# Patient Record
Sex: FEMALE | Race: WHITE | NOT HISPANIC OR LATINO | Employment: OTHER | URBAN - METROPOLITAN AREA
[De-identification: names, ages, dates, MRNs, and addresses within clinical notes are randomized per-mention and may not be internally consistent; named-entity substitution may affect disease eponyms.]

---

## 2017-02-14 ENCOUNTER — HOSPITAL ENCOUNTER (INPATIENT)
Facility: HOSPITAL | Age: 72
LOS: 13 days | Discharge: HOME | DRG: 956 | End: 2017-02-27
Attending: SURGERY | Admitting: INTERNAL MEDICINE
Payer: MEDICARE

## 2017-02-14 DIAGNOSIS — S72.002A CLOSED LEFT HIP FRACTURE, INITIAL ENCOUNTER: ICD-10-CM

## 2017-02-14 DIAGNOSIS — J98.9 RESPIRATORY DISORDER: ICD-10-CM

## 2017-02-14 DIAGNOSIS — S72.002A CLOSED FRACTURE OF LEFT HIP: ICD-10-CM

## 2017-02-14 DIAGNOSIS — S62.102A LEFT WRIST FRACTURE, CLOSED, INITIAL ENCOUNTER: Primary | ICD-10-CM

## 2017-02-14 DIAGNOSIS — M25.532 LEFT WRIST PAIN: ICD-10-CM

## 2017-02-14 LAB
ALBUMIN SERPL BCP-MCNC: 4.1 G/DL
ALP SERPL-CCNC: 57 U/L
ALT SERPL W/O P-5'-P-CCNC: 28 U/L
ANION GAP SERPL CALC-SCNC: 11 MMOL/L
AST SERPL-CCNC: 29 U/L
BASOPHILS # BLD AUTO: 0.01 K/UL
BASOPHILS NFR BLD: 0.1 %
BILIRUB SERPL-MCNC: 0.5 MG/DL
BILIRUB UR QL STRIP: NEGATIVE
BUN SERPL-MCNC: 18 MG/DL
CALCIUM SERPL-MCNC: 9.3 MG/DL
CHLORIDE SERPL-SCNC: 106 MMOL/L
CLARITY UR: CLEAR
CO2 SERPL-SCNC: 24 MMOL/L
COLOR UR: YELLOW
CREAT SERPL-MCNC: 0.7 MG/DL
DIFFERENTIAL METHOD: ABNORMAL
EOSINOPHIL # BLD AUTO: 0.1 K/UL
EOSINOPHIL NFR BLD: 0.5 %
ERYTHROCYTE [DISTWIDTH] IN BLOOD BY AUTOMATED COUNT: 12.7 %
EST. GFR  (AFRICAN AMERICAN): >60 ML/MIN/1.73 M^2
EST. GFR  (NON AFRICAN AMERICAN): >60 ML/MIN/1.73 M^2
GLUCOSE SERPL-MCNC: 144 MG/DL
GLUCOSE UR QL STRIP: NEGATIVE
HCT VFR BLD AUTO: 39.5 %
HGB BLD-MCNC: 13.7 G/DL
HGB UR QL STRIP: ABNORMAL
KETONES UR QL STRIP: ABNORMAL
LEUKOCYTE ESTERASE UR QL STRIP: NEGATIVE
LYMPHOCYTES # BLD AUTO: 0.8 K/UL
LYMPHOCYTES NFR BLD: 7.7 %
MCH RBC QN AUTO: 31.9 PG
MCHC RBC AUTO-ENTMCNC: 34.7 %
MCV RBC AUTO: 92 FL
MONOCYTES # BLD AUTO: 0.7 K/UL
MONOCYTES NFR BLD: 6.4 %
NEUTROPHILS # BLD AUTO: 9.2 K/UL
NEUTROPHILS NFR BLD: 85.3 %
NITRITE UR QL STRIP: NEGATIVE
PH UR STRIP: >8 [PH] (ref 5–8)
PLATELET # BLD AUTO: 328 K/UL
PMV BLD AUTO: 8.7 FL
POTASSIUM SERPL-SCNC: 3.3 MMOL/L
PROT SERPL-MCNC: 6.7 G/DL
PROT UR QL STRIP: NEGATIVE
RBC # BLD AUTO: 4.3 M/UL
SODIUM SERPL-SCNC: 141 MMOL/L
SP GR UR STRIP: 1.01 (ref 1–1.03)
URN SPEC COLLECT METH UR: ABNORMAL
UROBILINOGEN UR STRIP-ACNC: NEGATIVE EU/DL
WBC # BLD AUTO: 10.79 K/UL

## 2017-02-14 PROCEDURE — 80053 COMPREHEN METABOLIC PANEL: CPT

## 2017-02-14 PROCEDURE — 96365 THER/PROPH/DIAG IV INF INIT: CPT

## 2017-02-14 PROCEDURE — 99285 EMERGENCY DEPT VISIT HI MDM: CPT | Mod: 25

## 2017-02-14 PROCEDURE — 93005 ELECTROCARDIOGRAM TRACING: CPT

## 2017-02-14 PROCEDURE — 63600175 PHARM REV CODE 636 W HCPCS: Performed by: SURGERY

## 2017-02-14 PROCEDURE — 85025 COMPLETE CBC W/AUTO DIFF WBC: CPT

## 2017-02-14 PROCEDURE — 94761 N-INVAS EAR/PLS OXIMETRY MLT: CPT

## 2017-02-14 PROCEDURE — 94760 N-INVAS EAR/PLS OXIMETRY 1: CPT

## 2017-02-14 PROCEDURE — 36415 COLL VENOUS BLD VENIPUNCTURE: CPT

## 2017-02-14 PROCEDURE — 25000003 PHARM REV CODE 250: Performed by: SURGERY

## 2017-02-14 PROCEDURE — 11000001 HC ACUTE MED/SURG PRIVATE ROOM

## 2017-02-14 PROCEDURE — 27000339 *HC DAILY SUPPLY KIT

## 2017-02-14 PROCEDURE — 96375 TX/PRO/DX INJ NEW DRUG ADDON: CPT

## 2017-02-14 PROCEDURE — 81003 URINALYSIS AUTO W/O SCOPE: CPT

## 2017-02-14 PROCEDURE — 63600175 PHARM REV CODE 636 W HCPCS: Performed by: ORTHOPAEDIC SURGERY

## 2017-02-14 PROCEDURE — 51702 INSERT TEMP BLADDER CATH: CPT

## 2017-02-14 RX ORDER — NALOXONE HCL 0.4 MG/ML
1 VIAL (ML) INJECTION ONCE
Status: DISCONTINUED | OUTPATIENT
Start: 2017-02-14 | End: 2017-02-15

## 2017-02-14 RX ORDER — IBUPROFEN 600 MG/1
600 TABLET ORAL EVERY 6 HOURS PRN
Status: DISCONTINUED | OUTPATIENT
Start: 2017-02-14 | End: 2017-02-27 | Stop reason: HOSPADM

## 2017-02-14 RX ORDER — PANTOPRAZOLE SODIUM 40 MG/1
40 TABLET, DELAYED RELEASE ORAL DAILY
Status: DISCONTINUED | OUTPATIENT
Start: 2017-02-15 | End: 2017-02-27 | Stop reason: HOSPADM

## 2017-02-14 RX ORDER — ONDANSETRON 2 MG/ML
4 INJECTION INTRAMUSCULAR; INTRAVENOUS EVERY 12 HOURS PRN
Status: DISCONTINUED | OUTPATIENT
Start: 2017-02-14 | End: 2017-02-14 | Stop reason: SDUPTHER

## 2017-02-14 RX ORDER — ONDANSETRON 2 MG/ML
4 INJECTION INTRAMUSCULAR; INTRAVENOUS EVERY 8 HOURS PRN
Status: DISCONTINUED | OUTPATIENT
Start: 2017-02-14 | End: 2017-02-27 | Stop reason: HOSPADM

## 2017-02-14 RX ORDER — HYDROMORPHONE HCL IN 0.9% NACL 6 MG/30 ML
PATIENT CONTROLLED ANALGESIA SYRINGE INTRAVENOUS CONTINUOUS
Status: DISCONTINUED | OUTPATIENT
Start: 2017-02-14 | End: 2017-02-15

## 2017-02-14 RX ORDER — HYDROMORPHONE HYDROCHLORIDE 1 MG/ML
1 INJECTION, SOLUTION INTRAMUSCULAR; INTRAVENOUS; SUBCUTANEOUS EVERY 4 HOURS PRN
Status: DISCONTINUED | OUTPATIENT
Start: 2017-02-14 | End: 2017-02-14

## 2017-02-14 RX ORDER — SODIUM CHLORIDE 9 MG/ML
INJECTION, SOLUTION INTRAVENOUS CONTINUOUS
Status: DISCONTINUED | OUTPATIENT
Start: 2017-02-14 | End: 2017-02-16

## 2017-02-14 RX ORDER — HYDROMORPHONE HYDROCHLORIDE 1 MG/ML
1 INJECTION, SOLUTION INTRAMUSCULAR; INTRAVENOUS; SUBCUTANEOUS
Status: COMPLETED | OUTPATIENT
Start: 2017-02-14 | End: 2017-02-14

## 2017-02-14 RX ORDER — VENLAFAXINE HYDROCHLORIDE 75 MG/1
75 CAPSULE, EXTENDED RELEASE ORAL DAILY
Status: DISCONTINUED | OUTPATIENT
Start: 2017-02-15 | End: 2017-02-27 | Stop reason: HOSPADM

## 2017-02-14 RX ORDER — HYDROMORPHONE HYDROCHLORIDE 1 MG/ML
1 INJECTION, SOLUTION INTRAMUSCULAR; INTRAVENOUS; SUBCUTANEOUS EVERY 4 HOURS PRN
Status: DISCONTINUED | OUTPATIENT
Start: 2017-02-14 | End: 2017-02-15

## 2017-02-14 RX ORDER — ONDANSETRON 4 MG/1
4 TABLET, ORALLY DISINTEGRATING ORAL
Status: COMPLETED | OUTPATIENT
Start: 2017-02-14 | End: 2017-02-14

## 2017-02-14 RX ADMIN — ONDANSETRON 4 MG: 4 TABLET, ORALLY DISINTEGRATING ORAL at 05:02

## 2017-02-14 RX ADMIN — PROMETHAZINE HYDROCHLORIDE 12.5 MG: 25 INJECTION INTRAMUSCULAR; INTRAVENOUS at 10:02

## 2017-02-14 RX ADMIN — HYDROMORPHONE HYDROCHLORIDE 1 MG: 1 INJECTION, SOLUTION INTRAMUSCULAR; INTRAVENOUS; SUBCUTANEOUS at 04:02

## 2017-02-14 RX ADMIN — PROMETHAZINE HYDROCHLORIDE 12.5 MG: 25 INJECTION INTRAMUSCULAR; INTRAVENOUS at 04:02

## 2017-02-14 RX ADMIN — Medication: at 06:02

## 2017-02-14 NOTE — ED NOTES
DR REEDER ON WAY TO HOSPITAL   WILL GIVE PAIN MED AFTER EVALUATION   PATIENT UNDERSTANDS   AT SIDE

## 2017-02-14 NOTE — ED PROVIDER NOTES
[facility]                                        February 14, 2017                   Chief Complaint  71 y.o. female with Hip Pain (left hip pain and wrist s/p trauma  fall off exercise equipment)     History of Present Illness  Marycarmen Louis presents to the emergency room with left hip and wrist pain today  Patient was on exercise equipment with a slip and fall on her left side prior to arrival  Patient on arrival has an obvious left wrist and hip deformity consistent with fractures  Patient has good distal pulses and capillary refill, neurovascular intact on exam now  Patient denies any head trauma or loss of consciousness with the slip and fall today  Patient on x-ray has a femoral neck fracture and a nondisplaced wrist fracture today    The history is provided by the patient    Past Medical History   -- Anxiety    -- Arthritis    -- Cancer    -- COPD (chronic obstructive pulmonary disease)    -- Degenerative disc disease    -- Depression    -- Emphysema of lung    -- Macrocytosis    -- Meibomianitis    -- Memory loss    -- Osteoporosis    -- Platelet disorder      Past surgical history: Appendectomy and salivary gland surgery  ALLERGIES: Codeine, fentanyl, phenytoin, Vicodin    Review of Systems and Physical Exam     Review of Systems  -- Constitution - no fever, denies fatigue, no weakness, no chills  -- Eyes - no tearing or redness, no visual disturbance  -- Ear, Nose - no tinnitus or earache, no nasal congestion or discharge  -- Mouth,Throat - no sore throat, no toothache, normal voice, normal swallowing  -- Respiratory - denies cough and congestion, no shortness of breath, no SHAIKH  -- Cardiovascular - denies chest pain, no palpitations, denies claudication  -- Gastrointestinal - denies abdominal pain, nausea, vomiting, or diarrhea  -- Musculoskeletal - left hip and wrist pain today  -- Neurological - no headache, denies weakness or seizure; no LOC  -- Skin - denies pallor, rash, or  changes in skin. no hives or welts noted    Vital Signs  -- Her blood pressure is 136/69 and her pulse is 89.   -- Her respiration is 18 and oxygen saturation is 100%.      Physical Exam  -- Nursing note and vitals reviewed  -- Constitutional: Appears well-developed and well-nourished  -- Head: Atraumatic. Normocephalic. No obvious abnormality  -- Eyes: Pupils are equal and reactive to light. Normal conjunctiva and lids  -- Cardiac: Normal rate, regular rhythm and normal heart sounds  -- Pulmonary: Normal respiratory effort, breath sounds clear to auscultation  -- Abdominal: Soft, no tenderness. Normal bowel sounds. Normal liver edge  -- Musculoskeletal: Left wrist pain on exam, left hip deformity and leg shortening  -- Neurological: No focal deficits. Showed good interaction with staff  -- Vascular: Posterior tibial, dorsalis pedis and radial pulses 2+ bilaterally      Emergency Room Course     Treatment and Evaluation  -- Chest x-ray showed no infiltrate and showed no acute pathology   -- Left hip x-ray shows a femoral neck fracture  -- Left wrist x-ray show radius and ulnar fracture  -- The electrolytes drawn in the ER today were within normal limits   -- The CBC drawn in the ER today was within normal limits   -- The EKG findings today were without concerning findings from baseline   -- Velco wrist splint applied by the physician     Diagnosis  -- Left wrist fracture, closed  -- Left wrist pain and Closed left hip fracture, initial encounter     Disposition and Plan  -- Disposition: observation  -- Condition: stable  -- Telemetry monitoring  -- Morning labs  -- SCD hoses  -- Home medications  -- Nausea medication when necessary  -- Pain medication when necessary  -- Hep-Lock IV  -- Routine monitoring  -- Bed rest until otherwise stated  -- Low salt diet  -- Protonix for GERD prophylaxis  -- EKG in the morning  -- Aspirin daily  -- Cardiology consult  -- Paul catheter  -- Surgery in the morning     This note is  dictated on Dragon Natural Speaking word recognition program.  There are word recognition mistakes that are occasionally missed on review.             Santos Chapman MD  02/14/17 0085

## 2017-02-14 NOTE — IP AVS SNAPSHOT
[address] [address]  Phone: [phone]           Patient Discharge Instructions     Our goal is to set you up for success. This packet includes information on your condition, medications, and your home care. It will help you to care for yourself so you don't get sicker and need to go back to the hospital.     Please ask your nurse if you have any questions.        There are many details to remember when preparing to leave the hospital. Here is what you will need to do:    1. Take your medicine. If you are prescribed medications, review your Medication List in the following pages. You may have new medications to  at the pharmacy and others that you'll need to stop taking. Review the instructions for how and when to take your medications. Talk with your doctor or nurses if you are unsure of what to do.     2. Go to your follow-up appointments. Specific follow-up information is listed in the following pages. Your may be contacted by a transition nurse or clinical provider about future appointments. Be sure we have all of the phone numbers to reach you, if needed. Please contact your provider's office if you are unable to make an appointment.     3. Watch for warning signs. Your doctor or nurse will give you detailed warning signs to watch for and when to call for assistance. These instructions may also include educational information about your condition. If you experience any of warning signs to your health, call your doctor.               ** Verify the list of medication(s) below is accurate and up to date. Carry this with you in case of emergency. If your medications have changed, please notify your healthcare provider.             Medication List      START taking these medications        Additional Info                      ibuprofen 600 MG tablet   Commonly known as:  ADVIL,MOTRIN   Quantity:  60 tablet   Refills:  0   Dose:  600 mg    Last time this was  given:  600 mg on 2/26/2017  4:25 PM   Instructions:  Take 1 tablet (600 mg total) by mouth every 6 (six) hours as needed.     Begin Date    AM    Noon    PM    Bedtime       lorazepam 0.5 MG tablet   Commonly known as:  ATIVAN   Quantity:  30 tablet   Refills:  0   Dose:  0.5 mg    Instructions:  Take 1 tablet (0.5 mg total) by mouth every 6 (six) hours as needed for Anxiety.     Begin Date    AM    Noon    PM    Bedtime       rivaroxaban 10 mg Tab   Commonly known as:  XARELTO   Quantity:  13 tablet   Refills:  0   Dose:  10 mg    Last time this was given:  10 mg on 2/17/2017  5:01 PM   Instructions:  Take 1 tablet (10 mg total) by mouth daily with dinner or evening meal.     Begin Date    AM    Noon    PM    Bedtime       teriparatide 20 mcg/dose - 600 mcg/2.4 mL Pnij   Commonly known as:  FORTEO   Quantity:  2.4 mL   Refills:  11   Dose:  20 mcg   Comments:  Failed biphosphenate; now with hip and wrist fracture    Instructions:  Inject 0.08 mLs (20 mcg total) into the skin once daily.     Begin Date    AM    Noon    PM    Bedtime       tramadol 50 mg tablet   Commonly known as:  ULTRAM   Quantity:  60 tablet   Refills:  0   Dose:  50 mg    Last time this was given:  50 mg on 2/27/2017  9:02 AM   Instructions:  Take 1 tablet (50 mg total) by mouth every 6 (six) hours as needed.     Begin Date    AM    Noon    PM    Bedtime         CONTINUE taking these medications        Additional Info                      b complex vitamins capsule   Refills:  0   Dose:  1 capsule    Instructions:  Take 1 capsule by mouth once daily.     Begin Date    AM    Noon    PM    Bedtime       calcium-vitamin D3 500 mg(1,250mg) -200 unit per tablet   Refills:  0   Dose:  1 tablet    Instructions:  Take 1 tablet by mouth 2 (two) times daily with meals.     Begin Date    AM    Noon    PM    Bedtime       glucosamine-chondroitin 500-400 mg tablet   Refills:  0   Dose:  1 tablet    Instructions:  Take 1 tablet by mouth 3 (three) times  daily.     Begin Date    AM    Noon    PM    Bedtime       meclizine 25 mg tablet   Commonly known as:  ANTIVERT   Quantity:  30 tablet   Refills:  1   Dose:  25 mg    Instructions:  Take 1 tablet (25 mg total) by mouth 3 (three) times daily as needed for Dizziness.     Begin Date    AM    Noon    PM    Bedtime       melatonin 5 mg Tab   Refills:  0   Dose:  5 mg    Instructions:  Take 5 mg by mouth 2 (two) times daily.     Begin Date    AM    Noon    PM    Bedtime       multivitamin capsule   Refills:  0   Dose:  1 capsule    Instructions:  Take 1 capsule by mouth once daily.     Begin Date    AM    Noon    PM    Bedtime       ondansetron 8 MG Tbdl   Commonly known as:  ZOFRAN-ODT   Quantity:  6 tablet   Refills:  1    Last time this was given:  4 mg on 2/14/2017  5:06 PM   Instructions:  1 sublingual Q 8 hours, prn     Begin Date    AM    Noon    PM    Bedtime       venlafaxine 75 MG 24 hr capsule   Commonly known as:  EFFEXOR-XR   Quantity:  30 capsule   Refills:  12   Dose:  75 mg    Last time this was given:  75 mg on 2/27/2017  9:02 AM   Instructions:  Take 1 capsule (75 mg total) by mouth once daily.     Begin Date    AM    Noon    PM    Bedtime       SHEREEN-C ORAL   Refills:  0    Instructions:  Take by mouth.     Begin Date    AM    Noon    PM    Bedtime       VITAMIN B-1 100 MG tablet   Refills:  0   Dose:  100 mg   Generic drug:  thiamine    Instructions:  Take 100 mg by mouth once daily.     Begin Date    AM    Noon    PM    Bedtime       VITAMIN B-12 ORAL   Refills:  0    Instructions:  Take by mouth.     Begin Date    AM    Noon    PM    Bedtime         STOP taking these medications     alendronate 70 MG tablet   Commonly known as:  FOSAMAX            Where to Get Your Medications      These medications were sent to [facility], [address] [address]     Phone:  [phone]     ibuprofen 600 MG tablet    rivaroxaban 10 mg Tab     teriparatide 20 mcg/dose - 600 mcg/2.4 mL Pnij         You can get these medications from any pharmacy     Bring a paper prescription for each of these medications     lorazepam 0.5 MG tablet    tramadol 50 mg tablet                  Please bring to all follow up appointments:    1. A copy of your discharge instructions.  2. All medicines you are currently taking in their original bottles.  3. Identification and insurance card.    Please arrive 15 minutes ahead of scheduled appointment time.    Please call 24 hours in advance if you must reschedule your appointment and/or time.        Follow-up Information     Follow up with Shaun Sanchez MD.    Specialty:  Orthopedic Surgery    Why:  Outpatient Services    Contact information:    [address] [place] [address]  [phone]          Follow up with Kurtis Cesar MD.    Specialty:  Cardiology    Why:  please follow-up with Dr. Cesar's clinic on 3/17/17 at 2:10pm. The appointment will be with KM Thomas.    Contact information:    [address] [place] [address]  [phone]          Follow up with Dileep Moulton MD.    Specialty:  Family Medicine    Why:  either 1 week or s/p release from rehab    Contact information:    [address] [place] [address]  [phone]        Referrals     Future Orders    Referral to Home health         Discharge Instructions     Future Orders    Activity as tolerated     Diet general     Questions:    Total calories:      Fat restriction, if any:      Protein restriction, if any:      Na restriction, if any:      Fluid restriction:      Additional restrictions:          Discharge Instructions         Discharge Instructions for Hip Fracture Surgery  You had surgery to repair a hip fracture. The type of surgery you received depends on the location and severity of the fracture. You may have pins, screws, or rods (internal fixation devices) holding the fractured bone in place. Or, some or all of your hip may  have been replaced. You must take care of your new hip as you recover at home or in a rehabilitation facility. This means moving and sitting the way you were taught in the hospital. You must also see your doctor for follow-up visits as you slowly return to activity.  Hip repair for fracture or hip replacement is major surgery. So dont be surprised if it takes a few months before you can move comfortably. Plan to have your family and friends help when you return home.  Home care  · Take your pain medicine exactly as directed.  · Dont drive until your doctor says its OK. And never drive if you are taking opioid pain medicine.  · Wear the support stockings you were given in the hospital. Wear them 24 hours a day for 3 to 4 week(s).  · Make arrangements to have your staples removed 2 weeks after surgery. The staples were used to close the skin incision.  · Get up and carefully move around to relieve pain.  · If you received an artificial hip joint, tell all your healthcare providers--including your dentist--about the joint before any procedure. You may need to take antibiotics before dental work and other medical procedures to reduce the risk of infection.  Incision care  · Avoid infection by washing your hands often. If an infection occurs, it will need to be treated with antibiotics immediately. So call your doctor right away if you think you may have an infection. Symptoms of infection include a fever or leakage of white, greenish, or yellowish-colored fluid from the incision.  · Check your incision daily for redness, tenderness, or drainage.  · Avoid soaking your wound in water (no hot tubs, bathtubs, swimming pools) until your doctor says its OK.  · Wait 7 day(s) after your surgery to begin showering. Then shower as needed. Carefully wash your incision with soap and water. Gently pat it dry. Dont rub the incision, or apply creams or lotions. And sit on a shower stool when you shower to keep from  falling.  Sitting and sleeping  · Dont sit for more than 30 to 45 minutes at a time.  · Use chairs with arms, and sit with your knees slightly lower than your hips. Dont sit on low or sagging chairs or couches.  · Dont lean forward while sitting.  · Dont cross your legs.  · Keep your feet flat on the floor. Dont turn your foot or leg inward. This stresses your hip joint.  · Use an elevated toilet seat for 6 week(s) after surgery.  · Use pillows between your legs when sleeping on your back or on your healthy side.  · Sit on a firm cushion when you ride in a car and avoid sitting too low. Try not to bend your hip too much when getting in and out of the car.  Moving safely  · Dont bend at the hip when you bend over. Dont bend at the waist to put on socks and shoes. And avoid picking up items from the floor.  · Use a cane, crutches, a walker, or handrails until your balance, flexibility, and strength improve. And remember to ask for help from others when you need it.  · Free up your hands so that you can use them to keep balance. Use a sanjuanita pack, apron, or pockets to carry things.  · Follow your doctors orders regarding how much weight to place on the affected leg.  · Do all exercises as instructed.  · Arrange your household to keep the items you need within reach.  · Remove electrical cords, throw rugs, and anything else that may cause you to fall.  · Use nonslip bath mats, grab bars, an elevated toilet seat, and a shower chair in your bathroom.  Follow-up  Make a follow-up appointment as directed by your doctor.     When to seek medical attention  Call 911 right away if you have any of the following:  · Chest pain  · Shortness of breath  Otherwise, call your doctor right away if you have any of the following:  · Increased hip pain  · Pain or swelling of your calf or leg  · Fever above 100.4°F  (38.0°C) or shaking chills  · Excessive swelling, increased redness, or any drainage from the incision  · Swelling,  tenderness, or cramps in your leg   Date Last Reviewed: 11/15/2015  © 4580-5690 [employer]. [address] [place] [address], [place] [address]. All rights reserved. This information is not intended as a substitute for professional medical care. Always follow your healthcare professional's instructions.      The Xarelto needs to be taken for only 28 days (including the days you took it in the hospital). Your first dose was given in the hospital on 2/16/17. This will help prevent blood clots. Please take the medication with a meal. For any questions about this medication please contact Dr. Cesar's office.     Discharge References/Attachments     HIP PRECAUTIONS: NO ADDUCTION (ENGLISH)    HIP PRECAUTIONS: NO INTERNAL ROTATION (ENGLISH)    HIP PRECAUTIONS: LIMIT HIP FLEXION (ENGLISH)    MOBILITY: USING WALKER (ENGLISH)        Primary Diagnosis     Your primary diagnosis was:  Closed Fracture Of Hip      Admission Information     Date & Time Provider Department CSN    2/14/2017  3:25 PM Jose Jaeger MD [facility] [id number]      Care Providers     Provider Role Specialty Primary office phone    Jose Jaeger MD Attending Provider Family Medicine [phone]    Shaun Sanchez MD Consulting Physician  Orthopedic Surgery [phone]    Kurtis Cesar MD Consulting Physician  Cardiology [phone]    Shaun Sanchez MD Surgeon  Orthopedic Surgery [phone]    Reji Melendez MD Consulting Physician  Pulmonary Disease [phone]      Important Medicare Message          Most Recent Value    Important Message from Medicare Regarding Discharge Appeal Rights  Given to patient/caregiver, Explained to patient/caregiver, Signed/date by patient/caregiver yes 02/15/2017 1011      Your Vitals Were     BP                   93/61 (BP Location: Right arm, Patient Position: Lying, BP Method: Automatic)           Recent Lab Values     No lab values to display.      Pending Labs     Order  Current Status    Specimen to Pathology - Surgery Collected (02/15/17 1512)    Specimen to Pathology - Surgery Collected (02/15/17 1513)    Specimen to Pathology - Surgery In process    Prepare RBC 1 Unit Preliminary result      Allergies as of 2/27/2017        Reactions    Codeine     Other reaction(s): Vomiting  Other reaction(s): Headache    Fentanyl     Other reaction(s): Vomiting  Other reaction(s): Nausea    Phenytoin Sodium Extended     Other reaction(s): Vomiting  Other reaction(s): Nausea    Vicodin  [Hydrocodone-acetaminophen]     Other reaction(s): Vomiting      [facility]     [facility] Nurse Care Line - 24/7 Assistance  Unless otherwise directed by your provider, please contact [facility], our nurse care line that is available for 24/7 assistance.     Registered nurses in the [facility] provide clinical advisement, health education, appointment booking, and other advisory services.  Call for this free service at [phone].        Advance Directives     An advance directive is a document which, in the event you are no longer able to make decisions for yourself, tells your healthcare team what kind of treatment you do or do not want to receive, or who you would like to make those decisions for you.  If you do not currently have an advance directive, [facility] encourages you to create one.  For more information call:  [phone]), [phone] ([phone]),  or log on to [email or web address].        Smoking Cessation     If you would like to quit smoking:   You may be eligible for free services if you are a [place] resident and started smoking cigarettes before September 1, 1988.  Call the Smoking Cessation Trust (SCT) toll free at (219) 371-1826 or (092) 575-7643.   Call 5-457-QUIT-NOW if you do not meet the above criteria.            Language Assistance Services     ATTENTION: Language assistance services are available, free of charge. Please call  [phone].      ATENCIÓN: Si habla gina, tiene a weiss disposición servicios gratuitos de asistencia lingüística. Joselo al [phone].     [facility] Ý: N?u b?n nói Ti?ng Vi?t, có các d?ch v? h? tr? ngôn ng? mi?n phí dành cho b?n. G?i s? [phone].        Blood Transfusion Reaction Signs and Symptoms     The blood you have received has been matched for you as carefully as possible. Most patients who receive a blood transfusion do not experience problems. However, there can be a delayed reaction that happens a few weeks after your blood transfusion. Contact your physician immediately if you experience any NEW SYMPTOMS listed below:     Fever greater than 100.4 degrees    Chills   Yellow color to your skin or eyes(Jaundice)   Back pain, chest pain, or pain at the infusion site   Weakness (more than usual)   Discomfort or uneasiness more than usual (Malaise)   Nausea or vomiting   Shortness of breath, wheezing, or coughing   Higher or lower blood pressure than normal   Skin rash, itching, skin redness, or localized swelling (example: hands or feet)   Urinating less than normal   Urine appears reddish or orange and is darker than normal      Remember that some these signs may already exist for you--such as having chronic back pain or high blood pressure. You only need to look for and report to your doctor any new occurrences since your blood transfusion that are of concern.        [employer] Information            [facility] complies with applicable Federal civil rights laws and does not discriminate on the basis of race, color, national origin, age, disability, or sex.

## 2017-02-15 ENCOUNTER — ANESTHESIA EVENT (OUTPATIENT)
Dept: SURGERY | Facility: HOSPITAL | Age: 72
DRG: 956 | End: 2017-02-15
Payer: MEDICARE

## 2017-02-15 ENCOUNTER — ANESTHESIA (OUTPATIENT)
Dept: SURGERY | Facility: HOSPITAL | Age: 72
DRG: 956 | End: 2017-02-15
Payer: MEDICARE

## 2017-02-15 ENCOUNTER — SURGERY (OUTPATIENT)
Age: 72
End: 2017-02-15

## 2017-02-15 PROBLEM — R50.9 FEVER: Status: ACTIVE | Noted: 2017-02-15

## 2017-02-15 PROBLEM — M81.0 OSTEOPOROSIS: Status: ACTIVE | Noted: 2017-02-15

## 2017-02-15 PROBLEM — S72.002A CLOSED FRACTURE OF LEFT HIP: Status: ACTIVE | Noted: 2017-02-15

## 2017-02-15 LAB
ABO + RH BLD: NORMAL
ALBUMIN SERPL BCP-MCNC: 3.6 G/DL
ALP SERPL-CCNC: 45 U/L
ALT SERPL W/O P-5'-P-CCNC: 23 U/L
ANION GAP SERPL CALC-SCNC: 7 MMOL/L
AST SERPL-CCNC: 28 U/L
BASOPHILS # BLD AUTO: 0.01 K/UL
BASOPHILS NFR BLD: 0.1 %
BILIRUB SERPL-MCNC: 1 MG/DL
BLD GP AB SCN CELLS X3 SERPL QL: NORMAL
BUN SERPL-MCNC: 13 MG/DL
CALCIUM SERPL-MCNC: 8.1 MG/DL
CHLORIDE SERPL-SCNC: 102 MMOL/L
CO2 SERPL-SCNC: 27 MMOL/L
CREAT SERPL-MCNC: 0.7 MG/DL
DIFFERENTIAL METHOD: ABNORMAL
EOSINOPHIL # BLD AUTO: 0 K/UL
EOSINOPHIL NFR BLD: 0.3 %
ERYTHROCYTE [DISTWIDTH] IN BLOOD BY AUTOMATED COUNT: 12.8 %
EST. GFR  (AFRICAN AMERICAN): >60 ML/MIN/1.73 M^2
EST. GFR  (NON AFRICAN AMERICAN): >60 ML/MIN/1.73 M^2
GLUCOSE SERPL-MCNC: 120 MG/DL
HCT VFR BLD AUTO: 36 %
HGB BLD-MCNC: 12.1 G/DL
LYMPHOCYTES # BLD AUTO: 0.7 K/UL
LYMPHOCYTES NFR BLD: 6.5 %
MCH RBC QN AUTO: 31.4 PG
MCHC RBC AUTO-ENTMCNC: 33.6 %
MCV RBC AUTO: 94 FL
MONOCYTES # BLD AUTO: 0.9 K/UL
MONOCYTES NFR BLD: 8.4 %
NEUTROPHILS # BLD AUTO: 8.6 K/UL
NEUTROPHILS NFR BLD: 84.7 %
PLATELET # BLD AUTO: 287 K/UL
PMV BLD AUTO: 8.9 FL
POTASSIUM SERPL-SCNC: 3.6 MMOL/L
PROT SERPL-MCNC: 5.9 G/DL
RBC # BLD AUTO: 3.85 M/UL
SODIUM SERPL-SCNC: 136 MMOL/L
WBC # BLD AUTO: 10.2 K/UL

## 2017-02-15 PROCEDURE — 63600175 PHARM REV CODE 636 W HCPCS: Performed by: SURGERY

## 2017-02-15 PROCEDURE — 94761 N-INVAS EAR/PLS OXIMETRY MLT: CPT

## 2017-02-15 PROCEDURE — 0SRS0JZ REPLACEMENT OF LEFT HIP JOINT, FEMORAL SURFACE WITH SYNTHETIC SUBSTITUTE, OPEN APPROACH: ICD-10-PCS | Performed by: ORTHOPAEDIC SURGERY

## 2017-02-15 PROCEDURE — 86920 COMPATIBILITY TEST SPIN: CPT

## 2017-02-15 PROCEDURE — 36000711: Performed by: ORTHOPAEDIC SURGERY

## 2017-02-15 PROCEDURE — 27000494 *HC OXYGEN SET UP (STAH ONLY)

## 2017-02-15 PROCEDURE — 99223 1ST HOSP IP/OBS HIGH 75: CPT | Mod: ,,, | Performed by: FAMILY MEDICINE

## 2017-02-15 PROCEDURE — 01210 ANES OPEN PX HIP JOINT NOS: CPT | Mod: QZ,,P3 | Performed by: NURSE ANESTHETIST, CERTIFIED REGISTERED

## 2017-02-15 PROCEDURE — 25000003 PHARM REV CODE 250: Performed by: NURSE ANESTHETIST, CERTIFIED REGISTERED

## 2017-02-15 PROCEDURE — 94640 AIRWAY INHALATION TREATMENT: CPT

## 2017-02-15 PROCEDURE — 80053 COMPREHEN METABOLIC PANEL: CPT

## 2017-02-15 PROCEDURE — 63600175 PHARM REV CODE 636 W HCPCS: Performed by: FAMILY MEDICINE

## 2017-02-15 PROCEDURE — 71000033 HC RECOVERY, INTIAL HOUR: Performed by: ORTHOPAEDIC SURGERY

## 2017-02-15 PROCEDURE — 37000009 HC ANESTHESIA EA ADD 15 MINS: Performed by: ORTHOPAEDIC SURGERY

## 2017-02-15 PROCEDURE — C1776 JOINT DEVICE (IMPLANTABLE): HCPCS | Performed by: ORTHOPAEDIC SURGERY

## 2017-02-15 PROCEDURE — 36000710: Performed by: ORTHOPAEDIC SURGERY

## 2017-02-15 PROCEDURE — 63600175 PHARM REV CODE 636 W HCPCS: Performed by: ORTHOPAEDIC SURGERY

## 2017-02-15 PROCEDURE — 25000003 PHARM REV CODE 250: Performed by: ORTHOPAEDIC SURGERY

## 2017-02-15 PROCEDURE — C1769 GUIDE WIRE: HCPCS | Performed by: ORTHOPAEDIC SURGERY

## 2017-02-15 PROCEDURE — 85025 COMPLETE CBC W/AUTO DIFF WBC: CPT

## 2017-02-15 PROCEDURE — 11000001 HC ACUTE MED/SURG PRIVATE ROOM

## 2017-02-15 PROCEDURE — 71000039 HC RECOVERY, EACH ADD'L HOUR: Performed by: ORTHOPAEDIC SURGERY

## 2017-02-15 PROCEDURE — 25000003 PHARM REV CODE 250

## 2017-02-15 PROCEDURE — 27000221 HC OXYGEN, UP TO 24 HOURS

## 2017-02-15 PROCEDURE — 86900 BLOOD TYPING SEROLOGIC ABO: CPT

## 2017-02-15 PROCEDURE — 37000008 HC ANESTHESIA 1ST 15 MINUTES: Performed by: ORTHOPAEDIC SURGERY

## 2017-02-15 PROCEDURE — 94760 N-INVAS EAR/PLS OXIMETRY 1: CPT

## 2017-02-15 PROCEDURE — 86901 BLOOD TYPING SEROLOGIC RH(D): CPT

## 2017-02-15 PROCEDURE — 27000496 *HC LARYNGEAL BLADE (STAH ONLY)

## 2017-02-15 PROCEDURE — 0PSJ34Z REPOSITION LEFT RADIUS WITH INTERNAL FIXATION DEVICE, PERCUTANEOUS APPROACH: ICD-10-PCS | Performed by: ORTHOPAEDIC SURGERY

## 2017-02-15 PROCEDURE — 63600175 PHARM REV CODE 636 W HCPCS

## 2017-02-15 PROCEDURE — 88305 TISSUE EXAM BY PATHOLOGIST: CPT | Performed by: PATHOLOGY

## 2017-02-15 PROCEDURE — 27000495 *HC ANESTHESIA START UP SUPPLY KIT (STAH ONLY)

## 2017-02-15 PROCEDURE — 27000339 *HC DAILY SUPPLY KIT

## 2017-02-15 PROCEDURE — 27200120 HC KIT IV START (RUSH ONLY)

## 2017-02-15 DEVICE — IMPLANTABLE DEVICE: Type: IMPLANTABLE DEVICE | Site: HIP | Status: FUNCTIONAL

## 2017-02-15 DEVICE — HEAD FEMORAL 28MM: Type: IMPLANTABLE DEVICE | Site: HIP | Status: FUNCTIONAL

## 2017-02-15 DEVICE — HIP STEM SUMMITT: Type: IMPLANTABLE DEVICE | Site: HIP | Status: FUNCTIONAL

## 2017-02-15 DEVICE — IMPLANTABLE DEVICE: Type: IMPLANTABLE DEVICE | Site: RADIUS | Status: FUNCTIONAL

## 2017-02-15 RX ORDER — PROPOFOL 10 MG/ML
VIAL (ML) INTRAVENOUS
Status: DISCONTINUED | OUTPATIENT
Start: 2017-02-15 | End: 2017-02-15

## 2017-02-15 RX ORDER — SCOLOPAMINE TRANSDERMAL SYSTEM 1 MG/1
PATCH, EXTENDED RELEASE TRANSDERMAL
Status: DISCONTINUED | OUTPATIENT
Start: 2017-02-15 | End: 2017-02-15

## 2017-02-15 RX ORDER — ACETAMINOPHEN 500 MG
500 TABLET ORAL EVERY 6 HOURS PRN
Status: DISCONTINUED | OUTPATIENT
Start: 2017-02-15 | End: 2017-02-27 | Stop reason: HOSPADM

## 2017-02-15 RX ORDER — HYDROMORPHONE HYDROCHLORIDE 1 MG/ML
1 INJECTION, SOLUTION INTRAMUSCULAR; INTRAVENOUS; SUBCUTANEOUS EVERY 4 HOURS PRN
Status: DISCONTINUED | OUTPATIENT
Start: 2017-02-15 | End: 2017-02-20

## 2017-02-15 RX ORDER — MIDAZOLAM HYDROCHLORIDE 1 MG/ML
INJECTION INTRAMUSCULAR; INTRAVENOUS
Status: DISCONTINUED | OUTPATIENT
Start: 2017-02-15 | End: 2017-02-15

## 2017-02-15 RX ORDER — FLUMAZENIL 0.1 MG/ML
INJECTION INTRAVENOUS
Status: DISCONTINUED | OUTPATIENT
Start: 2017-02-15 | End: 2017-02-15

## 2017-02-15 RX ORDER — LIDOCAINE HYDROCHLORIDE 20 MG/ML
INJECTION, SOLUTION EPIDURAL; INFILTRATION; INTRACAUDAL; PERINEURAL
Status: DISCONTINUED | OUTPATIENT
Start: 2017-02-15 | End: 2017-02-15

## 2017-02-15 RX ORDER — KETOROLAC TROMETHAMINE 30 MG/ML
INJECTION, SOLUTION INTRAMUSCULAR; INTRAVENOUS
Status: DISCONTINUED | OUTPATIENT
Start: 2017-02-15 | End: 2017-02-15

## 2017-02-15 RX ORDER — TRAMADOL HYDROCHLORIDE 50 MG/1
50 TABLET ORAL EVERY 6 HOURS PRN
Status: DISCONTINUED | OUTPATIENT
Start: 2017-02-15 | End: 2017-02-27 | Stop reason: HOSPADM

## 2017-02-15 RX ORDER — GLYCOPYRROLATE 0.2 MG/ML
INJECTION INTRAMUSCULAR; INTRAVENOUS
Status: DISCONTINUED | OUTPATIENT
Start: 2017-02-15 | End: 2017-02-15

## 2017-02-15 RX ORDER — ROCURONIUM BROMIDE 10 MG/ML
INJECTION, SOLUTION INTRAVENOUS
Status: DISCONTINUED | OUTPATIENT
Start: 2017-02-15 | End: 2017-02-15

## 2017-02-15 RX ORDER — KETOROLAC TROMETHAMINE 15 MG/ML
15 INJECTION, SOLUTION INTRAMUSCULAR; INTRAVENOUS EVERY 8 HOURS PRN
Status: DISPENSED | OUTPATIENT
Start: 2017-02-15 | End: 2017-02-18

## 2017-02-15 RX ORDER — HYDROMORPHONE HYDROCHLORIDE 2 MG/ML
INJECTION, SOLUTION INTRAMUSCULAR; INTRAVENOUS; SUBCUTANEOUS
Status: DISCONTINUED | OUTPATIENT
Start: 2017-02-15 | End: 2017-02-15

## 2017-02-15 RX ORDER — FUROSEMIDE 10 MG/ML
10 INJECTION INTRAMUSCULAR; INTRAVENOUS ONCE
Status: COMPLETED | OUTPATIENT
Start: 2017-02-15 | End: 2017-02-15

## 2017-02-15 RX ORDER — ACETAMINOPHEN 10 MG/ML
INJECTION, SOLUTION INTRAVENOUS
Status: DISCONTINUED | OUTPATIENT
Start: 2017-02-15 | End: 2017-02-15

## 2017-02-15 RX ORDER — LEVALBUTEROL INHALATION SOLUTION 0.63 MG/3ML
0.63 SOLUTION RESPIRATORY (INHALATION) ONCE
Status: COMPLETED | OUTPATIENT
Start: 2017-02-15 | End: 2017-02-15

## 2017-02-15 RX ORDER — ONDANSETRON 2 MG/ML
4 INJECTION INTRAMUSCULAR; INTRAVENOUS EVERY 12 HOURS PRN
Status: DISCONTINUED | OUTPATIENT
Start: 2017-02-15 | End: 2017-02-15

## 2017-02-15 RX ORDER — FUROSEMIDE 10 MG/ML
30 INJECTION INTRAMUSCULAR; INTRAVENOUS ONCE
Status: COMPLETED | OUTPATIENT
Start: 2017-02-15 | End: 2017-02-15

## 2017-02-15 RX ORDER — SODIUM CHLORIDE 0.9 % (FLUSH) 0.9 %
3 SYRINGE (ML) INJECTION EVERY 8 HOURS
Status: DISCONTINUED | OUTPATIENT
Start: 2017-02-15 | End: 2017-02-27 | Stop reason: HOSPADM

## 2017-02-15 RX ORDER — AMOXICILLIN 250 MG
1 CAPSULE ORAL 2 TIMES DAILY
Status: DISCONTINUED | OUTPATIENT
Start: 2017-02-15 | End: 2017-02-27 | Stop reason: HOSPADM

## 2017-02-15 RX ORDER — PROPOFOL 10 MG/ML
VIAL (ML) INTRAVENOUS CONTINUOUS PRN
Status: DISCONTINUED | OUTPATIENT
Start: 2017-02-15 | End: 2017-02-15

## 2017-02-15 RX ORDER — FENTANYL CITRATE 50 UG/ML
INJECTION, SOLUTION INTRAMUSCULAR; INTRAVENOUS
Status: DISCONTINUED | OUTPATIENT
Start: 2017-02-15 | End: 2017-02-15

## 2017-02-15 RX ORDER — SODIUM CHLORIDE, SODIUM LACTATE, POTASSIUM CHLORIDE, CALCIUM CHLORIDE 600; 310; 30; 20 MG/100ML; MG/100ML; MG/100ML; MG/100ML
INJECTION, SOLUTION INTRAVENOUS CONTINUOUS PRN
Status: DISCONTINUED | OUTPATIENT
Start: 2017-02-15 | End: 2017-02-15

## 2017-02-15 RX ORDER — PHENYLEPHRINE HYDROCHLORIDE 10 MG/ML
INJECTION INTRAVENOUS
Status: DISCONTINUED | OUTPATIENT
Start: 2017-02-15 | End: 2017-02-15

## 2017-02-15 RX ORDER — FAMOTIDINE 20 MG/1
20 TABLET, FILM COATED ORAL 2 TIMES DAILY
Status: DISCONTINUED | OUTPATIENT
Start: 2017-02-15 | End: 2017-02-15

## 2017-02-15 RX ORDER — HYDROCODONE BITARTRATE AND ACETAMINOPHEN 7.5; 325 MG/1; MG/1
1 TABLET ORAL EVERY 6 HOURS PRN
Status: DISCONTINUED | OUTPATIENT
Start: 2017-02-15 | End: 2017-02-15

## 2017-02-15 RX ORDER — NEOSTIGMINE METHYLSULFATE 1 MG/ML
INJECTION, SOLUTION INTRAVENOUS
Status: DISCONTINUED | OUTPATIENT
Start: 2017-02-15 | End: 2017-02-15

## 2017-02-15 RX ORDER — DEXAMETHASONE SODIUM PHOSPHATE 4 MG/ML
INJECTION, SOLUTION INTRA-ARTICULAR; INTRALESIONAL; INTRAMUSCULAR; INTRAVENOUS; SOFT TISSUE
Status: DISCONTINUED | OUTPATIENT
Start: 2017-02-15 | End: 2017-02-15

## 2017-02-15 RX ADMIN — SCOPOLAMINE 1 PATCH: 1 PATCH, EXTENDED RELEASE TRANSDERMAL at 08:02

## 2017-02-15 RX ADMIN — PHENYLEPHRINE HYDROCHLORIDE 100 MCG: 10 INJECTION INTRAVENOUS at 12:02

## 2017-02-15 RX ADMIN — ONDANSETRON 4 MG: 2 INJECTION INTRAMUSCULAR; INTRAVENOUS at 04:02

## 2017-02-15 RX ADMIN — ACETAMINOPHEN 1000 MG: 10 INJECTION, SOLUTION INTRAVENOUS at 01:02

## 2017-02-15 RX ADMIN — HYDROMORPHONE HYDROCHLORIDE 0.2 MG: 2 INJECTION, SOLUTION INTRAMUSCULAR; INTRAVENOUS; SUBCUTANEOUS at 03:02

## 2017-02-15 RX ADMIN — FUROSEMIDE 30 MG: 10 INJECTION, SOLUTION INTRAMUSCULAR; INTRAVENOUS at 03:02

## 2017-02-15 RX ADMIN — MIDAZOLAM HYDROCHLORIDE 2 MG: 1 INJECTION, SOLUTION INTRAMUSCULAR; INTRAVENOUS at 12:02

## 2017-02-15 RX ADMIN — ROCURONIUM BROMIDE 10 MG: 10 INJECTION, SOLUTION INTRAVENOUS at 01:02

## 2017-02-15 RX ADMIN — ROCURONIUM BROMIDE 20 MG: 10 INJECTION, SOLUTION INTRAVENOUS at 12:02

## 2017-02-15 RX ADMIN — SODIUM CHLORIDE, SODIUM LACTATE, POTASSIUM CHLORIDE, AND CALCIUM CHLORIDE: .6; .31; .03; .02 INJECTION, SOLUTION INTRAVENOUS at 02:02

## 2017-02-15 RX ADMIN — DOCUSATE SODIUM AND SENNOSIDES 1 TABLET: 8.6; 5 TABLET, FILM COATED ORAL at 09:02

## 2017-02-15 RX ADMIN — SODIUM CHLORIDE, SODIUM LACTATE, POTASSIUM CHLORIDE, AND CALCIUM CHLORIDE: .6; .31; .03; .02 INJECTION, SOLUTION INTRAVENOUS at 01:02

## 2017-02-15 RX ADMIN — FLUMAZENIL 0.3 MG: 0.1 INJECTION, SOLUTION INTRAVENOUS at 04:02

## 2017-02-15 RX ADMIN — PROPOFOL 150 MG: 10 INJECTION, EMULSION INTRAVENOUS at 12:02

## 2017-02-15 RX ADMIN — SODIUM CHLORIDE, SODIUM LACTATE, POTASSIUM CHLORIDE, AND CALCIUM CHLORIDE: .6; .31; .03; .02 INJECTION, SOLUTION INTRAVENOUS at 12:02

## 2017-02-15 RX ADMIN — NEOSTIGMINE METHYLSULFATE 3 MG: 1 INJECTION INTRAVENOUS at 02:02

## 2017-02-15 RX ADMIN — ROCURONIUM BROMIDE 30 MG: 10 INJECTION, SOLUTION INTRAVENOUS at 12:02

## 2017-02-15 RX ADMIN — FUROSEMIDE 10 MG: 10 INJECTION, SOLUTION INTRAMUSCULAR; INTRAVENOUS at 03:02

## 2017-02-15 RX ADMIN — HYDROMORPHONE HYDROCHLORIDE 1 MG: 1 INJECTION, SOLUTION INTRAMUSCULAR; INTRAVENOUS; SUBCUTANEOUS at 08:02

## 2017-02-15 RX ADMIN — FENTANYL CITRATE 100 MCG: 50 INJECTION, SOLUTION INTRAMUSCULAR; INTRAVENOUS at 12:02

## 2017-02-15 RX ADMIN — PROPOFOL 250 MCG/KG/MIN: 10 INJECTION, EMULSION INTRAVENOUS at 12:02

## 2017-02-15 RX ADMIN — ROCURONIUM BROMIDE 10 MG: 10 INJECTION, SOLUTION INTRAVENOUS at 12:02

## 2017-02-15 RX ADMIN — DEXAMETHASONE SODIUM PHOSPHATE 8 MG: 4 INJECTION, SOLUTION INTRAMUSCULAR; INTRAVENOUS at 12:02

## 2017-02-15 RX ADMIN — LEVALBUTEROL HYDROCHLORIDE 0.63 MG: 0.63 SOLUTION RESPIRATORY (INHALATION) at 04:02

## 2017-02-15 RX ADMIN — LIDOCAINE HYDROCHLORIDE 100 MG: 20 INJECTION, SOLUTION EPIDURAL; INFILTRATION; INTRACAUDAL; PERINEURAL at 12:02

## 2017-02-15 RX ADMIN — GLYCOPYRROLATE 0.4 MG: 0.2 INJECTION INTRAMUSCULAR; INTRAVENOUS at 02:02

## 2017-02-15 RX ADMIN — ONDANSETRON 4 MG: 2 INJECTION INTRAMUSCULAR; INTRAVENOUS at 12:02

## 2017-02-15 RX ADMIN — PROPOFOL 50 MG: 10 INJECTION, EMULSION INTRAVENOUS at 12:02

## 2017-02-15 RX ADMIN — SODIUM CHLORIDE: 0.9 INJECTION, SOLUTION INTRAVENOUS at 05:02

## 2017-02-15 RX ADMIN — KETOROLAC TROMETHAMINE 30 MG: 30 INJECTION, SOLUTION INTRAMUSCULAR; INTRAVENOUS at 03:02

## 2017-02-15 RX ADMIN — FENTANYL CITRATE 50 MCG: 50 INJECTION, SOLUTION INTRAMUSCULAR; INTRAVENOUS at 02:02

## 2017-02-15 NOTE — H&P
[facility] Medicine  Progress Note    Patient Name: Marycarmen Louis  MRN: [id number]  Patient Class: IP- Inpatient   Admission Date: 2/14/2017  Length of Stay: 1 days  Attending Physician: Paul Higgins MD  Primary Care Provider: Willie Santos MD (Inactive)        Subjective:     Principal Problem:Closed fracture of left hip    HPI:  70 y/o female reports she fell off of gym apparatus. Reports falling after missing step. ER w/up shows left wrist fracture and left hip fracture. Reports feeling fine prior to fall yesterday. No fever prior to this am.    Hospital Course:  Currently resting comfortably in bed. Pain controlled with IV dilaudid  Fever overnight       Past Medical History   Diagnosis Date    Anxiety     Arthritis     Cancer     COPD (chronic obstructive pulmonary disease)     Degenerative disc disease     Depression     Emphysema of lung     Macrocytosis     Meibomianitis 11/3/10    Memory loss     Osteoporosis     Platelet disorder        Past Surgical History   Procedure Laterality Date    Appendectomy      Salviary gland removed      Joint replacement  2014     left knee  tka       Review of patient's allergies indicates:   Allergen Reactions    Codeine      Other reaction(s): Vomiting  Other reaction(s): Headache    Fentanyl      Other reaction(s): Vomiting  Other reaction(s): Nausea    Phenytoin sodium extended      Other reaction(s): Vomiting  Other reaction(s): Nausea    Vicodin  [hydrocodone-acetaminophen]      Other reaction(s): Vomiting       No current facility-administered medications on file prior to encounter.      Current Outpatient Prescriptions on File Prior to Encounter   Medication Sig    b complex vitamins capsule Take 1 capsule by mouth once daily.    calcium-vitamin D3 500 mg(1,250mg) -200 unit per tablet Take 1 tablet by mouth 2 (two) times daily with meals.    CYANOCOBALAMIN, VITAMIN B-12, (VITAMIN B-12 ORAL) Take by mouth.  "   melatonin 5 mg Tab Take 5 mg by mouth 2 (two) times daily.    multivitamin capsule Take 1 capsule by mouth once daily.    venlafaxine (EFFEXOR-XR) 75 MG 24 hr capsule Take 1 capsule (75 mg total) by mouth once daily.    alendronate (FOSAMAX) 70 MG tablet Take 1 tablet (70 mg total) by mouth every 7 days.    ASCORBIC ACID (SHEREEN-C ORAL) Take by mouth.    glucosamine-chondroitin 500-400 mg tablet Take 1 tablet by mouth 3 (three) times daily.    meclizine (ANTIVERT) 25 mg tablet Take 1 tablet (25 mg total) by mouth 3 (three) times daily as needed for Dizziness.    ondansetron (ZOFRAN-ODT) 8 MG TbDL 1 sublingual Q 8 hours, prn    thiamine (VITAMIN B-1) 100 MG tablet Take 100 mg by mouth once daily.     Family History     Problem Relation (Age of Onset)    Cancer Father    Heart disease Mother    No Known Problems Sister, Brother, Maternal Aunt, Maternal Uncle, Paternal Aunt, Paternal Uncle, Maternal Grandmother, Maternal Grandfather, Paternal Grandmother, Paternal Grandfather        Social History Main Topics    Smoking status: Former Smoker    Smokeless tobacco: Not on file    Alcohol use Yes      Comment: occasionally    Drug use: Not on file    Sexual activity: Not on file     Review of Systems   Constitutional: Negative for chills, diaphoresis, fatigue and fever.        "i'm burning up"   HENT: Negative for congestion, nosebleeds, postnasal drip, rhinorrhea, sinus pressure, sneezing and sore throat.    Eyes: Negative for visual disturbance.   Respiratory: Negative for cough, shortness of breath and wheezing.         History of smoking 20 years ago; reports chronic cough "most of the time"   Cardiovascular: Negative for chest pain.   Gastrointestinal: Positive for nausea and vomiting. Negative for abdominal distention, abdominal pain, blood in stool, constipation and diarrhea.        Only secondary to pain meds in ER; reports she cannot "take all that"   Genitourinary: Negative for difficulty " urinating, dyspareunia, flank pain, frequency and hematuria.   Musculoskeletal: Negative for arthralgias, back pain and myalgias.   Neurological: Positive for headaches (this am).   Psychiatric/Behavioral: Negative for dysphoric mood (takes antidepressant) and sleep disturbance.     Objective:     Vital Signs (Most Recent):  Temp: 99.2 °F (37.3 °C) (02/15/17 0749)  Pulse: 99 (02/15/17 0749)  Resp: 16 (02/15/17 0749)  BP: 133/63 (02/15/17 0749)  SpO2: 95 % (02/15/17 0749) Vital Signs (24h Range):  Temp:  [96.4 °F (35.8 °C)-100.9 °F (38.3 °C)] 99.2 °F (37.3 °C)  Pulse:  [] 99  Resp:  [16-20] 16  SpO2:  [70 %-100 %] 95 %  BP: (123-150)/(63-78) 133/63     Weight: 59 kg (130 lb)  Body mass index is 22.31 kg/(m^2).    Physical Exam   Constitutional: She is oriented to person, place, and time. She appears well-developed and well-nourished.   HENT:   Head: Normocephalic and atraumatic.   Right Ear: External ear normal.   Left Ear: External ear normal.   Nose: Nose normal.   Mouth/Throat: Oropharynx is clear and moist.   TMs clear  OP clear    Eyes: EOM are normal. Pupils are equal, round, and reactive to light.   Neck: Normal range of motion. Neck supple. No JVD present. No tracheal deviation present. No thyromegaly present.   Cardiovascular: Normal rate, normal heart sounds and intact distal pulses.    No murmur heard.  Pulmonary/Chest: Effort normal and breath sounds normal. No respiratory distress. She has no wheezes. She has no rales. She exhibits no tenderness.   Abdominal: Soft. Bowel sounds are normal. She exhibits no distension and no mass. There is no tenderness. There is no rebound and no guarding.   Musculoskeletal: Normal range of motion. She exhibits no edema or tenderness.   Left wrist and left LE immobilzed   Lymphadenopathy:     She has no cervical adenopathy.   Neurological: She is alert and oriented to person, place, and time. She has normal reflexes. She displays normal reflexes. No cranial nerve  deficit. She exhibits normal muscle tone. Coordination normal.   Skin: Skin is warm and dry. No rash noted. No erythema. No pallor.   Psychiatric: She has a normal mood and affect. Her behavior is normal. Judgment and thought content normal.        Significant Labs:   CBC:   Recent Labs  Lab 02/14/17  1635 02/15/17  0514   WBC 10.79 10.20   HGB 13.7 12.1   HCT 39.5 36.0*    287     CMP:   Recent Labs  Lab 02/14/17  1635 02/15/17  0514    136   K 3.3* 3.6    102   CO2 24 27   * 120*   BUN 18 13   CREATININE 0.7 0.7   CALCIUM 9.3 8.1*   PROT 6.7 5.9*   ALBUMIN 4.1 3.6   BILITOT 0.5 1.0   ALKPHOS 57 45*   AST 29 28   ALT 28 23   ANIONGAP 11 7*   EGFRNONAA >60 >60     Urine Studies:   Recent Labs  Lab 02/14/17  1720   COLORU Yellow   APPEARANCEUA Clear   PHUR >8.0   SPECGRAV 1.015   PROTEINUA Negative   GLUCUA Negative   KETONESU 1+*   BILIRUBINUA Negative   OCCULTUA Trace*   NITRITE Negative   UROBILINOGEN Negative   LEUKOCYTESUR Negative       Significant Imaging: CXR: I have reviewed all pertinent results/findings within the past 24 hours and my personal findings are:  reviewed.     Assessment/Plan:      * Closed fracture of left hip  Ortho following  Surgery today  She is medically cleared      Coronary artery calcification seen on CAT scan  [facility] has cleared her for surgery      Left wrist fracture  Ortho follwing      Osteoporosis  Failed fosamax   Will need forteo x 2 years at discharge       Fever  Normal exam, CXR, U/A. ROS negative.  This fever is presumed to be from her acute fractures      VTE Risk Mitigation         Ordered     Medium Risk of VTE  Once      02/14/17 1744          Dileep Moulton MD  Department of Hospital Medicine   [facility]

## 2017-02-15 NOTE — ANESTHESIA PREPROCEDURE EVALUATION
02/15/2017  Marycarmen Louis is a 71 y.o., female.    [facility] Anesthesia Evaluation    I have reviewed the Patient Summary Reports.    I have reviewed the Nursing Notes.   I have reviewed the Medications.     Review of Systems  Social:  Former Smoker, Social Alcohol Use    Hematology/Oncology:  Hematology Normal   Oncology Normal     EENT/Dental:EENT/Dental Normal   Cardiovascular:   Exercise tolerance: good CAD asymptomatic     Pulmonary:   COPD, mild    Renal/:  Renal/ Normal     Musculoskeletal:   Arthritis     Neurological:  Neurology Normal    Endocrine:  Endocrine Normal    Psych:   Psychiatric History          Physical Exam  General:  Well nourished    Airway/Jaw/Neck:  Airway Findings: Mouth Opening: Normal Tongue: Normal  General Airway Assessment: Adult  Mallampati: II  TM Distance: Normal, at least 6 cm  Jaw/Neck Findings:  Neck ROM: Normal ROM      Dental:  Dental Findings: In tact        Mental Status:  Mental Status Findings:  Cooperative         Anesthesia Plan  Type of Anesthesia, risks & benefits discussed:  Anesthesia Type:  general  Patient's Preference:   Intra-op Monitoring Plan:   Intra-op Monitoring Plan Comments:   Post Op Pain Control Plan:   Post Op Pain Control Plan Comments:   Induction:   IV  Beta Blocker:  Patient is not currently on a Beta-Blocker (No further documentation required).       Informed Consent: Patient understands risks and agrees with Anesthesia plan.  Questions answered.   ASA Score: 3     Day of Surgery Review of History & Physical: I have interviewed and examined the patient. I have reviewed the patient's H&P dated: 2/15/17. There are no significant changes.  H&P update referred to the surgeon.         Ready For Surgery From Anesthesia Perspective.

## 2017-02-15 NOTE — NURSING
Rounding on patient noted to be difficult to arouse. Patient 's PCA continues and patient had received Phenergan . Pulse Ox obtained and noted to be in 70's with ECo2 44. O2 applied at 3l with increase in sats initially to 80's and eventually to 92. Kept patient stimulated and encouraged deep breathing.Dozes back to sleep. PCA pump stopped. B/P stable.

## 2017-02-15 NOTE — PLAN OF CARE
Problem: Patient Care Overview  Goal: Plan of Care Review  Outcome: Ongoing (interventions implemented as appropriate)  The patient did become sedate with her PCA and a dose of Phenergan. Since adjustments made she has been arousable and is still comfortable. She has required repeat nausea meds with relief  Fall risk assessment 13 and precautions are in place . She has been NPO and anticipates surgery later today. Palan of care reviewed with patient and she verbalizes understanding Weld traction maintained as well as Left wrist splint

## 2017-02-15 NOTE — NURSING
Report received. Patient admitted to floor. Hip pain when moving. Oriented to room/ Plan of care reviewed. Family at bedside. Call bell within reach. Patient instructed to call with needs.

## 2017-02-15 NOTE — CONSULTS
This is a 71-year-old white female brought into the emergency room after she sustained a fall while on some exercise equipment injuring her left hip and left wrist. Emergency room evaluation and x-rays show a displaced fracture of the left femoral neck, and a comminuted fracture of the left distal radius including the ulna will styloid. She has a normal neurovascular examination to both left upper and lower extremities. Two years ago she had a left total knee arthroplasty. The left lower extremity is shortened consistent with the injury. Present in the emergency room with her is her . I explained to both of them the necessities of fixing the left hip. They normally live in [place], but have received all of their medical care at an [facility] facility. Informed consent has been obtained. She also has a past history of COPD. She is being admitted to the hospital by the medical service and will be cleared for anticipated bipolar endoprosthetic hemiarthroplasty and close reduction percutaneous distraction pinning of the left distal radius tomorrow.

## 2017-02-15 NOTE — CONSULTS
[facility]  Cardiology  Consult Note    Patient Name: Marycarmen Louis  MRN: [id number]  Admission Date: 2/14/2017  Hospital Length of Stay: 1 days  Code Status: Full Code   Attending Provider: Paul Higgins MD   Consulting Provider: JOSEF Guillen  Primary Care Physician: Willie Santos MD (Inactive)  Principal Problem:<principal problem not specified>    Patient information was obtained from patient and ER records.     Inpatient consult to [facility]  Consult performed by: BIANKA EVANS  Consult ordered by: BARB ROGEL        Subjective:     Chief Complaint:  Lt hip and Lt wrist fracture/surgical risk stratification      HPI: 71 year old w/f with Hx of COPD, CA, osteoporosis, and no significant cardiac history presented to the ED after falling while exercising at the gym. She sustained Lt hip and wrist fracture. She denies any recent chest pain, palpitations, SOB, dizziness or weakness.     Past Medical History   Diagnosis Date    Anxiety     Arthritis     Cancer     COPD (chronic obstructive pulmonary disease)     Degenerative disc disease     Depression     Emphysema of lung     Macrocytosis     Meibomianitis 11/3/10    Memory loss     Osteoporosis     Platelet disorder        Past Surgical History   Procedure Laterality Date    Appendectomy      Salviary gland removed      Joint replacement  2014     left knee  tka       Review of patient's allergies indicates:   Allergen Reactions    Codeine      Other reaction(s): Vomiting  Other reaction(s): Headache    Fentanyl      Other reaction(s): Vomiting  Other reaction(s): Nausea    Phenytoin sodium extended      Other reaction(s): Vomiting  Other reaction(s): Nausea    Vicodin  [hydrocodone-acetaminophen]      Other reaction(s): Vomiting       No current facility-administered medications on file prior to encounter.      Current Outpatient Prescriptions on File Prior to Encounter   Medication Sig    b  complex vitamins capsule Take 1 capsule by mouth once daily.    calcium-vitamin D3 500 mg(1,250mg) -200 unit per tablet Take 1 tablet by mouth 2 (two) times daily with meals.    CYANOCOBALAMIN, VITAMIN B-12, (VITAMIN B-12 ORAL) Take by mouth.    melatonin 5 mg Tab Take 5 mg by mouth 2 (two) times daily.    multivitamin capsule Take 1 capsule by mouth once daily.    venlafaxine (EFFEXOR-XR) 75 MG 24 hr capsule Take 1 capsule (75 mg total) by mouth once daily.    alendronate (FOSAMAX) 70 MG tablet Take 1 tablet (70 mg total) by mouth every 7 days.    ASCORBIC ACID (SHEREEN-C ORAL) Take by mouth.    glucosamine-chondroitin 500-400 mg tablet Take 1 tablet by mouth 3 (three) times daily.    meclizine (ANTIVERT) 25 mg tablet Take 1 tablet (25 mg total) by mouth 3 (three) times daily as needed for Dizziness.    ondansetron (ZOFRAN-ODT) 8 MG TbDL 1 sublingual Q 8 hours, prn    thiamine (VITAMIN B-1) 100 MG tablet Take 100 mg by mouth once daily.     Family History     Problem Relation (Age of Onset)    Cancer Father    Heart disease Mother    No Known Problems Sister, Brother, Maternal Aunt, Maternal Uncle, Paternal Aunt, Paternal Uncle, Maternal Grandmother, Maternal Grandfather, Paternal Grandmother, Paternal Grandfather        Social History Main Topics    Smoking status: Former Smoker    Smokeless tobacco: Not on file    Alcohol use Yes      Comment: occasionally    Drug use: Not on file    Sexual activity: Not on file     ROS   Constitutional: Negative   Eyes: Negative    Respiratory: Negative    Cardiovascular: Negative   Gastrointestinal: Negative   Genitourinary: Negative   Musculoskeletal: Negative   Skin: Negative .   Neurological: Negative   Objective:     Vital Signs (Most Recent):  Temp: 99.2 °F (37.3 °C) (02/15/17 0749)  Pulse: 99 (02/15/17 0749)  Resp: 16 (02/15/17 0749)  BP: 133/63 (02/15/17 0749)  SpO2: 95 % (02/15/17 0749) Vital Signs (24h Range):  Temp:  [96.4 °F (35.8 °C)-100.9 °F (38.3 °C)]  99.2 °F (37.3 °C)  Pulse:  [] 99  Resp:  [16-20] 16  SpO2:  [70 %-100 %] 95 %  BP: (123-150)/(63-78) 133/63     Weight: 59 kg (130 lb)  Body mass index is 22.31 kg/(m^2).    SpO2: 95 %  O2 Device (Oxygen Therapy): nasal cannula      Intake/Output Summary (Last 24 hours) at 02/15/17 0909  Last data filed at 02/15/17 0400   Gross per 24 hour   Intake                0 ml   Output              700 ml   Net             -700 ml       Lines/Drains/Airways     Drain                 Urethral Catheter 02/14/17 1721 Latex less than 1 day          Peripheral Intravenous Line                 Peripheral IV - Single Lumen 02/14/17 1535 Right Hand less than 1 day                Physical Exam   General appearance: alert, appears stated age and cooperative  Head: Normocephalic, without obvious abnormality, atraumatic  Eyes: conjunctivae/corneas clear. PERRL  Neck: no carotid bruit, no JVD and supple, symmetrical, trachea midline  Lungs: clear to auscultation bilaterally, normal respiratory effort  Chest Wall: no tenderness  Heart: regular rate and rhythm, S1, S2 normal, no murmur, click, rub or gallop  Abdomen: soft, non-tender; bowel sounds normal; no masses,  no organomegaly  Extremities: shortening of the LLE, no cyanosis, clubbing, or edema  Pulses: Dorsalis Pedis R: 2+ (normal)/L: 2+ (normal)  Skin: Skin color, texture, turgor normal. No rashes or lesions  Neurologic: Normal mood and affect  Alert and oriented X 3    Significant Labs:   ABG: No results for input(s): PH, PCO2, HCO3, POCSATURATED, BE in the last 48 hours., Blood Culture: No results for input(s): LABBLOO in the last 48 hours., BMP:   Recent Labs  Lab 02/14/17  1635 02/15/17  0514   * 120*    136   K 3.3* 3.6    102   CO2 24 27   BUN 18 13   CREATININE 0.7 0.7   CALCIUM 9.3 8.1*   , CMP   Recent Labs  Lab 02/14/17  1635 02/15/17  0514    136   K 3.3* 3.6    102   CO2 24 27   * 120*   BUN 18 13   CREATININE 0.7 0.7    CALCIUM 9.3 8.1*   PROT 6.7 5.9*   ALBUMIN 4.1 3.6   BILITOT 0.5 1.0   ALKPHOS 57 45*   AST 29 28   ALT 28 23   ANIONGAP 11 7*   ESTGFRAFRICA >60 >60   EGFRNONAA >60 >60   , CBC   Recent Labs  Lab 02/14/17  1635 02/15/17  0514   WBC 10.79 10.20   HGB 13.7 12.1   HCT 39.5 36.0*    287   , INR No results for input(s): INR, PROTIME in the last 48 hours., Lipid Panel No results for input(s): CHOL, HDL, LDLCALC, TRIG, CHOLHDL in the last 48 hours.,   Pathology Results  (Last 10 years)               02/15/17 0514 (A) CBC auto differential (Abnormal) Final result    02/14/17 1635 (A) CBC auto differential (Abnormal) Final result    11/16/16 1112 (A) CBC auto differential (Abnormal) Final result    Narrative:  [facility] Bulk Order       10/21/14 0947 (A) CBC auto differential (Abnormal) Final result    11/29/13 1242 (A) CBC auto differential (Abnormal) Final result    06/01/12 1257 (A) CBC auto differential (Abnormal) Final result    04/07/11 1449 (A) CBC auto differential (Abnormal) Final result    03/04/10 0400 (A) CBC auto differential (Abnormal) Final result    03/03/10 1910 (A) CBC auto differential (Abnormal) Final result       and Troponin No results for input(s): TROPONINI in the last 48 hours.    Significant Imaging: CT scan: CT ABDOMEN PELVIS WITH CONTRAST: No results found for this visit on 02/14/17. and CT ABDOMEN PELVIS WITHOUT CONTRAST: No results found for this visit on 02/14/17., Echocardiogram: 2D echo with color flow doppler: No results found for this or any previous visit., EKG: , Stress Test:  and X-Ray: CXR: X-Ray Chest 1 View (CXR):   Results for orders placed or performed during the hospital encounter of 02/14/17   X-Ray Chest 1 View    Narrative    Chest, 1 view: The lungs are hyperexpanded and demonstrate mild chronic lung changes.  No consolidation or pleural effusions are seen.  The heart is normal in size.  The skeletal structures are osteopenic and show age-appropriate degenerative  change.    Impression     As above.      Electronically signed by: Adriane Urena MD  Date:     02/14/17  Time:    16:24      Assessment and Plan:     Lt hip and Lt wrist fracture due to fall while at fitness center  COPD/remote smoker  Osteoporosis   Note slight tachycardia due to discomfort  Also note past h/o vomiting with Narcotics    PLAN:  Patient is low risk for surgery from a cardiovascular standpoint based on revised Ramirez criteria  Proceed with usual precautions including aggressive DVT prophylaxis    Active Diagnoses:    Diagnosis Date Noted POA    Left wrist fracture [S62.102A] 02/14/2017 Yes      Problems Resolved During this Admission:    Diagnosis Date Noted Date Resolved POA       VTE Risk Mitigation         Ordered     Medium Risk of VTE  Once      02/14/17 1744            JOSEF Guillen  Cardiology   [facility]    I attest that I have personally seen and examined this patient. I have reviewed and discussed the management in detail as outlined above.

## 2017-02-15 NOTE — SUBJECTIVE & OBJECTIVE
Past Medical History   Diagnosis Date    Anxiety     Arthritis     Cancer     COPD (chronic obstructive pulmonary disease)     Degenerative disc disease     Depression     Emphysema of lung     Macrocytosis     Meibomianitis 11/3/10    Memory loss     Osteoporosis     Platelet disorder        Past Surgical History   Procedure Laterality Date    Appendectomy      Salviary gland removed      Joint replacement  2014     left knee  tka       Review of patient's allergies indicates:   Allergen Reactions    Codeine      Other reaction(s): Vomiting  Other reaction(s): Headache    Fentanyl      Other reaction(s): Vomiting  Other reaction(s): Nausea    Phenytoin sodium extended      Other reaction(s): Vomiting  Other reaction(s): Nausea    Vicodin  [hydrocodone-acetaminophen]      Other reaction(s): Vomiting       No current facility-administered medications on file prior to encounter.      Current Outpatient Prescriptions on File Prior to Encounter   Medication Sig    b complex vitamins capsule Take 1 capsule by mouth once daily.    calcium-vitamin D3 500 mg(1,250mg) -200 unit per tablet Take 1 tablet by mouth 2 (two) times daily with meals.    CYANOCOBALAMIN, VITAMIN B-12, (VITAMIN B-12 ORAL) Take by mouth.    melatonin 5 mg Tab Take 5 mg by mouth 2 (two) times daily.    multivitamin capsule Take 1 capsule by mouth once daily.    venlafaxine (EFFEXOR-XR) 75 MG 24 hr capsule Take 1 capsule (75 mg total) by mouth once daily.    alendronate (FOSAMAX) 70 MG tablet Take 1 tablet (70 mg total) by mouth every 7 days.    ASCORBIC ACID (SHEREEN-C ORAL) Take by mouth.    glucosamine-chondroitin 500-400 mg tablet Take 1 tablet by mouth 3 (three) times daily.    meclizine (ANTIVERT) 25 mg tablet Take 1 tablet (25 mg total) by mouth 3 (three) times daily as needed for Dizziness.    ondansetron (ZOFRAN-ODT) 8 MG TbDL 1 sublingual Q 8 hours, prn    thiamine (VITAMIN B-1) 100 MG tablet Take 100 mg by mouth once  "daily.     Family History     Problem Relation (Age of Onset)    Cancer Father    Heart disease Mother    No Known Problems Sister, Brother, Maternal Aunt, Maternal Uncle, Paternal Aunt, Paternal Uncle, Maternal Grandmother, Maternal Grandfather, Paternal Grandmother, Paternal Grandfather        Social History Main Topics    Smoking status: Former Smoker    Smokeless tobacco: Not on file    Alcohol use Yes      Comment: occasionally    Drug use: Not on file    Sexual activity: Not on file     Review of Systems   Constitutional: Negative for chills, diaphoresis, fatigue and fever.        "i'm burning up"   HENT: Negative for congestion, nosebleeds, postnasal drip, rhinorrhea, sinus pressure, sneezing and sore throat.    Eyes: Negative for visual disturbance.   Respiratory: Negative for cough, shortness of breath and wheezing.         History of smoking 20 years ago; reports chronic cough "most of the time"   Cardiovascular: Negative for chest pain.   Gastrointestinal: Positive for nausea and vomiting. Negative for abdominal distention, abdominal pain, blood in stool, constipation and diarrhea.        Only secondary to pain meds in ER; reports she cannot "take all that"   Genitourinary: Negative for difficulty urinating, dyspareunia, flank pain, frequency and hematuria.   Musculoskeletal: Negative for arthralgias, back pain and myalgias.   Neurological: Positive for headaches (this am).   Psychiatric/Behavioral: Negative for dysphoric mood (takes antidepressant) and sleep disturbance.     Objective:     Vital Signs (Most Recent):  Temp: 99.2 °F (37.3 °C) (02/15/17 0749)  Pulse: 99 (02/15/17 0749)  Resp: 16 (02/15/17 0749)  BP: 133/63 (02/15/17 0749)  SpO2: 95 % (02/15/17 0749) Vital Signs (24h Range):  Temp:  [96.4 °F (35.8 °C)-100.9 °F (38.3 °C)] 99.2 °F (37.3 °C)  Pulse:  [] 99  Resp:  [16-20] 16  SpO2:  [70 %-100 %] 95 %  BP: (123-150)/(63-78) 133/63     Weight: 59 kg (130 lb)  Body mass index is 22.31 " kg/(m^2).    Physical Exam   Constitutional: She is oriented to person, place, and time. She appears well-developed and well-nourished.   HENT:   Head: Normocephalic and atraumatic.   Right Ear: External ear normal.   Left Ear: External ear normal.   Nose: Nose normal.   Mouth/Throat: Oropharynx is clear and moist.   TMs clear  OP clear    Eyes: EOM are normal. Pupils are equal, round, and reactive to light.   Neck: Normal range of motion. Neck supple. No JVD present. No tracheal deviation present. No thyromegaly present.   Cardiovascular: Normal rate, normal heart sounds and intact distal pulses.    No murmur heard.  Pulmonary/Chest: Effort normal and breath sounds normal. No respiratory distress. She has no wheezes. She has no rales. She exhibits no tenderness.   Abdominal: Soft. Bowel sounds are normal. She exhibits no distension and no mass. There is no tenderness. There is no rebound and no guarding.   Musculoskeletal: Normal range of motion. She exhibits no edema or tenderness.   Left wrist and left LE immobilzed   Lymphadenopathy:     She has no cervical adenopathy.   Neurological: She is alert and oriented to person, place, and time. She has normal reflexes. She displays normal reflexes. No cranial nerve deficit. She exhibits normal muscle tone. Coordination normal.   Skin: Skin is warm and dry. No rash noted. No erythema. No pallor.   Psychiatric: She has a normal mood and affect. Her behavior is normal. Judgment and thought content normal.        Significant Labs:   CBC:   Recent Labs  Lab 02/14/17  1635 02/15/17  0514   WBC 10.79 10.20   HGB 13.7 12.1   HCT 39.5 36.0*    287     CMP:   Recent Labs  Lab 02/14/17  1635 02/15/17  0514    136   K 3.3* 3.6    102   CO2 24 27   * 120*   BUN 18 13   CREATININE 0.7 0.7   CALCIUM 9.3 8.1*   PROT 6.7 5.9*   ALBUMIN 4.1 3.6   BILITOT 0.5 1.0   ALKPHOS 57 45*   AST 29 28   ALT 28 23   ANIONGAP 11 7*   EGFRNONAA >60 >60     Urine Studies:    Recent Labs  Lab 02/14/17  1720   COLORU Yellow   APPEARANCEUA Clear   PHUR >8.0   SPECGRAV 1.015   PROTEINUA Negative   GLUCUA Negative   KETONESU 1+*   BILIRUBINUA Negative   OCCULTUA Trace*   NITRITE Negative   UROBILINOGEN Negative   LEUKOCYTESUR Negative       Significant Imaging: CXR: I have reviewed all pertinent results/findings within the past 24 hours and my personal findings are:  reviewed.

## 2017-02-15 NOTE — OP NOTE
DATE OF PROCEDURE:  02/15/2017.    PREOPERATIVE DIAGNOSES:  Displaced fracture of the left femoral neck and   comminuted fracture of the left distal radius and ulnar styloid.    POSTOPERATIVE DIAGNOSES:  Displaced fracture of the left femoral neck and   comminuted fracture of the left distal radius and ulnar styloid.    PROCEDURES:  Left bipolar endoprosthetic hemiarthroplasty and closed reduction   and percutaneous distraction pinning of the left distal radius.    PROCEDURE IN DETAIL:  After adequate preoperative medication, the patient was   brought to the Operating Room, placed in a supine position, and was administered   a general anesthetic with endotracheal intubation.  She was initially   transferred on to the operating room table and placed in the right lateral   decubitus position.  The left hip region was then prepped and draped in the   usual sterile manner.  A posterior incision was created, allowing exposure of   the greater trochanter and distally down the femoral shaft, approximately 3   inches, and then towards the posterior iliac spine for about 3 inches.    Dissection was carried down through skin and subcutaneous tissues.  Hemostasis   was constantly monitored with the use of electrocautery.  An interval was then   created whereby the tensor fascia lee was divided along its fibers and then   extended up across the gluteus musculature.  This exposed the posterior rotators   of the hip.  They were isolated and cut at their insertion.  This allowed   exposure of the posterior capsule.  A T incision was created.  The fracture   hematoma was evacuated.  The femoral head was extracted and measured at size 46.    At this point, the joint was inspected.  The femoral neck was freshened up   with a rongeur followed by reaming, finding the shaft, then followed by the   lateralizing reamer, followed by reaming up to size 6.  The broaches were then   utilized up to size 5.  The size 5 was eventually chosen as  the appropriate   size.  The calcar reamer was utilized.  A trial fit showed the standard neck and   head to be appropriate.  All the instrumentation was removed.  The wound,   femoral shaft, and acetabulum were aggressively irrigated with normal saline   using the Pulsavac and then the permanent 5 tapered stem impacted into position.    A second trial fit was accomplished which showed satisfactory stabilization,   rotation, and fit.  The trial head was removed.  The permanent double head   system was then put together and impacted on the permanent stem.  The hip was   reduced and placed through a normal range of motion and noted to be extremely   stable.  One last irrigation with Pulsavac was accomplished, followed by closure   using a #1 Vicryl suture to reapproximate the posterior capsule, followed by   reattachment of the external rotators.  The tensor fascia lee and gluteus   musculature was reapproximated with a #1 Vicryl suture.  The subcutaneous   tissues were closed with a 0 Vicryl suture.  The skin was then closed with a   skin stapler.  The wound was dressed with sterile Adaptic, sterile 4 x 4, and   ABD and the dressings taped into position.  She was then placed in an abduction   pillow.  She was then repositioned into a supine position and the left upper   extremity placed in finger trap traction with 15 pounds of weight.  The C-arm   was brought into position and an AP and lateral projection obtained of that   distal radius.  After approximately 5 minutes of traction, a closed reduction   was performed.  A satisfactory reduction was noted.  The upper extremity was   then prepped and draped in the usual sterile manner.  Two 3.2 smooth Steinmann   pins were then utilized; the first placed at the base of the index metacarpal   through three cortices and the second proximal to the fracture, but still in the   distal portion of the radius and through two cortices.  Positions of pins were   checked with a  C-arm.  A short-arm fiberglass cast was then applied,   incorporating both pins.  Once the cast material was allowed to harden, the pins   were bent, cut, and incorporated in the remainder of the cast, basically hiding   and protecting the two pins.  The patient tolerated the procedure quite well.    She left the operating room in satisfactory condition.  Estimated blood loss was   400 mL.  Please refer to form 1092.      ESTHER/VENKATA  dd: 02/15/2017 15:40:52 (CST)  td: 02/15/2017 16:23:12 (CST)  Doc ID   #[id number]  Job ID #[id number]    CC:

## 2017-02-15 NOTE — TRANSFER OF CARE
"Anesthesia Transfer of Care Note    Patient: Marycarmen Louis    Procedure(s) Performed: Procedure(s) (LRB):  BIPOLAR ENDOPROSTHETIC HEMIARTHROPLASTY (Left)  CLOSED REDUCTION PERCUTANEOUS DISTRACTION PINNING DISTAL RADIUS (Left)    Patient location: PACU    Anesthesia Type: general    Transport from OR: Transported from OR on room air with adequate spontaneous ventilation    Post pain: adequate analgesia    Post assessment: no apparent anesthetic complications and tolerated procedure well    Post vital signs: stable    Level of consciousness: awake, alert and oriented    Nausea/Vomiting: no nausea/vomiting    Complications: none          Last vitals:   Visit Vitals    /63 (BP Location: Right arm, Patient Position: Lying, BP Method: Automatic)    Pulse 96    Temp 37.3 °C (99.2 °F) (Oral)    Resp 16    Ht 5' 4" (1.626 m)    Wt 59 kg (130 lb)    SpO2 (!) 92%    Breastfeeding No    BMI 22.31 kg/m2     "

## 2017-02-15 NOTE — NURSING
Report received. Patient resting in bed quietly; stable, but sleepy. Neurovascular checks WDL. IV fluids infusing. Call bell in reach. Patient instructed to call with needs.

## 2017-02-16 PROBLEM — J81.1 PULMONARY EDEMA: Status: ACTIVE | Noted: 2017-02-16

## 2017-02-16 LAB
ANION GAP SERPL CALC-SCNC: 5 MMOL/L
BASOPHILS # BLD AUTO: 0.02 K/UL
BASOPHILS NFR BLD: 0.2 %
BUN SERPL-MCNC: 9 MG/DL
CALCIUM SERPL-MCNC: 7.9 MG/DL
CHLORIDE SERPL-SCNC: 104 MMOL/L
CO2 SERPL-SCNC: 30 MMOL/L
CREAT SERPL-MCNC: 0.6 MG/DL
DIFFERENTIAL METHOD: ABNORMAL
EOSINOPHIL # BLD AUTO: 0.3 K/UL
EOSINOPHIL NFR BLD: 2.7 %
ERYTHROCYTE [DISTWIDTH] IN BLOOD BY AUTOMATED COUNT: 13.1 %
EST. GFR  (AFRICAN AMERICAN): >60 ML/MIN/1.73 M^2
EST. GFR  (NON AFRICAN AMERICAN): >60 ML/MIN/1.73 M^2
GLUCOSE SERPL-MCNC: 114 MG/DL
HCT VFR BLD AUTO: 31.2 %
HCT VFR BLD AUTO: 31.7 %
HGB BLD-MCNC: 10.3 G/DL
HGB BLD-MCNC: 10.6 G/DL
LYMPHOCYTES # BLD AUTO: 0.9 K/UL
LYMPHOCYTES NFR BLD: 8.1 %
MCH RBC QN AUTO: 31.6 PG
MCHC RBC AUTO-ENTMCNC: 33 %
MCV RBC AUTO: 96 FL
MONOCYTES # BLD AUTO: 1.1 K/UL
MONOCYTES NFR BLD: 9.6 %
NEUTROPHILS # BLD AUTO: 9.3 K/UL
NEUTROPHILS NFR BLD: 79.4 %
PLATELET # BLD AUTO: 234 K/UL
PMV BLD AUTO: 8.8 FL
POTASSIUM SERPL-SCNC: 3.6 MMOL/L
RBC # BLD AUTO: 3.26 M/UL
SODIUM SERPL-SCNC: 139 MMOL/L
WBC # BLD AUTO: 11.65 K/UL

## 2017-02-16 PROCEDURE — 94761 N-INVAS EAR/PLS OXIMETRY MLT: CPT

## 2017-02-16 PROCEDURE — 36415 COLL VENOUS BLD VENIPUNCTURE: CPT

## 2017-02-16 PROCEDURE — 63600175 PHARM REV CODE 636 W HCPCS: Performed by: NURSE PRACTITIONER

## 2017-02-16 PROCEDURE — 25000003 PHARM REV CODE 250: Performed by: SURGERY

## 2017-02-16 PROCEDURE — 27000339 *HC DAILY SUPPLY KIT

## 2017-02-16 PROCEDURE — 25000003 PHARM REV CODE 250: Performed by: ORTHOPAEDIC SURGERY

## 2017-02-16 PROCEDURE — 94799 UNLISTED PULMONARY SVC/PX: CPT

## 2017-02-16 PROCEDURE — 99232 SBSQ HOSP IP/OBS MODERATE 35: CPT | Mod: ,,, | Performed by: FAMILY MEDICINE

## 2017-02-16 PROCEDURE — 97530 THERAPEUTIC ACTIVITIES: CPT

## 2017-02-16 PROCEDURE — 97163 PT EVAL HIGH COMPLEX 45 MIN: CPT

## 2017-02-16 PROCEDURE — 85018 HEMOGLOBIN: CPT

## 2017-02-16 PROCEDURE — 85014 HEMATOCRIT: CPT

## 2017-02-16 PROCEDURE — 25000003 PHARM REV CODE 250: Performed by: NURSE PRACTITIONER

## 2017-02-16 PROCEDURE — 80048 BASIC METABOLIC PNL TOTAL CA: CPT

## 2017-02-16 PROCEDURE — 11000001 HC ACUTE MED/SURG PRIVATE ROOM

## 2017-02-16 PROCEDURE — 25000003 PHARM REV CODE 250: Performed by: FAMILY MEDICINE

## 2017-02-16 PROCEDURE — 85025 COMPLETE CBC W/AUTO DIFF WBC: CPT

## 2017-02-16 PROCEDURE — 63600175 PHARM REV CODE 636 W HCPCS: Performed by: FAMILY MEDICINE

## 2017-02-16 PROCEDURE — 27000221 HC OXYGEN, UP TO 24 HOURS

## 2017-02-16 RX ORDER — FUROSEMIDE 10 MG/ML
20 INJECTION INTRAMUSCULAR; INTRAVENOUS ONCE
Status: COMPLETED | OUTPATIENT
Start: 2017-02-16 | End: 2017-02-16

## 2017-02-16 RX ADMIN — PANTOPRAZOLE SODIUM 40 MG: 40 TABLET, DELAYED RELEASE ORAL at 08:02

## 2017-02-16 RX ADMIN — RIVAROXABAN 10 MG: 10 TABLET, FILM COATED ORAL at 04:02

## 2017-02-16 RX ADMIN — TRAMADOL HYDROCHLORIDE 50 MG: 50 TABLET, COATED ORAL at 08:02

## 2017-02-16 RX ADMIN — KETOROLAC TROMETHAMINE 15 MG: 15 INJECTION, SOLUTION INTRAMUSCULAR; INTRAVENOUS at 08:02

## 2017-02-16 RX ADMIN — DOCUSATE SODIUM AND SENNOSIDES 1 TABLET: 8.6; 5 TABLET, FILM COATED ORAL at 08:02

## 2017-02-16 RX ADMIN — ACETAMINOPHEN 500 MG: 500 TABLET ORAL at 03:02

## 2017-02-16 RX ADMIN — VENLAFAXINE HYDROCHLORIDE 75 MG: 75 CAPSULE, EXTENDED RELEASE ORAL at 08:02

## 2017-02-16 RX ADMIN — FUROSEMIDE 20 MG: 10 INJECTION, SOLUTION INTRAMUSCULAR; INTRAVENOUS at 10:02

## 2017-02-16 RX ADMIN — KETOROLAC TROMETHAMINE 15 MG: 15 INJECTION, SOLUTION INTRAMUSCULAR; INTRAVENOUS at 01:02

## 2017-02-16 RX ADMIN — TRAMADOL HYDROCHLORIDE 50 MG: 50 TABLET, COATED ORAL at 05:02

## 2017-02-16 RX ADMIN — ACETAMINOPHEN 500 MG: 500 TABLET ORAL at 08:02

## 2017-02-16 RX ADMIN — SODIUM CHLORIDE, PRESERVATIVE FREE 3 ML: 5 INJECTION INTRAVENOUS at 02:02

## 2017-02-16 NOTE — PROGRESS NOTES
Immediately post op PACU evaluation indicated some O-2 Sat decreases warranting short stay in ICE, which have since been reversed.  I saw & spoke with her in ICU about 5:30 PM and she was in good spirits, in no distress, and noticed her pre op pain level for both the hip and wrist were reduced. I spoke with her , and saw to it he was able to visit with her.  Ar this moment she has returned to her 3rd floor room. Hopefully she will start to progress with PT later today.

## 2017-02-16 NOTE — PROGRESS NOTES
[facility]  Cardiology  Progress Note    Patient Name: Marycarmen Louis  MRN: [id number]  Admission Date: 2/14/2017  Hospital Length of Stay: 2 days  Code Status: Full Code   Attending Physician: Paul Higgins MD   Primary Care Physician: Willie Santos MD (Inactive)  Expected Discharge Date:   Principal Problem:Closed fracture of left hip    Subjective:   Chief Complaint:  Lt hip and Lt wrist fracture/surgical risk stratification       HPI: 71 year old w/f with Hx of COPD, CA, osteoporosis, and no significant cardiac history presented to the ED after falling while exercising at the gym. She sustained Lt hip and wrist fracture. She denies any recent chest pain, palpitations, SOB, dizziness or weakness. She was risk stratified prior to surgery.  She has had surgical correction.      ROS   Constitutional: Negative   Eyes: Negative   Respiratory: Negative   Cardiovascular: Negative   Gastrointestinal: Negative   Genitourinary: Negative   Musculoskeletal: Negative   Skin: Negative .   Neurological: Negative   No complaints.  Objective:     Vital Signs (Most Recent):  Temp: (!) 100.4 °F (38 °C) (02/16/17 0726)  Pulse: 101 (02/16/17 0737)  Resp: 18 (02/16/17 0726)  BP: (!) 99/55 (02/16/17 0726)  SpO2: 96 % (02/16/17 0737) Vital Signs (24h Range):  Temp:  [96.8 °F (36 °C)-100.4 °F (38 °C)] 100.4 °F (38 °C)  Pulse:  [] 101  Resp:  [15-18] 18  SpO2:  [78 %-97 %] 96 %  BP: ()/(55-82) 99/55     Weight: 59 kg (130 lb)  Body mass index is 22.31 kg/(m^2).    SpO2: 96 %  O2 Device (Oxygen Therapy): nasal cannula      Intake/Output Summary (Last 24 hours) at 02/16/17 0929  Last data filed at 02/16/17 0600   Gross per 24 hour   Intake          2941.67 ml   Output             2920 ml   Net            21.67 ml       Lines/Drains/Airways     Drain                 Urethral Catheter 02/14/17 1721 Latex 1 day          Peripheral Intravenous Line                 Peripheral IV - Single Lumen 02/14/17  1535 Right Hand 1 day                Physical Exam  General appearance: alert, appears stated age and cooperative  Head: Normocephalic, without obvious abnormality, atraumatic  Eyes: conjunctivae/corneas clear. PERRL  Neck: no carotid bruit, no JVD and supple, symmetrical, trachea midline  Lungs: clear to auscultation bilaterally, normal respiratory effort  Chest Wall: no tenderness  Heart: regular rate and rhythm, S1, S2 normal, no murmur, click, rub or gallop  Abdomen: soft, non-tender; bowel sounds normal; no masses, no organomegaly  Extremities: shortening of the LLE, no cyanosis, clubbing, or edema  Pulses: Dorsalis Pedis R: 2+ (normal)/L: 2+ (normal)  Skin: Skin color, texture, turgor normal. No rashes or lesions  Neurologic: Normal mood and affect  Alert and oriented X 3     Significant Labs:   BMP:   Recent Labs  Lab 02/14/17  1635 02/15/17  0514   * 120*    136   K 3.3* 3.6    102   CO2 24 27   BUN 18 13   CREATININE 0.7 0.7   CALCIUM 9.3 8.1*    and CBC   Recent Labs  Lab 02/14/17  1635 02/15/17  0514 02/16/17  0552   WBC 10.79 10.20  --    HGB 13.7 12.1 10.6*   HCT 39.5 36.0* 31.7*    287  --        Significant Imaging: X-Ray: CXR: X-Ray Chest 1 View (CXR):   Results for orders placed or performed during the hospital encounter of 02/14/17   X-Ray Chest 1 View    Narrative    Chest, 1 view: The lungs are underexpanded with crowding of the pulmonary vascularity.  As compared to the previous study of 2/15/2017, there is improved aeration of the lungs with continued ill-defined opacity in the right perihilar location.  The heart is normal in size.  The skeletal structures are intact.    Impression     As above.      Electronically signed by: Adriane Urena MD  Date:     02/16/17  Time:    08:20      Assessment and Plan:   1 day postop doing well  Lt hip and Lt wrist fracture due to fall while at fitness center  COPD/remote smoker  Osteoporosis     Plan:    Consider DVT prophylaxis  with Xarelto 10 mgqd for 28 days if ok with orthopedics.  Low grade fever/assoicated mild tachycardia.  CV stable.  Will sign off.      Active Diagnoses:    Diagnosis Date Noted POA    PRINCIPAL PROBLEM:  Closed fracture of left hip [S72.002A] 02/15/2017 Yes    Osteoporosis [M81.0] 02/15/2017 Yes    Fever [R50.9] 02/15/2017 No    Left wrist fracture [S62.102A] 02/14/2017 Yes    Coronary artery calcification seen on CAT scan [I25.10] 06/29/2013 Yes      Problems Resolved During this Admission:    Diagnosis Date Noted Date Resolved POA       VTE Risk Mitigation         Ordered     Medium Risk of VTE  Once      02/15/17 1550          Martha Perez NP  Cardiology  [facility]    I attest that I have personally seen and examined this patient. I have reviewed and discussed the management in detail as outlined above.

## 2017-02-16 NOTE — PROGRESS NOTES
Seen on rounds, and continues to feel better than pre op.  Anticipating PT visit,.  Explained the use of the abduction pillow and to avoid crossing legs, flexion, internal rotation, and adduction. Pre op H&H 36.0/12.1 and this AM 31.7/10.6. Also explained her ulna styloid fracture may cause pain which I can eliminate, but would require a long arm cast which most patients fine the cure to be worse than the disease.

## 2017-02-16 NOTE — PT/OT/SLP EVAL
"Physical Therapy  Evaluation    Marycarmen Louis   MRN: [id number]   Admitting Diagnosis: Closed fracture of left hip    PT Received On: 17  PT Start Time: 1200     PT Stop Time: 1230    PT Total Time (min): 30 min       Billable Minutes:  Evaluation 15 minutes and Therapeutic Activity 15 minutes    Diagnosis: Closed fracture of left hip  Displaced fracture of the left femoral neck and   comminuted fracture of the left distal radius and ulnar styloid.    S/P Left bipolar endoprosthetic hemiarthroplasty and closed reduction   and percutaneous distraction pinning of the left distal radius.        Past Medical History   Diagnosis Date    Anxiety     Arthritis     Cancer     COPD (chronic obstructive pulmonary disease)     Degenerative disc disease     Depression     Emphysema of lung     Macrocytosis     Meibomianitis 11/3/10    Memory loss     Osteoporosis     Platelet disorder       Past Surgical History   Procedure Laterality Date    Appendectomy      Salviary gland removed      Joint replacement       left knee  tka       Referring physician: Dr. SAMMIE Sanchez M.D.  Date referred to PT: 2/15/2017    General Precautions: Standard, fall  Orthopedic Precautions: LLE weight bearing as tolerated   Braces:  (Left Wrist Hard Cast)       Do you have any cultural, spiritual, Confucianist conflicts, given your current situation?: none voiced    Patient History:  Living Arrangements: house  Home Layout: Able to live on 1st floor  Equipment Currently Used at Home: none  DME owned (not currently used): none    Previous Level of Function:  Ambulation Skills: independent  Transfer Skills: independent  ADL Skills: independent  Work/Leisure Activity: independent    Subjective:  Communicated with patient prior to session.  "I want to sit up."  Chief Complaint: Pt. Had c/o Left Hip Pain  Patient goals: Increase distance with ambulation and regain independence with ADL's.    Pain Ratin/10   Location - Side: " Left  Location - Orientation: generalized  Location: hip  Pain Addressed: Nurse notified, Reposition, Distraction, Pre-medicate for activity  Pain Rating Post-Intervention: 2/10    Objective:   Patient found with: peripheral IV, oxygen     Cognitive Exam:  Oriented to: Person, Place, Time and Situation    Follows Commands/attention: Follows multistep  commands  Communication: clear/fluent  Safety awareness/insight to disability: intact    Physical Exam:  Postural examination/scapula alignment: No postural abnormalities identified    Skin integrity: Visible skin intact and Left Wrist/Hand Hard Cast, Left Hip bandaged.  Edema: none noted    Sensation:   Intact    Upper Extremity Range of Motion:  Right Upper Extremity: WFL  Left Upper Extremity: WFL except Left Wrist N/A (sx.)    Upper Extremity Strength:  Right Upper Extremity: WFL  Left Upper Extremity: WFL except Left Elbow, Wrist N/A secondary to sx.    Lower Extremity Range of Motion:  Right Lower Extremity: WFL  Left Lower Extremity: Deficits: Left Hip, Knee N/A secondary to sx.  Left Ankle : 5 deg. DF, 30 deg. PF    Lower Extremity Strength:  Right Lower Extremity: WFL  Left Lower Extremity: Deficits: Deficits: Left Hip, Knee N/A secondary to sx.  Left Ankle : 4-/5 grossly     Fine motor coordination:  Impaired  Left hand thumb/finger opposition skills secondary to sx.    Gross motor coordination: WFL    Functional Mobility:  Bed Mobility:  Rolling/Turning to Left: Moderate assistance  Rolling/Turning Right: Moderate assistance  Scooting/Bridging: Moderate Assistance, With trapeze  Supine to Sit: Moderate Assistance, With trapeze  Sit to Supine: Moderate Assistance    Transfers:  Sit <> Stand Assistance: Moderate Assistance  Sit <> Stand Assistive Device: Platform, Rolling Walker    Gait:        Stairs:      Balance:   Static Sit: FAIR+: Able to take MINIMAL challenges from all directions  Dynamic Sit: FAIR+: Maintains balance through MINIMAL excursions of  active trunk motion  Static Stand: POOR+: Needs MINIMAL assist to maintain  Dynamic stand: POOR: N/A    Therapeutic Activities and Exercises:  Therapeutic Activity:  Bed mobility tasks and transfer training with assistance as noted.  Pt. Transferred sit <>stand with platform walker, mod. A. X 2 bouts.  Verbal cues and manual guidance given to pt. For sequencing.    AM-PAC 6 CLICK MOBILITY  How much help from another person does this patient currently need?   1 = Unable, Total/Dependent Assistance  2 = A lot, Maximum/Moderate Assistance  3 = A little, Minimum/Contact Guard/Supervision  4 = None, Modified [place]/Independent    Turning over in bed (including adjusting bedclothes, sheets and blankets)?: 2  Sitting down on and standing up from a chair with arms (e.g., wheelchair, bedside commode, etc.): 2  Moving from lying on back to sitting on the side of the bed?: 2  Moving to and from a bed to a chair (including a wheelchair)?: 2  Need to walk in hospital room?: 1  Climbing 3-5 steps with a railing?: 1  Total Score: 10     AM-PAC Raw Score CMS G-Code Modifier Level of Impairment Assistance   6 % Total / Unable   7 - 9 CM 80 - 100% Maximal Assist   10 - 14 CL 60 - 80% Moderate Assist   15 - 19 CK 40 - 60% Moderate Assist   20 - 22 CJ 20 - 40% Minimal Assist   23 CI 1-20% SBA / CGA   24 CH 0% Independent/ Mod I     Patient left Left UE elevated on pillow, Hip Abd. pillow positioned appropriately with all lines intact, call button in reach and RN notified.    Assessment:   Marycarmen Louis is a 71 y.o. female with a medical diagnosis of Closed fracture of left hip and presents with mobility deficits, decreased strength and endurance and c/o pain.  Pt. Would benefit from PT to achieve stated goals.      Factors that may affect patient's status:  [x] Patient's age [x] Time since onset of injury/illness/exacerbation  [x]Prior Level of function       [x] Current level of function [x] Status of current  condition []Patient's cognitive status and safety concerns      [x] Patient's level of motivation    [] Patient's home situation (environment and family support)  [x] Objective examination findings [] Goals and goal agreement with the patient        [] Rehab potential (prognosis) and probable outcome    [x] Expected progression of patient    Criteria:     History:    > 3  Personal Factors and/or co-morbidity - 97644 (see PLOFand Med Hx)  Examination of Body System:  addressing a total of 4 or more elements - 42402  (See Rehab Impairments/Problem List below)  Clinical Presentation:  Unstable - 92326  Clinical Decision Making (Complexity):  High - 56785      On examination of body system using standardized tests and measures patient presents with 4 or more elements from any of the following: body structures and functions, activity limitations, and/or participation restrictions.  Leading to a clinical presentation that is considered unstable with unpredictable characteristics      Rehab identified problem list/impairments: Rehab identified problem list/impairments: weakness, impaired endurance, impaired balance, gait instability, decreased lower extremity function, impaired functional mobilty, orthopedic precautions, pain    Rehab potential is fair.    Activity tolerance: Fair    Discharge recommendations: Discharge Facility/Level Of Care Needs: home health PT, home with home health     Barriers to discharge: Barriers to Discharge: None    Equipment recommendations: Equipment Needed After Discharge: walker, rolling, other (see comments) (platform)     GOALS:   Physical Therapy Goals        Problem: Physical Therapy Goal    Goal Priority Disciplines Outcome Goal Variances Interventions   Physical Therapy Goal     PT/OT, PT      Description:  Goals to be met by: 3/10/2017     Patient will increase functional independence with mobility by performin. Supine to sit with Stand-by Assistance  2. Sit to supine with  Stand-by Assistance  3. Sit to stand transfer with Stand-by Assistance  4. Bed to chair transfer with Stand-by Assistance using Platform walker  5. Gait  x 50 feet with Contact Guard Assistance using Platform walker.                 PLAN:    Patient to be seen  (1-2x/day(weekdays); PRN(Weekends)) to address the above listed problems via therapeutic activities, gait training, therapeutic exercises  Plan of Care expires:  (Upon D/C from facility)  Plan of Care reviewed with: patient          Guido Douglas, PT  02/16/2017

## 2017-02-16 NOTE — PROGRESS NOTES
"[facility] Hospital Medicine  Progress Note    Patient Name: Marycarmen Louis  MRN: [id number]  Patient Class: IP- Inpatient   Admission Date: 2/14/2017  Length of Stay: 2 days  Attending Physician: Paul Higgins MD  Primary Care Provider: Willie Santos MD (Inactive)        Subjective:     Principal Problem:Closed fracture of left hip    HPI:  70 y/o female reports she fell off of gym apparatus. Reports falling after missing step. ER w/up shows left wrist fracture and left hip fracture. Reports feeling fine prior to fall yesterday. No fever prior to this am.    Hospital Course:  She has surgery yesterday right lateral decubitus. Was given IVF. Has slight fluid overload caused resp distress. Hypoxic. CXR showed right lung infiltrate. She had no s/s of pneumonia and clear  X ray prior to surgery. Given dose of lasix and improved well. She was moved  From ICU to floor. Did well over night. PT to start this am. Still on O2 today 4L NC 96%    Interval History: doing well No SOB, low grade  Temp 100.4. No SOB. POX 96% on RA  Review of Systems   Constitutional: Negative for chills, diaphoresis, fatigue and fever.        "i'm burning up"   HENT: Negative for congestion, nosebleeds, postnasal drip, rhinorrhea, sinus pressure, sneezing and sore throat.    Eyes: Negative for visual disturbance.   Respiratory: Negative for cough, shortness of breath and wheezing.         History of smoking 20 years ago; reports chronic cough "most of the time"   Cardiovascular: Negative for chest pain.   Gastrointestinal: Positive for vomiting. Negative for abdominal distention, abdominal pain, blood in stool, constipation, diarrhea and nausea.        Only secondary to pain meds in ER; reports she cannot "take all that"   Genitourinary: Negative for difficulty urinating, dyspareunia, flank pain, frequency and hematuria.   Musculoskeletal: Positive for arthralgias. Negative for back pain and myalgias.   Neurological: " Negative for headaches (this am).   Psychiatric/Behavioral: Negative for dysphoric mood (takes antidepressant) and sleep disturbance.     Objective:     Vital Signs (Most Recent):  Temp: (!) 100.4 °F (38 °C) (02/16/17 0726)  Pulse: 101 (02/16/17 0737)  Resp: 18 (02/16/17 0726)  BP: (!) 99/55 (02/16/17 0726)  SpO2: 96 % (02/16/17 0737) Vital Signs (24h Range):  Temp:  [96.8 °F (36 °C)-100.4 °F (38 °C)] 100.4 °F (38 °C)  Pulse:  [] 101  Resp:  [15-18] 18  SpO2:  [78 %-97 %] 96 %  BP: ()/(55-82) 99/55     Weight: 59 kg (130 lb)  Body mass index is 22.31 kg/(m^2).    Physical Exam   Constitutional: She is oriented to person, place, and time. She appears well-developed and well-nourished. No distress.   HENT:   Head: Normocephalic and atraumatic.   Right Ear: External ear normal.   Left Ear: External ear normal.   Nose: Nose normal.   Mouth/Throat: Oropharynx is clear and moist.   TMs clear  OP clear    Eyes: Conjunctivae and EOM are normal. Pupils are equal, round, and reactive to light.   Neck: Normal range of motion. Neck supple. No JVD present. No tracheal deviation present. No thyromegaly present.   Cardiovascular: Normal rate, regular rhythm, normal heart sounds and intact distal pulses.    No murmur heard.  Pulmonary/Chest: Effort normal and breath sounds normal. No respiratory distress. She has no wheezes. She has no rales. She exhibits no tenderness.   No resp distress.    Lungs clear bilaterally   Abdominal: Soft. Bowel sounds are normal. She exhibits no distension and no mass. There is no tenderness. There is no rebound and no guarding.   Musculoskeletal: Normal range of motion. She exhibits no edema or tenderness.   Left wrist casted and left LE post surgical   Lymphadenopathy:     She has no cervical adenopathy.   Neurological: She is alert and oriented to person, place, and time. She has normal reflexes. She displays normal reflexes. No cranial nerve deficit. She exhibits normal muscle tone.  Coordination normal.   Skin: Skin is warm and dry. No rash noted. No erythema. No pallor.   Left hip with bandage, CDI   Psychiatric: She has a normal mood and affect. Her behavior is normal. Judgment and thought content normal.   Nursing note and vitals reviewed.       Significant Labs:   CBC:     Recent Labs  Lab 02/14/17  1635 02/15/17  0514 02/16/17  0552   WBC 10.79 10.20  --    HGB 13.7 12.1 10.6*   HCT 39.5 36.0* 31.7*    287  --      CMP:     Recent Labs  Lab 02/14/17  1635 02/15/17  0514    136   K 3.3* 3.6    102   CO2 24 27   * 120*   BUN 18 13   CREATININE 0.7 0.7   CALCIUM 9.3 8.1*   PROT 6.7 5.9*   ALBUMIN 4.1 3.6   BILITOT 0.5 1.0   ALKPHOS 57 45*   AST 29 28   ALT 28 23   ANIONGAP 11 7*   EGFRNONAA >60 >60     Urine Studies:     Recent Labs  Lab 02/14/17  1720   COLORU Yellow   APPEARANCEUA Clear   PHUR >8.0   SPECGRAV 1.015   PROTEINUA Negative   GLUCUA Negative   KETONESU 1+*   BILIRUBINUA Negative   OCCULTUA Trace*   NITRITE Negative   UROBILINOGEN Negative   LEUKOCYTESUR Negative       Significant Imaging:     CXR prior to surgery The lungs are hyperexpanded and demonstrate mild chronic lung changes.  No consolidation or pleural effusions are seen.  The heart is normal in size.  The skeletal structures are osteopenic and show age-appropriate degenerative change.    CXR after surgery Since the prior study, there has been interval development of a diffuse infiltrate involving the right lung.  The possibility of aspiration pneumonia should be considered.  The left lung is relatively clear.  Allowing for a very poor depth of inspiration, heart size and pulmonary vessels are within normal limits.  No pleural effusion or pneumothorax are identified.    CXR today The lungs are underexpanded with crowding of the pulmonary vascularity.  As compared to the previous study of 2/15/2017, there is improved aeration of the lungs with continued ill-defined opacity in the right perihilar  location.  The heart is normal in size.  The skeletal structures are intact.    EKG NSR    Assessment/Plan:      * Closed fracture of left hip  Ortho following  VS/p Left bipolar endoprosthetic hemiarthroplasty        Coronary artery calcification seen on CAT scan  CIS has cleared her for surgery      Left wrist fracture  Ortho follwing  S/p closed reduction   and percutaneous distraction pinning of the left distal radius.  Start PT today    Osteoporosis  Failed fosamax   Will need forteo x 2 years at discharge       Fever  Normal exam, CXR, U/A. ROS negative.  This fever is presumed to be from her acute fractures as clinically she had NO infectious symptoms. Check CBC to assess WBC this morning with 100.4 isolated temp. If fever persistent and continued O2 requirement will need to add in aspiration coverage  IS      Pulmonary edema  Given 40 mg IV lasix yesterday. Will repeat 20mg IV today      VTE Risk Mitigation         Ordered     Medium Risk of VTE  Once      02/15/17 1550          Tamera Bowen MD  Department of Hospital Medicine   [facility]

## 2017-02-16 NOTE — NURSING
Attempted to ween patient from 5L o2 to 4L o2, but sats dropped from 97 to 92. Kept patient on 5L.

## 2017-02-16 NOTE — ASSESSMENT & PLAN NOTE
Normal exam, CXR, U/A. ROS negative.  This fever is presumed to be from her acute fractures as clinically she had NO infectious symptoms. Check CBC to assess WBC this morning with 100.4 isolated temp. If fever persistent and continued O2 requirement will need to add in aspiration coverage  IS

## 2017-02-16 NOTE — SUBJECTIVE & OBJECTIVE
"Interval History: doing well No SOB, low grade  Temp 100.4. No SOB. POX 96% on RA  Review of Systems   Constitutional: Negative for chills, diaphoresis, fatigue and fever.        "i'm burning up"   HENT: Negative for congestion, nosebleeds, postnasal drip, rhinorrhea, sinus pressure, sneezing and sore throat.    Eyes: Negative for visual disturbance.   Respiratory: Negative for cough, shortness of breath and wheezing.         History of smoking 20 years ago; reports chronic cough "most of the time"   Cardiovascular: Negative for chest pain.   Gastrointestinal: Positive for vomiting. Negative for abdominal distention, abdominal pain, blood in stool, constipation, diarrhea and nausea.        Only secondary to pain meds in ER; reports she cannot "take all that"   Genitourinary: Negative for difficulty urinating, dyspareunia, flank pain, frequency and hematuria.   Musculoskeletal: Positive for arthralgias. Negative for back pain and myalgias.   Neurological: Negative for headaches (this am).   Psychiatric/Behavioral: Negative for dysphoric mood (takes antidepressant) and sleep disturbance.     Objective:     Vital Signs (Most Recent):  Temp: (!) 100.4 °F (38 °C) (02/16/17 0726)  Pulse: 101 (02/16/17 0737)  Resp: 18 (02/16/17 0726)  BP: (!) 99/55 (02/16/17 0726)  SpO2: 96 % (02/16/17 0737) Vital Signs (24h Range):  Temp:  [96.8 °F (36 °C)-100.4 °F (38 °C)] 100.4 °F (38 °C)  Pulse:  [] 101  Resp:  [15-18] 18  SpO2:  [78 %-97 %] 96 %  BP: ()/(55-82) 99/55     Weight: 59 kg (130 lb)  Body mass index is 22.31 kg/(m^2).    Physical Exam   Constitutional: She is oriented to person, place, and time. She appears well-developed and well-nourished. No distress.   HENT:   Head: Normocephalic and atraumatic.   Right Ear: External ear normal.   Left Ear: External ear normal.   Nose: Nose normal.   Mouth/Throat: Oropharynx is clear and moist.   TMs clear  OP clear    Eyes: Conjunctivae and EOM are normal. Pupils are equal, " round, and reactive to light.   Neck: Normal range of motion. Neck supple. No JVD present. No tracheal deviation present. No thyromegaly present.   Cardiovascular: Normal rate, regular rhythm, normal heart sounds and intact distal pulses.    No murmur heard.  Pulmonary/Chest: Effort normal and breath sounds normal. No respiratory distress. She has no wheezes. She has no rales. She exhibits no tenderness.   No resp distress.    Lungs clear bilaterally   Abdominal: Soft. Bowel sounds are normal. She exhibits no distension and no mass. There is no tenderness. There is no rebound and no guarding.   Musculoskeletal: Normal range of motion. She exhibits no edema or tenderness.   Left wrist casted and left LE post surgical   Lymphadenopathy:     She has no cervical adenopathy.   Neurological: She is alert and oriented to person, place, and time. She has normal reflexes. She displays normal reflexes. No cranial nerve deficit. She exhibits normal muscle tone. Coordination normal.   Skin: Skin is warm and dry. No rash noted. No erythema. No pallor.   Left hip with bandage, CDI   Psychiatric: She has a normal mood and affect. Her behavior is normal. Judgment and thought content normal.   Nursing note and vitals reviewed.       Significant Labs:   CBC:     Recent Labs  Lab 02/14/17  1635 02/15/17  0514 02/16/17  0552   WBC 10.79 10.20  --    HGB 13.7 12.1 10.6*   HCT 39.5 36.0* 31.7*    287  --      CMP:     Recent Labs  Lab 02/14/17  1635 02/15/17  0514    136   K 3.3* 3.6    102   CO2 24 27   * 120*   BUN 18 13   CREATININE 0.7 0.7   CALCIUM 9.3 8.1*   PROT 6.7 5.9*   ALBUMIN 4.1 3.6   BILITOT 0.5 1.0   ALKPHOS 57 45*   AST 29 28   ALT 28 23   ANIONGAP 11 7*   EGFRNONAA >60 >60     Urine Studies:     Recent Labs  Lab 02/14/17  1720   COLORU Yellow   APPEARANCEUA Clear   PHUR >8.0   SPECGRAV 1.015   PROTEINUA Negative   GLUCUA Negative   KETONESU 1+*   BILIRUBINUA Negative   OCCULTUA Trace*   NITRITE  Negative   UROBILINOGEN Negative   LEUKOCYTESUR Negative       Significant Imaging:     CXR prior to surgery The lungs are hyperexpanded and demonstrate mild chronic lung changes.  No consolidation or pleural effusions are seen.  The heart is normal in size.  The skeletal structures are osteopenic and show age-appropriate degenerative change.    CXR after surgery Since the prior study, there has been interval development of a diffuse infiltrate involving the right lung.  The possibility of aspiration pneumonia should be considered.  The left lung is relatively clear.  Allowing for a very poor depth of inspiration, heart size and pulmonary vessels are within normal limits.  No pleural effusion or pneumothorax are identified.    CXR today The lungs are underexpanded with crowding of the pulmonary vascularity.  As compared to the previous study of 2/15/2017, there is improved aeration of the lungs with continued ill-defined opacity in the right perihilar location.  The heart is normal in size.  The skeletal structures are intact.    EKG NSR

## 2017-02-16 NOTE — PLAN OF CARE
Problem: Fall Risk (Adult)  Goal: Absence of Falls  Patient will demonstrate the desired outcomes by discharge/transition of care.   Outcome: Ongoing (interventions implemented as appropriate)  Fall precautions maintained. No falls this shift.     Problem: Patient Care Overview  Goal: Plan of Care Review  Outcome: Ongoing (interventions implemented as appropriate)  Plan of care reviewed with patient and she agrees with the plan of care. Neuro vascular checks WNL. Dressing to left hip intact with serosanguinous drainage noted. Cast intact to left wrist.

## 2017-02-16 NOTE — PLAN OF CARE
Problem: Patient Care Overview  Goal: Plan of Care Review  Outcome: Ongoing (interventions implemented as appropriate)  Patient required ICU monitoring initial post op secondary to respiratory issues. More awake at present, breath sounds auscultated with few bibasilar crackles. O2 sat 96% on O2 per ventimask at 50%. Respirations even and unlabored, rate 18/min. Moist non-productive cough noted. Denies any pain. Will plan to downgrade to med-surg unit later this PM. Short arm cast intact to left arm. neurochecks WNL. Left hip dressing intact with moderate amount of sanguineous drainage, margin marked. Not through to outer dressing. Abduction pillow in use, good circulation and neurochecks to left foot. Vital signs stable. NSR noted on monitor. Will continue to monitor.

## 2017-02-16 NOTE — NURSING
Home instructions for xarelto reviewed with patient and spouse; all questions addressed. Patient instructed to call Dr. Cesar's office for nay questions after discharge.

## 2017-02-16 NOTE — NURSING
Report received. Patient resting in pain; stable. Call bell within reach. Patient instructed to call with needs.

## 2017-02-16 NOTE — PLAN OF CARE
Problem: Patient Care Overview  Goal: Plan of Care Review  Outcome: Ongoing (interventions implemented as appropriate)  Fall precautions maintained. Patient remains free from falls/injuries. Infection prevention methods in place; equipment surfaces wipes down; proper hand hygiene. Rest promoted. Trapeze bar over bed. Patient repositions independently. Fluids and proper nutrition promoted. Paul catheter draining clear, yellow urine. Patient denies pain at rest. Patient took tylenol. Only hurts when moving. Denies nausea. Weight bearing as tolerated.Cast to left extremity dry and intact. Worked with PT today; stood up with them with platform walker. Abductor pillow. Neurovascular checks WDL; patient instructed of warning signs to watch for. CIS on case; xarelto initiated. Plan of care reviewed with patient and patient agrees with plan of care.

## 2017-02-16 NOTE — PLAN OF CARE
02/16/17 1414   Discharge Assessment   Assessment Type Discharge Planning Assessment   Confirmed/corrected address and phone number on facesheet? Yes   Assessment information obtained from? Patient   Expected Length of Stay (days) 3   Type of Healthcare Directive Received (Declined)   Prior to hospitilization cognitive status: Alert/Oriented   Prior to hospitalization functional status: Independent   Current cognitive status: Alert/Oriented   Current Functional Status: Independent   Arrived From home or self-care   Lives With spouse   Able to Return to Prior Arrangements yes   Is patient able to care for self after discharge? Yes   How many people do you have in your home that can help with your care after discharge? 1   Who are your caregiver(s) and their phone number(s)? Spouse, Melvi Louis Cell [phone]   Patient's perception of discharge disposition home or selfcare   Readmission Within The Last 30 Days no previous admission in last 30 days   Patient currently being followed by outpatient case management? No   Patient currently receives home health services? No   Does the patient currently use HME? No   Patient currently receives private duty nursing? No   Patient currently receives any other outside agency services? No   Equipment Currently Used at Home none   Do you have any problems affording any of your prescribed medications? No   Is the patient taking medications as prescribed? yes   Do you have any financial concerns preventing you from receiving the healthcare you need? No   Does the patient have transportation to healthcare appointments? Yes   Transportation Available family or friend will provide   On Dialysis? No   Does the patient receive services at the [facility]? No   Are there any open cases? No   Discharge Plan A Rehab   Discharge Plan B Home with family;Home Health   Patient/Family In Agreement With Plan yes   Patient is a 71 year old female coming to the hospital from home where  she lives with her spouse.  Patient is alert and engages in conversation.  She states she is very active and her spouse will assist as needed.  She may need inpatient rehab or home with home health.  Will follow for needs.  Patient anticipates returning to her home or rehab.  She is OK with [facility] if needed.

## 2017-02-16 NOTE — ASSESSMENT & PLAN NOTE
Ortho follwing  S/p closed reduction   and percutaneous distraction pinning of the left distal radius.  Start PT today

## 2017-02-17 PROBLEM — J44.1 COPD EXACERBATION: Status: ACTIVE | Noted: 2017-02-17

## 2017-02-17 LAB
ANION GAP SERPL CALC-SCNC: 6 MMOL/L
BASOPHILS # BLD AUTO: 0.03 K/UL
BASOPHILS NFR BLD: 0.3 %
BNP SERPL-MCNC: 36 PG/ML
BUN SERPL-MCNC: 8 MG/DL
CALCIUM SERPL-MCNC: 7.6 MG/DL
CHLORIDE SERPL-SCNC: 101 MMOL/L
CO2 SERPL-SCNC: 29 MMOL/L
CREAT SERPL-MCNC: 0.6 MG/DL
DIFFERENTIAL METHOD: ABNORMAL
EOSINOPHIL # BLD AUTO: 0.5 K/UL
EOSINOPHIL NFR BLD: 4.9 %
ERYTHROCYTE [DISTWIDTH] IN BLOOD BY AUTOMATED COUNT: 13 %
EST. GFR  (AFRICAN AMERICAN): >60 ML/MIN/1.73 M^2
EST. GFR  (NON AFRICAN AMERICAN): >60 ML/MIN/1.73 M^2
GLUCOSE SERPL-MCNC: 109 MG/DL
HCT VFR BLD AUTO: 27.9 %
HCT VFR BLD AUTO: 27.9 %
HGB BLD-MCNC: 9.2 G/DL
HGB BLD-MCNC: 9.2 G/DL
LYMPHOCYTES # BLD AUTO: 0.6 K/UL
LYMPHOCYTES NFR BLD: 6.2 %
MCH RBC QN AUTO: 31 PG
MCHC RBC AUTO-ENTMCNC: 33 %
MCV RBC AUTO: 94 FL
MONOCYTES # BLD AUTO: 1 K/UL
MONOCYTES NFR BLD: 10.4 %
NEUTROPHILS # BLD AUTO: 7.4 K/UL
NEUTROPHILS NFR BLD: 78.2 %
PLATELET # BLD AUTO: 211 K/UL
PMV BLD AUTO: 8.6 FL
POTASSIUM SERPL-SCNC: 3.1 MMOL/L
RBC # BLD AUTO: 2.97 M/UL
SODIUM SERPL-SCNC: 136 MMOL/L
WBC # BLD AUTO: 9.4 K/UL

## 2017-02-17 PROCEDURE — 63600175 PHARM REV CODE 636 W HCPCS: Performed by: FAMILY MEDICINE

## 2017-02-17 PROCEDURE — 11000001 HC ACUTE MED/SURG PRIVATE ROOM

## 2017-02-17 PROCEDURE — 99233 SBSQ HOSP IP/OBS HIGH 50: CPT | Mod: ,,, | Performed by: FAMILY MEDICINE

## 2017-02-17 PROCEDURE — 25000003 PHARM REV CODE 250: Performed by: SURGERY

## 2017-02-17 PROCEDURE — 94640 AIRWAY INHALATION TREATMENT: CPT

## 2017-02-17 PROCEDURE — 27000221 HC OXYGEN, UP TO 24 HOURS

## 2017-02-17 PROCEDURE — 80048 BASIC METABOLIC PNL TOTAL CA: CPT

## 2017-02-17 PROCEDURE — 97116 GAIT TRAINING THERAPY: CPT

## 2017-02-17 PROCEDURE — 25000003 PHARM REV CODE 250: Performed by: FAMILY MEDICINE

## 2017-02-17 PROCEDURE — 36415 COLL VENOUS BLD VENIPUNCTURE: CPT

## 2017-02-17 PROCEDURE — 27000339 *HC DAILY SUPPLY KIT

## 2017-02-17 PROCEDURE — 25500020 PHARM REV CODE 255: Performed by: FAMILY MEDICINE

## 2017-02-17 PROCEDURE — 99900035 HC TECH TIME PER 15 MIN (STAT)

## 2017-02-17 PROCEDURE — 97530 THERAPEUTIC ACTIVITIES: CPT

## 2017-02-17 PROCEDURE — 25000242 PHARM REV CODE 250 ALT 637 W/ HCPCS: Performed by: FAMILY MEDICINE

## 2017-02-17 PROCEDURE — 85025 COMPLETE CBC W/AUTO DIFF WBC: CPT

## 2017-02-17 PROCEDURE — 25000003 PHARM REV CODE 250: Performed by: NURSE PRACTITIONER

## 2017-02-17 PROCEDURE — 94761 N-INVAS EAR/PLS OXIMETRY MLT: CPT

## 2017-02-17 PROCEDURE — 83880 ASSAY OF NATRIURETIC PEPTIDE: CPT

## 2017-02-17 PROCEDURE — 25000003 PHARM REV CODE 250: Performed by: ORTHOPAEDIC SURGERY

## 2017-02-17 RX ORDER — POTASSIUM CHLORIDE 20 MEQ/1
40 TABLET, EXTENDED RELEASE ORAL ONCE
Status: COMPLETED | OUTPATIENT
Start: 2017-02-17 | End: 2017-02-17

## 2017-02-17 RX ORDER — IPRATROPIUM BROMIDE AND ALBUTEROL SULFATE 2.5; .5 MG/3ML; MG/3ML
3 SOLUTION RESPIRATORY (INHALATION)
Status: DISCONTINUED | OUTPATIENT
Start: 2017-02-17 | End: 2017-02-27 | Stop reason: HOSPADM

## 2017-02-17 RX ORDER — FLUTICASONE FUROATE AND VILANTEROL 100; 25 UG/1; UG/1
1 POWDER RESPIRATORY (INHALATION) DAILY
Status: DISCONTINUED | OUTPATIENT
Start: 2017-02-18 | End: 2017-02-17

## 2017-02-17 RX ORDER — CEFTRIAXONE 1 G/1
1 INJECTION, POWDER, FOR SOLUTION INTRAMUSCULAR; INTRAVENOUS
Status: DISCONTINUED | OUTPATIENT
Start: 2017-02-17 | End: 2017-02-17

## 2017-02-17 RX ADMIN — SODIUM CHLORIDE, PRESERVATIVE FREE 3 ML: 5 INJECTION INTRAVENOUS at 05:02

## 2017-02-17 RX ADMIN — POTASSIUM CHLORIDE 40 MEQ: 1500 TABLET, EXTENDED RELEASE ORAL at 02:02

## 2017-02-17 RX ADMIN — SODIUM CHLORIDE, PRESERVATIVE FREE 3 ML: 5 INJECTION INTRAVENOUS at 01:02

## 2017-02-17 RX ADMIN — RIVAROXABAN 10 MG: 10 TABLET, FILM COATED ORAL at 05:02

## 2017-02-17 RX ADMIN — CEFTRIAXONE 1 G: 1 INJECTION, SOLUTION INTRAVENOUS at 07:02

## 2017-02-17 RX ADMIN — SODIUM CHLORIDE, PRESERVATIVE FREE 3 ML: 5 INJECTION INTRAVENOUS at 09:02

## 2017-02-17 RX ADMIN — VENLAFAXINE HYDROCHLORIDE 75 MG: 75 CAPSULE, EXTENDED RELEASE ORAL at 09:02

## 2017-02-17 RX ADMIN — TRAMADOL HYDROCHLORIDE 50 MG: 50 TABLET, COATED ORAL at 07:02

## 2017-02-17 RX ADMIN — IPRATROPIUM BROMIDE AND ALBUTEROL SULFATE 3 ML: .5; 3 SOLUTION RESPIRATORY (INHALATION) at 07:02

## 2017-02-17 RX ADMIN — DOCUSATE SODIUM AND SENNOSIDES 1 TABLET: 8.6; 5 TABLET, FILM COATED ORAL at 09:02

## 2017-02-17 RX ADMIN — KETOROLAC TROMETHAMINE 15 MG: 15 INJECTION, SOLUTION INTRAMUSCULAR; INTRAVENOUS at 02:02

## 2017-02-17 RX ADMIN — IOHEXOL 75 ML: 350 INJECTION, SOLUTION INTRAVENOUS at 01:02

## 2017-02-17 RX ADMIN — PANTOPRAZOLE SODIUM 40 MG: 40 TABLET, DELAYED RELEASE ORAL at 09:02

## 2017-02-17 NOTE — PROGRESS NOTES
Called to pt room bleeding noted to hip dressing. Accessed incision, no active bleeding but dressing was saturated. Dr. Sanchez notified. Wants to redress and if incision begins to bleed again he will come out. Small hematoma noted to bottom part of the incision. Will continue to monitor

## 2017-02-17 NOTE — PLAN OF CARE
Problem: Fall Risk (Adult)  Goal: Absence of Falls  Patient will demonstrate the desired outcomes by discharge/transition of care.   Outcome: Ongoing (interventions implemented as appropriate)  Fall precautions maintained. No falls this shift.     Problem: Patient Care Overview  Goal: Plan of Care Review  Outcome: Ongoing (interventions implemented as appropriate)  Plan of care reviewed with patient and she agrees with the plan of care. C/o pain to left hip and wrist x 1 this shift. Ketorolac and Ultram given and were effective for pain. Neurovascular checks WNL. Dressing intact to Left hip. Cast intact to left wrist.     Problem: Pressure Ulcer Risk (Harjinder Scale) (Adult,Obstetrics,Pediatric)  Goal: Skin Integrity  Patient will demonstrate the desired outcomes by discharge/transition of care.   Outcome: Ongoing (interventions implemented as appropriate)  Patient is able to reposition herself in bed using trapeze. Frequent weight shift encouraged.

## 2017-02-17 NOTE — PT/OT/SLP PROGRESS
Physical Therapy  Treatment    Marycarmen Louis   MRN: [id number]   Admitting Diagnosis: Closed fracture of left hip    PT Received On: 17  PT Start Time: 1355     PT Stop Time: 1410    PT Total Time (min): 15 min       Billable Minutes:  Therapeutic Activity 15 minutes    Treatment Type: Treatment  PT/PTA: PT             General Precautions: Standard, fall  Orthopedic Precautions: LUE non weight bearing, LLE weight bearing as tolerated (LUE NWB to wirst only)   Braces:           Subjective:  Communicated with patient prior to session.    Pain Ratin/10  Location - Side: Left  Location - Orientation: generalized  Location: hip  Pain Addressed: Reposition, Nurse notified  Pain Rating Post-Intervention: 9/10    Objective:   Patient found with: hip abduction pillow, dick catheter, oxygen, SCD    Functional Mobility:  Bed Mobility:   Rolling/Turning to Left: Moderate assistance  Rolling/Turning Right: Moderate assistance  Scooting/Bridging: Moderate Assistance, With trapeze  Supine to Sit: Moderate Assistance, With trapeze  Sit to Supine: With trapeze, Moderate Assistance    Transfers:  Sit <> Stand Assistance: Moderate Assistance  Sit <> Stand Assistive Device: Rolling Walker, Platform  Bed <> Chair Technique: Stand Pivot  Bed <> Chair Assistance: Moderate Assistance  Bed <> Chair Assistive Device: Rolling Walker, Platform    Balance:   Static Sit: GOOD-: Takes MODERATE challenges from all directions but inconsistently  Dynamic Sit: GOOD-: Maintains balance through MODERATE excursions of active trunk movement,     Static Stand: FAIR+: Takes MINIMAL challenges from all directions  Dynamic stand: POOR: N/A     AM-PAC 6 CLICK MOBILITY  How much help from another person does this patient currently need?   1 = Unable, Total/Dependent Assistance  2 = A lot, Maximum/Moderate Assistance  3 = A little, Minimum/Contact Guard/Supervision  4 = None, Modified [place]/Independent    Turning over in bed (including  adjusting bedclothes, sheets and blankets)?: 2  Sitting down on and standing up from a chair with arms (e.g., wheelchair, bedside commode, etc.): 2  Moving from lying on back to sitting on the side of the bed?: 2  Moving to and from a bed to a chair (including a wheelchair)?: 2  Need to walk in hospital room?: 1  Climbing 3-5 steps with a railing?: 1  Total Score: 10    AM-PAC Raw Score CMS G-Code Modifier Level of Impairment Assistance   6 % Total / Unable   7 - 9 CM 80 - 100% Maximal Assist   10 - 14 CL 60 - 80% Moderate Assist   15 - 19 CK 40 - 60% Moderate Assist   20 - 22 CJ 20 - 40% Minimal Assist   23 CI 1-20% SBA / CGA   24 CH 0% Independent/ Mod I     Patient left supine with all lines intact, call button in reach and NSG notified.    Assessment:  Marycarmen Louis is a 71 y.o. female with a medical diagnosis of Closed fracture of left hip and presents with increased fatigue this PM after just returning from x-ray. Pt c/o feeling exhausted and mild SOB. Pt unable to ambulate this PM.    Rehab identified problem list/impairments: Rehab identified problem list/impairments: weakness, impaired endurance, impaired self care skills, impaired functional mobilty, gait instability, impaired balance, decreased lower extremity function, decreased upper extremity function, decreased safety awareness, pain    Rehab potential is good.    Activity tolerance: Fair    Discharge recommendations: Discharge Facility/Level Of Care Needs: rehabilitation facility     Barriers to discharge: Barriers to Discharge: None    Equipment recommendations: Equipment Needed After Discharge: walker, rolling (LUE platform attachment)     GOALS:   Physical Therapy Goals        Problem: Physical Therapy Goal    Goal Priority Disciplines Outcome Goal Variances Interventions   Physical Therapy Goal     PT/OT, PT      Description:  Goals to be met by: 3/10/2017     Patient will increase functional independence with mobility by  performin. Supine to sit with Stand-by Assistance  2. Sit to supine with Stand-by Assistance  3. Sit to stand transfer with Stand-by Assistance  4. Bed to chair transfer with Stand-by Assistance using Platform walker  5. Gait  x 50 feet with Contact Guard Assistance using Platform walker.                 PLAN:    Patient to be seen  (1-2x/day Monday-Friday; PRN Weekends/Holidays)  to address the above listed problems via gait training, therapeutic activities, therapeutic exercises  Plan of Care expires:  (upon D/C from facility)  Plan of Care reviewed with: patient         Chanda Schmidt, PT  2017

## 2017-02-17 NOTE — PROGRESS NOTES
On rounds noticed pts NC was not on. Pt was lethargic and could not remember when she took it off. O2 sats were 52% NC reapplied and turned to 6L while getting facemask. Respiratory called. Once on facemask sats increased to 93%. Discussed with Dr. Garcia, will add orders

## 2017-02-17 NOTE — PROGRESS NOTES
Seen on rounds. H&H today 27.9/9.2. PT notes indicate she did not walk yesterday. Did stand with PT yesterday, but experienced slight dizziness, and did not walk.  Today's PT session should be a milestone. Soilage of dressing has not changed in last 24 hours.

## 2017-02-17 NOTE — NURSING
Attempted to take out dick catheter as ordered this morning; however, patient request dick to be left in until she can speak with the doctor.

## 2017-02-17 NOTE — PT/OT/SLP PROGRESS
Physical Therapy  Treatment    Marycarmen Louis   MRN: [id number]   Admitting Diagnosis: Closed fracture of left hip    PT Received On: 17  PT Start Time: 0945     PT Stop Time: 1010    PT Total Time (min): 25 min       Billable Minutes:  Gait [id number] minutes and Therapeutic Activity 10 minutes    Treatment Type: Treatment  PT/PTA: PT             General Precautions: Standard, fall  Orthopedic Precautions: LLE weight bearing as tolerated, LUE non weight bearing (LUE NWB to wrist only)   Braces:           Subjective:  Communicated with patient prior to session.    Pain Ratin/10  Location - Side: Left  Location - Orientation: generalized  Location: hip  Pain Addressed: Nurse notified  Pain Rating Post-Intervention: 5/10    Objective:   Patient found with: peripheral IV, SCD, hip abduction pillow, oxygen, dick catheter    Functional Mobility:  Bed Mobility:   Rolling/Turning to Left: Moderate assistance  Rolling/Turning Right: Moderate assistance  Scooting/Bridging: Moderate Assistance, With trapeze  Supine to Sit: Moderate Assistance, With trapeze  Sit to Supine: Moderate Assistance, With trapeze    Transfers:  Sit <> Stand Assistance: Moderate Assistance  Sit <> Stand Assistive Device: Rolling Walker  Bed <> Chair Technique: Stand Pivot  Bed <> Chair Assistance: Moderate Assistance  Bed <> Chair Assistive Device: Rolling Walker    Gait:   Gait Distance: 15 feet  Assistance 1: Moderate assistance  Gait Assistive Device: Rolling walker, Platform walker left  Gait Pattern: swing-to gait  Gait Deviation(s): decreased velocity of limb motion, decreased step length, decreased toe-to-floor clearance, decreased weight-shifting ability    Balance:   Static Sit: FAIR+: Able to take MINIMAL challenges from all directions  Dynamic Sit: FAIR+: Maintains balance through MINIMAL excursions of active trunk motion  Static Stand: POOR+: Needs MINIMAL assist to maintain  Dynamic stand: POOR: N/A     AM-PAC 6 CLICK  MOBILITY  How much help from another person does this patient currently need?   1 = Unable, Total/Dependent Assistance  2 = A lot, Maximum/Moderate Assistance  3 = A little, Minimum/Contact Guard/Supervision  4 = None, Modified [place]/Independent    Turning over in bed (including adjusting bedclothes, sheets and blankets)?: 2  Sitting down on and standing up from a chair with arms (e.g., wheelchair, bedside commode, etc.): 2  Moving from lying on back to sitting on the side of the bed?: 2  Moving to and from a bed to a chair (including a wheelchair)?: 2  Need to walk in hospital room?: 2  Climbing 3-5 steps with a railing?: 1  Total Score: 11    AM-PAC Raw Score CMS G-Code Modifier Level of Impairment Assistance   6 % Total / Unable   7 - 9 CM 80 - 100% Maximal Assist   10 - 14 CL 60 - 80% Moderate Assist   15 - 19 CK 40 - 60% Moderate Assist   20 - 22 CJ 20 - 40% Minimal Assist   23 CI 1-20% SBA / CGA   24 CH 0% Independent/ Mod I     Patient left up in chair with all lines intact, call button in reach and NSG notified.    Assessment:  Marycarmen Louis is a 71 y.o. female with a medical diagnosis of Closed fracture of left hip and presents with increased WB to LLE today. Pt with progression toward POC goals.    Rehab identified problem list/impairments: Rehab identified problem list/impairments: weakness, impaired endurance, impaired self care skills, impaired functional mobilty, gait instability, impaired balance, decreased lower extremity function, decreased upper extremity function, decreased safety awareness    Rehab potential is good.    Activity tolerance: Good    Discharge recommendations: Discharge Facility/Level Of Care Needs: rehabilitation facility     Barriers to discharge: Barriers to Discharge: None    Equipment recommendations: Equipment Needed After Discharge: walker, rolling (left UE platform attachment)     GOALS:   Physical Therapy Goals        Problem: Physical Therapy Goal    Goal  Priority Disciplines Outcome Goal Variances Interventions   Physical Therapy Goal     PT/OT, PT      Description:  Goals to be met by: 3/10/2017     Patient will increase functional independence with mobility by performin. Supine to sit with Stand-by Assistance  2. Sit to supine with Stand-by Assistance  3. Sit to stand transfer with Stand-by Assistance  4. Bed to chair transfer with Stand-by Assistance using Platform walker  5. Gait  x 50 feet with Contact Guard Assistance using Platform walker.                 PLAN:    Patient to be seen  (1-2x/day Monday-Friday; PRN Weekends/Holidays)  to address the above listed problems via gait training, therapeutic activities, therapeutic exercises  Plan of Care expires:  (upon D/C from facility)  Plan of Care reviewed with: patient         Chanda Roxana, PT  2017

## 2017-02-18 PROBLEM — R50.9 FEVER: Status: RESOLVED | Noted: 2017-02-15 | Resolved: 2017-02-18

## 2017-02-18 LAB
ANION GAP SERPL CALC-SCNC: 7 MMOL/L
BASOPHILS # BLD AUTO: 0.02 K/UL
BASOPHILS NFR BLD: 0.2 %
BUN SERPL-MCNC: 7 MG/DL
CALCIUM SERPL-MCNC: 7.8 MG/DL
CHLORIDE SERPL-SCNC: 99 MMOL/L
CO2 SERPL-SCNC: 28 MMOL/L
CREAT SERPL-MCNC: 0.5 MG/DL
DIFFERENTIAL METHOD: ABNORMAL
EOSINOPHIL # BLD AUTO: 0.3 K/UL
EOSINOPHIL NFR BLD: 3.6 %
ERYTHROCYTE [DISTWIDTH] IN BLOOD BY AUTOMATED COUNT: 12.7 %
EST. GFR  (AFRICAN AMERICAN): >60 ML/MIN/1.73 M^2
EST. GFR  (NON AFRICAN AMERICAN): >60 ML/MIN/1.73 M^2
GLUCOSE SERPL-MCNC: 108 MG/DL
HCT VFR BLD AUTO: 25.2 %
HCT VFR BLD AUTO: 25.2 %
HGB BLD-MCNC: 8.2 G/DL
HGB BLD-MCNC: 8.2 G/DL
LYMPHOCYTES # BLD AUTO: 0.7 K/UL
LYMPHOCYTES NFR BLD: 7.9 %
MCH RBC QN AUTO: 30.9 PG
MCHC RBC AUTO-ENTMCNC: 32.5 %
MCV RBC AUTO: 95 FL
MONOCYTES # BLD AUTO: 1.1 K/UL
MONOCYTES NFR BLD: 12.5 %
NEUTROPHILS # BLD AUTO: 6.4 K/UL
NEUTROPHILS NFR BLD: 75.8 %
PLATELET # BLD AUTO: 230 K/UL
PMV BLD AUTO: 8.9 FL
POTASSIUM SERPL-SCNC: 3.5 MMOL/L
RBC # BLD AUTO: 2.65 M/UL
SODIUM SERPL-SCNC: 134 MMOL/L
WBC # BLD AUTO: 8.43 K/UL

## 2017-02-18 PROCEDURE — 93005 ELECTROCARDIOGRAM TRACING: CPT

## 2017-02-18 PROCEDURE — 99232 SBSQ HOSP IP/OBS MODERATE 35: CPT | Mod: ,,, | Performed by: FAMILY MEDICINE

## 2017-02-18 PROCEDURE — 94640 AIRWAY INHALATION TREATMENT: CPT

## 2017-02-18 PROCEDURE — 99900035 HC TECH TIME PER 15 MIN (STAT)

## 2017-02-18 PROCEDURE — 93701 BIOIMPEDANCE CV ANALYSIS: CPT

## 2017-02-18 PROCEDURE — 94761 N-INVAS EAR/PLS OXIMETRY MLT: CPT

## 2017-02-18 PROCEDURE — 93010 ELECTROCARDIOGRAM REPORT: CPT | Mod: ,,, | Performed by: INTERNAL MEDICINE

## 2017-02-18 PROCEDURE — 27000221 HC OXYGEN, UP TO 24 HOURS

## 2017-02-18 PROCEDURE — 27100171 HC OXYGEN HIGH FLOW UP TO 24 HOURS

## 2017-02-18 PROCEDURE — 25000242 PHARM REV CODE 250 ALT 637 W/ HCPCS: Performed by: FAMILY MEDICINE

## 2017-02-18 PROCEDURE — 97530 THERAPEUTIC ACTIVITIES: CPT

## 2017-02-18 PROCEDURE — 25000003 PHARM REV CODE 250: Performed by: SURGERY

## 2017-02-18 PROCEDURE — 11000001 HC ACUTE MED/SURG PRIVATE ROOM

## 2017-02-18 PROCEDURE — 36415 COLL VENOUS BLD VENIPUNCTURE: CPT

## 2017-02-18 PROCEDURE — 85025 COMPLETE CBC W/AUTO DIFF WBC: CPT

## 2017-02-18 PROCEDURE — 25000003 PHARM REV CODE 250: Performed by: ORTHOPAEDIC SURGERY

## 2017-02-18 PROCEDURE — 25000003 PHARM REV CODE 250: Performed by: FAMILY MEDICINE

## 2017-02-18 PROCEDURE — 80048 BASIC METABOLIC PNL TOTAL CA: CPT

## 2017-02-18 PROCEDURE — 27000339 *HC DAILY SUPPLY KIT

## 2017-02-18 PROCEDURE — 97116 GAIT TRAINING THERAPY: CPT

## 2017-02-18 PROCEDURE — 63600175 PHARM REV CODE 636 W HCPCS: Performed by: SURGERY

## 2017-02-18 PROCEDURE — 63600175 PHARM REV CODE 636 W HCPCS: Performed by: FAMILY MEDICINE

## 2017-02-18 RX ORDER — HYDROCODONE BITARTRATE AND ACETAMINOPHEN 500; 5 MG/1; MG/1
TABLET ORAL
Status: DISCONTINUED | OUTPATIENT
Start: 2017-02-18 | End: 2017-02-18

## 2017-02-18 RX ADMIN — PANTOPRAZOLE SODIUM 40 MG: 40 TABLET, DELAYED RELEASE ORAL at 09:02

## 2017-02-18 RX ADMIN — CEFTRIAXONE 1 G: 1 INJECTION, SOLUTION INTRAVENOUS at 05:02

## 2017-02-18 RX ADMIN — IBUPROFEN 600 MG: 600 TABLET ORAL at 09:02

## 2017-02-18 RX ADMIN — SODIUM CHLORIDE, PRESERVATIVE FREE 3 ML: 5 INJECTION INTRAVENOUS at 09:02

## 2017-02-18 RX ADMIN — TRAMADOL HYDROCHLORIDE 50 MG: 50 TABLET, COATED ORAL at 05:02

## 2017-02-18 RX ADMIN — ONDANSETRON 4 MG: 2 INJECTION INTRAMUSCULAR; INTRAVENOUS at 05:02

## 2017-02-18 RX ADMIN — VENLAFAXINE HYDROCHLORIDE 75 MG: 75 CAPSULE, EXTENDED RELEASE ORAL at 09:02

## 2017-02-18 RX ADMIN — DOCUSATE SODIUM AND SENNOSIDES 1 TABLET: 8.6; 5 TABLET, FILM COATED ORAL at 09:02

## 2017-02-18 RX ADMIN — SODIUM CHLORIDE, PRESERVATIVE FREE 3 ML: 5 INJECTION INTRAVENOUS at 02:02

## 2017-02-18 RX ADMIN — ACETAMINOPHEN 500 MG: 500 TABLET ORAL at 01:02

## 2017-02-18 RX ADMIN — IPRATROPIUM BROMIDE AND ALBUTEROL SULFATE 3 ML: .5; 3 SOLUTION RESPIRATORY (INHALATION) at 07:02

## 2017-02-18 RX ADMIN — IPRATROPIUM BROMIDE AND ALBUTEROL SULFATE 3 ML: .5; 3 SOLUTION RESPIRATORY (INHALATION) at 01:02

## 2017-02-18 RX ADMIN — KETOROLAC TROMETHAMINE 15 MG: 15 INJECTION, SOLUTION INTRAMUSCULAR; INTRAVENOUS at 02:02

## 2017-02-18 RX ADMIN — SODIUM CHLORIDE, PRESERVATIVE FREE 3 ML: 5 INJECTION INTRAVENOUS at 06:02

## 2017-02-18 RX ADMIN — PROMETHAZINE HYDROCHLORIDE 12.5 MG: 25 INJECTION INTRAMUSCULAR; INTRAVENOUS at 09:02

## 2017-02-18 NOTE — PT/OT/SLP PROGRESS
"Physical Therapy  Treatment    Marycarmen Louis   MRN: [id number]   Admitting Diagnosis: Closed fracture of left hip    PT Received On: 17  PT Start Time: 925     PT Stop Time: 955    PT Total Time (min): 30 min       Billable Minutes:  Gait [id number] minutes and Therapeutic Activity 15 minutes    Treatment Type: Treatment  PT/PTA: PT             General Precautions: Standard, fall  Orthopedic Precautions: LUE non weight bearing, LLE weight bearing as tolerated (LUE NWB to wrist only)   Braces:  (Left Wrist Hard Cast)    Do you have any cultural, spiritual, Mormon conflicts, given your current situation?: none voiced    Subjective:  Communicated with patient prior to session.  "I just want to get better."    Pain Ratin/10  Location - Side: Left  Location - Orientation: medial  Location: hip  Pain Addressed: Nurse notified, Reposition  Pain Rating Post-Intervention: 2/10    Objective:   Patient found with: oxygen, SCD    Functional Mobility:  Bed Mobility:   Rolling/Turning to Left: Minimum assistance  Rolling/Turning Right: Minimum assistance  Scooting/Bridging: Minimum Assistance, With trapeze  Supine to Sit: Minimum Assistance, With trapeze  Sit to Supine: Minimum Assistance, With trapeze    Transfers:  Sit <> Stand Assistance: Minimum Assistance  Sit <> Stand Assistive Device: Rolling Walker, Platform  Bed <> Chair Technique: Stand Pivot  Bed <> Chair Assistance: Minimum Assistance  Bed <> Chair Assistive Device: Rolling Walker, Platform  Toilet Transfer Technique: Stand Pivot  Toilet Transfer Assistance: Moderate Assistance  Toilet Transfer Assistive Device: Rolling Walker, Other (see comments) (Platform Walker)    Gait:   Gait Distance: 15 feet  Assistance 1: Moderate assistance  Gait Assistive Device: Rolling walker, Platform walker left  Gait Pattern: swing-to gait  Gait Deviation(s): decreased velocity of limb motion, decreased step length, decreased toe-to-floor clearance, decreased " weight-shifting ability  Gait Training:  Pt. Amb. 15' with RW, platform attachment LUE, mod. A.  VC's given to pt. For step placement and A.D. Placement.    Balance:   Static Sit: GOOD-: Takes MODERATE challenges from all directions but inconsistently  Dynamic Sit: FAIR+: Maintains balance through MINIMAL excursions of active trunk motion  Static Stand: POOR+: Needs MINIMAL assist to maintain  Dynamic stand: POOR: N/A     Therapeutic Activities and Exercises:  There. Act.: Transfer Training with assistance as noted.   VC's and manual guidance given to pt. For sequencing.   Weight shifting and weight acceptance tasks performed in standing with min. A.  Trunk control tasks performed with pt. Seated EOB, with min. A.  Bed mobility tasks with assistance as noted, performed with pt.  VC's and manual guidance given to pt. For sequencing.     AM-PAC 6 CLICK MOBILITY  How much help from another person does this patient currently need?   1 = Unable, Total/Dependent Assistance  2 = A lot, Maximum/Moderate Assistance  3 = A little, Minimum/Contact Guard/Supervision  4 = None, Modified [place]/Independent    Turning over in bed (including adjusting bedclothes, sheets and blankets)?: 2  Sitting down on and standing up from a chair with arms (e.g., wheelchair, bedside commode, etc.): 2  Moving from lying on back to sitting on the side of the bed?: 2  Moving to and from a bed to a chair (including a wheelchair)?: 2  Need to walk in hospital room?: 2  Climbing 3-5 steps with a railing?: 1  Total Score: 11    AM-PAC Raw Score CMS G-Code Modifier Level of Impairment Assistance   6 % Total / Unable   7 - 9 CM 80 - 100% Maximal Assist   10 - 14 CL 60 - 80% Moderate Assist   15 - 19 CK 40 - 60% Moderate Assist   20 - 22 CJ 20 - 40% Minimal Assist   23 CI 1-20% SBA / CGA   24 CH 0% Independent/ Mod I     Patient left HOB elevated with all lines intact, call button in reach, RN notified, family present and LUE pillow  positioned appropriately.    Assessment:  Marycarmen Louis is a 71 y.o. female with a medical diagnosis of Closed fracture of left hip and presents with increased ease with mobility and decreased c/o pain.  Pt. Is progressing towards PT POC goals.    Rehab identified problem list/impairments: Rehab identified problem list/impairments: weakness, impaired balance, impaired endurance, gait instability, decreased lower extremity function, impaired functional mobilty, orthopedic precautions, pain    Rehab potential is fair.    Activity tolerance: Fair    Discharge recommendations: Discharge Facility/Level Of Care Needs: rehabilitation facility     Barriers to discharge:      Equipment recommendations: Equipment Needed After Discharge: walker, rolling (LUE platform attachment)     GOALS:   Physical Therapy Goals        Problem: Physical Therapy Goal    Goal Priority Disciplines Outcome Goal Variances Interventions   Physical Therapy Goal     PT/OT, PT      Description:  Goals to be met by: 3/10/2017     Patient will increase functional independence with mobility by performin. Supine to sit with Stand-by Assistance  2. Sit to supine with Stand-by Assistance  3. Sit to stand transfer with Stand-by Assistance  4. Bed to chair transfer with Stand-by Assistance using Platform walker  5. Gait  x 50 feet with Contact Guard Assistance using Platform walker.                 PLAN:    Patient to be seen  (1-2x/day(M-F); PRN(Sat.,Sun.))  to address the above listed problems via gait training, therapeutic activities, therapeutic exercises  Plan of Care expires:  (Upon D/C from facility)  Plan of Care reviewed with: patient, family         Rosaliakeagan Douglas, PT  2017

## 2017-02-18 NOTE — PROGRESS NOTES
"[facility] Medicine  Progress Note    Patient Name: Marycarmen Louis  MRN: [id number]  Patient Class: IP- Inpatient   Admission Date: 2/14/2017  Length of Stay: 3 days  Attending Physician: Jose Jaeger MD  Primary Care Provider: Willie Santos MD (Inactive)        Subjective:     Principal Problem:Closed fracture of left hip    HPI:  70 y/o female reports she fell off of gym apparatus. Reports falling after missing step. ER w/up shows left wrist fracture and left hip fracture. Reports feeling fine prior to fall yesterday. No fever prior to this am.    Hospital Course:  She has surgery yesterday right lateral decubitus. Was given IVF. Has slight fluid overload caused resp distress. Hypoxic. CXR showed right lung infiltrate. She had no s/s of pneumonia and clear  X ray prior to surgery. Given dose of lasix and improved well. She was moved  From ICU to floor. Did well over night. PT to start this am. Still on O2 today 4L NC 96%    2/17/17-patient still becomes hypoxic when oxygen is removed.  CTA of the chest and lungs were done today that were negative for pulmonary embolism.  She did have significant emphysematous changes and lower lobe infiltrates and atelectasis bilaterally.  She probably has an exacerbation of COPD probably from perioperative intubation.  DuoNeb treatments, Rocephin, azithromycin started.  She seems be doing better.      Interval History: doing well No SOB, low grade  Temp 100.4. No SOB. POX 96% on RA  Review of Systems   Constitutional: Negative for chills, diaphoresis, fatigue and fever.        "i'm burning up"   HENT: Negative for congestion, nosebleeds, postnasal drip, rhinorrhea, sinus pressure, sneezing and sore throat.    Eyes: Negative for visual disturbance.   Respiratory: Positive for cough and shortness of breath. Negative for wheezing.         History of smoking 20 years ago; reports chronic cough "most of the time"   Cardiovascular: Negative for " "chest pain.   Gastrointestinal: Positive for vomiting. Negative for abdominal distention, abdominal pain, blood in stool, constipation, diarrhea and nausea.        Only secondary to pain meds in ER; reports she cannot "take all that"   Genitourinary: Negative for difficulty urinating, dyspareunia, flank pain, frequency and hematuria.   Musculoskeletal: Positive for arthralgias. Negative for back pain and myalgias.   Neurological: Negative for headaches (this am).   Psychiatric/Behavioral: Negative for dysphoric mood (takes antidepressant) and sleep disturbance.     Objective:     Vital Signs (Most Recent):  Temp: 97.2 °F (36.2 °C) (02/17/17 1538)  Pulse: 95 (02/17/17 1937)  Resp: 18 (02/17/17 1937)  BP: (!) 102/54 (02/17/17 1538)  SpO2: (!) 92 % (02/17/17 1937) Vital Signs (24h Range):  Temp:  [97.2 °F (36.2 °C)-99.9 °F (37.7 °C)] 97.2 °F (36.2 °C)  Pulse:  [] 95  Resp:  [18-20] 18  SpO2:  [90 %-96 %] 92 %  BP: (102-113)/(50-59) 102/54     Weight: 59 kg (130 lb)  Body mass index is 22.31 kg/(m^2).    Physical Exam   Constitutional: She is oriented to person, place, and time. She appears well-developed and well-nourished. No distress.   HENT:   Head: Normocephalic and atraumatic.   TMs clear  OP clear    Eyes: EOM are normal. Pupils are equal, round, and reactive to light.   Neck: Normal range of motion. No JVD present.   Cardiovascular: Normal rate, regular rhythm, normal heart sounds and intact distal pulses.  Exam reveals no gallop and no friction rub.    No murmur heard.  Pulmonary/Chest: Effort normal. No respiratory distress. She has no wheezes. She has rales.   No resp distress.    Lungs clear bilaterally   Musculoskeletal: Normal range of motion. She exhibits tenderness. She exhibits no edema.   Left wrist casted and left LE post surgical   Neurological: She is alert and oriented to person, place, and time. No cranial nerve deficit.   Skin: Skin is warm and dry. No rash noted. No erythema. No pallor. "   Left hip with bandage, CDI   Psychiatric: She has a normal mood and affect. Her behavior is normal. Judgment and thought content normal.   Nursing note and vitals reviewed.       Significant Labs:   CBC:     Recent Labs  Lab 02/16/17  0552 02/16/17  1105 02/17/17  0504   WBC  --  11.65 9.40   HGB 10.6* 10.3* 9.2*  9.2*   HCT 31.7* 31.2* 27.9*  27.9*   PLT  --  234 211     CMP:     Recent Labs  Lab 02/16/17  1105 02/17/17  0504    136   K 3.6 3.1*    101   CO2 30* 29   * 109   BUN 9 8   CREATININE 0.6 0.6   CALCIUM 7.9* 7.6*   ANIONGAP 5* 6*   EGFRNONAA >60 >60     Urine Studies:   No results for input(s): COLORU, APPEARANCEUA, PHUR, SPECGRAV, PROTEINUA, GLUCUA, KETONESU, BILIRUBINUA, OCCULTUA, NITRITE, UROBILINOGEN, LEUKOCYTESUR, RBCUA, WBCUA, BACTERIA, SQUAMEPITHEL, HYALINECASTS in the last 48 hours.    Invalid input(s): WRIGHTSUR    Significant Imaging:     CXR prior to surgery The lungs are hyperexpanded and demonstrate mild chronic lung changes.  No consolidation or pleural effusions are seen.  The heart is normal in size.  The skeletal structures are osteopenic and show age-appropriate degenerative change.    CXR after surgery Since the prior study, there has been interval development of a diffuse infiltrate involving the right lung.  The possibility of aspiration pneumonia should be considered.  The left lung is relatively clear.  Allowing for a very poor depth of inspiration, heart size and pulmonary vessels are within normal limits.  No pleural effusion or pneumothorax are identified.    CXR today The lungs are underexpanded with crowding of the pulmonary vascularity.  As compared to the previous study of 2/15/2017, there is improved aeration of the lungs with continued ill-defined opacity in the right perihilar location.  The heart is normal in size.  The skeletal structures are intact.    EKG NSR    Assessment/Plan:      * Closed fracture of left hip  Ortho following  VS/p Left  bipolar endoprosthetic hemiarthroplasty        Coronary artery calcification seen on CAT scan  [facility] has cleared her for surgery      Left wrist fracture  Ortho follwing  S/p closed reduction   and percutaneous distraction pinning of the left distal radius.  Continue physical therapy    Osteoporosis  Failed fosamax   Will need forteo x 2 years at discharge       Fever  Monitor temperature      Pulmonary edema  Hold Lasix at this time      COPD exacerbation  Continue DuoNeb treatments 4 times daily  Add  Rocephin  Monitor respiratory status closely      VTE Risk Mitigation         Ordered     Medium Risk of VTE  Once      02/15/17 1550          Jose Jaeger MD  Department of Hospital Medicine   [facility]

## 2017-02-18 NOTE — PLAN OF CARE
Problem: Patient Care Overview  Goal: Plan of Care Review  Outcome: Ongoing (interventions implemented as appropriate)  Problem with O2 sats in the beginning of the shift, O2 sats now stable with 3L NC sating in low 90s. CTA done to rule out emboli. Hip incision bled this afternoon, new dressing applied and dry. Complained of pain that was decreased with pain meds. Up with PT today. Started abx for respiratory. IS done. Discussed plan of care with pt, stated understanding

## 2017-02-18 NOTE — PROGRESS NOTES
Report received. Pt stable sitting in bed. Drainage on left hip incision. Notified Dr. Sanchez, will access on morning rounds

## 2017-02-18 NOTE — PROGRESS NOTES
[facility] Medicine  Progress Note    Patient Name: Marycarmen Louis  MRN: [id number]  Patient Class: IP- Inpatient   Admission Date: 2/14/2017  Length of Stay: 4 days  Attending Physician: Jose Jaeger MD  Primary Care Provider: Willie Santos MD (Inactive)        Subjective:     Principal Problem:Closed fracture of left hip    HPI:  70 y/o female reports she fell off of gym apparatus. Reports falling after missing step. ER w/up shows left wrist fracture and left hip fracture. Reports feeling fine prior to fall yesterday. No fever prior to this am.    Hospital Course:  She has surgery yesterday right lateral decubitus. Was given IVF. Has slight fluid overload caused resp distress. Hypoxic. CXR showed right lung infiltrate. She had no s/s of pneumonia and clear  X ray prior to surgery. Given dose of lasix and improved well. She was moved  From ICU to floor. Did well over night. PT to start this am. Still on O2 today 4L NC 96%    2/17/17-patient still becomes hypoxic when oxygen is removed.  CTA of the chest and lungs were done today that were negative for pulmonary embolism.  She did have significant emphysematous changes and lower lobe infiltrates and atelectasis bilaterally.  She probably has an exacerbation of COPD probably from perioperative intubation.  DuoNeb treatments, Rocephin, azithromycin started.  She seems be doing better.    2/18.  Patient evaluated this morning.  She was working with physical therapy and able to ambulate across the room with assistance.  She does feel short of breath with exertion.  Her hemoglobin is now 8.2.  Dr. Melendez consulted.  Her sats still drop when she takes her oxygen off.  Patient had a large amount of bloody drainage.  I stopped her Xarelto today for now until her hemoglobin stabilizes.      Interval History: doing well No SOB, low grade  Temp 100.4. No SOB. POX 96% on RA  Review of Systems   Constitutional: Negative for chills,  "diaphoresis, fatigue and fever.        "i'm burning up"   HENT: Negative for congestion, nosebleeds, postnasal drip, rhinorrhea, sinus pressure, sneezing and sore throat.    Eyes: Negative for visual disturbance.   Respiratory: Positive for cough and shortness of breath. Negative for wheezing.         History of smoking 20 years ago; reports chronic cough "most of the time"   Cardiovascular: Negative for chest pain.   Gastrointestinal: Positive for vomiting. Negative for abdominal distention, abdominal pain, blood in stool, constipation, diarrhea and nausea.        Only secondary to pain meds in ER; reports she cannot "take all that"   Genitourinary: Negative for difficulty urinating, dyspareunia, flank pain, frequency and hematuria.   Musculoskeletal: Positive for arthralgias. Negative for back pain and myalgias.   Neurological: Negative for headaches (this am).   Psychiatric/Behavioral: Negative for dysphoric mood (takes antidepressant) and sleep disturbance.     Objective:     Vital Signs (Most Recent):  Temp: 98.8 °F (37.1 °C) (02/18/17 1140)  Pulse: 103 (02/18/17 1400)  Resp: 18 (02/18/17 1329)  BP: (!) 111/55 (02/18/17 1140)  SpO2: (!) 93 % (02/18/17 1329) Vital Signs (24h Range):  Temp:  [97.2 °F (36.2 °C)-100 °F (37.8 °C)] 98.8 °F (37.1 °C)  Pulse:  [] 103  Resp:  [18-20] 18  SpO2:  [85 %-95 %] 93 %  BP: (102-121)/(50-59) 111/55     Weight: 59 kg (130 lb)  Body mass index is 22.31 kg/(m^2).    Physical Exam   Constitutional: She is oriented to person, place, and time. She appears well-developed and well-nourished. No distress.   HENT:   Head: Normocephalic and atraumatic.   TMs clear  OP clear    Eyes: EOM are normal. Pupils are equal, round, and reactive to light.   Neck: Normal range of motion. No JVD present.   Cardiovascular: Normal rate, regular rhythm, normal heart sounds and intact distal pulses.  Exam reveals no gallop and no friction rub.    No murmur heard.  Pulmonary/Chest: Effort normal. No " respiratory distress. She has no wheezes. She has rales.   No resp distress.    Lungs clear bilaterally   Musculoskeletal: Normal range of motion. She exhibits tenderness. She exhibits no edema.   Left wrist casted and left LE post surgical.  Large amount of bloody drainage present.   Neurological: She is alert and oriented to person, place, and time. No cranial nerve deficit.   Skin: Skin is warm and dry. No rash noted. No erythema. No pallor.   Left hip with bandage, CDI   Psychiatric: She has a normal mood and affect. Her behavior is normal. Judgment and thought content normal.   Nursing note and vitals reviewed.       Significant Labs:   CBC:     Recent Labs  Lab 02/17/17  0504 02/18/17  0603   WBC 9.40 8.43   HGB 9.2*  9.2* 8.2*  8.2*   HCT 27.9*  27.9* 25.2*  25.2*    230     CMP:     Recent Labs  Lab 02/17/17  0504 02/18/17  0603    134*   K 3.1* 3.5    99   CO2 29 28    108   BUN 8 7*   CREATININE 0.6 0.5   CALCIUM 7.6* 7.8*   ANIONGAP 6* 7*   EGFRNONAA >60 >60     Urine Studies:   No results for input(s): COLORU, APPEARANCEUA, PHUR, SPECGRAV, PROTEINUA, GLUCUA, KETONESU, BILIRUBINUA, OCCULTUA, NITRITE, UROBILINOGEN, LEUKOCYTESUR, RBCUA, WBCUA, BACTERIA, SQUAMEPITHEL, HYALINECASTS in the last 48 hours.    Invalid input(s): WRIGHTSUR    Significant Imaging:     CXR prior to surgery The lungs are hyperexpanded and demonstrate mild chronic lung changes.  No consolidation or pleural effusions are seen.  The heart is normal in size.  The skeletal structures are osteopenic and show age-appropriate degenerative change.    CXR after surgery Since the prior study, there has been interval development of a diffuse infiltrate involving the right lung.  The possibility of aspiration pneumonia should be considered.  The left lung is relatively clear.  Allowing for a very poor depth of inspiration, heart size and pulmonary vessels are within normal limits.  No pleural effusion or pneumothorax  are identified.    CXR today The lungs are underexpanded with crowding of the pulmonary vascularity.  As compared to the previous study of 2/15/2017, there is improved aeration of the lungs with continued ill-defined opacity in the right perihilar location.  The heart is normal in size.  The skeletal structures are intact.    EKG NSR    Assessment/Plan:      * Closed fracture of left hip  Ortho following  VS/p Left bipolar endoprosthetic hemiarthroplasty        Coronary artery calcification seen on CAT scan  Cardiology consulted.  No changes at this time.      Left wrist fracture  Ortho follwing  S/p closed reduction   and percutaneous distraction pinning of the left distal radius.  Continue physical therapy    Osteoporosis  Failed fosamax   Will need forteo x 2 years at discharge       Pulmonary edema  Hold Lasix at this time.  Monitor chest x-rays.  Dr. camacho consulted.      COPD exacerbation  Continue DuoNeb treatments 4 times daily  Continue Rocephin  Monitor respiratory status closely      Fever, resolved as of 2/18/2017  Monitor temperature      VTE Risk Mitigation         Ordered     Medium Risk of VTE  Once      02/15/17 1550          Jose Jaeger MD  Department of Hospital Medicine   [facility]

## 2017-02-18 NOTE — NURSING
Report received and assessment completed. In stable condition. HOB elevated and O2 per N/C and tolerating well. Reinforced dressing to left hip dry and intact. Congested cough. Plan of care explained and verbalizes understanding. Call bell in reach and bed alarm in use. RODNEY Churchill RN

## 2017-02-18 NOTE — PLAN OF CARE
Problem: Patient Care Overview  Goal: Plan of Care Review  Outcome: Ongoing (interventions implemented as appropriate)  Vitals remained stable, afebrile. Complained of headache that was relieved with PO meds. Walked with PT today, did very well. Bleeding has decreased from incision now just small about.  Monitoring h/h if below 8 will discussed transfusion. O2 sats low 90s with 4L NC. abx given. Blood thinners held. Discussed plan of care, stated understanding

## 2017-02-18 NOTE — ASSESSMENT & PLAN NOTE
Ortho follwing  S/p closed reduction   and percutaneous distraction pinning of the left distal radius.  Continue physical therapy

## 2017-02-18 NOTE — CONSULTS
Marycarmen Louis is a 71 y.o. year old female that's presents with a chief complaint of SOB and Wheezing for 1 days.Pt had fall brokted hip and Left wrist + sob post surgery also pt with decreased Hb post surgery 8.2.No hilar mass on ct ++ effusion Right > left subpulmonic . Consulted to evaluate Respiratory status.    Past Medical History   Diagnosis Date    Anxiety     Arthritis     Cancer     COPD (chronic obstructive pulmonary disease)     Degenerative disc disease     Depression     Emphysema of lung     Macrocytosis     Meibomianitis 11/3/10    Memory loss     Osteoporosis     Platelet disorder         Past Surgical History   Procedure Laterality Date    Appendectomy      Salviary gland removed      Joint replacement  2014     left knee  tka       Prior to Admission medications    Medication Sig Start Date End Date Taking? Authorizing Provider   b complex vitamins capsule Take 1 capsule by mouth once daily.   Yes Historical Provider, MD   calcium-vitamin D3 500 mg(1,250mg) -200 unit per tablet Take 1 tablet by mouth 2 (two) times daily with meals.   Yes Historical Provider, MD   CYANOCOBALAMIN, VITAMIN B-12, (VITAMIN B-12 ORAL) Take by mouth.   Yes Historical Provider, MD   melatonin 5 mg Tab Take 5 mg by mouth 2 (two) times daily.   Yes Historical Provider, MD   multivitamin capsule Take 1 capsule by mouth once daily.   Yes Historical Provider, MD   venlafaxine (EFFEXOR-XR) 75 MG 24 hr capsule Take 1 capsule (75 mg total) by mouth once daily. 11/15/16  Yes Willie Santos MD   alendronate (FOSAMAX) 70 MG tablet Take 1 tablet (70 mg total) by mouth every 7 days. 12/8/16   Willie Santos MD   ASCORBIC ACID (SHEREEN-C ORAL) Take by mouth.    Historical Provider, MD   glucosamine-chondroitin 500-400 mg tablet Take 1 tablet by mouth 3 (three) times daily.    Historical Provider, MD   meclizine (ANTIVERT) 25 mg tablet Take 1 tablet (25 mg total) by mouth 3 (three) times daily as needed for  Dizziness. 12/2/14   Willie Santos MD   ondansetron (ZOFRAN-ODT) 8 MG TbDL 1 sublingual Q 8 hours, prn 12/2/14   Willie Santos MD   thiamine (VITAMIN B-1) 100 MG tablet Take 100 mg by mouth once daily.    Historical Provider, MD       Social History     Social History    Marital status:      Spouse name: N/A    Number of children: N/A    Years of education: N/A     Occupational History    Not on file.     Social History Main Topics    Smoking status: Former Smoker    Smokeless tobacco: Not on file    Alcohol use Yes      Comment: occasionally    Drug use: Not on file    Sexual activity: Not on file     Other Topics Concern    Not on file     Social History Narrative       Family History   Problem Relation Age of Onset    Heart disease Mother     Cancer Father      colon    No Known Problems Sister     No Known Problems Brother     No Known Problems Maternal Aunt     No Known Problems Maternal Uncle     No Known Problems Paternal Aunt     No Known Problems Paternal Uncle     No Known Problems Maternal Grandmother     No Known Problems Maternal Grandfather     No Known Problems Paternal Grandmother     No Known Problems Paternal Grandfather     Amblyopia Neg Hx     Blindness Neg Hx     Cataracts Neg Hx     Diabetes Neg Hx     Glaucoma Neg Hx     Hypertension Neg Hx     Macular degeneration Neg Hx     Retinal detachment Neg Hx     Strabismus Neg Hx     Stroke Neg Hx     Thyroid disease Neg Hx        Review of patient's allergies indicates:   Allergen Reactions    Codeine      Other reaction(s): Vomiting  Other reaction(s): Headache    Fentanyl      Other reaction(s): Vomiting  Other reaction(s): Nausea    Phenytoin sodium extended      Other reaction(s): Vomiting  Other reaction(s): Nausea    Vicodin  [hydrocodone-acetaminophen]      Other reaction(s): Vomiting    Allergies have been reviewed.     ROS:  negative N/V, negative Fever, negative Syncope, negative  Unilateral weakness, positive Chest pain, negative Rash, negative Sore throat, negative   Lower extremity swelling, negativeAbdominal pain, negativeDysuria, negativePolyuria, negativeEasy Bruising,  negativeWeight Loss, negativeNasal Drainage,otherwise ROS Negative    PE:   Vitals:    02/18/17 0800   BP:    Pulse: 104   Resp:    Temp:      Alert and orientated X 3   HEENT: Head: Normocephalic no trauma                Ears : Normal Pinna No Drainage no Battles sign                Eyes: Vision Unchanged, No conjunctivitis,No drainage                Neck: Supple, No JVD,No Abnormal Carotid Pulsations                Throat: No Erythema, No pus,No Swelling,Mallampati score=2    Chest: crackles bilateral basilar skodiatic ressonance on Right at the base  Cardiac: RRR S1+ S2 with a -S3: +M = 2/6, No R/H/G  Abdomen: Bowel Sounds are Normal.Soft Abdomen. No organomegaly of Liver,Spleen,or Kidneys   CNS: Non focal and intact. Cranial nerves 2, 346,8,9,10 and 12 are normal.Norrmal gait.Normal posture.  Extremities: No Clubbing,No Cyanosis with oxygen,Positive mild edema of lower extremities Bilateral  Skin: No Rash, No Ulcerative sores,and No cellulitis of the IV site.    Lab Results   Component Value Date    WBC 8.43 02/18/2017    HGB 8.2 (L) 02/18/2017    HGB 8.2 (L) 02/18/2017    HCT 25.2 (L) 02/18/2017    HCT 25.2 (L) 02/18/2017     02/18/2017    CHOL 183 11/16/2016    TRIG 48 11/16/2016    HDL 60 11/16/2016    ALT 23 02/15/2017    AST 28 02/15/2017     (L) 02/18/2017    K 3.5 02/18/2017    CL 99 02/18/2017    CREATININE 0.5 02/18/2017    BUN 7 (L) 02/18/2017    CO2 28 02/18/2017    TSH 1.383 11/29/2013    INR 0.9 11/29/2013       Assessment:  1. Acute Respiratory Distress  2. COPD exacerbation   3. Bilateral Infiltrate   4.Right  Hilar infiltrate(no Mass)  5.Left wrist and hip Fracture and repair  6.Right Subpulmonic Pleural Effusion     ICD-10-CM ICD-9-CM   1. Left wrist fracture, closed, initial encounter  S62.102A 814.00   2. Left wrist pain M25.532 719.43   3. Closed left hip fracture, initial encounter S72.002A 820.8   4. Closed fracture of left hip S72.002A 820.8         Plan:  1. CT scan  2. Follow cxr  3. BNP  4. Beta agonist  Hold off on transfusion unless pt more dyspneic or Hb drops below 8

## 2017-02-18 NOTE — SUBJECTIVE & OBJECTIVE
"Interval History: doing well No SOB, low grade  Temp 100.4. No SOB. POX 96% on RA  Review of Systems   Constitutional: Negative for chills, diaphoresis, fatigue and fever.        "i'm burning up"   HENT: Negative for congestion, nosebleeds, postnasal drip, rhinorrhea, sinus pressure, sneezing and sore throat.    Eyes: Negative for visual disturbance.   Respiratory: Positive for cough and shortness of breath. Negative for wheezing.         History of smoking 20 years ago; reports chronic cough "most of the time"   Cardiovascular: Negative for chest pain.   Gastrointestinal: Positive for vomiting. Negative for abdominal distention, abdominal pain, blood in stool, constipation, diarrhea and nausea.        Only secondary to pain meds in ER; reports she cannot "take all that"   Genitourinary: Negative for difficulty urinating, dyspareunia, flank pain, frequency and hematuria.   Musculoskeletal: Positive for arthralgias. Negative for back pain and myalgias.   Neurological: Negative for headaches (this am).   Psychiatric/Behavioral: Negative for dysphoric mood (takes antidepressant) and sleep disturbance.     Objective:     Vital Signs (Most Recent):  Temp: 97.2 °F (36.2 °C) (02/17/17 1538)  Pulse: 95 (02/17/17 1937)  Resp: 18 (02/17/17 1937)  BP: (!) 102/54 (02/17/17 1538)  SpO2: (!) 92 % (02/17/17 1937) Vital Signs (24h Range):  Temp:  [97.2 °F (36.2 °C)-99.9 °F (37.7 °C)] 97.2 °F (36.2 °C)  Pulse:  [] 95  Resp:  [18-20] 18  SpO2:  [90 %-96 %] 92 %  BP: (102-113)/(50-59) 102/54     Weight: 59 kg (130 lb)  Body mass index is 22.31 kg/(m^2).    Physical Exam   Constitutional: She is oriented to person, place, and time. She appears well-developed and well-nourished. No distress.   HENT:   Head: Normocephalic and atraumatic.   TMs clear  OP clear    Eyes: EOM are normal. Pupils are equal, round, and reactive to light.   Neck: Normal range of motion. No JVD present.   Cardiovascular: Normal rate, regular rhythm, normal " heart sounds and intact distal pulses.  Exam reveals no gallop and no friction rub.    No murmur heard.  Pulmonary/Chest: Effort normal. No respiratory distress. She has no wheezes. She has rales.   No resp distress.    Lungs clear bilaterally   Musculoskeletal: Normal range of motion. She exhibits tenderness. She exhibits no edema.   Left wrist casted and left LE post surgical   Neurological: She is alert and oriented to person, place, and time. No cranial nerve deficit.   Skin: Skin is warm and dry. No rash noted. No erythema. No pallor.   Left hip with bandage, CDI   Psychiatric: She has a normal mood and affect. Her behavior is normal. Judgment and thought content normal.   Nursing note and vitals reviewed.       Significant Labs:   CBC:     Recent Labs  Lab 02/16/17  0552 02/16/17  1105 02/17/17  0504   WBC  --  11.65 9.40   HGB 10.6* 10.3* 9.2*  9.2*   HCT 31.7* 31.2* 27.9*  27.9*   PLT  --  234 211     CMP:     Recent Labs  Lab 02/16/17  1105 02/17/17  0504    136   K 3.6 3.1*    101   CO2 30* 29   * 109   BUN 9 8   CREATININE 0.6 0.6   CALCIUM 7.9* 7.6*   ANIONGAP 5* 6*   EGFRNONAA >60 >60     Urine Studies:   No results for input(s): COLORU, APPEARANCEUA, PHUR, SPECGRAV, PROTEINUA, GLUCUA, KETONESU, BILIRUBINUA, OCCULTUA, NITRITE, UROBILINOGEN, LEUKOCYTESUR, RBCUA, WBCUA, BACTERIA, SQUAMEPITHEL, HYALINECASTS in the last 48 hours.    Invalid input(s): WRIGHTSUR    Significant Imaging:     CXR prior to surgery The lungs are hyperexpanded and demonstrate mild chronic lung changes.  No consolidation or pleural effusions are seen.  The heart is normal in size.  The skeletal structures are osteopenic and show age-appropriate degenerative change.    CXR after surgery Since the prior study, there has been interval development of a diffuse infiltrate involving the right lung.  The possibility of aspiration pneumonia should be considered.  The left lung is relatively clear.  Allowing for a very  poor depth of inspiration, heart size and pulmonary vessels are within normal limits.  No pleural effusion or pneumothorax are identified.    CXR today The lungs are underexpanded with crowding of the pulmonary vascularity.  As compared to the previous study of 2/15/2017, there is improved aeration of the lungs with continued ill-defined opacity in the right perihilar location.  The heart is normal in size.  The skeletal structures are intact.    EKG NSR

## 2017-02-18 NOTE — SIGNIFICANT EVENT
C/O midsternal pressure type chest pain. Denies radiation, SOB, shoulder or jaw pain. Awake, alert , skin warm and dry. Vital signs WNL. Increasing with coughing. Has congested, non productive cough. Dr. Jaeger notified and EKG order. VICKY Méndez

## 2017-02-18 NOTE — SUBJECTIVE & OBJECTIVE
"Interval History: doing well No SOB, low grade  Temp 100.4. No SOB. POX 96% on RA  Review of Systems   Constitutional: Negative for chills, diaphoresis, fatigue and fever.        "i'm burning up"   HENT: Negative for congestion, nosebleeds, postnasal drip, rhinorrhea, sinus pressure, sneezing and sore throat.    Eyes: Negative for visual disturbance.   Respiratory: Positive for cough and shortness of breath. Negative for wheezing.         History of smoking 20 years ago; reports chronic cough "most of the time"   Cardiovascular: Negative for chest pain.   Gastrointestinal: Positive for vomiting. Negative for abdominal distention, abdominal pain, blood in stool, constipation, diarrhea and nausea.        Only secondary to pain meds in ER; reports she cannot "take all that"   Genitourinary: Negative for difficulty urinating, dyspareunia, flank pain, frequency and hematuria.   Musculoskeletal: Positive for arthralgias. Negative for back pain and myalgias.   Neurological: Negative for headaches (this am).   Psychiatric/Behavioral: Negative for dysphoric mood (takes antidepressant) and sleep disturbance.     Objective:     Vital Signs (Most Recent):  Temp: 98.8 °F (37.1 °C) (02/18/17 1140)  Pulse: 103 (02/18/17 1400)  Resp: 18 (02/18/17 1329)  BP: (!) 111/55 (02/18/17 1140)  SpO2: (!) 93 % (02/18/17 1329) Vital Signs (24h Range):  Temp:  [97.2 °F (36.2 °C)-100 °F (37.8 °C)] 98.8 °F (37.1 °C)  Pulse:  [] 103  Resp:  [18-20] 18  SpO2:  [85 %-95 %] 93 %  BP: (102-121)/(50-59) 111/55     Weight: 59 kg (130 lb)  Body mass index is 22.31 kg/(m^2).    Physical Exam   Constitutional: She is oriented to person, place, and time. She appears well-developed and well-nourished. No distress.   HENT:   Head: Normocephalic and atraumatic.   TMs clear  OP clear    Eyes: EOM are normal. Pupils are equal, round, and reactive to light.   Neck: Normal range of motion. No JVD present.   Cardiovascular: Normal rate, regular rhythm, normal " heart sounds and intact distal pulses.  Exam reveals no gallop and no friction rub.    No murmur heard.  Pulmonary/Chest: Effort normal. No respiratory distress. She has no wheezes. She has rales.   No resp distress.    Lungs clear bilaterally   Musculoskeletal: Normal range of motion. She exhibits tenderness. She exhibits no edema.   Left wrist casted and left LE post surgical.  Large amount of bloody drainage present.   Neurological: She is alert and oriented to person, place, and time. No cranial nerve deficit.   Skin: Skin is warm and dry. No rash noted. No erythema. No pallor.   Left hip with bandage, CDI   Psychiatric: She has a normal mood and affect. Her behavior is normal. Judgment and thought content normal.   Nursing note and vitals reviewed.       Significant Labs:   CBC:     Recent Labs  Lab 02/17/17  0504 02/18/17  0603   WBC 9.40 8.43   HGB 9.2*  9.2* 8.2*  8.2*   HCT 27.9*  27.9* 25.2*  25.2*    230     CMP:     Recent Labs  Lab 02/17/17  0504 02/18/17  0603    134*   K 3.1* 3.5    99   CO2 29 28    108   BUN 8 7*   CREATININE 0.6 0.5   CALCIUM 7.6* 7.8*   ANIONGAP 6* 7*   EGFRNONAA >60 >60     Urine Studies:   No results for input(s): COLORU, APPEARANCEUA, PHUR, SPECGRAV, PROTEINUA, GLUCUA, KETONESU, BILIRUBINUA, OCCULTUA, NITRITE, UROBILINOGEN, LEUKOCYTESUR, RBCUA, WBCUA, BACTERIA, SQUAMEPITHEL, HYALINECASTS in the last 48 hours.    Invalid input(s): WRIGHTSUR    Significant Imaging:     CXR prior to surgery The lungs are hyperexpanded and demonstrate mild chronic lung changes.  No consolidation or pleural effusions are seen.  The heart is normal in size.  The skeletal structures are osteopenic and show age-appropriate degenerative change.    CXR after surgery Since the prior study, there has been interval development of a diffuse infiltrate involving the right lung.  The possibility of aspiration pneumonia should be considered.  The left lung is relatively clear.   Allowing for a very poor depth of inspiration, heart size and pulmonary vessels are within normal limits.  No pleural effusion or pneumothorax are identified.    CXR today The lungs are underexpanded with crowding of the pulmonary vascularity.  As compared to the previous study of 2/15/2017, there is improved aeration of the lungs with continued ill-defined opacity in the right perihilar location.  The heart is normal in size.  The skeletal structures are intact.    EKG NSR

## 2017-02-18 NOTE — PROGRESS NOTES
Seen on rounds.  Her dressing was changed yesterday 2° saturation.  I looked at today's dressing which was soiled with old clotted blood, probably representing the hematoma the nurse described yesterday.  Whole area a mass of bruising, but nothing unusual, and no active bleeding noted. H&H  25.2/8.2.  She did not get light headed yesterday during PT.  Lung CT a little worrisome and her SATs are down even on 4 L of O2, but she is not symptomatic, feeling slowly better, and eager to get up and get going. Abduction pillow removed, and she understands what to watch out for.

## 2017-02-18 NOTE — PLAN OF CARE
Problem: Patient Care Overview  Goal: Plan of Care Review  Outcome: Ongoing (interventions implemented as appropriate)  Chest pains during shift states it feels like bronchitis. Does have a congested cough. O2 sats dropped down to 85 % so O2 changed to Ventimask at 50 & and then titrated down per respiratory tech. EKG done as ordered per Dr. Jaeger. Toradol administered IV with great improvement of chest pain. Neurovascular checks to left fingers WNL with cast maintained and left extremity elevated on pillow. Reinforced dressing dry and intact. Plan of care discussed during care and verbalizes understanding. M> VICKY Churchill

## 2017-02-19 PROBLEM — D62 ACUTE BLOOD LOSS ANEMIA: Status: ACTIVE | Noted: 2017-02-19

## 2017-02-19 LAB
ABO + RH BLD: NORMAL
ALLENS TEST: ABNORMAL
ANION GAP SERPL CALC-SCNC: 7 MMOL/L
APTT BLDCRRT: 28.1 SEC
BASOPHILS # BLD AUTO: 0.03 K/UL
BASOPHILS NFR BLD: 0.4 %
BLD GP AB SCN CELLS X3 SERPL QL: NORMAL
BLD PROD TYP BPU: NORMAL
BLD PROD TYP BPU: NORMAL
BLOOD UNIT EXPIRATION DATE: NORMAL
BLOOD UNIT EXPIRATION DATE: NORMAL
BLOOD UNIT TYPE CODE: 6200
BLOOD UNIT TYPE CODE: 6200
BLOOD UNIT TYPE: NORMAL
BLOOD UNIT TYPE: NORMAL
BNP SERPL-MCNC: 28 PG/ML
BUN SERPL-MCNC: 7 MG/DL
CALCIUM SERPL-MCNC: 7.9 MG/DL
CHLORIDE SERPL-SCNC: 101 MMOL/L
CO2 SERPL-SCNC: 31 MMOL/L
CODING SYSTEM: NORMAL
CODING SYSTEM: NORMAL
CREAT SERPL-MCNC: 0.6 MG/DL
DELSYS: ABNORMAL
DIFFERENTIAL METHOD: ABNORMAL
DISPENSE STATUS: NORMAL
DISPENSE STATUS: NORMAL
EOSINOPHIL # BLD AUTO: 0.6 K/UL
EOSINOPHIL NFR BLD: 8.4 %
ERYTHROCYTE [DISTWIDTH] IN BLOOD BY AUTOMATED COUNT: 12.6 %
EST. GFR  (AFRICAN AMERICAN): >60 ML/MIN/1.73 M^2
EST. GFR  (NON AFRICAN AMERICAN): >60 ML/MIN/1.73 M^2
FIO2: 40 (ref 21–100)
GLUCOSE SERPL-MCNC: 99 MG/DL
HCO3 UR-SCNC: 34.3 MEQ/L (ref 22–26)
HCT VFR BLD AUTO: 23.2 %
HGB BLD-MCNC: 7.8 G/DL
INR PPP: 1
LYMPHOCYTES # BLD AUTO: 1 K/UL
LYMPHOCYTES NFR BLD: 13 %
MCH RBC QN AUTO: 31.8 PG
MCHC RBC AUTO-ENTMCNC: 33.6 %
MCV RBC AUTO: 95 FL
MODE: ABNORMAL
MONOCYTES # BLD AUTO: 1.1 K/UL
MONOCYTES NFR BLD: 14.7 %
NEUTROPHILS # BLD AUTO: 4.7 K/UL
NEUTROPHILS NFR BLD: 63.5 %
PCO2 BLDA: 43 MMHG (ref 35–45)
PH SMN: 7.51 [PH] (ref 7.35–7.45)
PLATELET # BLD AUTO: 253 K/UL
PMV BLD AUTO: 8.6 FL
PO2 BLDA: 83 MMHG (ref 80–100)
POC BE: 10.2 MEQ/L (ref -2–2)
POC SATURATED O2: 97 % (ref 90–100)
POTASSIUM SERPL-SCNC: 3.7 MMOL/L
PROTHROMBIN TIME: 10 SEC
RBC # BLD AUTO: 2.45 M/UL
SITE: ABNORMAL
SODIUM SERPL-SCNC: 139 MMOL/L
TRANS ERYTHROCYTES VOL PATIENT: NORMAL ML
TRANS ERYTHROCYTES VOL PATIENT: NORMAL ML
WBC # BLD AUTO: 7.37 K/UL

## 2017-02-19 PROCEDURE — 97110 THERAPEUTIC EXERCISES: CPT

## 2017-02-19 PROCEDURE — 27000339 *HC DAILY SUPPLY KIT

## 2017-02-19 PROCEDURE — 99900035 HC TECH TIME PER 15 MIN (STAT)

## 2017-02-19 PROCEDURE — 36430 TRANSFUSION BLD/BLD COMPNT: CPT

## 2017-02-19 PROCEDURE — P9021 RED BLOOD CELLS UNIT: HCPCS

## 2017-02-19 PROCEDURE — 86920 COMPATIBILITY TEST SPIN: CPT

## 2017-02-19 PROCEDURE — 80048 BASIC METABOLIC PNL TOTAL CA: CPT

## 2017-02-19 PROCEDURE — 63600175 PHARM REV CODE 636 W HCPCS: Performed by: FAMILY MEDICINE

## 2017-02-19 PROCEDURE — 94761 N-INVAS EAR/PLS OXIMETRY MLT: CPT

## 2017-02-19 PROCEDURE — 85730 THROMBOPLASTIN TIME PARTIAL: CPT

## 2017-02-19 PROCEDURE — 27000190 HC CPAP FULL FACE MASK W/VALVE

## 2017-02-19 PROCEDURE — 27000221 HC OXYGEN, UP TO 24 HOURS

## 2017-02-19 PROCEDURE — 85610 PROTHROMBIN TIME: CPT

## 2017-02-19 PROCEDURE — 86900 BLOOD TYPING SEROLOGIC ABO: CPT

## 2017-02-19 PROCEDURE — 93010 ELECTROCARDIOGRAM REPORT: CPT | Mod: ,,, | Performed by: INTERNAL MEDICINE

## 2017-02-19 PROCEDURE — 99233 SBSQ HOSP IP/OBS HIGH 50: CPT | Mod: ,,, | Performed by: FAMILY MEDICINE

## 2017-02-19 PROCEDURE — 63600175 PHARM REV CODE 636 W HCPCS: Performed by: ORTHOPAEDIC SURGERY

## 2017-02-19 PROCEDURE — 94640 AIRWAY INHALATION TREATMENT: CPT

## 2017-02-19 PROCEDURE — 25000003 PHARM REV CODE 250: Performed by: ORTHOPAEDIC SURGERY

## 2017-02-19 PROCEDURE — 85025 COMPLETE CBC W/AUTO DIFF WBC: CPT

## 2017-02-19 PROCEDURE — 86901 BLOOD TYPING SEROLOGIC RH(D): CPT

## 2017-02-19 PROCEDURE — 25000003 PHARM REV CODE 250: Performed by: FAMILY MEDICINE

## 2017-02-19 PROCEDURE — 94660 CPAP INITIATION&MGMT: CPT

## 2017-02-19 PROCEDURE — 97116 GAIT TRAINING THERAPY: CPT

## 2017-02-19 PROCEDURE — 25000003 PHARM REV CODE 250: Performed by: SURGERY

## 2017-02-19 PROCEDURE — 83880 ASSAY OF NATRIURETIC PEPTIDE: CPT

## 2017-02-19 PROCEDURE — 25000242 PHARM REV CODE 250 ALT 637 W/ HCPCS: Performed by: FAMILY MEDICINE

## 2017-02-19 PROCEDURE — 11000001 HC ACUTE MED/SURG PRIVATE ROOM

## 2017-02-19 PROCEDURE — 82803 BLOOD GASES ANY COMBINATION: CPT | Performed by: FAMILY MEDICINE

## 2017-02-19 PROCEDURE — 36415 COLL VENOUS BLD VENIPUNCTURE: CPT

## 2017-02-19 RX ORDER — FUROSEMIDE 10 MG/ML
40 INJECTION INTRAMUSCULAR; INTRAVENOUS ONCE
Status: COMPLETED | OUTPATIENT
Start: 2017-02-19 | End: 2017-02-19

## 2017-02-19 RX ORDER — HYDROCODONE BITARTRATE AND ACETAMINOPHEN 500; 5 MG/1; MG/1
TABLET ORAL
Status: DISCONTINUED | OUTPATIENT
Start: 2017-02-19 | End: 2017-02-27 | Stop reason: HOSPADM

## 2017-02-19 RX ORDER — LEVOFLOXACIN 5 MG/ML
500 INJECTION, SOLUTION INTRAVENOUS
Status: DISCONTINUED | OUTPATIENT
Start: 2017-02-19 | End: 2017-02-24

## 2017-02-19 RX ORDER — LORAZEPAM 2 MG/ML
0.5 INJECTION INTRAMUSCULAR EVERY 6 HOURS PRN
Status: DISCONTINUED | OUTPATIENT
Start: 2017-02-19 | End: 2017-02-27

## 2017-02-19 RX ADMIN — DOCUSATE SODIUM AND SENNOSIDES 1 TABLET: 8.6; 5 TABLET, FILM COATED ORAL at 08:02

## 2017-02-19 RX ADMIN — IPRATROPIUM BROMIDE AND ALBUTEROL SULFATE 3 ML: .5; 3 SOLUTION RESPIRATORY (INHALATION) at 07:02

## 2017-02-19 RX ADMIN — HYDROMORPHONE HYDROCHLORIDE 1 MG: 1 INJECTION, SOLUTION INTRAMUSCULAR; INTRAVENOUS; SUBCUTANEOUS at 11:02

## 2017-02-19 RX ADMIN — PANTOPRAZOLE SODIUM 40 MG: 40 TABLET, DELAYED RELEASE ORAL at 09:02

## 2017-02-19 RX ADMIN — ACETAMINOPHEN 500 MG: 500 TABLET ORAL at 08:02

## 2017-02-19 RX ADMIN — LEVOFLOXACIN 500 MG: 5 INJECTION INTRAVENOUS at 10:02

## 2017-02-19 RX ADMIN — CEFTRIAXONE 1 G: 1 INJECTION, SOLUTION INTRAVENOUS at 05:02

## 2017-02-19 RX ADMIN — SODIUM CHLORIDE, PRESERVATIVE FREE 3 ML: 5 INJECTION INTRAVENOUS at 06:02

## 2017-02-19 RX ADMIN — LORAZEPAM 0.5 MG: 2 INJECTION INTRAMUSCULAR; INTRAVENOUS at 09:02

## 2017-02-19 RX ADMIN — TRAMADOL HYDROCHLORIDE 50 MG: 50 TABLET, COATED ORAL at 08:02

## 2017-02-19 RX ADMIN — FUROSEMIDE 40 MG: 10 INJECTION, SOLUTION INTRAMUSCULAR; INTRAVENOUS at 02:02

## 2017-02-19 RX ADMIN — METHYLPREDNISOLONE SODIUM SUCCINATE 40 MG: 40 INJECTION, POWDER, FOR SOLUTION INTRAMUSCULAR; INTRAVENOUS at 10:02

## 2017-02-19 RX ADMIN — TRAMADOL HYDROCHLORIDE 50 MG: 50 TABLET, COATED ORAL at 12:02

## 2017-02-19 RX ADMIN — DOCUSATE SODIUM AND SENNOSIDES 1 TABLET: 8.6; 5 TABLET, FILM COATED ORAL at 09:02

## 2017-02-19 RX ADMIN — SODIUM CHLORIDE, PRESERVATIVE FREE 3 ML: 5 INJECTION INTRAVENOUS at 09:02

## 2017-02-19 RX ADMIN — IPRATROPIUM BROMIDE AND ALBUTEROL SULFATE 3 ML: .5; 3 SOLUTION RESPIRATORY (INHALATION) at 01:02

## 2017-02-19 RX ADMIN — VENLAFAXINE HYDROCHLORIDE 75 MG: 75 CAPSULE, EXTENDED RELEASE ORAL at 09:02

## 2017-02-19 NOTE — PROGRESS NOTES
Before giving meds noticed pt was dyspneic. Check sats, she was 85. Put on venti mask O2 sats up to 92. Will continue to monitor and Dr. Garcia notified

## 2017-02-19 NOTE — PROGRESS NOTES
"Marycarmen Louis is a 71 y.o. female patient.    Active Hospital Problems    Diagnosis  POA    *Closed fracture of left hip [S72.002A]  Yes    COPD exacerbation [J44.1]  No    Pulmonary edema [J81.1]  Yes    Osteoporosis [M81.0]  Yes    Left wrist fracture [S62.102A]  Yes    Coronary artery calcification seen on CAT scan [I25.10]  Yes      Resolved Hospital Problems    Diagnosis Date Resolved POA    Fever [R50.9] 02/18/2017 No     Temp: 98.3 °F (36.8 °C) (02/19/17 0315)  Pulse: 93 (02/19/17 0734)  Resp: 18 (02/19/17 0734)  BP: (!) 103/54 (02/19/17 0315)  SpO2: 97 % (02/19/17 0734)  Weight: 59 kg (130 lb) (02/14/17 1746)  Height: 5' 4" (162.6 cm) (02/14/17 1746)    Subjective:  Symptoms:  She reports shortness of breath, cough and anxiety.    Activity level: Returning to normal.    Pain:  She complains of pain that is moderate.  She reports pain is improving.  Pain is partially controlled.      Objective:  General Appearance:  Ill-appearing.    Vital signs: (most recent): Blood pressure (!) 103/54, pulse 93, temperature 98.3 °F (36.8 °C), temperature source Oral, resp. rate 18, height 5' 4" (1.626 m), weight 59 kg (130 lb), SpO2 97 %, not currently breastfeeding.  Vital signs are normal.    Output: Producing urine and producing stool.    Lungs:  Tachypnea.  Respiratory distress: mild to moderate.  No stridor.  There are wheezes, rhonchi and decreased breath sounds.    Heart: Tachycardia.  S1 normal and S2 normal.  Positive for murmur.  No gallop or friction rub.   Chest: No chest wall tenderness.  Symmetric chest wall expansion.   Extremities: Normal range of motion.  There is dependent edema.    Neurological: Patient is alert and oriented to person, place and time.  Normal strength.    Skin:  Warm.  No rash, ecchymosis or cyanosis.   Abdomen: Abdomen is soft.  There are no signs of ascites.  Bowel sounds are normal.     Pupils:  Pupils are equal, round, and reactive to light.    Pulses: Distal pulses are " intact.      Assessment:  (COPD Exacerbation   Volume OverLoad   Anemia below 8).     Plan:   Transfuse 1 unit  Lasix 20mg iv after.       Reji Melendez MD  2/19/2017

## 2017-02-19 NOTE — NURSING
Pt. Transferred to CCU-001 via bed with telemetry and 02 in use per staff. Placed on CM with ST-120's noted to monitor. Sp02 98% with O2 @ 50% Fi02 via Venti-mask. VSS. Orientation to room and staff completed.

## 2017-02-19 NOTE — PROGRESS NOTES
[facility] Hospital Medicine  Progress Note    Patient Name: Marycarmen Louis  MRN: [id number]  Patient Class: IP- Inpatient   Admission Date: 2/14/2017  Length of Stay: 5 days  Attending Physician: Jose Jaeger MD  Primary Care Provider: Willie Santos MD (Inactive)        Subjective:     Principal Problem:Closed fracture of left hip    HPI:  70 y/o female reports she fell off of gym apparatus. Reports falling after missing step. ER w/up shows left wrist fracture and left hip fracture. Reports feeling fine prior to fall yesterday. No fever prior to this am.    Hospital Course:  She has surgery yesterday right lateral decubitus. Was given IVF. Has slight fluid overload caused resp distress. Hypoxic. CXR showed right lung infiltrate. She had no s/s of pneumonia and clear  X ray prior to surgery. Given dose of lasix and improved well. She was moved  From ICU to floor. Did well over night. PT to start this am. Still on O2 today 4L NC 96%    2/17/17-patient still becomes hypoxic when oxygen is removed.  CTA of the chest and lungs were done today that were negative for pulmonary embolism.  She did have significant emphysematous changes and lower lobe infiltrates and atelectasis bilaterally.  She probably has an exacerbation of COPD probably from perioperative intubation.  DuoNeb treatments, Rocephin, azithromycin started.  She seems be doing better.    2/18.  Patient evaluated this morning.  She was working with physical therapy and able to ambulate across the room with assistance.  She does feel short of breath with exertion.  Her hemoglobin is now 8.2.  Dr. Melendez consulted.  Her sats still drop when she takes her oxygen off.  Patient had a large amount of bloody drainage.  I stopped her Xarelto today for now until her hemoglobin stabilizes.    2/18 patient never received blood.  Pulmonologist wanted to wait in x-ray today because he felt she may be fluid overloaded.  Chest x-ray is starting  "to look a little worse.  She has bilateral infiltrates consistent with bilateral perihilar and diffuse lung infiltrates bilaterally.  This may represent a atypical/viral pneumonia or ARDS.  Today her hemoglobin is 7.8 and everybody agrees that she could benefit from one unit of blood.  Her Xarelto has been discontinued.  Patient is ambulating with therapy.  She does have dyspnea with exertion.  She is on 4 L nasal cannula and her O2 sats are 92%.      Interval History: doing well No SOB, low grade  Temp 100.4. No SOB. POX 96% on RA  Review of Systems   Constitutional: Negative for chills, diaphoresis, fatigue and fever.   HENT: Negative for congestion, nosebleeds, postnasal drip, rhinorrhea, sinus pressure, sneezing and sore throat.    Eyes: Negative for visual disturbance.   Respiratory: Positive for cough and shortness of breath. Negative for wheezing.         History of smoking 20 years ago; reports chronic cough "most of the time"   Cardiovascular: Negative for chest pain.   Gastrointestinal: Negative for abdominal distention, abdominal pain, blood in stool, constipation, diarrhea, nausea and vomiting.        Only secondary to pain meds in ER; reports she cannot "take all that"   Genitourinary: Negative for difficulty urinating, dyspareunia, flank pain, frequency and hematuria.   Musculoskeletal: Positive for arthralgias. Negative for back pain and myalgias.   Neurological: Negative for headaches (this am).   Psychiatric/Behavioral: Negative for dysphoric mood (takes antidepressant) and sleep disturbance.     Objective:     Vital Signs (Most Recent):  Temp: 96.9 °F (36.1 °C) (02/19/17 0731)  Pulse: 96 (02/19/17 0800)  Resp: 18 (02/19/17 0734)  BP: (!) 110/56 (02/19/17 0731)  SpO2: 97 % (02/19/17 0734) Vital Signs (24h Range):  Temp:  [96.9 °F (36.1 °C)-100.6 °F (38.1 °C)] 96.9 °F (36.1 °C)  Pulse:  [] 96  Resp:  [18] 18  SpO2:  [91 %-97 %] 97 %  BP: (103-114)/(51-57) 110/56     Weight: 59 kg (130 lb)  Body " mass index is 22.31 kg/(m^2).    Physical Exam   Constitutional: She is oriented to person, place, and time. She appears well-developed and well-nourished. No distress.   HENT:   Head: Normocephalic and atraumatic.   TMs clear  OP clear    Eyes: EOM are normal. Pupils are equal, round, and reactive to light.   Neck: Normal range of motion. No JVD present.   Cardiovascular: Normal rate, regular rhythm, normal heart sounds and intact distal pulses.  Exam reveals no gallop and no friction rub.    No murmur heard.  Pulmonary/Chest: Effort normal. No respiratory distress. She has no wheezes. She has rales.   Crackles at the bases   Musculoskeletal: Normal range of motion. She exhibits tenderness. She exhibits no edema.   Left wrist casted and left LE post surgical.  Drainage has improved   Neurological: She is alert and oriented to person, place, and time. No cranial nerve deficit.   Skin: Skin is warm and dry. No rash noted. No erythema. No pallor.   Left hip with bandage, CDI   Psychiatric: She has a normal mood and affect. Her behavior is normal. Judgment and thought content normal.   Nursing note and vitals reviewed.       Significant Labs:   CBC:     Recent Labs  Lab 02/18/17  0603 02/19/17  0503   WBC 8.43 7.37   HGB 8.2*  8.2* 7.8*   HCT 25.2*  25.2* 23.2*    253     CMP:     Recent Labs  Lab 02/18/17  0603 02/19/17  0503   * 139   K 3.5 3.7   CL 99 101   CO2 28 31*    99   BUN 7* 7*   CREATININE 0.5 0.6   CALCIUM 7.8* 7.9*   ANIONGAP 7* 7*   EGFRNONAA >60 >60     Urine Studies:   No results for input(s): COLORU, APPEARANCEUA, PHUR, SPECGRAV, PROTEINUA, GLUCUA, KETONESU, BILIRUBINUA, OCCULTUA, NITRITE, UROBILINOGEN, LEUKOCYTESUR, RBCUA, WBCUA, BACTERIA, SQUAMEPITHEL, HYALINECASTS in the last 48 hours.    Invalid input(s): WRIGHTSUR    Significant Imaging:     CXR prior to surgery The lungs are hyperexpanded and demonstrate mild chronic lung changes.  No consolidation or pleural effusions are  seen.  The heart is normal in size.  The skeletal structures are osteopenic and show age-appropriate degenerative change.    CXR after surgery Since the prior study, there has been interval development of a diffuse infiltrate involving the right lung.  The possibility of aspiration pneumonia should be considered.  The left lung is relatively clear.  Allowing for a very poor depth of inspiration, heart size and pulmonary vessels are within normal limits.  No pleural effusion or pneumothorax are identified.    CXR today The lungs are underexpanded with crowding of the pulmonary vascularity.  As compared to the previous study of 2/15/2017, there is improved aeration of the lungs with continued ill-defined opacity in the right perihilar location.  The heart is normal in size.  The skeletal structures are intact.    EKG NSR  CXR 2/19-Increasing bilateral perihilar and diffuse lung infiltrates bilaterally.  This may represent a atypical/viral pneumonia or ARDS  Assessment/Plan:      * Closed fracture of left hip  Ortho following  VS/p Left bipolar endoprosthetic hemiarthroplasty  Continue physical therapy        Coronary artery calcification seen on CAT scan  Cardiology consulted.  No changes at this time.      Left wrist fracture  Ortho follwing  S/p closed reduction   and percutaneous distraction pinning of the left distal radius.  Continue physical therapy    Osteoporosis  Failed fosamax   Will need forteo x 2 years at discharge       Pulmonary edema  Hold Lasix at this time.  Monitor chest x-rays.  Dr. camacho consulted.  Chest x-ray-Increasing bilateral perihilar and diffuse lung infiltrates bilaterally.  This may represent a atypical/viral pneumonia or ARDS  After receiving 1 unit of blood, Lasix 40 mg IV push will be given.  Patient's BNP is normal  BioZ performed yesterday showed a TFC in the upper normal range  Continue oxygen per nasal cannula at 4 L  Continue Rocephin.  Add Levaquin      COPD  exacerbation  Continue DuoNeb treatments 4 times daily  Continue Rocephin.  Add Levaquin  Monitor respiratory status closely  Pulmonology consult      Acute blood loss anemia  Transfuse 1 unit of packed red blood cells.  Give 40 mg of Lasix afterwards.      VTE Risk Mitigation         Ordered     Medium Risk of VTE  Once      02/15/17 1550          Jose Jaeger MD  Department of Hospital Medicine   [facility]

## 2017-02-19 NOTE — NURSING
Report received and assessment completed. Dressing to left hip dry and intact. Stable condition.  HOB elevated and O 2 in progress per N/N at 4l/min. Congested non productive cough. Plan of care discussed and verbalizes understanding. Call bell in place and use. RODNEY Churchill RN

## 2017-02-19 NOTE — SUBJECTIVE & OBJECTIVE
"Interval History: doing well No SOB, low grade  Temp 100.4. No SOB. POX 96% on RA  Review of Systems   Constitutional: Negative for chills, diaphoresis, fatigue and fever.   HENT: Negative for congestion, nosebleeds, postnasal drip, rhinorrhea, sinus pressure, sneezing and sore throat.    Eyes: Negative for visual disturbance.   Respiratory: Positive for cough and shortness of breath. Negative for wheezing.         History of smoking 20 years ago; reports chronic cough "most of the time"   Cardiovascular: Negative for chest pain.   Gastrointestinal: Negative for abdominal distention, abdominal pain, blood in stool, constipation, diarrhea, nausea and vomiting.        Only secondary to pain meds in ER; reports she cannot "take all that"   Genitourinary: Negative for difficulty urinating, dyspareunia, flank pain, frequency and hematuria.   Musculoskeletal: Positive for arthralgias. Negative for back pain and myalgias.   Neurological: Negative for headaches (this am).   Psychiatric/Behavioral: Negative for dysphoric mood (takes antidepressant) and sleep disturbance.     Objective:     Vital Signs (Most Recent):  Temp: 96.9 °F (36.1 °C) (02/19/17 0731)  Pulse: 96 (02/19/17 0800)  Resp: 18 (02/19/17 0734)  BP: (!) 110/56 (02/19/17 0731)  SpO2: 97 % (02/19/17 0734) Vital Signs (24h Range):  Temp:  [96.9 °F (36.1 °C)-100.6 °F (38.1 °C)] 96.9 °F (36.1 °C)  Pulse:  [] 96  Resp:  [18] 18  SpO2:  [91 %-97 %] 97 %  BP: (103-114)/(51-57) 110/56     Weight: 59 kg (130 lb)  Body mass index is 22.31 kg/(m^2).    Physical Exam   Constitutional: She is oriented to person, place, and time. She appears well-developed and well-nourished. No distress.   HENT:   Head: Normocephalic and atraumatic.   TMs clear  OP clear    Eyes: EOM are normal. Pupils are equal, round, and reactive to light.   Neck: Normal range of motion. No JVD present.   Cardiovascular: Normal rate, regular rhythm, normal heart sounds and intact distal pulses.  " Exam reveals no gallop and no friction rub.    No murmur heard.  Pulmonary/Chest: Effort normal. No respiratory distress. She has no wheezes. She has rales.   Crackles at the bases   Musculoskeletal: Normal range of motion. She exhibits tenderness. She exhibits no edema.   Left wrist casted and left LE post surgical.  Drainage has improved   Neurological: She is alert and oriented to person, place, and time. No cranial nerve deficit.   Skin: Skin is warm and dry. No rash noted. No erythema. No pallor.   Left hip with bandage, CDI   Psychiatric: She has a normal mood and affect. Her behavior is normal. Judgment and thought content normal.   Nursing note and vitals reviewed.       Significant Labs:   CBC:     Recent Labs  Lab 02/18/17  0603 02/19/17  0503   WBC 8.43 7.37   HGB 8.2*  8.2* 7.8*   HCT 25.2*  25.2* 23.2*    253     CMP:     Recent Labs  Lab 02/18/17  0603 02/19/17  0503   * 139   K 3.5 3.7   CL 99 101   CO2 28 31*    99   BUN 7* 7*   CREATININE 0.5 0.6   CALCIUM 7.8* 7.9*   ANIONGAP 7* 7*   EGFRNONAA >60 >60     Urine Studies:   No results for input(s): COLORU, APPEARANCEUA, PHUR, SPECGRAV, PROTEINUA, GLUCUA, KETONESU, BILIRUBINUA, OCCULTUA, NITRITE, UROBILINOGEN, LEUKOCYTESUR, RBCUA, WBCUA, BACTERIA, SQUAMEPITHEL, HYALINECASTS in the last 48 hours.    Invalid input(s): WRIGHTSUR    Significant Imaging:     CXR prior to surgery The lungs are hyperexpanded and demonstrate mild chronic lung changes.  No consolidation or pleural effusions are seen.  The heart is normal in size.  The skeletal structures are osteopenic and show age-appropriate degenerative change.    CXR after surgery Since the prior study, there has been interval development of a diffuse infiltrate involving the right lung.  The possibility of aspiration pneumonia should be considered.  The left lung is relatively clear.  Allowing for a very poor depth of inspiration, heart size and pulmonary vessels are within normal  limits.  No pleural effusion or pneumothorax are identified.    CXR today The lungs are underexpanded with crowding of the pulmonary vascularity.  As compared to the previous study of 2/15/2017, there is improved aeration of the lungs with continued ill-defined opacity in the right perihilar location.  The heart is normal in size.  The skeletal structures are intact.    EKG NSR

## 2017-02-19 NOTE — EICU
Today, Mrs. Garcai was transferred to critical care from the floor following severe shortness of breath with increased work of breathing along with audible crackles that developed shortly after a transfusion of PRBC was started. The PRBC trasnfusion was stopped and a RXN workup started. She was also given lasix and diuresed quickly approx. 1.4L to which her cardiopulmonary status greatly improved. Her oxygen delivery was subequently reduced from 100%NRB to 6LNC. CXR showed bilateral infiltrates.  She now appears AA&OX3 W/O S/S of worsening cardiopulmonary instability @ this time. We will continue to monitor and trend data during her critical care stay.    Narrative      The cardiac size is within normal limits.  There is hypoventilation and severe central lung field and perihilar infiltrates, increased since this morning's study at 0801.  No pneumothorax or pleural effusion is noted.       Impression       Increasingly severe central and perihilar lung field infiltrates may represent ARDS or fulminant pneumonia.  Hypoventilation      Electronically signed by: Hari Olivas MD  Date: 02/19/17  Time: 16:44          Results for LISA GARCIA (MRN [id number]) as of 2/19/2017 17:39   Ref. Range 2/19/2017 16:52   POC PH Latest Ref Range: 7.35 - 7.45  7.51 (A)   POC PCO2 Latest Ref Range: 35 - 45 mmHg 43   POC PO2 Latest Ref Range: 80 - 100 mmHg 83   POC BE Latest Ref Range: -2 - 2 mEq/L 10.20 (A)   POC HCO3 Latest Ref Range: 22 - 26 mEq/L 34.30 (A)   POC SATURATED O2 Latest Ref Range: 90 - 100 % 97   FiO2 Latest Ref Range: 21 - 100  40   DelSys Unknown CPAP/BiPAP   Allens Test Unknown N/A   Site Unknown Right Brachial   Mode Unknown BiPAP     Results for LISA GARCIA (MRN [id number]) as of 2/19/2017 17:39   Ref. Range 2/19/2017 05:03 2/19/2017 16:28   WBC Latest Ref Range: 3.90 - 12.70 K/uL 7.37    RBC Latest Ref Range: 4.00 - 5.40 M/uL 2.45 (L)    Hemoglobin Latest Ref Range: 12.0 - 16.0 g/dL 7.8 (L)    Hematocrit  Latest Ref Range: 37.0 - 48.5 % 23.2 (L)    MCV Latest Ref Range: 82 - 98 fL 95    MCH Latest Ref Range: 27.0 - 31.0 pg 31.8 (H)    MCHC Latest Ref Range: 32.0 - 36.0 % 33.6    RDW Latest Ref Range: 11.5 - 14.5 % 12.6    Platelets Latest Ref Range: 150 - 350 K/uL 253    MPV Latest Ref Range: 9.2 - 12.9 fL 8.6 (L)    Gran% Latest Ref Range: 38.0 - 73.0 % 63.5    Gran # Latest Ref Range: 1.8 - 7.7 K/uL 4.7    Lymph% Latest Ref Range: 18.0 - 48.0 % 13.0 (L)    Lymph # Latest Ref Range: 1.0 - 4.8 K/uL 1.0    Mono% Latest Ref Range: 4.0 - 15.0 % 14.7    Mono # Latest Ref Range: 0.3 - 1.0 K/uL 1.1 (H)    Eosinophil% Latest Ref Range: 0.0 - 8.0 % 8.4 (H)    Eos # Latest Ref Range: 0.0 - 0.5 K/uL 0.6 (H)    Basophil% Latest Ref Range: 0.0 - 1.9 % 0.4    Baso # Latest Ref Range: 0.00 - 0.20 K/uL 0.03    Protime Latest Ref Range: 9.0 - 12.5 sec  10.0   Coumadin Monitoring INR Latest Ref Range: 0.8 - 1.2   1.0   aPTT Latest Ref Range: 21.0 - 32.0 sec  28.1     Results for LISA GARCIA (MRN [id number]) as of 2/19/2017 17:39   Ref. Range 2/19/2017 05:03   Sodium Latest Ref Range: 136 - 145 mmol/L 139   Potassium Latest Ref Range: 3.5 - 5.1 mmol/L 3.7   Chloride Latest Ref Range: 95 - 110 mmol/L 101   CO2 Latest Ref Range: 23 - 29 mmol/L 31 (H)   Anion Gap Latest Ref Range: 8 - 16 mmol/L 7 (L)   BUN, Bld Latest Ref Range: 8 - 23 mg/dL 7 (L)   Creatinine Latest Ref Range: 0.5 - 1.4 mg/dL 0.6   eGFR if non African American Latest Ref Range: >60 mL/min/1.73 m^2 >60   eGFR if  Latest Ref Range: >60 mL/min/1.73 m^2 >60   Glucose Latest Ref Range: 70 - 110 mg/dL 99   Calcium Latest Ref Range: 8.7 - 10.5 mg/dL 7.9 (L)

## 2017-02-19 NOTE — PLAN OF CARE
Problem: Patient Care Overview  Goal: Plan of Care Review  Outcome: Ongoing (interventions implemented as appropriate)  POC reviewed with patient and family with voiced understanding. Labs, X-ray, ABG and BiPap reviewed with patient. Informed pt. on administration of blood products with voiced understanding and questions answered.

## 2017-02-19 NOTE — PT/OT/SLP PROGRESS
"Physical Therapy  Treatment    Marycarmen Louis   MRN: [id number]   Admitting Diagnosis: Closed fracture of left hip    PT Received On: 17  PT Start Time: 852     PT Stop Time: 917    PT Total Time (min): 25 min       Billable Minutes:  Gait [id number] minutes and Therapeutic Exercise 10 minutes    Treatment Type: Treatment  PT/PTA: PT             General Precautions: Standard, fall  Orthopedic Precautions: LUE non weight bearing, LLE weight bearing as tolerated (LUE NWB to wrist only)   Braces:  (LUE hard cast)    Do you have any cultural, spiritual, Roman Catholic conflicts, given your current situation?: none voiced    Subjective:  Communicated with patient prior to session.  "I'm supposed to get blood today. I hope that helps me feel better."    Pain Ratin/10  Location - Side: Left  Location - Orientation: midline  Location: hip  Pain Addressed: Nurse notified, Reposition  Pain Rating Post-Intervention: 2/10    Objective:   Patient found with: oxygen    Functional Mobility:  Bed Mobility:   Rolling/Turning to Left: Minimum assistance  Rolling/Turning Right: Minimum assistance  Scooting/Bridging: Minimum Assistance, With trapeze  Supine to Sit: Minimum Assistance, With trapeze  Sit to Supine: Minimum Assistance, With trapeze    Transfers:  Sit <> Stand Assistance: Minimum Assistance  Sit <> Stand Assistive Device: Rolling Walker, Platform  Bed <> Chair Technique: Stand Pivot  Bed <> Chair Assistance: Minimum Assistance  Bed <> Chair Assistive Device: Rolling Walker, Platform    Gait:   Gait Distance: 20 feet  Assistance 1: Moderate assistance  Gait Assistive Device: Rolling walker, Platform walker left  Gait Pattern: swing-to gait  Gait Deviation(s): decreased velocity of limb motion, decreased step length, decreased toe-to-floor clearance, decreased weight-shifting ability  Gait Training:  Pt. Amb. 20' with platform RW, mod. A.  VC's given to pt. For step placement and A.D. Placement.        Balance:   Static " Sit: FAIR+: Able to take MINIMAL challenges from all directions  Dynamic Sit: FAIR+: Maintains balance through MINIMAL excursions of active trunk motion  Static Stand: POOR+: Needs MINIMAL assist to maintain  Dynamic stand: POOR: N/A     Therapeutic Activities and Exercises:  There. Ex.:  Pt. Performed gentle A/A exercises of LLE while supine in bed, for N.M. Tone and strength and fluid dynamics. 1-on-1 LLE HC stetches performed with pt.  LLE: A/A SLR(2x10), glut sets (2x10), quad sets (2x10), ankle pumps (2x10) performed with pt.     AM-PAC 6 CLICK MOBILITY  How much help from another person does this patient currently need?   1 = Unable, Total/Dependent Assistance  2 = A lot, Maximum/Moderate Assistance  3 = A little, Minimum/Contact Guard/Supervision  4 = None, Modified Garza/Independent    Turning over in bed (including adjusting bedclothes, sheets and blankets)?: 2  Sitting down on and standing up from a chair with arms (e.g., wheelchair, bedside commode, etc.): 2  Moving from lying on back to sitting on the side of the bed?: 2  Moving to and from a bed to a chair (including a wheelchair)?: 2  Need to walk in hospital room?: 2  Climbing 3-5 steps with a railing?: 1  Total Score: 11    AM-PAC Raw Score CMS G-Code Modifier Level of Impairment Assistance   6 % Total / Unable   7 - 9 CM 80 - 100% Maximal Assist   10 - 14 CL 60 - 80% Moderate Assist   15 - 19 CK 40 - 60% Moderate Assist   20 - 22 CJ 20 - 40% Minimal Assist   23 CI 1-20% SBA / CGA   24 CH 0% Independent/ Mod I     Patient left HOB elevated with all lines intact, call button in reach and RN and M.D. notified.    Assessment:  Marycarmen Louis is a 71 y.o. female with a medical diagnosis of Closed fracture of left hip and presents with increased distance with gait training.  Pt. Is progressing towards PT POC goals.    Rehab identified problem list/impairments: Rehab identified problem list/impairments: weakness, impaired balance, impaired  endurance, gait instability, decreased lower extremity function, impaired functional mobilty, orthopedic precautions    Rehab potential is fair.    Activity tolerance: Fair    Discharge recommendations: Discharge Facility/Level Of Care Needs: rehabilitation facility     Barriers to discharge: Barriers to Discharge: None    Equipment recommendations: Equipment Needed After Discharge: walker, rolling     GOALS:   Physical Therapy Goals        Problem: Physical Therapy Goal    Goal Priority Disciplines Outcome Goal Variances Interventions   Physical Therapy Goal     PT/OT, PT      Description:  Goals to be met by: 3/10/2017     Patient will increase functional independence with mobility by performin. Supine to sit with Stand-by Assistance  2. Sit to supine with Stand-by Assistance  3. Sit to stand transfer with Stand-by Assistance  4. Bed to chair transfer with Stand-by Assistance using Platform walker  5. Gait  x 50 feet with Contact Guard Assistance using Platform walker.                 PLAN:    Patient to be seen  (1-2x/day(M-F); PRN(Sat.,Sun.))  to address the above listed problems via gait training, therapeutic activities, therapeutic exercises  Plan of Care expires:  (Upon D/C from facility)  Plan of Care reviewed with: patient         Rosaliakeagan KatDouglas, PT  2017

## 2017-02-19 NOTE — PROGRESS NOTES
While patient started receiving blood she developed sudden respiratory distress.  Her O2 sats dropped into the 70's.  She had basilar crackles.  Blood transfusion was stopped and given Lasix 40 IVP.  Transferred to ICU.  Now on Bipap and doing better.        PE:  Vitals:    02/19/17 1541   BP:    Pulse: 109   Resp: (!) 26   Temp:    Gen:  A&O  Heart: RRR, no MRG  Lungs: crackles at the base  Ext:  No CCE.  Wound saturated with blood    O2 sat 96%.    CXR shows bilateral pulmonary infiltrates.    Bladder scan showed  cc urine  CTA - no PE, bilateral lower lobe infiltrate    ASS:  Causes could be pneumonia, ARDS, fat emboli for her hip fracture.  I suspect she has pulmonary fat embolism.   COPD  Blood loss anemia  Urinary retention  S/P ORIF hip and wrist  Possible transfusion reaction- I am doubtful     PLAN:  Currently she is stable on BiPap.    Paul placed  ECHO in am  Check Haptoglobin, LDH, Hgb, etc.  If these are normal then try to transfuse PRBC's again.  I would like to restart anticoagulation when hemodynamically stable  Continue Rocephin, Levaquin  Continue Duonebs  Case discussed with Dr. Melendez

## 2017-02-19 NOTE — ASSESSMENT & PLAN NOTE
Hold Lasix at this time.  Monitor chest x-rays.  Dr. camacho consulted.  Chest x-ray-Increasing bilateral perihilar and diffuse lung infiltrates bilaterally.  This may represent a atypical/viral pneumonia or ARDS  After receiving 1 unit of blood, Lasix 40 mg IV push will be given.  Patient's BNP is normal  BioZ performed yesterday showed a TFC in the upper normal range  Continue oxygen per nasal cannula at 4 L  Continue Rocephin.  Add Levaquin

## 2017-02-19 NOTE — PROGRESS NOTES
On rounds noticed pt was SOB, O2 sats were in low 80s to high 70s with venti mask at 12L. RR 24. Crackles. Tachycardic in 110s to 120s. Lasix 40 pushed. Dr. Mcdonnell, , and Daniel where called. Dr. Mcdonnell ordered transfer to CCU for closer monitoring and a EKG. Dr Melendez ordered Bipap. Pt notified of transfer to CCU. BP and temp were WNL. Report give to VICKY Mora

## 2017-02-19 NOTE — PLAN OF CARE
Problem: Infection, Risk/Actual (Adult)  Goal: Infection Prevention/Resolution  Patient will demonstrate the desired outcomes by discharge/transition of care.   Outcome: Ongoing (interventions implemented as appropriate)  Iv Antibiotics

## 2017-02-19 NOTE — ASSESSMENT & PLAN NOTE
Continue DuoNeb treatments 4 times daily  Continue Rocephin.  Add Levaquin  Monitor respiratory status closely  Pulmonology consult

## 2017-02-19 NOTE — PROGRESS NOTES
She did ok with PT this am.  She is scheduled to get 1 unit of PRBC.  She is breathing better and not complaining of too much pain.  She is anxious to get out of here, but realizes she is not ready today.  Hopefully there will be a significant noticeable improvement after the blood.

## 2017-02-19 NOTE — PROGRESS NOTES
Patient attempted to void on bedpan unable to. Says she feels the urge to void. Patient abd distended. Bladder scan shows greater then 999cc, Dr. Garcia notified. Paul placed in patient 1000cc drained clear yellow urine.

## 2017-02-19 NOTE — PROGRESS NOTES
Called to patient's room due to SaO2 dropping.  40% venti mask in use with SaO2 of 88-90% ; increased to 50% at this time with SaO2 increasing to 93%.  RN at bedside.

## 2017-02-20 PROBLEM — J96.01 ACUTE RESPIRATORY FAILURE WITH HYPOXIA: Status: ACTIVE | Noted: 2017-02-20

## 2017-02-20 LAB
ALLENS TEST: ABNORMAL
ANION GAP SERPL CALC-SCNC: 9 MMOL/L
BASOPHILS # BLD AUTO: 0.01 K/UL
BASOPHILS NFR BLD: 0.1 %
BNP SERPL-MCNC: 44 PG/ML
BUN SERPL-MCNC: 10 MG/DL
CALCIUM SERPL-MCNC: 8.4 MG/DL
CHLORIDE SERPL-SCNC: 96 MMOL/L
CO2 SERPL-SCNC: 30 MMOL/L
CREAT SERPL-MCNC: 0.6 MG/DL
DELSYS: ABNORMAL
DIASTOLIC DYSFUNCTION: YES
DIFFERENTIAL METHOD: ABNORMAL
EOSINOPHIL # BLD AUTO: 0 K/UL
EOSINOPHIL NFR BLD: 0.2 %
ERYTHROCYTE [DISTWIDTH] IN BLOOD BY AUTOMATED COUNT: 13.9 %
EST. GFR  (AFRICAN AMERICAN): >60 ML/MIN/1.73 M^2
EST. GFR  (NON AFRICAN AMERICAN): >60 ML/MIN/1.73 M^2
FIO2: 45 (ref 21–100)
GLUCOSE SERPL-MCNC: 143 MG/DL
HCO3 UR-SCNC: 35.1 MEQ/L (ref 22–26)
HCT VFR BLD AUTO: 28.4 %
HGB BLD-MCNC: 9.7 G/DL
LYMPHOCYTES # BLD AUTO: 0.3 K/UL
LYMPHOCYTES NFR BLD: 3.4 %
MCH RBC QN AUTO: 31.1 PG
MCHC RBC AUTO-ENTMCNC: 34.2 %
MCV RBC AUTO: 91 FL
MODE: ABNORMAL
MONOCYTES # BLD AUTO: 0.5 K/UL
MONOCYTES NFR BLD: 5.6 %
NEUTROPHILS # BLD AUTO: 8.6 K/UL
NEUTROPHILS NFR BLD: 90.7 %
PCO2 BLDA: 46 MMHG (ref 35–45)
PH SMN: 7.49 [PH] (ref 7.35–7.45)
PLATELET # BLD AUTO: 304 K/UL
PMV BLD AUTO: 8.7 FL
PO2 BLDA: 135 MMHG (ref 80–100)
POC BE: 10.4 MEQ/L (ref -2–2)
POC SATURATED O2: 99 % (ref 90–100)
POTASSIUM SERPL-SCNC: 4.1 MMOL/L
RBC # BLD AUTO: 3.12 M/UL
RETIRED EF AND QEF - SEE NOTES: 65 (ref 55–65)
SITE: ABNORMAL
SODIUM SERPL-SCNC: 135 MMOL/L
WBC # BLD AUTO: 9.46 K/UL

## 2017-02-20 PROCEDURE — 85025 COMPLETE CBC W/AUTO DIFF WBC: CPT

## 2017-02-20 PROCEDURE — 83880 ASSAY OF NATRIURETIC PEPTIDE: CPT

## 2017-02-20 PROCEDURE — 82803 BLOOD GASES ANY COMBINATION: CPT | Performed by: INTERNAL MEDICINE

## 2017-02-20 PROCEDURE — 27000339 *HC DAILY SUPPLY KIT

## 2017-02-20 PROCEDURE — 11000001 HC ACUTE MED/SURG PRIVATE ROOM

## 2017-02-20 PROCEDURE — 25000003 PHARM REV CODE 250: Performed by: SURGERY

## 2017-02-20 PROCEDURE — 93307 TTE W/O DOPPLER COMPLETE: CPT

## 2017-02-20 PROCEDURE — 27000221 HC OXYGEN, UP TO 24 HOURS

## 2017-02-20 PROCEDURE — 36415 COLL VENOUS BLD VENIPUNCTURE: CPT

## 2017-02-20 PROCEDURE — 94640 AIRWAY INHALATION TREATMENT: CPT

## 2017-02-20 PROCEDURE — 25000242 PHARM REV CODE 250 ALT 637 W/ HCPCS: Performed by: FAMILY MEDICINE

## 2017-02-20 PROCEDURE — 25000003 PHARM REV CODE 250: Performed by: ORTHOPAEDIC SURGERY

## 2017-02-20 PROCEDURE — 99900035 HC TECH TIME PER 15 MIN (STAT)

## 2017-02-20 PROCEDURE — 94660 CPAP INITIATION&MGMT: CPT

## 2017-02-20 PROCEDURE — 94761 N-INVAS EAR/PLS OXIMETRY MLT: CPT

## 2017-02-20 PROCEDURE — 36600 WITHDRAWAL OF ARTERIAL BLOOD: CPT

## 2017-02-20 PROCEDURE — 25000003 PHARM REV CODE 250: Performed by: FAMILY MEDICINE

## 2017-02-20 PROCEDURE — 63600175 PHARM REV CODE 636 W HCPCS: Performed by: INTERNAL MEDICINE

## 2017-02-20 PROCEDURE — 99233 SBSQ HOSP IP/OBS HIGH 50: CPT | Mod: ,,, | Performed by: INTERNAL MEDICINE

## 2017-02-20 PROCEDURE — 63600175 PHARM REV CODE 636 W HCPCS: Performed by: FAMILY MEDICINE

## 2017-02-20 PROCEDURE — 80048 BASIC METABOLIC PNL TOTAL CA: CPT

## 2017-02-20 RX ORDER — ENOXAPARIN SODIUM 100 MG/ML
40 INJECTION SUBCUTANEOUS EVERY 24 HOURS
Status: DISCONTINUED | OUTPATIENT
Start: 2017-02-20 | End: 2017-02-27

## 2017-02-20 RX ORDER — FUROSEMIDE 10 MG/ML
40 INJECTION INTRAMUSCULAR; INTRAVENOUS DAILY
Status: DISCONTINUED | OUTPATIENT
Start: 2017-02-20 | End: 2017-02-21

## 2017-02-20 RX ADMIN — SODIUM CHLORIDE, PRESERVATIVE FREE 3 ML: 5 INJECTION INTRAVENOUS at 10:02

## 2017-02-20 RX ADMIN — SODIUM CHLORIDE, PRESERVATIVE FREE 3 ML: 5 INJECTION INTRAVENOUS at 06:02

## 2017-02-20 RX ADMIN — SODIUM CHLORIDE, PRESERVATIVE FREE 3 ML: 5 INJECTION INTRAVENOUS at 01:02

## 2017-02-20 RX ADMIN — DOCUSATE SODIUM AND SENNOSIDES 1 TABLET: 8.6; 5 TABLET, FILM COATED ORAL at 09:02

## 2017-02-20 RX ADMIN — ENOXAPARIN SODIUM 40 MG: 40 INJECTION, SOLUTION INTRAVENOUS; SUBCUTANEOUS at 01:02

## 2017-02-20 RX ADMIN — DOCUSATE SODIUM AND SENNOSIDES 1 TABLET: 8.6; 5 TABLET, FILM COATED ORAL at 08:02

## 2017-02-20 RX ADMIN — IPRATROPIUM BROMIDE AND ALBUTEROL SULFATE 3 ML: .5; 3 SOLUTION RESPIRATORY (INHALATION) at 01:02

## 2017-02-20 RX ADMIN — TRAMADOL HYDROCHLORIDE 50 MG: 50 TABLET, COATED ORAL at 09:02

## 2017-02-20 RX ADMIN — CEFTRIAXONE 1 G: 1 INJECTION, SOLUTION INTRAVENOUS at 06:02

## 2017-02-20 RX ADMIN — IPRATROPIUM BROMIDE AND ALBUTEROL SULFATE 3 ML: .5; 3 SOLUTION RESPIRATORY (INHALATION) at 07:02

## 2017-02-20 RX ADMIN — METHYLPREDNISOLONE SODIUM SUCCINATE 40 MG: 40 INJECTION, POWDER, FOR SOLUTION INTRAMUSCULAR; INTRAVENOUS at 09:02

## 2017-02-20 RX ADMIN — METHYLPREDNISOLONE SODIUM SUCCINATE 40 MG: 40 INJECTION, POWDER, FOR SOLUTION INTRAMUSCULAR; INTRAVENOUS at 01:02

## 2017-02-20 RX ADMIN — FUROSEMIDE 40 MG: 10 INJECTION, SOLUTION INTRAMUSCULAR; INTRAVENOUS at 01:02

## 2017-02-20 RX ADMIN — VENLAFAXINE HYDROCHLORIDE 75 MG: 75 CAPSULE, EXTENDED RELEASE ORAL at 08:02

## 2017-02-20 RX ADMIN — PANTOPRAZOLE SODIUM 40 MG: 40 TABLET, DELAYED RELEASE ORAL at 08:02

## 2017-02-20 RX ADMIN — METHYLPREDNISOLONE SODIUM SUCCINATE 40 MG: 40 INJECTION, POWDER, FOR SOLUTION INTRAMUSCULAR; INTRAVENOUS at 06:02

## 2017-02-20 RX ADMIN — LORAZEPAM 0.5 MG: 2 INJECTION INTRAMUSCULAR; INTRAVENOUS at 04:02

## 2017-02-20 RX ADMIN — LORAZEPAM 0.5 MG: 2 INJECTION INTRAMUSCULAR; INTRAVENOUS at 09:02

## 2017-02-20 RX ADMIN — IPRATROPIUM BROMIDE AND ALBUTEROL SULFATE 3 ML: .5; 3 SOLUTION RESPIRATORY (INHALATION) at 08:02

## 2017-02-20 RX ADMIN — LEVOFLOXACIN 500 MG: 5 INJECTION INTRAVENOUS at 09:02

## 2017-02-20 NOTE — PROGRESS NOTES
[facility]  Cardiology  Progress Note    Patient Name: Marycarmen Louis  MRN: [id number]  Admission Date: 2/14/2017  Hospital Length of Stay: 6 days  Code Status: Full Code   Attending Physician: Jose Jaeger MD   Primary Care Physician: Willie Santos MD (Inactive)  Expected Discharge Date:   Principal Problem:Closed fracture of left hip    Subjective:     Hospital Course: admitted with hip fracture    Interval History: underwent ORIF last week, had post op SOB and desaturations, CT chest negative for PE.  Pulmonology following    Review of Systems   Constitution: Positive for weakness.   HENT: Negative.    Cardiovascular: Negative.    Respiratory: Positive for cough and shortness of breath.    Endocrine: Negative.    Skin: Negative.    Musculoskeletal: Positive for joint pain.   Gastrointestinal: Negative.    Genitourinary: Negative.    Psychiatric/Behavioral: Negative.    Allergic/Immunologic: Negative.      Objective:     Vital Signs (Most Recent):  Temp: 97 °F (36.1 °C) (02/20/17 0701)  Pulse: 108 (02/20/17 1000)  Resp: (!) 27 (02/20/17 1000)  BP: 118/64 (02/20/17 1000)  SpO2: 100 % (02/20/17 1000) Vital Signs (24h Range):  Temp:  [96.8 °F (36 °C)-100.8 °F (38.2 °C)] 97 °F (36.1 °C)  Pulse:  [] 108  Resp:  [16-30] 27  SpO2:  [89 %-100 %] 100 %  BP: (107-129)/(52-68) 118/64     Weight: 59 kg (130 lb)  Body mass index is 22.31 kg/(m^2).    SpO2: 100 %  O2 Device (Oxygen Therapy): BiPAP      Intake/Output Summary (Last 24 hours) at 02/20/17 1104  Last data filed at 02/20/17 1030   Gross per 24 hour   Intake           664.17 ml   Output             3825 ml   Net         -3160.83 ml       Lines/Drains/Airways     Drain                 Urethral Catheter 02/19/17 1656 Latex 16 Fr. less than 1 day          Peripheral Intravenous Line                 Peripheral IV - Single Lumen 02/20/17 1056 Right Wrist less than 1 day                Physical Exam   Constitutional: She is oriented to  person, place, and time. She appears well-developed and well-nourished.   HENT:   Head: Normocephalic.   Eyes: EOM are normal.   Neck: Normal range of motion. Neck supple.   Cardiovascular: Normal rate and regular rhythm.    Pulmonary/Chest: Effort normal.   Abdominal: Soft.   Neurological: She is alert and oriented to person, place, and time.   Skin: Skin is warm and dry.       Significant Labs: All pertinent lab results from the last 24 hours have been reviewed.    Significant Imaging: EKG: sinus tachy  Assessment and Plan:     Lt hip and Lt wrist fracture due to fall while at [facility] center  S/p ORIF 2/15/17  Hypoxia -- ARDS (+ COPD exacerbation?) but no clear sepsis  Postop anemia  COPD/remote smoker  Osteoporosis   Coronary artery calcification by CT 2013    Plan:  Await echo results today  Obtain venous u/s of BLE to R/O DVT  lovenox  Keep dry with lasix.    Active Diagnoses:    Diagnosis Date Noted POA    PRINCIPAL PROBLEM:  Closed fracture of left hip [S72.002A] 02/15/2017 Yes    Acute blood loss anemia [D62] 02/19/2017 No    COPD exacerbation [J44.1] 02/17/2017 No    Pulmonary edema [J81.1] 02/16/2017 Yes    Osteoporosis [M81.0] 02/15/2017 Yes    Left wrist fracture [S62.102A] 02/14/2017 Yes    Coronary artery calcification seen on CAT scan [I25.10] 06/29/2013 Yes      Problems Resolved During this Admission:    Diagnosis Date Noted Date Resolved POA    Fever [R50.9] 02/15/2017 02/18/2017 No       VTE Risk Mitigation         Ordered     Place sequential compression device  Until discontinued      02/19/17 1827     Medium Risk of VTE  Once      02/15/17 1550          Anticipated Disposition: Home or Self Care    Discharge Needs: outpatient evaluation    Cathy Mckeon NP  Cardiology  [facility]    I attest that I have personally seen and examined this patient. I have reviewed and discussed the management in detail as outlined above.

## 2017-02-20 NOTE — SUBJECTIVE & OBJECTIVE
Interval History: on biPaP, desats quickly to low 80s off BiPaP, does recover with 50% Venti Mask. CXR worsening    Review of Systems   Constitutional: Negative for activity change, fatigue, fever and unexpected weight change.   HENT: Negative for congestion, ear pain, hearing loss, rhinorrhea and sore throat.    Eyes: Negative for pain, redness and visual disturbance.   Respiratory: Negative for cough, shortness of breath and wheezing.    Cardiovascular: Negative for chest pain, palpitations and leg swelling.   Gastrointestinal: Negative for abdominal pain, constipation, diarrhea, nausea and vomiting.   Genitourinary: Negative for dysuria, frequency, pelvic pain and urgency.   Musculoskeletal: Negative for back pain, joint swelling and neck pain.   Skin: Negative for color change, rash and wound.   Neurological: Negative for dizziness, weakness, light-headedness and headaches.     Objective:     Vital Signs (Most Recent):  Temp: 97 °F (36.1 °C) (02/20/17 0701)  Pulse: 102 (02/20/17 1100)  Resp: (!) 23 (02/20/17 1100)  BP: 125/62 (02/20/17 1100)  SpO2: 99 % (02/20/17 1100) Vital Signs (24h Range):  Temp:  [96.8 °F (36 °C)-100.8 °F (38.2 °C)] 97 °F (36.1 °C)  Pulse:  [] 102  Resp:  [16-30] 23  SpO2:  [89 %-100 %] 99 %  BP: (107-129)/(52-68) 125/62     Weight: 59 kg (130 lb)  Body mass index is 22.31 kg/(m^2).    Intake/Output Summary (Last 24 hours) at 02/20/17 1138  Last data filed at 02/20/17 1030   Gross per 24 hour   Intake           314.17 ml   Output             3825 ml   Net         -3510.83 ml      Physical Exam   Constitutional: She is oriented to person, place, and time. She appears well-developed and well-nourished. No distress.   HENT:   Head: Normocephalic and atraumatic.   Right Ear: External ear normal.   Left Ear: External ear normal.   Eyes: Conjunctivae and EOM are normal. Pupils are equal, round, and reactive to light. Right eye exhibits no discharge. Left eye exhibits no discharge.   Neck:  "Neck supple. No tracheal deviation present.   Cardiovascular: Normal rate and regular rhythm.  Exam reveals no gallop and no friction rub.    No murmur heard.  Pulmonary/Chest: Effort normal. No respiratory distress. She has no wheezes. She has rales (soft crackles in bases).   Decreased breath sounds in bases b/l   Abdominal: Soft. Bowel sounds are normal. She exhibits no distension. There is no tenderness.   Musculoskeletal: She exhibits no edema.   Lymphadenopathy:     She has no cervical adenopathy.   Neurological: She is alert and oriented to person, place, and time. No cranial nerve deficit.   Skin: Skin is warm and dry.   Psychiatric: She has a normal mood and affect. Her behavior is normal.   Nursing note and vitals reviewed.      Significant Labs:   Blood Culture: No results for input(s): LABBLOO in the last 48 hours.  CBC:   Recent Labs  Lab 02/19/17  0503 02/20/17  0421   WBC 7.37 9.46   HGB 7.8* 9.7*   HCT 23.2* 28.4*    304     CMP:   Recent Labs  Lab 02/19/17  0503 02/20/17  0421    135*   K 3.7 4.1    96   CO2 31* 30*   GLU 99 143*   BUN 7* 10   CREATININE 0.6 0.6   CALCIUM 7.9* 8.4*   ANIONGAP 7* 9   EGFRNONAA >60 >60     Cardiac Markers:   Recent Labs  Lab 02/20/17  0421   BNP 44     All pertinent labs within the past 24 hours have been reviewed.    Significant Imaging: I have reviewed all pertinent imaging results/findings within the past 24 hours.     2/19: "Increasingly severe central and perihilar lung field infiltrates may represent ARDS or fulminant pneumonia. Hypoventilation"    "

## 2017-02-20 NOTE — PLAN OF CARE
02/20/17 1447   Discharge Reassessment   Assessment Type Discharge Planning Reassessment   Can the patient answer the patient profile reliably? Yes, cognitively intact   How does the patient rate their overall health at the present time? Fair   Describe the patient's ability to walk at the present time. Major restrictions/daily assistance from another person   How often would a person be available to care for the patient? Often   Number of comorbid conditions (as recorded on the chart) One   During the past month, has the patient often been bothered by feeling down, depressed or hopeless? No   During the past month, has the patient often been bothered by little interest or pleasure in doing things? No   Discharge plan remains the same: Yes   Provided patient/caregiver education on the expected discharge date and the discharge plan Yes   Discharge Plan A Home Health;Home with family   Discharge Plan B Home with family;Home Health   Change in patient condition or support system Yes   Patient choice form signed by patient/caregiver N/A   Explained to the the patient/caregiver why the discharge planned changed: Yes   Involved the patient/caregiver in establishing a new discharge plan: Yes

## 2017-02-20 NOTE — ASSESSMENT & PLAN NOTE
Continue DuoNeb treatments 4 times daily  Continue Rocephin (day 4).  Added Levaquin (day 2)  Pulmonology consult--added steroids.     I am not convinced this is COPD exacerbation. She has normal pCO2 (not retaining). No wheezing on exam. However, I do not have the benefit of seeing her prior to treatment over the last few days therefore will continue current threapy and see if she will improve    I feel she has developed ARDS. See treatment plan.

## 2017-02-20 NOTE — PT/OT/SLP DISCHARGE
Physical Therapy Discharge Summary    Marycarmen Louis  MRN: [id number]   Closed fracture of left hip   Patient Discharged from acute Physical Therapy on 2017.  Please refer to prior PT noted date on 2017 for functional status.     Assessment:   Patient was discharge unexpectedly.  Information required to complete and accurate discharge summary is unknown.  Refer to therapy initial evaluation and last progress note for initial and most recent functional status and goal achievement.  Recommendations made may be found in medical record. Patient appropriate for care in another setting.  GOALS:   Physical Therapy Goals        Problem: Physical Therapy Goal    Goal Priority Disciplines Outcome Goal Variances Interventions   Physical Therapy Goal     PT/OT, PT      Description:  Goals to be met by: 3/10/2017     Patient will increase functional independence with mobility by performin. Supine to sit with Stand-by Assistance  2. Sit to supine with Stand-by Assistance  3. Sit to stand transfer with Stand-by Assistance  4. Bed to chair transfer with Stand-by Assistance using Platform walker  5. Gait  x 50 feet with Contact Guard Assistance using Platform walker.               Reasons for Discontinuation of Therapy Services  Transfer to alternate level of care.      Plan:  Patient Discharged to: Transfer to ICU.

## 2017-02-20 NOTE — ASSESSMENT & PLAN NOTE
Ortho follwing  S/p closed reduction and percutaneous distraction pinning of the left distal radius.  Continue physical therapy  Pain well controlled

## 2017-02-20 NOTE — PLAN OF CARE
Problem: Patient Care Overview  Goal: Plan of Care Review  Outcome: Ongoing (interventions implemented as appropriate)  Pt now in CCU; on BiPAP 12/5/45% rr12. Pt pulling and moving mask constantly.  No distress noted; maintaining adequate O2 sats.

## 2017-02-20 NOTE — ASSESSMENT & PLAN NOTE
Transfuse 1 unit of packed red blood cells 2/20.  Developed acute decompensation of resp status and transferred to ICU  Hgb better today at 9.7  Follow CBC  Started prophylactic lovenox today

## 2017-02-20 NOTE — ASSESSMENT & PLAN NOTE
Worsening respiratory status over last 24 hours  Now requiring BiPap at 35% FiO2  Repeating ABG today  Off BiPaP, sats acutely drop to the low 80%; Maintains on 50% Venti as well with sats >95% this AM  We are treating with IV Lasix for possible pulm edema as had acute worsening after blood transfusion yesterday. TTE today. I am not yet convinced this is CHF related. BNP normal today. No other clinical signs of heart failure  She was already started on steroids and antibx for possible PNA. When I review her prior CXR it looks like she might have been developing a small infiltrate (right ivelisse-hilar). Will continue levaquin and rocephin which were already started as noted elsewhere and continue steroids for now and see if she improves  CTA was negative for PE--she is now on prophylactic lovenox (2/20), was off for a few days after H/H was dropping post-op    Would like to consult critical care specialist today and see if there is anything that may be contributing.

## 2017-02-20 NOTE — PLAN OF CARE
Problem: Patient Care Overview  Goal: Plan of Care Review  Outcome: Ongoing (interventions implemented as appropriate)  Patient to CCU 2/19 after resp distress on 3rd floor s/p blood transfusion. Patient currently on BiPAp 12/5 35% with O2 sats>95%; quickly desats to 80's with mask off for medications, etc. Dsg to L hip CDI w/o drainage; surgery following. Hard cast to LUE dry/intact with warm fingers noted. Pain controlled with Tramadol and Dilaudid prn at this time; tolerating well. Instructed on POC with verbalized understanding.

## 2017-02-20 NOTE — PT/OT/SLP PROGRESS
Physical Therapy      Marycarmen Louis  MRN: [id number]    Patient not seen today secondary to Other (Comment), MD hold (Comment), Patient ill (Comment) (Pt. transferred to ICU). Will follow-up as/if indicated.  Please see PT discharge note.    Rip Cole, PT

## 2017-02-20 NOTE — PROGRESS NOTES
"Marycarmen Louis is a 71 y.o. female patient.    Active Hospital Problems    Diagnosis  POA    *Closed fracture of left hip [S72.002A]  Yes    Acute blood loss anemia [D62]  No    COPD exacerbation [J44.1]  No    Pulmonary edema [J81.1]  Yes    Osteoporosis [M81.0]  Yes    Left wrist fracture [S62.102A]  Yes    Coronary artery calcification seen on CAT scan [I25.10]  Yes      Resolved Hospital Problems    Diagnosis Date Resolved POA    Fever [R50.9] 02/18/2017 No     Temp: 96.8 °F (36 °C) (02/20/17 0400)  Pulse: 96 (02/20/17 0716)  Resp: 20 (02/20/17 0716)  BP: 118/67 (02/20/17 0600)  SpO2: 97 % (02/20/17 0716)  Weight: 59 kg (130 lb) (02/14/17 1746)  Height: 5' 4" (162.6 cm) (02/19/17 1541)    Subjective:  Symptoms:  She reports shortness of breath, cough and anxiety.    Activity level: Returning to normal.    Pain:  She complains of pain that is moderate.  She reports pain is improving.  Pain is partially controlled.      Objective:  General Appearance:  Ill-appearing.    Vital signs: (most recent): Blood pressure 118/67, pulse 96, temperature 96.8 °F (36 °C), temperature source Axillary, resp. rate 20, height 5' 4" (1.626 m), weight 59 kg (130 lb), SpO2 97 %, not currently breastfeeding.  Vital signs are normal.    Output: Producing urine and producing stool.    Lungs:  Tachypnea.  Respiratory distress: mild to moderate.  No stridor.  There are wheezes, rhonchi and decreased breath sounds.    Heart: Tachycardia.  S1 normal and S2 normal.  Positive for murmur.  No gallop or friction rub.   Chest: No chest wall tenderness.  Symmetric chest wall expansion.   Extremities: Normal range of motion.  There is dependent edema.    Neurological: Patient is alert and oriented to person, place and time.  Normal strength.    Skin:  Warm.  No rash, ecchymosis or cyanosis.   Abdomen: Abdomen is soft.  There are no signs of ascites.  Bowel sounds are normal.     Pupils:  Pupils are equal, round, and reactive to light.  "   Pulses: Distal pulses are intact.      Assessment:  (ARDS  Fat Emboli  COPD Exacerbation   Volume OverLoad   Anemia S/p transfusion ).     Plan:   NIV  Beta agonist   Cxr shows ARDS need to follow   Got early Steroids and now  let shower of fatty acids resolve   Pt desats when take off NIV .       Reji Melendez MD  2/20/2017

## 2017-02-20 NOTE — ASSESSMENT & PLAN NOTE
Ortho following  S/p Left bipolar endoprosthetic hemiarthroplasty  Continue physical therapy  Pain well controlled

## 2017-02-20 NOTE — ASSESSMENT & PLAN NOTE
Cardiology consulted.  No changes at this time.  Can be followed further as outpatient  No chest pain, no signs of ACS this admission

## 2017-02-20 NOTE — ASSESSMENT & PLAN NOTE
Lasix was held earlier this admission.  Monitor chest x-rays.  Dr. camacho consulted.  Chest x-ray-Increasing bilateral perihilar and diffuse lung infiltrates bilaterally.  This may represent a atypical/viral pneumonia or ARDS  After receiving 1 unit of blood, Lasix 40 mg IV push was given.  Patient's BNP is normal (44)  BioZ performed yesterday showed a TFC in the upper normal range    TTE today to eval heart function  No other clinical evidence of CHF exacerbation and no known CHF history. I do not feel this is all pulmonary edema. I think she has developed ARDS. See ARDS plans

## 2017-02-20 NOTE — PLAN OF CARE
Patient currently on BiPAPwith O2 Sats > 95%, when removed from BiPAP desats quickly to upper 70's.  Dressing to L hip dry and intact, no redness noted.  Cast to LUE dry/intact with no damage fingers warm to touch with good cap refill. No complaints of pain at this time.  Instructed on POC, verbalized understanding.

## 2017-02-20 NOTE — PROGRESS NOTES
[facility] Hospital Medicine  Progress Note    Patient Name: Marycarmen Louis  MRN: [id number]  Patient Class: IP- Inpatient   Admission Date: 2/14/2017  Length of Stay: 6 days  Attending Physician: Jose Jaeger MD  Primary Care Provider: Willie Santos MD (Inactive)        Subjective:     Principal Problem:Closed fracture of left hip    HPI:  72 y/o female reports she fell off of gym apparatus. Reports falling after missing step. ER w/up shows left wrist fracture and left hip fracture. Reports feeling fine prior to fall yesterday. No fever prior to this am.    Hospital Course:  She has surgery yesterday right lateral decubitus. Was given IVF. Has slight fluid overload caused resp distress. Hypoxic. CXR showed right lung infiltrate. She had no s/s of pneumonia and clear  X ray prior to surgery. Given dose of lasix and improved well. She was moved  From ICU to floor. Did well over night. PT to start this am. Still on O2 today 4L NC 96%    2/17/17-patient still becomes hypoxic when oxygen is removed.  CTA of the chest and lungs were done today that were negative for pulmonary embolism.  She did have significant emphysematous changes and lower lobe infiltrates and atelectasis bilaterally.  She probably has an exacerbation of COPD probably from perioperative intubation.  DuoNeb treatments, Rocephin, azithromycin started.  She seems be doing better.    2/18.  Patient evaluated this morning.  She was working with physical therapy and able to ambulate across the room with assistance.  She does feel short of breath with exertion.  Her hemoglobin is now 8.2.  Dr. Melendez consulted.  Her sats still drop when she takes her oxygen off.  Patient had a large amount of bloody drainage.  I stopped her Xarelto today for now until her hemoglobin stabilizes.    2/18 patient never received blood.  Pulmonologist wanted to wait in x-ray today because he felt she may be fluid overloaded.  Chest x-ray is starting  to look a little worse.  She has bilateral infiltrates consistent with bilateral perihilar and diffuse lung infiltrates bilaterally.  This may represent a atypical/viral pneumonia or ARDS.  Today her hemoglobin is 7.8 and everybody agrees that she could benefit from one unit of blood.  Her Xarelto has been discontinued.  Patient is ambulating with therapy.  She does have dyspnea with exertion.  She is on 4 L nasal cannula and her O2 sats are 92%.    2/20  Yesterday patient had acute hypoxic episode with sats into the 50% after receiving 1 unit of blood. Transferred to ICU on BiPaP. Got Lasix 40mg IV x1. Maintaining sats in 90% on BipaP with 35% FiO2, sats drop to <80% within 1 minutes of coming off BiPaP. However, even with the hypoxia this AM she does not report feeling SOB, no use of accessory muscles, speaking in complete sentences. CXR has been worsneing, looks like b/l infiltrates consistent with ARDS. Pain is well controlled from surgery. She is not on any anticoagulation s/p surgery due to bleeding and worsening H/H. She had CTA which was negative for PE. She is on levaquin and Ceftriaxone for possible PNA (there was some question of aspiration after her surgery). She was also started on steroids.       Interval History: on biPaP, desats quickly to low 80s off BiPaP, does recover with 50% Venti Mask. CXR worsening    Review of Systems   Constitutional: Negative for activity change, fatigue, fever and unexpected weight change.   HENT: Negative for congestion, ear pain, hearing loss, rhinorrhea and sore throat.    Eyes: Negative for pain, redness and visual disturbance.   Respiratory: Negative for cough, shortness of breath and wheezing.    Cardiovascular: Negative for chest pain, palpitations and leg swelling.   Gastrointestinal: Negative for abdominal pain, constipation, diarrhea, nausea and vomiting.   Genitourinary: Negative for dysuria, frequency, pelvic pain and urgency.   Musculoskeletal: Negative for  back pain, joint swelling and neck pain.   Skin: Negative for color change, rash and wound.   Neurological: Negative for dizziness, weakness, light-headedness and headaches.     Objective:     Vital Signs (Most Recent):  Temp: 97 °F (36.1 °C) (02/20/17 0701)  Pulse: 102 (02/20/17 1100)  Resp: (!) 23 (02/20/17 1100)  BP: 125/62 (02/20/17 1100)  SpO2: 99 % (02/20/17 1100) Vital Signs (24h Range):  Temp:  [96.8 °F (36 °C)-100.8 °F (38.2 °C)] 97 °F (36.1 °C)  Pulse:  [] 102  Resp:  [16-30] 23  SpO2:  [89 %-100 %] 99 %  BP: (107-129)/(52-68) 125/62     Weight: 59 kg (130 lb)  Body mass index is 22.31 kg/(m^2).    Intake/Output Summary (Last 24 hours) at 02/20/17 1138  Last data filed at 02/20/17 1030   Gross per 24 hour   Intake           314.17 ml   Output             3825 ml   Net         -3510.83 ml      Physical Exam   Constitutional: She is oriented to person, place, and time. She appears well-developed and well-nourished. No distress.   HENT:   Head: Normocephalic and atraumatic.   Right Ear: External ear normal.   Left Ear: External ear normal.   Eyes: Conjunctivae and EOM are normal. Pupils are equal, round, and reactive to light. Right eye exhibits no discharge. Left eye exhibits no discharge.   Neck: Neck supple. No tracheal deviation present.   Cardiovascular: Normal rate and regular rhythm.  Exam reveals no gallop and no friction rub.    No murmur heard.  Pulmonary/Chest: Effort normal. No respiratory distress. She has no wheezes. She has rales (soft crackles in bases).   Decreased breath sounds in bases b/l   Abdominal: Soft. Bowel sounds are normal. She exhibits no distension. There is no tenderness.   Musculoskeletal: She exhibits no edema.   Lymphadenopathy:     She has no cervical adenopathy.   Neurological: She is alert and oriented to person, place, and time. No cranial nerve deficit.   Skin: Skin is warm and dry.   Psychiatric: She has a normal mood and affect. Her behavior is normal.   Nursing  "note and vitals reviewed.      Significant Labs:   Blood Culture: No results for input(s): LABBLOO in the last 48 hours.  CBC:   Recent Labs  Lab 02/19/17  0503 02/20/17  0421   WBC 7.37 9.46   HGB 7.8* 9.7*   HCT 23.2* 28.4*    304     CMP:   Recent Labs  Lab 02/19/17  0503 02/20/17  0421    135*   K 3.7 4.1    96   CO2 31* 30*   GLU 99 143*   BUN 7* 10   CREATININE 0.6 0.6   CALCIUM 7.9* 8.4*   ANIONGAP 7* 9   EGFRNONAA >60 >60     Cardiac Markers:   Recent Labs  Lab 02/20/17 0421   BNP 44     All pertinent labs within the past 24 hours have been reviewed.    Significant Imaging: I have reviewed all pertinent imaging results/findings within the past 24 hours.     2/19: "Increasingly severe central and perihilar lung field infiltrates may represent ARDS or fulminant pneumonia. Hypoventilation"      Assessment/Plan:      * Closed fracture of left hip  Ortho following  S/p Left bipolar endoprosthetic hemiarthroplasty  Continue physical therapy  Pain well controlled        Coronary artery calcification seen on CAT scan  Cardiology consulted.  No changes at this time.  Can be followed further as outpatient  No chest pain, no signs of ACS this admission      Left wrist fracture  Ortho follwing  S/p closed reduction and percutaneous distraction pinning of the left distal radius.  Continue physical therapy  Pain well controlled    Osteoporosis  Failed fosamax   Will need forteo x 2 years at discharge       Pulmonary edema   Lasix was held earlier this admission.  Monitor chest x-rays.  Dr. camacho consulted.  Chest x-ray-Increasing bilateral perihilar and diffuse lung infiltrates bilaterally.  This may represent a atypical/viral pneumonia or ARDS  After receiving 1 unit of blood, Lasix 40 mg IV push was given.  Patient's BNP is normal (44)  BioZ performed yesterday showed a TFC in the upper normal range    TTE today to eval heart function  No other clinical evidence of CHF exacerbation and no known CHF " history. I do not feel this is all pulmonary edema. I think she has developed ARDS. See ARDS plans      COPD exacerbation  Continue DuoNeb treatments 4 times daily  Continue Rocephin (day 4).  Added Levaquin (day 2)  Pulmonology consult--added steroids.     I am not convinced this is COPD exacerbation. She has normal pCO2 (not retaining). No wheezing on exam. However, I do not have the benefit of seeing her prior to treatment over the last few days therefore will continue current threapy and see if she will improve    I feel she has developed ARDS. See treatment plan.           Acute blood loss anemia  Transfuse 1 unit of packed red blood cells 2/20.  Developed acute decompensation of resp status and transferred to ICU  Hgb better today at 9.7  Follow CBC  Started prophylactic lovenox today      Acute respiratory failure with hypoxia  Worsening respiratory status over last 24 hours  Now requiring BiPap at 35% FiO2  Repeating ABG today  Off BiPaP, sats acutely drop to the low 80%; Maintains on 50% Venti as well with sats >95% this AM  We are treating with IV Lasix for possible pulm edema as had acute worsening after blood transfusion yesterday. TTE today. I am not yet convinced this is CHF related. BNP normal today. No other clinical signs of heart failure  She was already started on steroids and antibx for possible PNA. When I review her prior CXR it looks like she might have been developing a small infiltrate (right ivelisse-hilar). Will continue levaquin and rocephin which were already started as noted elsewhere and continue steroids for now and see if she improves  CTA was negative for PE--she is now on prophylactic lovenox (2/20), was off for a few days after H/H was dropping post-op    Would like to consult critical care specialist today and see if there is anything that may be contributing.      VTE Risk Mitigation         Ordered     enoxaparin injection 40 mg  Daily     Route:  Subcutaneous        02/20/17 6641      Place sequential compression device  Until discontinued      02/19/17 1827     Medium Risk of VTE  Once      02/15/17 1550          Tamara Loza MD  Department of Hospital Medicine   [facility]

## 2017-02-21 LAB
ALLENS TEST: ABNORMAL
ANION GAP SERPL CALC-SCNC: 7 MMOL/L
BASOPHILS # BLD AUTO: 0.01 K/UL
BASOPHILS NFR BLD: 0.1 %
BNP SERPL-MCNC: 63 PG/ML
BUN SERPL-MCNC: 14 MG/DL
CALCIUM SERPL-MCNC: 8.6 MG/DL
CHLORIDE SERPL-SCNC: 98 MMOL/L
CO2 SERPL-SCNC: 34 MMOL/L
CREAT SERPL-MCNC: 0.6 MG/DL
DELSYS: ABNORMAL
DIFFERENTIAL METHOD: ABNORMAL
EOSINOPHIL # BLD AUTO: 0 K/UL
EOSINOPHIL NFR BLD: 0 %
ERYTHROCYTE [DISTWIDTH] IN BLOOD BY AUTOMATED COUNT: 13.5 %
EST. GFR  (AFRICAN AMERICAN): >60 ML/MIN/1.73 M^2
EST. GFR  (NON AFRICAN AMERICAN): >60 ML/MIN/1.73 M^2
FIO2: 40 (ref 21–100)
GLUCOSE SERPL-MCNC: 140 MG/DL
HCO3 UR-SCNC: 32 MEQ/L (ref 22–26)
HCT VFR BLD AUTO: 26.8 %
HGB BLD-MCNC: 8.9 G/DL
LYMPHOCYTES # BLD AUTO: 0.6 K/UL
LYMPHOCYTES NFR BLD: 4.3 %
MCH RBC QN AUTO: 30.6 PG
MCHC RBC AUTO-ENTMCNC: 33.2 %
MCV RBC AUTO: 92 FL
MODE: ABNORMAL
MONOCYTES # BLD AUTO: 1 K/UL
MONOCYTES NFR BLD: 7.1 %
NEUTROPHILS # BLD AUTO: 12.7 K/UL
NEUTROPHILS NFR BLD: 88.5 %
PCO2 BLDA: 46 MMHG (ref 35–45)
PH SMN: 7.45 [PH] (ref 7.35–7.45)
PLATELET # BLD AUTO: 387 K/UL
PMV BLD AUTO: 8.9 FL
PO2 BLDA: 104 MMHG (ref 80–100)
POC BE: 7 MEQ/L (ref -2–2)
POC SATURATED O2: 98 % (ref 90–100)
POTASSIUM SERPL-SCNC: 4.1 MMOL/L
RBC # BLD AUTO: 2.91 M/UL
SITE: ABNORMAL
SODIUM SERPL-SCNC: 139 MMOL/L
WBC # BLD AUTO: 14.32 K/UL

## 2017-02-21 PROCEDURE — 63600175 PHARM REV CODE 636 W HCPCS: Performed by: INTERNAL MEDICINE

## 2017-02-21 PROCEDURE — 94660 CPAP INITIATION&MGMT: CPT

## 2017-02-21 PROCEDURE — 36415 COLL VENOUS BLD VENIPUNCTURE: CPT

## 2017-02-21 PROCEDURE — 94640 AIRWAY INHALATION TREATMENT: CPT

## 2017-02-21 PROCEDURE — 11000001 HC ACUTE MED/SURG PRIVATE ROOM

## 2017-02-21 PROCEDURE — 99233 SBSQ HOSP IP/OBS HIGH 50: CPT | Mod: ,,, | Performed by: INTERNAL MEDICINE

## 2017-02-21 PROCEDURE — 27000221 HC OXYGEN, UP TO 24 HOURS

## 2017-02-21 PROCEDURE — 85025 COMPLETE CBC W/AUTO DIFF WBC: CPT

## 2017-02-21 PROCEDURE — 82803 BLOOD GASES ANY COMBINATION: CPT | Performed by: INTERNAL MEDICINE

## 2017-02-21 PROCEDURE — 25000003 PHARM REV CODE 250: Performed by: ORTHOPAEDIC SURGERY

## 2017-02-21 PROCEDURE — 97530 THERAPEUTIC ACTIVITIES: CPT

## 2017-02-21 PROCEDURE — 27000339 *HC DAILY SUPPLY KIT

## 2017-02-21 PROCEDURE — 25000003 PHARM REV CODE 250: Performed by: INTERNAL MEDICINE

## 2017-02-21 PROCEDURE — 36600 WITHDRAWAL OF ARTERIAL BLOOD: CPT

## 2017-02-21 PROCEDURE — 80048 BASIC METABOLIC PNL TOTAL CA: CPT

## 2017-02-21 PROCEDURE — 94761 N-INVAS EAR/PLS OXIMETRY MLT: CPT

## 2017-02-21 PROCEDURE — 25000242 PHARM REV CODE 250 ALT 637 W/ HCPCS: Performed by: FAMILY MEDICINE

## 2017-02-21 PROCEDURE — 25000003 PHARM REV CODE 250: Performed by: FAMILY MEDICINE

## 2017-02-21 PROCEDURE — 94799 UNLISTED PULMONARY SVC/PX: CPT

## 2017-02-21 PROCEDURE — 83880 ASSAY OF NATRIURETIC PEPTIDE: CPT

## 2017-02-21 PROCEDURE — 97163 PT EVAL HIGH COMPLEX 45 MIN: CPT

## 2017-02-21 PROCEDURE — 25000003 PHARM REV CODE 250: Performed by: SURGERY

## 2017-02-21 PROCEDURE — 63600175 PHARM REV CODE 636 W HCPCS: Performed by: FAMILY MEDICINE

## 2017-02-21 PROCEDURE — 99900035 HC TECH TIME PER 15 MIN (STAT)

## 2017-02-21 RX ORDER — DIPHENHYDRAMINE HCL 25 MG
25 CAPSULE ORAL ONCE
Status: COMPLETED | OUTPATIENT
Start: 2017-02-21 | End: 2017-02-21

## 2017-02-21 RX ADMIN — IPRATROPIUM BROMIDE AND ALBUTEROL SULFATE 3 ML: .5; 3 SOLUTION RESPIRATORY (INHALATION) at 07:02

## 2017-02-21 RX ADMIN — CEFTRIAXONE 1 G: 1 INJECTION, SOLUTION INTRAVENOUS at 05:02

## 2017-02-21 RX ADMIN — IPRATROPIUM BROMIDE AND ALBUTEROL SULFATE 3 ML: .5; 3 SOLUTION RESPIRATORY (INHALATION) at 01:02

## 2017-02-21 RX ADMIN — DIPHENHYDRAMINE HYDROCHLORIDE 25 MG: 25 CAPSULE ORAL at 10:02

## 2017-02-21 RX ADMIN — VENLAFAXINE HYDROCHLORIDE 75 MG: 75 CAPSULE, EXTENDED RELEASE ORAL at 09:02

## 2017-02-21 RX ADMIN — METHYLPREDNISOLONE SODIUM SUCCINATE 40 MG: 40 INJECTION, POWDER, FOR SOLUTION INTRAMUSCULAR; INTRAVENOUS at 06:02

## 2017-02-21 RX ADMIN — SODIUM CHLORIDE, PRESERVATIVE FREE 3 ML: 5 INJECTION INTRAVENOUS at 02:02

## 2017-02-21 RX ADMIN — TRAMADOL HYDROCHLORIDE 50 MG: 50 TABLET, COATED ORAL at 09:02

## 2017-02-21 RX ADMIN — DOCUSATE SODIUM AND SENNOSIDES 1 TABLET: 8.6; 5 TABLET, FILM COATED ORAL at 09:02

## 2017-02-21 RX ADMIN — METHYLPREDNISOLONE SODIUM SUCCINATE 40 MG: 40 INJECTION, POWDER, FOR SOLUTION INTRAMUSCULAR; INTRAVENOUS at 09:02

## 2017-02-21 RX ADMIN — LORAZEPAM 0.5 MG: 2 INJECTION INTRAMUSCULAR; INTRAVENOUS at 09:02

## 2017-02-21 RX ADMIN — IPRATROPIUM BROMIDE AND ALBUTEROL SULFATE 3 ML: .5; 3 SOLUTION RESPIRATORY (INHALATION) at 08:02

## 2017-02-21 RX ADMIN — SODIUM CHLORIDE, PRESERVATIVE FREE 3 ML: 5 INJECTION INTRAVENOUS at 06:02

## 2017-02-21 RX ADMIN — LEVOFLOXACIN 500 MG: 5 INJECTION INTRAVENOUS at 09:02

## 2017-02-21 RX ADMIN — ENOXAPARIN SODIUM 40 MG: 40 INJECTION, SOLUTION INTRAVENOUS; SUBCUTANEOUS at 11:02

## 2017-02-21 RX ADMIN — METHYLPREDNISOLONE SODIUM SUCCINATE 40 MG: 40 INJECTION, POWDER, FOR SOLUTION INTRAMUSCULAR; INTRAVENOUS at 01:02

## 2017-02-21 RX ADMIN — IBUPROFEN 600 MG: 600 TABLET ORAL at 01:02

## 2017-02-21 RX ADMIN — PANTOPRAZOLE SODIUM 40 MG: 40 TABLET, DELAYED RELEASE ORAL at 09:02

## 2017-02-21 NOTE — PT/OT/SLP EVAL
"Physical Therapy  Evaluation    Marycarmen Louis   MRN: [id number]   Admitting Diagnosis: Closed fracture of left hip    PT Received On: 17  PT Start Time: 1132     PT Stop Time: 1202    PT Total Time (min): 30 min       Billable Minutes:  Evaluation 15 minutes and Therapeutic Activity 15 minutes    Diagnosis: Closed fracture of left hip  postop endoprosthesis left hip & pinning of left wrist with ulna styloid fracture    Past Medical History   Diagnosis Date    Anxiety     Arthritis     Cancer     COPD (chronic obstructive pulmonary disease)     Degenerative disc disease     Depression     Emphysema of lung     Macrocytosis     Meibomianitis 11/3/10    Memory loss     Osteoporosis     Platelet disorder       Past Surgical History   Procedure Laterality Date    Appendectomy      Salviary gland removed      Joint replacement       left knee  tka       Referring physician: Dr. RODNEY Higgins M.D.  Date referred to PT: 2017    General Precautions: Standard, fall  Orthopedic Precautions: LUE non weight bearing, LLE weight bearing as tolerated (LUE NWB to wrist only)   Braces:  (Distal LUE cast)       Do you have any cultural, spiritual, [employer]ism conflicts, given your current situation?: None verbalized    Patient History:  Lives With: spouse  Living Arrangements: house  Home Layout: Able to live on 1st floor  Equipment Currently Used at Home: none  DME owned (not currently used): none    Previous Level of Function:  Ambulation Skills: independent  Transfer Skills: independent  ADL Skills: independent  Work/Leisure Activity: independent    Subjective:  Communicated with patient prior to session.  "I'm glad to see you.  I'm ready to get out of bed."  Chief Complaint: Fatigue  Patient goals: Increase strength and increase out of bed activity    Pain Ratin/10   Location - Side: Left  Location - Orientation: medial  Location: hip  Pain Addressed: Nurse notified, Reposition  Pain Rating " Post-Intervention: 1/10    Objective:   Patient found with: oxygen, peripheral IV     Cognitive Exam:  Oriented to: Person, Place, Time and Situation    Follows Commands/attention: Follows multistep  commands  Communication: clear/fluent  Safety awareness/insight to disability: intact    Physical Exam:  Postural examination/scapula alignment: No postural abnormalities identified    Skin integrity: LUE distal cast  Edema: None noted    Sensation:   Intact    Upper Extremity Range of Motion:  Right Upper Extremity: WFL  Left Upper Extremity: WFL except Left Wrist/Hand: N/A secondary to sx.    Upper Extremity Strength:  Right Upper Extremity: WFL  Left Upper Extremity: WFL except Left Wrist/Hand: N/A secondary to sx.    Lower Extremity Range of Motion:  Right Lower Extremity: WFL  Left Lower Extremity: WFL except hip precautions observed    Lower Extremity Strength:  Right Lower Extremity: Grossly 4/5  Left Lower Extremity: Deficits: Grossly 3+/5     Fine motor coordination:  Impaired  Left hand thumb/finger opposition skills secondary to sx. and Left hand, manipulation of objects secondary to sx.    Gross motor coordination: WFL    Functional Mobility:  Bed Mobility:  Rolling/Turning to Left: Minimum assistance  Rolling/Turning Right: Minimum assistance  Scooting/Bridging: Minimum Assistance  Supine to Sit: Moderate Assistance  Sit to Supine: Moderate Assistance    Transfers:  Sit <> Stand Assistance: Moderate Assistance  Sit <> Stand Assistive Device: Rolling Walker, Platform  Bed <> Chair Technique: Stand Pivot  Bed <> Chair Assistance: Moderate Assistance  Bed <> Chair Assistive Device: Rolling Walker, Platform    Gait:   Gait Distance:  (N/A)          Balance:   Static Sit: FAIR+: Able to take MINIMAL challenges from all directions  Dynamic Sit: FAIR+: Maintains balance through MINIMAL excursions of active trunk motion  Static Stand: POOR+: Needs MINIMAL assist to maintain  Dynamic stand: POOR: N/A    Therapeutic  Activities and Exercises:  Therapeutic Activity:  Transfer Training with assistance as noted.  Verbal cues and manual guidance given to pt. For sequencing.      AM-PAC 6 CLICK MOBILITY  How much help from another person does this patient currently need?   1 = Unable, Total/Dependent Assistance  2 = A lot, Maximum/Moderate Assistance  3 = A little, Minimum/Contact Guard/Supervision  4 = None, Modified [place]/Independent    Turning over in bed (including adjusting bedclothes, sheets and blankets)?: 2  Sitting down on and standing up from a chair with arms (e.g., wheelchair, bedside commode, etc.): 2  Moving from lying on back to sitting on the side of the bed?: 2  Moving to and from a bed to a chair (including a wheelchair)?: 2  Need to walk in hospital room?: 1  Climbing 3-5 steps with a railing?: 1  Total Score: 10     AM-PAC Raw Score CMS G-Code Modifier Level of Impairment Assistance   6 % Total / Unable   7 - 9 CM 80 - 100% Maximal Assist   10 - 14 CL 60 - 80% Moderate Assist   15 - 19 CK 40 - 60% Moderate Assist   20 - 22 CJ 20 - 40% Minimal Assist   23 CI 1-20% SBA / CGA   24 CH 0% Independent/ Mod I     Patient left up in chair with all lines intact, call button in reach and RN notified.    Assessment:   Marycarmen Louis is a 71 y.o. female with a medical diagnosis of Closed fracture of left hip and presents with decreased endurance, mobility deficits, and decreased strength.  Pt. Would benefit from PT to increase independence with ADL's.      Factors that may affect patient's status:  [x] Patient's age [x] Time since onset of injury/illness/exacerbation  []Prior Level of function       [x] Current level of function [x] Status of current condition []Patient's cognitive status and safety concerns      [] Patient's level of motivation    [] Patient's home situation (environment and family support)  [x] Objective examination findings [] Goals and goal agreement with the patient        [x] Rehab  potential (prognosis) and probable outcome    [x] Expected progression of patient    Criteria:     History:    > 3  Personal Factors and/or co-morbidity - 83752 (see PLOFand Med Hx)  Examination of Body System:  addressing a total of 4 or more elements - 01376  (See Rehab Impairments/Problem List below)  Clinical Presentation:  Unstable - 52549  Clinical Decision Making (Complexity):  High - 63538      On examination of body system using standardized tests and measures patient presents with 4 or more elements from any of the following: body structures and functions, activity limitations, and/or participation restrictions.  Leading to a clinical presentation that is considered unstable with unpredictable characteristics      Rehab identified problem list/impairments: Rehab identified problem list/impairments: weakness, impaired endurance, gait instability, decreased lower extremity function, impaired functional mobilty, impaired balance, orthopedic precautions    Rehab potential is fair.    Activity tolerance: Fair    Discharge recommendations: Discharge Facility/Level Of Care Needs: rehabilitation facility     Barriers to discharge: Barriers to Discharge: None    Equipment recommendations: Equipment Needed After Discharge: walker, rolling     GOALS:   Physical Therapy Goals        Problem: Physical Therapy Goal    Goal Priority Disciplines Outcome Goal Variances Interventions   Physical Therapy Goal     PT/OT, PT Unable to achieve outcome(s) by discharge     Description:  Goals to be met by: 3/10/2017     Patient will increase functional independence with mobility by performin. Supine to sit with Stand-by Assistance  2. Sit to supine with Stand-by Assistance  3. Sit to stand transfer with Stand-by Assistance  4. Bed to chair transfer with Stand-by Assistance using Platform walker  5. Gait  x 50 feet with Contact Guard Assistance using Platform walker.              Problem: Physical Therapy Goal    Goal  Priority Disciplines Outcome Goal Variances Interventions   Physical Therapy Goal     PT/OT, PT      Description:  Goals to be met by: 3/11/2017     Patient will increase functional independence with mobility by performin. Supine to sit with Stand-by Assistance  2. Sit to supine with Stand-by Assistance  3. Rolling to Left and Right with Stand-by Assistance.  4. Sit to stand transfer with Contact Guard Assistance, with Platform Walker  5. Bed to chair transfer with Contact Guard Assistance using Platform walker  6. Gait  x 50 feet with Contact Guard Assistance using Platform walker.   7. [place] with Home Exercise Program.                PLAN:    Patient to be seen  (1-2x/day(Weekdays); PRN(Sat., and Sun.)) to address the above listed problems via therapeutic activities, therapeutic exercises, gait training  Plan of Care expires:  (Upon Discharge from facility)  Plan of Care reviewed with: patient          Guido Katnard, PT  2017

## 2017-02-21 NOTE — ASSESSMENT & PLAN NOTE
Transfused 1 unit of packed red blood cells 2/20.  Developed acute decompensation of resp status and transferred to ICU  Hgb stable   Follow CBC  Started prophylactic lovenox yesterday   Wound dressing dry

## 2017-02-21 NOTE — PROGRESS NOTES
Seen on rounds, still in ICU.  She is improving on all fronts, and assuming no regressions, or exacerbations, she should be on the mend.  She is missing out on a lot of PT. Can see a lot pain reduction, and better use of her hand.  Great mobility of the left hip.

## 2017-02-21 NOTE — EICU
Video rounding performed at this time. No infusions. VSS. Pt with venti mask on, no issues and no visible distress.

## 2017-02-21 NOTE — PLAN OF CARE
Pt assessment done Dr Higgins assessed pt and updated on pt status and current findings. CM in progress alarms set and on. VS stable. Pt awake and alert without c/o. BBS diminished. Productive cough noted. O2 via VM at 50% in progress will attempt to wean today. Right PIV intact and secured flushed and patent. Encouraged cough and deep breathing exercises,voiced understanding POX dropped to 85% when pt removed VM.  Callbell within reach, understands use.

## 2017-02-21 NOTE — PROGRESS NOTES
[facility]  Cardiology  Progress Note    Patient Name: Marycarmen Louis  MRN: [id number]  Admission Date: 2/14/2017  Hospital Length of Stay: 7 days  Code Status: Full Code   Attending Physician: Jose Jaeger MD   Primary Care Physician: Willie Santos MD (Inactive)  Expected Discharge Date:   Principal Problem:Closed fracture of left hip    Subjective:     Hospital Course: admitted with hip/wrist fx, s/p ORIF hip. Developed ARDS post op    Interval History: looks and feels better today, diuresed 3.6 liters yesterday    Review of Systems   Constitution: Negative.   HENT: Negative.    Eyes: Negative.    Cardiovascular: Negative.    Respiratory: Negative.    Endocrine: Negative.    Skin: Negative.    Musculoskeletal: Positive for joint pain.   Gastrointestinal: Negative.    Genitourinary: Negative.    Neurological: Negative.    Psychiatric/Behavioral: Negative.    Allergic/Immunologic: Negative.      Objective:     Vital Signs (Most Recent):  Temp: 99 °F (37.2 °C) (02/21/17 0800)  Pulse: (!) 127 (02/21/17 0855)  Resp: (!) 22 (02/21/17 0855)  BP: (!) 98/55 (02/21/17 0800)  SpO2: (!) 94 % (02/21/17 0855) Vital Signs (24h Range):  Temp:  [98 °F (36.7 °C)-99 °F (37.2 °C)] 99 °F (37.2 °C)  Pulse:  [] 127  Resp:  [18-30] 22  SpO2:  [90 %-100 %] 94 %  BP: ()/(41-84) 98/55     Weight: 59 kg (130 lb)  Body mass index is 22.31 kg/(m^2).    SpO2: (!) 94 %  O2 Device (Oxygen Therapy): venti mask      Intake/Output Summary (Last 24 hours) at 02/21/17 0943  Last data filed at 02/21/17 0912   Gross per 24 hour   Intake              750 ml   Output             4350 ml   Net            -3600 ml       Lines/Drains/Airways     Drain                 Urethral Catheter 02/19/17 1656 Latex 16 Fr. 1 day          Peripheral Intravenous Line                 Peripheral IV - Single Lumen 02/20/17 1056 Right Wrist less than 1 day                Physical Exam   Constitutional: She is oriented to person,  place, and time. She appears well-developed and well-nourished.   HENT:   Head: Normocephalic and atraumatic.   Eyes: EOM are normal.   Neck: Normal range of motion. Neck supple.   Cardiovascular: Normal rate and regular rhythm.    Pulmonary/Chest: Effort normal and breath sounds normal.   Abdominal: Soft. Bowel sounds are normal.   Neurological: She is alert and oriented to person, place, and time.   Skin: Skin is warm and dry.   Psychiatric: She has a normal mood and affect.       Significant Labs: All pertinent lab results from the last 24 hours have been reviewed.    Significant Imaging: EKG, echo reviewed  Assessment and Plan:     Lt hip and Lt wrist fracture due to fall while at [facility]  S/p ORIF 2/15/17  Hypoxia -- ARDS probably from transfusion (+ COPD exacerbation?) but no clear sepsis  Postop anemia  COPD/remote smoker  Osteoporosis   Coronary artery calcification by CT 2013     Plan:  Echo showing normal LV systolic function  Venous u/s negative for DVT  Keep dry with lasix, continue lovenox, ASA on DC  No evidence of perioperative cardiovascular event.  Wean o2 as tolerated       Active Diagnoses:    Diagnosis Date Noted POA    PRINCIPAL PROBLEM:  Closed fracture of left hip [S72.002A] 02/15/2017 Yes    Acute respiratory failure with hypoxia [J96.01] 02/20/2017 No    Acute blood loss anemia [D62] 02/19/2017 No    COPD exacerbation [J44.1] 02/17/2017 No    Pulmonary edema [J81.1] 02/16/2017 Yes    Osteoporosis [M81.0] 02/15/2017 Yes    Left wrist fracture [S62.102A] 02/14/2017 Yes    Coronary artery calcification seen on CAT scan [I25.10] 06/29/2013 Yes    Degeneration of lumbar or lumbosacral intervertebral disc [M51.37] 11/09/2012 Yes      Problems Resolved During this Admission:    Diagnosis Date Noted Date Resolved POA    Fever [R50.9] 02/15/2017 02/18/2017 No       VTE Risk Mitigation         Ordered     enoxaparin injection 40 mg  Daily     Route:  Subcutaneous        02/20/17 4218      Place sequential compression device  Until discontinued      02/19/17 1827     Medium Risk of VTE  Once      02/15/17 1550          Anticipated Disposition: Home or Self Care    Discharge Needs: outpatient followup    Cathy Mckeon NP  Cardiology  [facility]    I attest that I have personally seen and examined this patient. I have reviewed and discussed the management in detail as outlined above.

## 2017-02-21 NOTE — ASSESSMENT & PLAN NOTE
Continue DuoNeb treatments 4 times daily  Continue Rocephin (day 4).  Added Levaquin (day 2)  Pulmonology consult--added steroids.       I feel she has developed ARDS. See treatment plan.

## 2017-02-21 NOTE — PLAN OF CARE
Dr Melendez reviewed pt chart with current labs and ABG results. Then assessed pt and discussed plan of care with pt. He stated to pt that status is improving. O2 at 40%VM remains in progress. Pox 99%at present.

## 2017-02-21 NOTE — PLAN OF CARE
PT Rock worked with pt and assisted pt OOB to chair Pox decreased to 79% momentarily and rebounded to 100%. Pt stated she is mildly SOB. Performed slow deep breathing.  Dr Higgins updated on ocurence. Pt will remain in ICU

## 2017-02-21 NOTE — PLAN OF CARE
Patient currently on BiPAPwith O2 Sats 100%, when removed from BiPAP desats quickly to upper 70's, tolerated 50% Venti Mask for meals. Dressing to L hip dry and intact, no redness noted. Cast to LUE dry/intact with no damage fingers warm to touch with good cap refill. No complaints of pain at this time. Instructed on POC, verbalized understanding.  Pt was frustrated today, given 0.5mg Ativan at 1629, when pt reassessed she was resting comfortably in bed.

## 2017-02-21 NOTE — PROGRESS NOTES
[facility] Medicine  Progress Note    Patient Name: Marycarmen Louis  MRN: [id number]  Patient Class: IP- Inpatient   Admission Date: 2/14/2017  Length of Stay: 7 days  Attending Physician: Jose Jaeger MD  Primary Care Provider: Willie Santos MD (Inactive)        Subjective:     Principal Problem:Closed fracture of left hip    HPI:  72 y/o female reports she fell off of gym apparatus. Reports falling after missing step. ER w/up shows left wrist fracture and left hip fracture. Reports feeling fine prior to fall yesterday. No fever prior to this am.    Hospital Course:  She has surgery yesterday right lateral decubitus. Was given IVF. Has slight fluid overload caused resp distress. Hypoxic. CXR showed right lung infiltrate. She had no s/s of pneumonia and clear  X ray prior to surgery. Given dose of lasix and improved well. She was moved  From ICU to floor. Did well over night. PT to start this am. Still on O2 today 4L NC 96%    2/17/17-patient still becomes hypoxic when oxygen is removed.  CTA of the chest and lungs were done today that were negative for pulmonary embolism.  She did have significant emphysematous changes and lower lobe infiltrates and atelectasis bilaterally.  She probably has an exacerbation of COPD probably from perioperative intubation.  DuoNeb treatments, Rocephin, azithromycin started.  She seems be doing better.    2/18.  Patient evaluated this morning.  She was working with physical therapy and able to ambulate across the room with assistance.  She does feel short of breath with exertion.  Her hemoglobin is now 8.2.  Dr. Melendez consulted.  Her sats still drop when she takes her oxygen off.  Patient had a large amount of bloody drainage.  I stopped her Xarelto today for now until her hemoglobin stabilizes.    2/18 patient never received blood.  Pulmonologist wanted to wait in x-ray today because he felt she may be fluid overloaded.  Chest x-ray is starting  to look a little worse.  She has bilateral infiltrates consistent with bilateral perihilar and diffuse lung infiltrates bilaterally.  This may represent a atypical/viral pneumonia or ARDS.  Today her hemoglobin is 7.8 and everybody agrees that she could benefit from one unit of blood.  Her Xarelto has been discontinued.  Patient is ambulating with therapy.  She does have dyspnea with exertion.  She is on 4 L nasal cannula and her O2 sats are 92%.    2/20  Yesterday patient had acute hypoxic episode with sats into the 50% after receiving 1 unit of blood. Transferred to ICU on BiPaP. Got Lasix 40mg IV x1. Maintaining sats in 90% on BipaP with 35% FiO2, sats drop to <80% within 1 minutes of coming off BiPaP. However, even with the hypoxia this AM she does not report feeling SOB, no use of accessory muscles, speaking in complete sentences. CXR has been worsneing, looks like b/l infiltrates consistent with ARDS. Pain is well controlled from surgery. She is not on any anticoagulation s/p surgery due to bleeding and worsening H/H. She had CTA which was negative for PE. She is on levaquin and Ceftriaxone for possible PNA (there was some question of aspiration after her surgery). She was also started on steroids.     2/21/17  She is still on BIPAP  She is on steroids;  Nebs   She diuresed with lasix  She is still in ventimax  Her HH is stable  Dressing is dry   Echo was normal   She is levaquin and rocephin   Temp 99          Interval History: see above     Review of Systems   Constitutional: Negative for activity change, fatigue, fever and unexpected weight change.   HENT: Negative for congestion, ear pain, hearing loss, rhinorrhea and sore throat.    Eyes: Negative for pain, redness and visual disturbance.   Respiratory: Positive for shortness of breath. Negative for cough and wheezing.    Cardiovascular: Negative for chest pain, palpitations and leg swelling.   Gastrointestinal: Negative for abdominal pain, constipation,  diarrhea, nausea and vomiting.   Genitourinary: Negative for dysuria, frequency, pelvic pain and urgency.   Musculoskeletal: Negative for back pain, joint swelling and neck pain.        Pain control with tramadol   Skin: Negative for color change, rash and wound.   Neurological: Negative for dizziness, weakness, light-headedness and headaches.   Psychiatric/Behavioral: The patient is nervous/anxious.      Objective:     Vital Signs (Most Recent):  Temp: 98 °F (36.7 °C) (02/21/17 0400)  Pulse: 110 (02/21/17 0706)  Resp: 18 (02/21/17 0706)  BP: (!) 125/58 (02/21/17 0600)  SpO2: 98 % (02/21/17 0706) Vital Signs (24h Range):  Temp:  [98 °F (36.7 °C)-98.9 °F (37.2 °C)] 98 °F (36.7 °C)  Pulse:  [] 110  Resp:  [18-30] 18  SpO2:  [92 %-100 %] 98 %  BP: (100-160)/(41-84) 125/58     Weight: 59 kg (130 lb)  Body mass index is 22.31 kg/(m^2).    Intake/Output Summary (Last 24 hours) at 02/21/17 0732  Last data filed at 02/21/17 0551   Gross per 24 hour   Intake              270 ml   Output             4200 ml   Net            -3930 ml      Physical Exam   Constitutional: She is oriented to person, place, and time. She appears well-developed and well-nourished. No distress.   HENT:   Head: Normocephalic and atraumatic.   Right Ear: External ear normal.   Left Ear: External ear normal.   Eyes: Conjunctivae and EOM are normal. Pupils are equal, round, and reactive to light. Right eye exhibits no discharge. Left eye exhibits no discharge.   Neck: Neck supple. No tracheal deviation present.   Cardiovascular: Normal rate and regular rhythm.  Exam reveals no gallop and no friction rub.    No murmur heard.  Pulmonary/Chest: Effort normal. No respiratory distress. She has no wheezes. She has rales (soft crackles in bases).   Decreased breath sounds in bases b/l   Abdominal: Soft. Bowel sounds are normal. She exhibits no distension. There is no tenderness.   Musculoskeletal: She exhibits no edema.   Lymphadenopathy:     She has no  cervical adenopathy.   Neurological: She is alert and oriented to person, place, and time. No cranial nerve deficit.   Skin: Skin is warm and dry.   Psychiatric: She has a normal mood and affect. Her behavior is normal.   Nursing note and vitals reviewed.      Significant Labs:   CBC:   Recent Labs  Lab 02/20/17 0421 02/21/17  0416   WBC 9.46 14.32*   HGB 9.7* 8.9*   HCT 28.4* 26.8*    387*     CMP:   Recent Labs  Lab 02/20/17 0421 02/21/17  0416   * 139   K 4.1 4.1   CL 96 98   CO2 30* 34*   * 140*   BUN 10 14   CREATININE 0.6 0.6   CALCIUM 8.4* 8.6*   ANIONGAP 9 7*   EGFRNONAA >60 >60       Significant Imaging: CT: I have reviewed all pertinent results/findings within the past 24 hours and my personal findings are:  no PE    Assessment/Plan:      * Closed fracture of left hip  Ortho following  S/p Left bipolar endoprosthetic hemiarthroplasty  Continue physical therapy  Pain well controlled  enoxaprin for DVT prophylaxis      Degeneration of lumbar or lumbosacral intervertebral disc        Coronary artery calcification seen on CAT scan  Cardiology consulted.  No changes at this time.  Can be followed further as outpatient  No chest pain, no signs of ACS this admission      Left wrist fracture  Ortho follwing  S/p closed reduction and percutaneous distraction pinning of the left distal radius.  Continue physical therapy  Pain well controlled    Osteoporosis  Failed fosamax   Will need forteo x 2 years at discharge       Pulmonary edema   Lasix was held earlier this admission.  Monitor chest x-rays.  Dr. camacho consulted.  Chest x-ray-Increasing bilateral perihilar and diffuse lung infiltrates bilaterally.  This may represent a atypical/viral pneumonia or ARDS  After receiving 1 unit of blood, Lasix 40 mg IV push was given.  Patient's BNP is normal (44)  BioZ performed yesterday showed a TFC in the upper normal range    TTE today to eval heart function  No other clinical evidence of CHF  exacerbation and no known CHF history. I do not feel this is all pulmonary edema. I think she has developed ARDS. See ARDS plans      COPD exacerbation  Continue DuoNeb treatments 4 times daily  Continue Rocephin (day 4).  Added Levaquin (day 2)  Pulmonology consult--added steroids.       I feel she has developed ARDS. See treatment plan.           Acute blood loss anemia  Transfused 1 unit of packed red blood cells 2/20.  Developed acute decompensation of resp status and transferred to ICU  Hgb stable   Follow CBC  Started prophylactic lovenox yesterday   Wound dressing dry     Acute respiratory failure with hypoxia  Worsening respiratory status over last 24 hours  Now requiring BiPap at 35% FiO2  Repeating ABG reviewed         VTE Risk Mitigation         Ordered     enoxaparin injection 40 mg  Daily     Route:  Subcutaneous        02/20/17 1131     Place sequential compression device  Until discontinued      02/19/17 1827     Medium Risk of VTE  Once      02/15/17 1550          Paul Higgins MD  Department of Hospital Medicine   [facility]

## 2017-02-21 NOTE — PLAN OF CARE
Problem: Physical Therapy Goal  Goal: Physical Therapy Goal  Goals to be met by: 3/10/2017     Patient will increase functional independence with mobility by performin. Supine to sit with Stand-by Assistance  2. Sit to supine with Stand-by Assistance  3. Sit to stand transfer with Stand-by Assistance  4. Bed to chair transfer with Stand-by Assistance using Platform walker  5. Gait x 50 feet with Contact Guard Assistance using Platform walker.    Outcome: Unable to achieve outcome(s) by discharge  Goals Revised secondary to pt. Transfer to ICU.  New PT goals as noted.    Comments:   Problem: Physical Therapy Goal  Goal: Physical Therapy Goal  Goals to be met by: 3/10/2017     Patient will increase functional independence with mobility by performin. Supine to sit with Stand-by Assistance  2. Sit to supine with Stand-by Assistance  3. Sit to stand transfer with Stand-by Assistance  4. Bed to chair transfer with Stand-by Assistance using Platform walker  5. Gait x 50 feet with Contact Guard Assistance using Platform walker.   Outcome: Unable to achieve outcome(s) by discharge  Goals Revised secondary to pt. Transfer to ICU. New PT goals as noted.

## 2017-02-21 NOTE — ASSESSMENT & PLAN NOTE
Worsening respiratory status over last 24 hours  Now requiring BiPap at 35% FiO2  Repeating ABG reviewed

## 2017-02-21 NOTE — PROGRESS NOTES
"Marycarmen Louis is a 71 y.o. female patient.    Active Hospital Problems    Diagnosis  POA    *Closed fracture of left hip [S72.002A]  Yes    Acute respiratory failure with hypoxia [J96.01]  No    Acute blood loss anemia [D62]  No    COPD exacerbation [J44.1]  No    Pulmonary edema [J81.1]  Yes    Osteoporosis [M81.0]  Yes    Left wrist fracture [S62.102A]  Yes    Coronary artery calcification seen on CAT scan [I25.10]  Yes    Degeneration of lumbar or lumbosacral intervertebral disc [M51.37]  Yes      Resolved Hospital Problems    Diagnosis Date Resolved POA    Fever [R50.9] 02/18/2017 No     Temp: 99 °F (37.2 °C) (02/21/17 0800)  Pulse: (!) 115 (02/21/17 0945)  Resp: (!) 21 (02/21/17 0945)  BP: (!) 98/55 (02/21/17 0800)  SpO2: 99 % (02/21/17 0945)  Weight: 59 kg (130 lb) (02/14/17 1746)  Height: 5' 4" (162.6 cm) (02/19/17 1541)    Subjective:  Symptoms:  She reports shortness of breath, cough and anxiety.    Activity level: Returning to normal.    Pain:  She complains of pain that is moderate.  She reports pain is improving.  Pain is partially controlled.      Objective:  General Appearance:  Ill-appearing.    Vital signs: (most recent): Blood pressure (!) 98/55, pulse (!) 115, temperature 99 °F (37.2 °C), temperature source Oral, resp. rate (!) 21, height 5' 4" (1.626 m), weight 59 kg (130 lb), SpO2 99 %, not currently breastfeeding.  Vital signs are normal.    Output: Producing urine and producing stool.    Lungs:  Tachypnea.  Respiratory distress: mild to moderate.  No stridor.  There are wheezes, rhonchi and decreased breath sounds.    Heart: Tachycardia.  S1 normal and S2 normal.  Positive for murmur.  No gallop or friction rub.   Chest: No chest wall tenderness.  Symmetric chest wall expansion.   Extremities: Normal range of motion.  There is dependent edema.    Neurological: Patient is alert and oriented to person, place and time.  Normal strength.    Skin:  Warm.  No rash, ecchymosis or " cyanosis.   Abdomen: Abdomen is soft.  There are no signs of ascites.  Bowel sounds are normal.     Pupils:  Pupils are equal, round, and reactive to light.    Pulses: Distal pulses are intact.      Assessment:  (ARDS resolving   Fat Emboli resolving   COPD Exacerbation   Volume OverLoad   Anemia S/p transfusion(hb8.9)).     Plan:   NIV reduce time on BIPAP  Beta agonist   Cxr shows ARDS need to follow   Got early Steroids and now  let shower of fatty acids resolve   Pt desats when take off NIV less than yesterday to floor .       Reji Melendez MD  2/21/2017

## 2017-02-21 NOTE — PLAN OF CARE
Pt c/o mild itching to Right PIV, slight redness to insertion site, Dr Higgins notified. New order noted monitoring site closely. IV abx therapy continued as ordered

## 2017-02-21 NOTE — SUBJECTIVE & OBJECTIVE
Interval History: see above     Review of Systems   Constitutional: Negative for activity change, fatigue, fever and unexpected weight change.   HENT: Negative for congestion, ear pain, hearing loss, rhinorrhea and sore throat.    Eyes: Negative for pain, redness and visual disturbance.   Respiratory: Positive for shortness of breath. Negative for cough and wheezing.    Cardiovascular: Negative for chest pain, palpitations and leg swelling.   Gastrointestinal: Negative for abdominal pain, constipation, diarrhea, nausea and vomiting.   Genitourinary: Negative for dysuria, frequency, pelvic pain and urgency.   Musculoskeletal: Negative for back pain, joint swelling and neck pain.        Pain control with tramadol   Skin: Negative for color change, rash and wound.   Neurological: Negative for dizziness, weakness, light-headedness and headaches.   Psychiatric/Behavioral: The patient is nervous/anxious.      Objective:     Vital Signs (Most Recent):  Temp: 98 °F (36.7 °C) (02/21/17 0400)  Pulse: 110 (02/21/17 0706)  Resp: 18 (02/21/17 0706)  BP: (!) 125/58 (02/21/17 0600)  SpO2: 98 % (02/21/17 0706) Vital Signs (24h Range):  Temp:  [98 °F (36.7 °C)-98.9 °F (37.2 °C)] 98 °F (36.7 °C)  Pulse:  [] 110  Resp:  [18-30] 18  SpO2:  [92 %-100 %] 98 %  BP: (100-160)/(41-84) 125/58     Weight: 59 kg (130 lb)  Body mass index is 22.31 kg/(m^2).    Intake/Output Summary (Last 24 hours) at 02/21/17 0732  Last data filed at 02/21/17 0551   Gross per 24 hour   Intake              270 ml   Output             4200 ml   Net            -3930 ml      Physical Exam   Constitutional: She is oriented to person, place, and time. She appears well-developed and well-nourished. No distress.   HENT:   Head: Normocephalic and atraumatic.   Right Ear: External ear normal.   Left Ear: External ear normal.   Eyes: Conjunctivae and EOM are normal. Pupils are equal, round, and reactive to light. Right eye exhibits no discharge. Left eye exhibits no  discharge.   Neck: Neck supple. No tracheal deviation present.   Cardiovascular: Normal rate and regular rhythm.  Exam reveals no gallop and no friction rub.    No murmur heard.  Pulmonary/Chest: Effort normal. No respiratory distress. She has no wheezes. She has rales (soft crackles in bases).   Decreased breath sounds in bases b/l   Abdominal: Soft. Bowel sounds are normal. She exhibits no distension. There is no tenderness.   Musculoskeletal: She exhibits no edema.   Lymphadenopathy:     She has no cervical adenopathy.   Neurological: She is alert and oriented to person, place, and time. No cranial nerve deficit.   Skin: Skin is warm and dry.   Psychiatric: She has a normal mood and affect. Her behavior is normal.   Nursing note and vitals reviewed.      Significant Labs:   CBC:   Recent Labs  Lab 02/20/17 0421 02/21/17 0416   WBC 9.46 14.32*   HGB 9.7* 8.9*   HCT 28.4* 26.8*    387*     CMP:   Recent Labs  Lab 02/20/17 0421 02/21/17 0416   * 139   K 4.1 4.1   CL 96 98   CO2 30* 34*   * 140*   BUN 10 14   CREATININE 0.6 0.6   CALCIUM 8.4* 8.6*   ANIONGAP 9 7*   EGFRNONAA >60 >60       Significant Imaging: CT: I have reviewed all pertinent results/findings within the past 24 hours and my personal findings are:  no PE

## 2017-02-21 NOTE — ASSESSMENT & PLAN NOTE
Ortho following  S/p Left bipolar endoprosthetic hemiarthroplasty  Continue physical therapy  Pain well controlled  enoxaprin for DVT prophylaxis

## 2017-02-22 LAB
ALLENS TEST: ABNORMAL
ANION GAP SERPL CALC-SCNC: 6 MMOL/L
ANISOCYTOSIS BLD QL SMEAR: SLIGHT
BASOPHILS NFR BLD: 0 %
BNP SERPL-MCNC: 38 PG/ML
BUN SERPL-MCNC: 16 MG/DL
CALCIUM SERPL-MCNC: 8.5 MG/DL
CHLORIDE SERPL-SCNC: 100 MMOL/L
CO2 SERPL-SCNC: 33 MMOL/L
CREAT SERPL-MCNC: 0.6 MG/DL
DELSYS: ABNORMAL
DIFFERENTIAL METHOD: ABNORMAL
EOSINOPHIL NFR BLD: 0 %
ERYTHROCYTE [DISTWIDTH] IN BLOOD BY AUTOMATED COUNT: 13.5 %
EST. GFR  (AFRICAN AMERICAN): >60 ML/MIN/1.73 M^2
EST. GFR  (NON AFRICAN AMERICAN): >60 ML/MIN/1.73 M^2
FIO2: ABNORMAL (ref 21–100)
GLUCOSE SERPL-MCNC: 121 MG/DL
HCO3 UR-SCNC: 35.6 MEQ/L (ref 22–26)
HCT VFR BLD AUTO: 26.8 %
HGB BLD-MCNC: 8.8 G/DL
HYPOCHROMIA BLD QL SMEAR: ABNORMAL
LYMPHOCYTES NFR BLD: 4 %
MCH RBC QN AUTO: 30.7 PG
MCHC RBC AUTO-ENTMCNC: 32.8 %
MCV RBC AUTO: 93 FL
MODE: ABNORMAL
MONOCYTES NFR BLD: 4 %
MYELOCYTES NFR BLD MANUAL: 1 %
NEUTROPHILS NFR BLD: 90 %
NEUTS BAND NFR BLD MANUAL: 1 %
PCO2 BLDA: 50 MMHG (ref 35–45)
PH SMN: 7.46 [PH] (ref 7.35–7.45)
PLATELET # BLD AUTO: 462 K/UL
PMV BLD AUTO: 8.5 FL
PO2 BLDA: 77 MMHG (ref 80–100)
POC BE: 10.2 MEQ/L (ref -2–2)
POC SATURATED O2: 96 % (ref 90–100)
POTASSIUM SERPL-SCNC: 4.3 MMOL/L
RBC # BLD AUTO: 2.87 M/UL
SITE: ABNORMAL
SODIUM SERPL-SCNC: 139 MMOL/L
SPHEROCYTES BLD QL SMEAR: ABNORMAL
SYMPTOMS P TRANSF RX PATIENT-IMP: NORMAL
WBC # BLD AUTO: 12.12 K/UL

## 2017-02-22 PROCEDURE — 25000003 PHARM REV CODE 250: Performed by: FAMILY MEDICINE

## 2017-02-22 PROCEDURE — 94761 N-INVAS EAR/PLS OXIMETRY MLT: CPT

## 2017-02-22 PROCEDURE — 25000003 PHARM REV CODE 250: Performed by: ORTHOPAEDIC SURGERY

## 2017-02-22 PROCEDURE — 25000003 PHARM REV CODE 250: Performed by: SURGERY

## 2017-02-22 PROCEDURE — 36415 COLL VENOUS BLD VENIPUNCTURE: CPT

## 2017-02-22 PROCEDURE — 36600 WITHDRAWAL OF ARTERIAL BLOOD: CPT

## 2017-02-22 PROCEDURE — 99233 SBSQ HOSP IP/OBS HIGH 50: CPT | Mod: ,,, | Performed by: FAMILY MEDICINE

## 2017-02-22 PROCEDURE — 63600175 PHARM REV CODE 636 W HCPCS: Performed by: FAMILY MEDICINE

## 2017-02-22 PROCEDURE — 97110 THERAPEUTIC EXERCISES: CPT

## 2017-02-22 PROCEDURE — 94640 AIRWAY INHALATION TREATMENT: CPT

## 2017-02-22 PROCEDURE — 97116 GAIT TRAINING THERAPY: CPT

## 2017-02-22 PROCEDURE — 25000242 PHARM REV CODE 250 ALT 637 W/ HCPCS: Performed by: FAMILY MEDICINE

## 2017-02-22 PROCEDURE — 27000339 *HC DAILY SUPPLY KIT

## 2017-02-22 PROCEDURE — 11000001 HC ACUTE MED/SURG PRIVATE ROOM

## 2017-02-22 PROCEDURE — 85027 COMPLETE CBC AUTOMATED: CPT

## 2017-02-22 PROCEDURE — 82803 BLOOD GASES ANY COMBINATION: CPT | Performed by: INTERNAL MEDICINE

## 2017-02-22 PROCEDURE — 85007 BL SMEAR W/DIFF WBC COUNT: CPT

## 2017-02-22 PROCEDURE — 27000221 HC OXYGEN, UP TO 24 HOURS

## 2017-02-22 PROCEDURE — 63600175 PHARM REV CODE 636 W HCPCS: Performed by: INTERNAL MEDICINE

## 2017-02-22 PROCEDURE — 80048 BASIC METABOLIC PNL TOTAL CA: CPT

## 2017-02-22 PROCEDURE — 83880 ASSAY OF NATRIURETIC PEPTIDE: CPT

## 2017-02-22 RX ADMIN — IPRATROPIUM BROMIDE AND ALBUTEROL SULFATE 3 ML: .5; 3 SOLUTION RESPIRATORY (INHALATION) at 07:02

## 2017-02-22 RX ADMIN — VENLAFAXINE HYDROCHLORIDE 75 MG: 75 CAPSULE, EXTENDED RELEASE ORAL at 09:02

## 2017-02-22 RX ADMIN — DOCUSATE SODIUM AND SENNOSIDES 1 TABLET: 8.6; 5 TABLET, FILM COATED ORAL at 09:02

## 2017-02-22 RX ADMIN — IPRATROPIUM BROMIDE AND ALBUTEROL SULFATE 3 ML: .5; 3 SOLUTION RESPIRATORY (INHALATION) at 01:02

## 2017-02-22 RX ADMIN — METHYLPREDNISOLONE SODIUM SUCCINATE 40 MG: 40 INJECTION, POWDER, FOR SOLUTION INTRAMUSCULAR; INTRAVENOUS at 05:02

## 2017-02-22 RX ADMIN — CEFTRIAXONE 1 G: 1 INJECTION, SOLUTION INTRAVENOUS at 06:02

## 2017-02-22 RX ADMIN — SODIUM CHLORIDE, PRESERVATIVE FREE 3 ML: 5 INJECTION INTRAVENOUS at 05:02

## 2017-02-22 RX ADMIN — METHYLPREDNISOLONE SODIUM SUCCINATE 40 MG: 40 INJECTION, POWDER, FOR SOLUTION INTRAMUSCULAR; INTRAVENOUS at 01:02

## 2017-02-22 RX ADMIN — LEVOFLOXACIN 500 MG: 5 INJECTION INTRAVENOUS at 10:02

## 2017-02-22 RX ADMIN — ENOXAPARIN SODIUM 40 MG: 40 INJECTION, SOLUTION INTRAVENOUS; SUBCUTANEOUS at 12:02

## 2017-02-22 RX ADMIN — PANTOPRAZOLE SODIUM 40 MG: 40 TABLET, DELAYED RELEASE ORAL at 09:02

## 2017-02-22 RX ADMIN — TRAMADOL HYDROCHLORIDE 50 MG: 50 TABLET, COATED ORAL at 01:02

## 2017-02-22 RX ADMIN — METHYLPREDNISOLONE SODIUM SUCCINATE 40 MG: 40 INJECTION, POWDER, FOR SOLUTION INTRAMUSCULAR; INTRAVENOUS at 09:02

## 2017-02-22 RX ADMIN — IBUPROFEN 600 MG: 600 TABLET ORAL at 05:02

## 2017-02-22 RX ADMIN — LORAZEPAM 0.5 MG: 2 INJECTION INTRAMUSCULAR; INTRAVENOUS at 09:02

## 2017-02-22 RX ADMIN — SODIUM CHLORIDE, PRESERVATIVE FREE 3 ML: 5 INJECTION INTRAVENOUS at 09:02

## 2017-02-22 RX ADMIN — SODIUM CHLORIDE, PRESERVATIVE FREE 3 ML: 5 INJECTION INTRAVENOUS at 02:02

## 2017-02-22 NOTE — PROGRESS NOTES
[facility] Medicine  Progress Note    Patient Name: Marycarmen Louis  MRN: [id number]  Patient Class: IP- Inpatient   Admission Date: 2/14/2017  Length of Stay: 8 days  Attending Physician: Jose Jaeger MD  Primary Care Provider: Willie Santos MD (Inactive)        Subjective:     Principal Problem:Closed fracture of left hip    HPI:  70 y/o female reports she fell off of gym apparatus. Reports falling after missing step. ER w/up shows left wrist fracture and left hip fracture. Reports feeling fine prior to fall yesterday. No fever prior to this am.    Hospital Course:  She has surgery yesterday right lateral decubitus. Was given IVF. Has slight fluid overload caused resp distress. Hypoxic. CXR showed right lung infiltrate. She had no s/s of pneumonia and clear  X ray prior to surgery. Given dose of lasix and improved well. She was moved  From ICU to floor. Did well over night. PT to start this am. Still on O2 today 4L NC 96%    2/17/17-patient still becomes hypoxic when oxygen is removed.  CTA of the chest and lungs were done today that were negative for pulmonary embolism.  She did have significant emphysematous changes and lower lobe infiltrates and atelectasis bilaterally.  She probably has an exacerbation of COPD probably from perioperative intubation.  DuoNeb treatments, Rocephin, azithromycin started.  She seems be doing better.    2/18.  Patient evaluated this morning.  She was working with physical therapy and able to ambulate across the room with assistance.  She does feel short of breath with exertion.  Her hemoglobin is now 8.2.  Dr. Melendez consulted.  Her sats still drop when she takes her oxygen off.  Patient had a large amount of bloody drainage.  I stopped her Xarelto today for now until her hemoglobin stabilizes.    2/18 patient never received blood.  Pulmonologist wanted to wait in x-ray today because he felt she may be fluid overloaded.  Chest x-ray is starting  to look a little worse.  She has bilateral infiltrates consistent with bilateral perihilar and diffuse lung infiltrates bilaterally.  This may represent a atypical/viral pneumonia or ARDS.  Today her hemoglobin is 7.8 and everybody agrees that she could benefit from one unit of blood.  Her Xarelto has been discontinued.  Patient is ambulating with therapy.  She does have dyspnea with exertion.  She is on 4 L nasal cannula and her O2 sats are 92%.    2/20  Yesterday patient had acute hypoxic episode with sats into the 50% after receiving 1 unit of blood. Transferred to ICU on BiPaP. Got Lasix 40mg IV x1. Maintaining sats in 90% on BipaP with 35% FiO2, sats drop to <80% within 1 minutes of coming off BiPaP. However, even with the hypoxia this AM she does not report feeling SOB, no use of accessory muscles, speaking in complete sentences. CXR has been worsneing, looks like b/l infiltrates consistent with ARDS. Pain is well controlled from surgery. She is not on any anticoagulation s/p surgery due to bleeding and worsening H/H. She had CTA which was negative for PE. She is on levaquin and Ceftriaxone for possible PNA (there was some question of aspiration after her surgery). She was also started on steroids.     2/21/17  She is still on BIPAP  She is on steroids;  Nebs   She diuresed with lasix  She is still in ventimax  Her HH is stable  Dressing is dry   Echo was normal   She is levaquin and rocephin   Temp 99    2/22/17  She is weaned down to 3liters Nc while eating but hypoxic(88)  Still requiring bipap  Getting OOB to chair with PT                Interval History: see HC    Review of Systems   Constitutional: Negative for activity change, fatigue, fever and unexpected weight change.   HENT: Negative for congestion, ear pain, hearing loss, rhinorrhea and sore throat.    Eyes: Negative for pain, redness and visual disturbance.   Respiratory: Positive for shortness of breath. Negative for cough and wheezing.     Cardiovascular: Negative for chest pain, palpitations and leg swelling.   Gastrointestinal: Negative for abdominal pain, constipation, diarrhea, nausea and vomiting.   Genitourinary: Negative for dysuria, frequency, pelvic pain and urgency.   Musculoskeletal: Negative for back pain, joint swelling and neck pain.        Pain control with tramadol   Skin: Negative for color change, rash and wound.   Neurological: Negative for dizziness, weakness, light-headedness and headaches.   Psychiatric/Behavioral: The patient is nervous/anxious.      Objective:     Vital Signs (Most Recent):  Temp: 97.5 °F (36.4 °C) (02/22/17 0400)  Pulse: 96 (02/22/17 0708)  Resp: 16 (02/22/17 0708)  BP: 125/70 (02/22/17 0500)  SpO2: 97 % (02/22/17 0708) Vital Signs (24h Range):  Temp:  [97.5 °F (36.4 °C)-98.2 °F (36.8 °C)] 97.5 °F (36.4 °C)  Pulse:  [] 96  Resp:  [9-29] 16  SpO2:  [87 %-100 %] 97 %  BP: ()/(49-70) 125/70     Weight: 59 kg (130 lb)  Body mass index is 22.31 kg/(m^2).    Intake/Output Summary (Last 24 hours) at 02/22/17 0801  Last data filed at 02/22/17 0500   Gross per 24 hour   Intake             1430 ml   Output             4000 ml   Net            -2570 ml      Physical Exam   Constitutional: She is oriented to person, place, and time. She appears well-developed and well-nourished. No distress.   HENT:   Head: Normocephalic and atraumatic.   Right Ear: External ear normal.   Left Ear: External ear normal.   Eyes: Conjunctivae and EOM are normal. Pupils are equal, round, and reactive to light. Right eye exhibits no discharge. Left eye exhibits no discharge.   Neck: Neck supple. No tracheal deviation present.   Cardiovascular: Normal rate and regular rhythm.  Exam reveals no gallop and no friction rub.    No murmur heard.  Pulmonary/Chest: Effort normal. No respiratory distress. She has no wheezes. She has rales (soft crackles in bases).   Decreased breath sounds in bases b/l   Abdominal: Soft. Bowel sounds are  normal. She exhibits no distension. There is no tenderness.   Musculoskeletal: She exhibits no edema.   Lymphadenopathy:     She has no cervical adenopathy.   Neurological: She is alert and oriented to person, place, and time. No cranial nerve deficit.   Skin: Skin is warm and dry.   Psychiatric: She has a normal mood and affect. Her behavior is normal.   Nursing note and vitals reviewed.      Significant Labs:   CBC:   Recent Labs  Lab 02/21/17 0416 02/22/17  0425   WBC 14.32* 12.12   HGB 8.9* 8.8*   HCT 26.8* 26.8*   * 462*     CMP:   Recent Labs  Lab 02/21/17 0416 02/22/17  0425    139   K 4.1 4.3   CL 98 100   CO2 34* 33*   * 121*   BUN 14 16   CREATININE 0.6 0.6   CALCIUM 8.6* 8.5*   ANIONGAP 7* 6*   EGFRNONAA >60 >60       Significant Imaging: I have reviewed and interpreted all pertinent imaging results/findings within the past 24 hours.    Assessment/Plan:      * Closed fracture of left hip  Ortho following  S/p Left bipolar endoprosthetic hemiarthroplasty  Continue physical therapy  Pain well controlled  enoxaprin for DVT prophylaxis      Degeneration of lumbar or lumbosacral intervertebral disc        Coronary artery calcification seen on CAT scan  Cardiology consulted.  No changes at this time.  Can be followed further as outpatient  No chest pain, no signs of ACS this admission      Left wrist fracture  Ortho follwing  S/p closed reduction and percutaneous distraction pinning of the left distal radius.  Continue physical therapy  Pain well controlled    Osteoporosis  Failed fosamax   Will need forteo x 2 years at discharge       Pulmonary edema   Resolved. Lasix was held earlier this admission.  Monitor chest x-rays.  Dr. camacho consulted.  Chest x-ray-Increasing bilateral perihilar and diffuse lung infiltrates bilaterally.  This may represent a atypical/viral pneumonia or ARDS  After receiving 1 unit of blood, Lasix 40 mg IV push was given.  Patient's BNP is normal (44)  BioZ  performed showed a TFC in the upper normal range    TTE Tuesday to eval heart function---normal  No other clinical evidence of CHF exacerbation and no known CHF history. I do not feel this is all pulmonary edema. I think she has developed ARDS. See ARDS plans      COPD exacerbation  Continue DuoNeb treatments 4 times daily  Continue Rocephin (day 5).  Added Levaquin (day 3)  Pulmonology consult--added steroids. Dr. Melendez believes this is fat emboli                Acute blood loss anemia  Transfused 1 unit of packed red blood cells 2/20.  Developed acute decompensation of resp status and transferred to ICU  Hgb stable   Follow CBC  Started prophylactic lovenox 2/20  Wound dressing dry     Acute respiratory failure with hypoxia  Improved  Continue Bipap  Downgrade to floor       VTE Risk Mitigation         Ordered     enoxaparin injection 40 mg  Daily     Route:  Subcutaneous        02/20/17 1131     Place sequential compression device  Until discontinued      02/19/17 1827     Medium Risk of VTE  Once      02/15/17 1550          Dileep Moulton MD  Department of Hospital Medicine   [facility]

## 2017-02-22 NOTE — ASSESSMENT & PLAN NOTE
Continue DuoNeb treatments 4 times daily  Continue Rocephin (day 5).  Added Levaquin (day 3)  Pulmonology consult--added steroids. Dr. Melendez believes this is fat emboli

## 2017-02-22 NOTE — PHYSICIAN QUERY
PT Name: Marycarmen Louis  MR #: [id number]     Physician Query Form - Documentation Clarification    Reviewer Dev Leo RN, CDI   Ext 483    This form is a permanent document in the medical record.     Query Date: February 22, 2017  By submitting this query, we are merely seeking further clarification of documentation to reflect the severity of illness of your patient. Please utilize your independent clinical judgment when addressing the question(s) below.    (The Medical record reflects the following:)      Supporting Clinical Findings Location in Medical Record     Pulmonary Edema  Given 40 mg IV lasix yesterday. Will repeat 20mg IV today    surgery yesterday right lateral decubitus. Was given IVF. Has slight fluid overload caused resp distress. Hypoxic. CXR showed right lung infiltrate. She had no s/s of pneumonia and clear X ray prior to surgery    Chest x-ray-Increasing bilateral perihilar and diffuse lung infiltrates bilaterally.  This may represent a atypical/viral pneumonia or ARDS  After receiving 1 unit of blood, Lasix 40 mg IV push will be given.  Patient's BNP is normal     Progress Note: 2/16                      Progress Note: 2/19       Please further specify the diagnosis of pulmonary edema.                                                                                 Doctor, Please specify diagnosis or diagnoses associated with above clinical findings.    Physician Use Only    Pulmonary edema:  [ x ] acute  [  ] chronic  [  ] other: _______________________                                                                                                             [  ] Unable to determine

## 2017-02-22 NOTE — ASSESSMENT & PLAN NOTE
Transfused 1 unit of packed red blood cells 2/20.  Developed acute decompensation of resp status and transferred to ICU  Hgb stable   Follow CBC  Started prophylactic lovenox 2/20  Wound dressing dry

## 2017-02-22 NOTE — PT/OT/SLP PROGRESS
Physical Therapy      Marycarmen Louis  MRN: [id number]    Patient not seen 2/21/2017 secondary to Unavailable (Comment) (Pt. being transported to 3rd floor). Will follow-up 2/22/2017 devonte Cole, PT

## 2017-02-22 NOTE — PROGRESS NOTES
[facility]  Cardiology  Progress Note    Patient Name: Marycarmen Louis  MRN: [id number]  Admission Date: 2/14/2017  Hospital Length of Stay: 8 days  Code Status: Full Code   Attending Physician: Joes Jaeger MD   Primary Care Physician: Willie Santos MD (Inactive)  Expected Discharge Date:   Principal Problem:Closed fracture of left hip    Subjective:     Hospital Course: admitted with hip/wrist fx, s/p ORIF hip. Developed ARDS post op.  Fat emboli?  Tachycardia and fever    Interval History: still needs bipap and supplemental O2    ROS   Constitution: Negative.   HENT: Negative.   Eyes: Negative.   Cardiovascular: Negative.   Respiratory: sob.   Endocrine: Negative.   Skin: Negative.   Musculoskeletal: Positive for joint pain.   Gastrointestinal: Negative.   Genitourinary: Negative.   Neurological: Negative.   Psychiatric/Behavioral: Negative.   Allergic/Immunologic: Negative. .    Objective:     Vital Signs (Most Recent):  Temp: 97.7 °F (36.5 °C) (02/22/17 0800)  Pulse: (!) 118 (02/22/17 0900)  Resp: 18 (02/22/17 0900)  BP: (!) 123/42 (02/22/17 0900)  SpO2: 99 % (02/22/17 0900) Vital Signs (24h Range):  Temp:  [97.5 °F (36.4 °C)-98.2 °F (36.8 °C)] 97.7 °F (36.5 °C)  Pulse:  [] 118  Resp:  [9-29] 18  SpO2:  [92 %-100 %] 99 %  BP: ()/(42-70) 123/42     Weight: 59 kg (130 lb)  Body mass index is 22.31 kg/(m^2).    SpO2: 99 %  O2 Device (Oxygen Therapy): nasal cannula      Intake/Output Summary (Last 24 hours) at 02/22/17 1003  Last data filed at 02/22/17 0830   Gross per 24 hour   Intake             1550 ml   Output             4000 ml   Net            -2450 ml       Lines/Drains/Airways     Drain                 Urethral Catheter 02/19/17 1656 Latex 16 Fr. 2 days          Peripheral Intravenous Line                 Peripheral IV - Single Lumen 02/20/17 1056 Right Wrist 1 day                Physical Exam  Constitutional: She is oriented to person, place, and time. She  appears well-developed and well-nourished.   HENT:   Head: Normocephalic and atraumatic.   Eyes: EOM are normal.   Neck: Normal range of motion. Neck supple.   Cardiovascular: Normal rate and regular rhythm.   Pulmonary/Chest: Effort normal and breath sounds normal.   Abdominal: Soft. Bowel sounds are normal.   Neurological: She is alert and oriented to person, place, and time.   Skin: Skin is warm and dry.   Psychiatric: She has a normal mood and affect.   Significant Labs: All pertinent lab results from the last 24 hours have been reviewed.    Significant Imaging: Echocardiogram: 2D echo with color flow doppler: No results found for this or any previous visit., EKG: SR and X-Ray: CXR: X-Ray Chest 1 View (CXR):   Results for orders placed or performed during the hospital encounter of 02/14/17   X-Ray Chest 1 View    Narrative    Chest, 1 view: There has been no detrimental change in the cardiopulmonary findings as compared to the previous study of 2/22/2017.  The heart is normal in size.  The skeletal structures are intact.    Impression     As above.      Electronically signed by: Adriane Urena MD  Date:     02/23/17  Time:    08:23      Assessment and Plan:     Lt hip and Lt wrist fracture due to fall while at fitness center  S/p ORIF 2/15/17  pulm eval.  ?fat emboli  Hypoxia -- ARDS probably from transfusion (+ COPD exacerbation?) but no clear sepsis  Postop anemia  COPD/remote smoker  Osteoporosis   Coronary artery calcification by CT 2013      Plan:  Echo showing normal LV systolic function  Venous u/s negative for DVT  Keep dry with lasix, continue lovenox, ASA on DC  No evidence of perioperative cardiovascular event.  Wean o2 as tolerated  May go to floor  Not much to add  Will follow as outpt           Active Diagnoses:    Diagnosis Date Noted POA    PRINCIPAL PROBLEM:  Closed fracture of left hip [S72.002A] 02/15/2017 Yes    Acute respiratory failure with hypoxia [J96.01] 02/20/2017 No    Acute blood  loss anemia [D62] 02/19/2017 No    COPD exacerbation [J44.1] 02/17/2017 No    Pulmonary edema [J81.1] 02/16/2017 Yes    Osteoporosis [M81.0] 02/15/2017 Yes    Left wrist fracture [S62.102A] 02/14/2017 Yes    Coronary artery calcification seen on CAT scan [I25.10] 06/29/2013 Yes    Degeneration of lumbar or lumbosacral intervertebral disc [M51.37] 11/09/2012 Yes      Problems Resolved During this Admission:    Diagnosis Date Noted Date Resolved POA    Fever [R50.9] 02/15/2017 02/18/2017 No       VTE Risk Mitigation         Ordered     enoxaparin injection 40 mg  Daily     Route:  Subcutaneous        02/20/17 1131     Place sequential compression device  Until discontinued      02/19/17 1827     Medium Risk of VTE  Once      02/15/17 1550          Martha Perez NP  Cardiology  [facility]    I attest that I have personally seen and examined this patient. I have reviewed and discussed the management in detail as outlined above.

## 2017-02-22 NOTE — PT/OT/SLP PROGRESS
"Physical Therapy  Treatment    Marycarmen Louis   MRN: [id number]   Admitting Diagnosis: Closed fracture of left hip    PT Received On: 17  PT Start Time: 1325     PT Stop Time: 1350    PT Total Time (min): 25 min       Billable Minutes:  Gait [id number] minutes and Therapeutic Exercise 10 minutes    Treatment Type: Treatment  PT/PTA: PT             General Precautions: Standard, fall  Orthopedic Precautions: LUE non weight bearing, LLE weight bearing as tolerated   Braces:  (Hard Cast Left Wrist/Hand)    Do you have any cultural, spiritual, Anabaptism conflicts, given your current situation?: None verbalized    Subjective:  Communicated with patient prior to session.  "I'd like to get out of bed."    Pain Ratin/10  Location - Side: Left  Location - Orientation: generalized  Location: hip  Pain Addressed: Nurse notified, Pre-medicate for activity  Pain Rating Post-Intervention: 1/10    Objective:   Patient found with: oxygen, peripheral IV    Functional Mobility:  Bed Mobility:   Rolling/Turning to Left: Contact guard assistance  Rolling/Turning Right: Contact guard assistance  Scooting/Bridging: Contact Guard Assistance  Supine to Sit: Contact Guard Assistance  Sit to Supine: Contact Guard Assistance    Transfers:  Sit <> Stand Assistance: Minimum Assistance  Sit <> Stand Assistive Device: Rolling Walker  Bed <> Chair Technique: Stand Pivot  Bed <> Chair Assistance: Minimum Assistance  Bed <> Chair Assistive Device: Rolling Walker, Platform    Gait:   Gait Distance: 20 feet  Assistance 1: Minimum assistance  Gait Assistive Device: Platform walker left  Gait Pattern: swing-to gait  Gait Deviation(s): decreased velocity of limb motion, decreased step length, decreased weight-shifting ability, decreased toe-to-floor clearance  Gait Training:  Pt. Amb. 20' with platform RW, min. A. With supplemental O2 nasal cannula.  VC's given to pt. For step placement and A.D. Placement.      Balance:   Static Sit: FAIR+: Able " to take MINIMAL challenges from all directions  Dynamic Sit: FAIR+: Maintains balance through MINIMAL excursions of active trunk motion  Static Stand: POOR+: Needs MINIMAL assist to maintain  Dynamic stand: POOR: N/A     Therapeutic Activities and Exercises:  There. Ex.:  Pt. Rendered gentle there. Ex. Of LLE, while pt. Supine in bed, for N.M. Tone and strength and fluid dynamics. 1-on-1 LLE HC stretches performed with pt.  LLE: ankle pumps, quad sets, heel slides (2x10) performed with pt.    AM-PAC 6 CLICK MOBILITY  How much help from another person does this patient currently need?   1 = Unable, Total/Dependent Assistance  2 = A lot, Maximum/Moderate Assistance  3 = A little, Minimum/Contact Guard/Supervision  4 = None, Modified [place]/Independent    Turning over in bed (including adjusting bedclothes, sheets and blankets)?: 3  Sitting down on and standing up from a chair with arms (e.g., wheelchair, bedside commode, etc.): 3  Moving from lying on back to sitting on the side of the bed?: 3  Moving to and from a bed to a chair (including a wheelchair)?: 3  Need to walk in hospital room?: 2  Climbing 3-5 steps with a railing?: 1  Total Score: 15    AM-PAC Raw Score CMS G-Code Modifier Level of Impairment Assistance   6 % Total / Unable   7 - 9 CM 80 - 100% Maximal Assist   10 - 14 CL 60 - 80% Moderate Assist   15 - 19 CK 40 - 60% Moderate Assist   20 - 22 CJ 20 - 40% Minimal Assist   23 CI 1-20% SBA / CGA   24 CH 0% Independent/ Mod I     Patient left up in chair with all lines intact, call button in reach and RN notified.    Assessment:  Marycarmen Louis is a 71 y.o. female with a medical diagnosis of Closed fracture of left hip and presents with increased distance with gait training.  Pt. Is progressing towards PT POC goals.    Rehab identified problem list/impairments: Rehab identified problem list/impairments: weakness, impaired balance, impaired endurance, impaired functional mobilty, gait  instability, decreased lower extremity function, orthopedic precautions    Rehab potential is fair.    Activity tolerance: Fair    Discharge recommendations: Discharge Facility/Level Of Care Needs: rehabilitation facility     Barriers to discharge: Barriers to Discharge: None    Equipment recommendations: Equipment Needed After Discharge: walker, rolling     GOALS:   Physical Therapy Goals                   Problem: Physical Therapy Goal    Goal Priority Disciplines Outcome Goal Variances Interventions   Physical Therapy Goal     PT/OT, PT      Description:  Goals to be met by: 3/11/2017     Patient will increase functional independence with mobility by performin. Supine to sit with Stand-by Assistance  2. Sit to supine with Stand-by Assistance  3. Rolling to Left and Right with Stand-by Assistance.  4. Sit to stand transfer with Contact Guard Assistance, with Platform Walker  5. Bed to chair transfer with Contact Guard Assistance using Platform walker  6. Gait  x 50 feet with Contact Guard Assistance using Platform walker.   7. [place] with Home Exercise Program.                PLAN:    Patient to be seen  (1-2x/day(M-F); PRN(Sat.,Sun.))  to address the above listed problems via gait training, therapeutic activities, therapeutic exercises  Plan of Care expires:  (Upon D/C from facility)  Plan of Care reviewed with: patient         Rip Katnard, PT  2017

## 2017-02-22 NOTE — SUBJECTIVE & OBJECTIVE
Interval History: see     Review of Systems   Constitutional: Negative for activity change, fatigue, fever and unexpected weight change.   HENT: Negative for congestion, ear pain, hearing loss, rhinorrhea and sore throat.    Eyes: Negative for pain, redness and visual disturbance.   Respiratory: Positive for shortness of breath. Negative for cough and wheezing.    Cardiovascular: Negative for chest pain, palpitations and leg swelling.   Gastrointestinal: Negative for abdominal pain, constipation, diarrhea, nausea and vomiting.   Genitourinary: Negative for dysuria, frequency, pelvic pain and urgency.   Musculoskeletal: Negative for back pain, joint swelling and neck pain.        Pain control with tramadol   Skin: Negative for color change, rash and wound.   Neurological: Negative for dizziness, weakness, light-headedness and headaches.   Psychiatric/Behavioral: The patient is nervous/anxious.      Objective:     Vital Signs (Most Recent):  Temp: 97.5 °F (36.4 °C) (02/22/17 0400)  Pulse: 96 (02/22/17 0708)  Resp: 16 (02/22/17 0708)  BP: 125/70 (02/22/17 0500)  SpO2: 97 % (02/22/17 0708) Vital Signs (24h Range):  Temp:  [97.5 °F (36.4 °C)-98.2 °F (36.8 °C)] 97.5 °F (36.4 °C)  Pulse:  [] 96  Resp:  [9-29] 16  SpO2:  [87 %-100 %] 97 %  BP: ()/(49-70) 125/70     Weight: 59 kg (130 lb)  Body mass index is 22.31 kg/(m^2).    Intake/Output Summary (Last 24 hours) at 02/22/17 0801  Last data filed at 02/22/17 0500   Gross per 24 hour   Intake             1430 ml   Output             4000 ml   Net            -2570 ml      Physical Exam   Constitutional: She is oriented to person, place, and time. She appears well-developed and well-nourished. No distress.   HENT:   Head: Normocephalic and atraumatic.   Right Ear: External ear normal.   Left Ear: External ear normal.   Eyes: Conjunctivae and EOM are normal. Pupils are equal, round, and reactive to light. Right eye exhibits no discharge. Left eye exhibits no  discharge.   Neck: Neck supple. No tracheal deviation present.   Cardiovascular: Normal rate and regular rhythm.  Exam reveals no gallop and no friction rub.    No murmur heard.  Pulmonary/Chest: Effort normal. No respiratory distress. She has no wheezes. She has rales (soft crackles in bases).   Decreased breath sounds in bases b/l   Abdominal: Soft. Bowel sounds are normal. She exhibits no distension. There is no tenderness.   Musculoskeletal: She exhibits no edema.   Lymphadenopathy:     She has no cervical adenopathy.   Neurological: She is alert and oriented to person, place, and time. No cranial nerve deficit.   Skin: Skin is warm and dry.   Psychiatric: She has a normal mood and affect. Her behavior is normal.   Nursing note and vitals reviewed.      Significant Labs:   CBC:   Recent Labs  Lab 02/21/17 0416 02/22/17  0425   WBC 14.32* 12.12   HGB 8.9* 8.8*   HCT 26.8* 26.8*   * 462*     CMP:   Recent Labs  Lab 02/21/17 0416 02/22/17  0425    139   K 4.1 4.3   CL 98 100   CO2 34* 33*   * 121*   BUN 14 16   CREATININE 0.6 0.6   CALCIUM 8.6* 8.5*   ANIONGAP 7* 6*   EGFRNONAA >60 >60       Significant Imaging: I have reviewed and interpreted all pertinent imaging results/findings within the past 24 hours.

## 2017-02-22 NOTE — PROGRESS NOTES
"Marycarmen Louis is a 71 y.o. female patient.    Active Hospital Problems    Diagnosis  POA    *Closed fracture of left hip [S72.002A]  Yes    Acute respiratory failure with hypoxia [J96.01]  No    Acute blood loss anemia [D62]  No    COPD exacerbation [J44.1]  No    Pulmonary edema [J81.1]  Yes    Osteoporosis [M81.0]  Yes    Left wrist fracture [S62.102A]  Yes    Coronary artery calcification seen on CAT scan [I25.10]  Yes    Degeneration of lumbar or lumbosacral intervertebral disc [M51.37]  Yes      Resolved Hospital Problems    Diagnosis Date Resolved POA    Fever [R50.9] 02/18/2017 No     Temp: 97.5 °F (36.4 °C) (02/22/17 0400)  Pulse: 96 (02/22/17 0708)  Resp: 16 (02/22/17 0708)  BP: 125/70 (02/22/17 0500)  SpO2: 97 % (02/22/17 0708)  Weight: 59 kg (130 lb) (02/14/17 1746)  Height: 5' 4" (162.6 cm) (02/19/17 1541)    Subjective:  Symptoms:  She reports shortness of breath, cough and anxiety.    Activity level: Returning to normal.    Pain:  She complains of pain that is moderate.  She reports pain is improving.  Pain is partially controlled.      Objective:  General Appearance:  Ill-appearing.    Vital signs: (most recent): Blood pressure 125/70, pulse 96, temperature 97.5 °F (36.4 °C), temperature source Oral, resp. rate 16, height 5' 4" (1.626 m), weight 59 kg (130 lb), SpO2 97 %, not currently breastfeeding.  Vital signs are normal.    Output: Producing urine and producing stool.    Lungs:  Tachypnea.  Respiratory distress: mild to moderate.  No stridor.  There are wheezes, rhonchi and decreased breath sounds.    Heart: Tachycardia.  S1 normal and S2 normal.  Positive for murmur.  No gallop or friction rub.   Chest: No chest wall tenderness.  Symmetric chest wall expansion.   Extremities: Normal range of motion.  There is dependent edema.    Neurological: Patient is alert and oriented to person, place and time.  Normal strength.    Skin:  Warm.  No rash, ecchymosis or cyanosis.   Abdomen: Abdomen " is soft.  There are no signs of ascites.  Bowel sounds are normal.     Pupils:  Pupils are equal, round, and reactive to light.    Pulses: Distal pulses are intact.      Assessment:  (ARDS resolving   Fat Emboli resolving   COPD Exacerbation   Volume OverLoad   Anemia S/p transfusion(hb8.9)).     Plan:   NIV increase time on BIPAP  Beta agonist   Cxr improving need to follow and incentive  Got early Steroids and now  let shower of fatty acids resolve   Pt desats when take off NIV less than yesterday but to floor today .       Reji Melendez MD  2/22/2017

## 2017-02-22 NOTE — PLAN OF CARE
Problem: Patient Care Overview  Goal: Plan of Care Review  Patient rested well this shift on bipap.  Pain medication given as requested and patient obtained relief from meds.  spo2 maintained at % this shift and vs stable. Cardiac monitoring reveals sinus tach 100-110 bpm this shift. No safety issues encountered this shift.

## 2017-02-22 NOTE — ASSESSMENT & PLAN NOTE
Resolved. Lasix was held earlier this admission.  Monitor chest x-rays.  Dr. camacho consulted.  Chest x-ray-Increasing bilateral perihilar and diffuse lung infiltrates bilaterally.  This may represent a atypical/viral pneumonia or ARDS  After receiving 1 unit of blood, Lasix 40 mg IV push was given.  Patient's BNP is normal (44)  BioZ performed showed a TFC in the upper normal range    TTE Tuesday to eval heart function---normal  No other clinical evidence of CHF exacerbation and no known CHF history. I do not feel this is all pulmonary edema. I think she has developed ARDS. See ARDS plans

## 2017-02-22 NOTE — PROGRESS NOTES
Seen in ICU knowing she has been transferred to [facility].  Her need for BiPAP may keep her here but at least now we can get on with her PT. I anticipate she will progress quickly.

## 2017-02-22 NOTE — PLAN OF CARE
Problem: Patient Care Overview  Goal: Plan of Care Review  Outcome: Ongoing (interventions implemented as appropriate)  Vitals remained stable, afebrile. Pain controled with PO meds. Up with PT. O2 sats remained in high 90s with 2L NC. Incision draining small amount. Rested well today. Discussed plan of care with pt, stated understanding

## 2017-02-23 LAB
ANION GAP SERPL CALC-SCNC: 4 MMOL/L
BASOPHILS NFR BLD: 0 %
BNP SERPL-MCNC: 15 PG/ML
BUN SERPL-MCNC: 16 MG/DL
CALCIUM SERPL-MCNC: 8.5 MG/DL
CHLORIDE SERPL-SCNC: 103 MMOL/L
CO2 SERPL-SCNC: 30 MMOL/L
CREAT SERPL-MCNC: 0.6 MG/DL
DIFFERENTIAL METHOD: ABNORMAL
EOSINOPHIL NFR BLD: 0 %
ERYTHROCYTE [DISTWIDTH] IN BLOOD BY AUTOMATED COUNT: 13.3 %
EST. GFR  (AFRICAN AMERICAN): >60 ML/MIN/1.73 M^2
EST. GFR  (NON AFRICAN AMERICAN): >60 ML/MIN/1.73 M^2
GLUCOSE SERPL-MCNC: 117 MG/DL
HCT VFR BLD AUTO: 26.6 %
HGB BLD-MCNC: 8.6 G/DL
LYMPHOCYTES NFR BLD: 3 %
MCH RBC QN AUTO: 30.5 PG
MCHC RBC AUTO-ENTMCNC: 32.3 %
MCV RBC AUTO: 94 FL
MONOCYTES NFR BLD: 6 %
NEUTROPHILS NFR BLD: 88 %
NEUTS BAND NFR BLD MANUAL: 2 %
NRBC BLD-RTO: ABNORMAL /100 WBC
PLATELET # BLD AUTO: 499 K/UL
PMV BLD AUTO: 8.3 FL
POTASSIUM SERPL-SCNC: 4.3 MMOL/L
PROMYELOCYTES NFR BLD MANUAL: 1 %
RBC # BLD AUTO: 2.82 M/UL
SODIUM SERPL-SCNC: 137 MMOL/L
WBC # BLD AUTO: 11.54 K/UL

## 2017-02-23 PROCEDURE — 97165 OT EVAL LOW COMPLEX 30 MIN: CPT

## 2017-02-23 PROCEDURE — 99233 SBSQ HOSP IP/OBS HIGH 50: CPT | Mod: ,,, | Performed by: FAMILY MEDICINE

## 2017-02-23 PROCEDURE — 85007 BL SMEAR W/DIFF WBC COUNT: CPT

## 2017-02-23 PROCEDURE — 83880 ASSAY OF NATRIURETIC PEPTIDE: CPT

## 2017-02-23 PROCEDURE — 36415 COLL VENOUS BLD VENIPUNCTURE: CPT

## 2017-02-23 PROCEDURE — 25000003 PHARM REV CODE 250: Performed by: SURGERY

## 2017-02-23 PROCEDURE — 27000221 HC OXYGEN, UP TO 24 HOURS

## 2017-02-23 PROCEDURE — 63600175 PHARM REV CODE 636 W HCPCS: Performed by: INTERNAL MEDICINE

## 2017-02-23 PROCEDURE — 25000242 PHARM REV CODE 250 ALT 637 W/ HCPCS: Performed by: FAMILY MEDICINE

## 2017-02-23 PROCEDURE — 25000003 PHARM REV CODE 250: Performed by: ORTHOPAEDIC SURGERY

## 2017-02-23 PROCEDURE — 11000001 HC ACUTE MED/SURG PRIVATE ROOM

## 2017-02-23 PROCEDURE — G8985 CARRY GOAL STATUS: HCPCS | Mod: CH

## 2017-02-23 PROCEDURE — 97110 THERAPEUTIC EXERCISES: CPT

## 2017-02-23 PROCEDURE — 85027 COMPLETE CBC AUTOMATED: CPT

## 2017-02-23 PROCEDURE — 27000339 *HC DAILY SUPPLY KIT

## 2017-02-23 PROCEDURE — 25000003 PHARM REV CODE 250: Performed by: FAMILY MEDICINE

## 2017-02-23 PROCEDURE — G8984 CARRY CURRENT STATUS: HCPCS | Mod: CI

## 2017-02-23 PROCEDURE — 94640 AIRWAY INHALATION TREATMENT: CPT

## 2017-02-23 PROCEDURE — 94761 N-INVAS EAR/PLS OXIMETRY MLT: CPT

## 2017-02-23 PROCEDURE — 63600175 PHARM REV CODE 636 W HCPCS: Performed by: FAMILY MEDICINE

## 2017-02-23 PROCEDURE — 97116 GAIT TRAINING THERAPY: CPT

## 2017-02-23 PROCEDURE — 80048 BASIC METABOLIC PNL TOTAL CA: CPT

## 2017-02-23 RX ADMIN — LEVOFLOXACIN 500 MG: 5 INJECTION INTRAVENOUS at 10:02

## 2017-02-23 RX ADMIN — PANTOPRAZOLE SODIUM 40 MG: 40 TABLET, DELAYED RELEASE ORAL at 09:02

## 2017-02-23 RX ADMIN — SODIUM CHLORIDE, PRESERVATIVE FREE 3 ML: 5 INJECTION INTRAVENOUS at 02:02

## 2017-02-23 RX ADMIN — DOCUSATE SODIUM AND SENNOSIDES 1 TABLET: 8.6; 5 TABLET, FILM COATED ORAL at 09:02

## 2017-02-23 RX ADMIN — SODIUM CHLORIDE, PRESERVATIVE FREE 3 ML: 5 INJECTION INTRAVENOUS at 06:02

## 2017-02-23 RX ADMIN — TRAMADOL HYDROCHLORIDE 50 MG: 50 TABLET, COATED ORAL at 11:02

## 2017-02-23 RX ADMIN — IPRATROPIUM BROMIDE AND ALBUTEROL SULFATE 3 ML: .5; 3 SOLUTION RESPIRATORY (INHALATION) at 01:02

## 2017-02-23 RX ADMIN — ENOXAPARIN SODIUM 40 MG: 40 INJECTION, SOLUTION INTRAVENOUS; SUBCUTANEOUS at 12:02

## 2017-02-23 RX ADMIN — IPRATROPIUM BROMIDE AND ALBUTEROL SULFATE 3 ML: .5; 3 SOLUTION RESPIRATORY (INHALATION) at 06:02

## 2017-02-23 RX ADMIN — SODIUM CHLORIDE, PRESERVATIVE FREE 3 ML: 5 INJECTION INTRAVENOUS at 08:02

## 2017-02-23 RX ADMIN — TRAMADOL HYDROCHLORIDE 50 MG: 50 TABLET, COATED ORAL at 06:02

## 2017-02-23 RX ADMIN — VENLAFAXINE HYDROCHLORIDE 75 MG: 75 CAPSULE, EXTENDED RELEASE ORAL at 09:02

## 2017-02-23 RX ADMIN — DOCUSATE SODIUM AND SENNOSIDES 1 TABLET: 8.6; 5 TABLET, FILM COATED ORAL at 08:02

## 2017-02-23 RX ADMIN — METHYLPREDNISOLONE SODIUM SUCCINATE 40 MG: 40 INJECTION, POWDER, FOR SOLUTION INTRAMUSCULAR; INTRAVENOUS at 06:02

## 2017-02-23 RX ADMIN — IBUPROFEN 600 MG: 600 TABLET ORAL at 09:02

## 2017-02-23 RX ADMIN — IPRATROPIUM BROMIDE AND ALBUTEROL SULFATE 3 ML: .5; 3 SOLUTION RESPIRATORY (INHALATION) at 07:02

## 2017-02-23 NOTE — PT/OT/SLP EVAL
"Occupational Therapy  Evaluation    Marycarmen Louis   MRN: [id number]   Admitting Diagnosis: Closed fracture of left hip    OT Date of Treatment: 02/23/17   OT Start Time: 0245  OT Stop Time: 0301  OT Total Time (min): 16 min    Billable Minutes:  Evaluation 16 min    Diagnosis: Closed fracture of left hip       Past Medical History   Diagnosis Date    Anxiety     Arthritis     Cancer     COPD (chronic obstructive pulmonary disease)     Degenerative disc disease     Depression     Emphysema of lung     Macrocytosis     Meibomianitis 11/3/10    Memory loss     Osteoporosis     Platelet disorder       Past Surgical History   Procedure Laterality Date    Appendectomy      Salviary gland removed      Joint replacement  2014     left knee  tka       Referring physician: Giovanny  Date referred to OT: 02/23/2017    General Precautions: Standard, fall  Orthopedic Precautions: LUE non weight bearing, LLE weight bearing as tolerated  Braces:  (hard cast LUE)    Do you have any cultural, spiritual, Tenriism conflicts, given your current situation?: none voiced     Patient History:  Living Environment  Lives With: spouse  Living Arrangements: house  Home Layout: Able to live on 1st floor  Transportation Available: family or friend will provide  Equipment Currently Used at Home: bedside commode, walker, rolling, shower chair    Prior level of function:   Bed Mobility/Transfers: independent  Grooming: independent  Upper Body Dressing: independent  Lower Body Dressing: independent  Toileting: independent  Home Management Skills: independent  Driving License: Yes  Mode of Transportation: Car  Occupation: Retired     Dominant hand: right    Subjective:  Communicated with nsg prior to session.    Chief Complaint: " I have no strength in this hand."      Pain Rating: 3/10  Location - Side: Left  Location - Orientation: generalized  Location: thumb  Pain Addressed: Pre-medicate for activity  Pain Rating Post-Intervention: " 3/10    Objective:       Cognitive Exam:  Oriented to: Person, Place, Time and Situation  Follows Commands/attention: Follows multistep  commands  Communication: clear/fluent  Memory:  No Deficits noted  Safety awareness/insight to disability: intact  Coping skills/emotional control: Appropriate to situation    Visual/perceptual:  Intact    Physical Exam:  Postural examination/scapula alignment: No postural abnormalities identified  Skin integrity: Visible skin intact  Edema: Mild (L) LE    Sensation:   Intact    Upper Extremity Range of Motion:  Right Upper Extremity: WFL  Left Upper Extremity: No active wrist movement due to hard cast, and Pt with good AROM of digits but limited due to retristions of cast.    Upper Extremity Strength:  Right Upper Extremity: WFL  Left Upper Extremity: WFL, not tested at wrist or digits of (L)UE   Strength: (R) UE: N (L) UE: Poor    Fine motor coordination:   Pt with limited fine motor control of (L) UE due to cast.     Gross motor coordination: ssee PT note    Functional Mobility:  Bed Mobility:  Scooting/Bridging: Independent  Supine to Sit: Independent  Sit to Supine: Independent    Transfers:  Sit <> Stand Assistance: Independent  Sit <> Stand Assistive Device: Rolling Walker  Toilet Transfer Assistance: Modified Independent  Toilet Transfer Assistive Device: Rolling Walker, bedside commode    Functional Ambulation: Pt ambulated 10' with Mod (I)    Activities of Daily Living:       UE Dressing Level of Assistance: Modified independent    LE Dressing Level of Assistance: Minimum assistance      Balance:   Static Sit: NORMAL: No deviations seen in posture held statically  Dynamic Sit: NORMAL: No deviations seen in posture held dynamically  Static Stand: GOOD+: Takes MAXIMAL challenges from all directions  Dynamic stand: 0: N/A        AM-PAC 6 CLICK ADL  How much help from another person does this patient currently need?  1 = Unable, Total/Dependent Assistance  2 = A lot,  "Maximum/Moderate Assistance  3 = A little, Minimum/Contact Guard/Supervision  4 = None, Modified [place]/Independent    Putting on and taking off regular lower body clothing? : 3  Bathing (including washing, rinsing, drying)?: 3  Toileting, which includes using toilet, bedpan, or urinal? : 4  Putting on and taking off regular upper body clothing?: 4  Taking care of personal grooming such as brushing teeth?: 4  Eating meals?: 4  Total Score: 22    AM-PAC Raw Score CMS "G-Code Modifier Level of Impairment Assistance   6 % Total / Unable   7 - 9 CM 80 - 100% Maximal Assist   10 - 14 CL 60 - 80% Moderate Assist   15 - 19 CK 40 - 60% Moderate Assist   20 - 22 CJ 20 - 40% Minimal Assist   23 CI 1-20% SBA / CGA   24 CH 0% Independent/ Mod I       Patient left HOB elevated with all lines intact, call button in reach and nsg notified    Assessment:  Marycarmen Louis is a 71 y.o. female with a medical diagnosis of Closed fracture of left hip.  Pt able to perform all ADLs with appropriate (A) devices.  Pt educated on the use of Hip kit and where to purchase if she so choose.  Pt educated on posterior hip precautions and she verbalizes understanding.  Pt states that she has been using the toilet and therapist suggested that Pt uses bedside commode place over her toilet to prevent increase flexion of the hip, nsg also notified of this.  Pt appropriate for continued OT for fine motor strengthening.     Rehab identified problem list/impairments: Rehab identified problem list/impairments: decreased coordination, impaired fine motor    Rehab potential is excellent.    Activity tolerance: Excellent    Discharge recommendations: Discharge Facility/Level Of Care Needs: home, outpatient OT     Barriers to discharge: Barriers to Discharge: None    Equipment recommendations: none   Factors that may affect patient's status:  [x] Patient's age [] Time since onset of injury/illness/exacerbation  [x]Prior Level of function       " [x] Current level of function [] Status of current condition []Patient's cognitive status and safety concerns      [] Patient's level of motivation    [] Patient's home situation (environment and family support)  [] Objective examination findings [] Goals and goal agreement with the patient        [x] Rehab potential (prognosis) and probable outcome    [] Expected progression of patient    Criteria:     Medical Background and History:    Brief History - 34012  Occupational Performance and Deficits:  identifies 1 - 3 performance deficits - 71897  Clinical Decision Making (Complexity):  limited # of tx options - 83636     Evaluation Code (Complexity):  Low - 64292 -       After (CHOOSE ONE) review of medical and/or therapy records relating to the presenting problem and on examination of body system using standardized tests and measures patient presents with 1 - 3 elements from any of the following: body structures and functions, activity limitations, and/or participation restrictions.  Leading to a clinical consideration of a  limited number of treatment options that may lead to significant modification of tasks or assistance necessary to enable completion of evaluation component.    GOALS:   Occupational Therapy Goals        Problem: Occupational Therapy Goal    Goal Priority Disciplines Outcome Interventions   Occupational Therapy Goal     OT, PT/OT     Description:  Goals to be met by: d/c from facility    Patient will increase functional independence with ADLs by performing:    Pt will perform palm to tip, while maintaining to objects in her palm by time of d/c to increase fine motor coordination.                 PLAN:  Patient to be seen 2 x/week, 3 x/week to address the above listed problems via therapeutic activities, therapeutic exercises  Plan of Care expires:  (upon d/c)  Plan of Care reviewed with: patient    OT G-codes  Functional Assessment Tool Used: ampac  Score: 22  Functional Limitation: Carrying,  moving and handling objects  Carrying, Moving and Handling Objects Current Status (): CI  Carrying, Moving and Handling Objects Goal Status (): PILAR Matias OT  02/23/2017

## 2017-02-23 NOTE — PROGRESS NOTES
[facility] Medicine  Progress Note    Patient Name: Marycarmen Louis  MRN: [id number]  Patient Class: IP- Inpatient   Admission Date: 2/14/2017  Length of Stay: 9 days  Attending Physician: Jose Jaeger MD  Primary Care Provider: Willie Santos MD (Inactive)        Subjective:     Principal Problem:Closed fracture of left hip    HPI:  72 y/o female reports she fell off of gym apparatus. Reports falling after missing step. ER w/up shows left wrist fracture and left hip fracture. Reports feeling fine prior to fall yesterday. No fever prior to this am.    Hospital Course:  She has surgery yesterday right lateral decubitus. Was given IVF. Has slight fluid overload caused resp distress. Hypoxic. CXR showed right lung infiltrate. She had no s/s of pneumonia and clear  X ray prior to surgery. Given dose of lasix and improved well. She was moved  From ICU to floor. Did well over night. PT to start this am. Still on O2 today 4L NC 96%    2/17/17-patient still becomes hypoxic when oxygen is removed.  CTA of the chest and lungs were done today that were negative for pulmonary embolism.  She did have significant emphysematous changes and lower lobe infiltrates and atelectasis bilaterally.  She probably has an exacerbation of COPD probably from perioperative intubation.  DuoNeb treatments, Rocephin, azithromycin started.  She seems be doing better.    2/18.  Patient evaluated this morning.  She was working with physical therapy and able to ambulate across the room with assistance.  She does feel short of breath with exertion.  Her hemoglobin is now 8.2.  Dr. Melendez consulted.  Her sats still drop when she takes her oxygen off.  Patient had a large amount of bloody drainage.  I stopped her Xarelto today for now until her hemoglobin stabilizes.    2/18 patient never received blood.  Pulmonologist wanted to wait in x-ray today because he felt she may be fluid overloaded.  Chest x-ray is starting  to look a little worse.  She has bilateral infiltrates consistent with bilateral perihilar and diffuse lung infiltrates bilaterally.  This may represent a atypical/viral pneumonia or ARDS.  Today her hemoglobin is 7.8 and everybody agrees that she could benefit from one unit of blood.  Her Xarelto has been discontinued.  Patient is ambulating with therapy.  She does have dyspnea with exertion.  She is on 4 L nasal cannula and her O2 sats are 92%.    2/20  Yesterday patient had acute hypoxic episode with sats into the 50% after receiving 1 unit of blood. Transferred to ICU on BiPaP. Got Lasix 40mg IV x1. Maintaining sats in 90% on BipaP with 35% FiO2, sats drop to <80% within 1 minutes of coming off BiPaP. However, even with the hypoxia this AM she does not report feeling SOB, no use of accessory muscles, speaking in complete sentences. CXR has been worsneing, looks like b/l infiltrates consistent with ARDS. Pain is well controlled from surgery. She is not on any anticoagulation s/p surgery due to bleeding and worsening H/H. She had CTA which was negative for PE. She is on levaquin and Ceftriaxone for possible PNA (there was some question of aspiration after her surgery). She was also started on steroids.     2/21/17  She is still on BIPAP  She is on steroids;  Nebs   She diuresed with lasix  She is still in ventimax  Her HH is stable  Dressing is dry   Echo was normal   She is levaquin and rocephin   Temp 99    2/22/17  She is weaned down to 3liters Nc while eating but hypoxic(88)  Still requiring bipap  Getting OOB to chair with PT      2/23/17 She still has dick, bladder training started today and will d/c after. She has gotten out of bed with OT. She has no fever and no elevated WBC. She is 7 days with rocephin and 5 days levaquin today. She is feeling great today just ready to get out of here. No weakness, no SOB/CP. No more O2 requirement           Interval History: see     Review of Systems    Constitutional: Negative for activity change, fatigue, fever and unexpected weight change.   HENT: Negative for congestion, ear pain, hearing loss, rhinorrhea and sore throat.    Eyes: Negative for pain, redness and visual disturbance.   Respiratory: Negative for cough, shortness of breath and wheezing.    Cardiovascular: Negative for chest pain, palpitations and leg swelling.   Gastrointestinal: Negative for abdominal pain, constipation, diarrhea, nausea and vomiting.   Genitourinary: Negative for dysuria, frequency, pelvic pain and urgency.   Musculoskeletal: Negative for back pain, joint swelling and neck pain.        Pain control with tramadol   Skin: Negative for color change, rash and wound.   Neurological: Negative for dizziness, weakness, light-headedness and headaches.   Psychiatric/Behavioral: Positive for dysphoric mood. The patient is nervous/anxious.      Objective:     Vital Signs (Most Recent):  Temp: 97.2 °F (36.2 °C) (02/23/17 0731)  Pulse: 94 (02/23/17 0732)  Resp: 16 (02/23/17 0732)  BP: 130/66 (02/23/17 0731)  SpO2: 99 % (02/23/17 0732) Vital Signs (24h Range):  Temp:  [97.2 °F (36.2 °C)-99 °F (37.2 °C)] 97.2 °F (36.2 °C)  Pulse:  [] 94  Resp:  [16-18] 16  SpO2:  [95 %-100 %] 99 %  BP: (128-149)/(60-70) 130/66     Weight: 59 kg (130 lb)  Body mass index is 22.31 kg/(m^2).    Intake/Output Summary (Last 24 hours) at 02/23/17 0934  Last data filed at 02/23/17 0500   Gross per 24 hour   Intake             1010 ml   Output             4450 ml   Net            -3440 ml      Physical Exam   Constitutional: She is oriented to person, place, and time. She appears well-developed and well-nourished. No distress.   HENT:   Head: Normocephalic and atraumatic.   Right Ear: External ear normal.   Left Ear: External ear normal.   Eyes: Conjunctivae and EOM are normal. Pupils are equal, round, and reactive to light. Right eye exhibits no discharge. Left eye exhibits no discharge.   Neck: Neck supple. No  tracheal deviation present.   Cardiovascular: Normal rate and regular rhythm.  Exam reveals no gallop and no friction rub.    No murmur heard.  Pulmonary/Chest: Effort normal. No respiratory distress. She has no wheezes. She has rales (rales at right lower base only).   Decreased breath sounds in bases b/l   Abdominal: Soft. Bowel sounds are normal. She exhibits no distension. There is no tenderness.   Musculoskeletal: She exhibits no edema.   Cast with hardware to left wrist   Lymphadenopathy:     She has no cervical adenopathy.   Neurological: She is alert and oriented to person, place, and time. No cranial nerve deficit.   Skin: Skin is warm and dry.   Bandage to left hip   Psychiatric: She has a normal mood and affect. Her behavior is normal.   Nursing note and vitals reviewed.      Significant Labs:   CBC:     Recent Labs  Lab 17  0528   WBC 12.12 11.54   HGB 8.8* 8.6*   HCT 26.8* 26.6*   * 499*     CMP:     Recent Labs  Lab 17  05    137   K 4.3 4.3    103   CO2 33* 30*   * 117*   BUN 16 16   CREATININE 0.6 0.6   CALCIUM 8.5* 8.5*   ANIONGAP 6* 4*   EGFRNONAA >60 >60       Significant Imagin2017 CXR There has been no detrimental change in the cardiopulmonary findings as compared to the previous study of 2017.  The heart is normal in size.  The skeletal structures are intact    Assessment/Plan:      * Closed fracture of left hip  Ortho following  S/p Left bipolar endoprosthetic hemiarthroplasty  Continue physical therapy  Pain well controlled  enoxaprin for DVT prophylaxis  She will need 2 weeks xarelto at d/c  Add ot      Coronary artery calcification seen on CAT scan  Cardiology consulted.  No changes at this time.  Can be followed further as outpatient  No chest pain, no signs of ACS this admission      Left wrist fracture  Ortho follwing  S/p closed reduction and percutaneous distraction pinning of the left distal  radius.  Continue physical therapy  Pain well controlled    Osteoporosis  Failed fosamax   Will need forteo x 2 years at discharge       Pulmonary edema   Resolved. Lasix was held earlier this admission.  Monitor chest x-rays.  Dr. melendez consulted.  Chest x-ray-was increasing bilateral perihilar and diffuse lung infiltrates bilaterally.  This may represent fat emboli leading to ARDS  After receiving 1 unit of blood, Lasix 40 mg IV push was given.  Patient's BNP is normal (44)  BioZ performed showed a TFC in the upper normal range    TTE Tuesday to eval heart function---normal  No other clinical evidence of CHF exacerbation and no known CHF history. I do not feel this is all pulmonary edema. I think she has developed ARDS. See ARDS plans    She is getting much better no O2 requirements, d/cing abx after today. Bipap as tolerated. IS during day, wean steroids as she has no wheezing      COPD exacerbation  Continue DuoNeb treatments 4 times daily  D/c rocephin day 7 and levaquin day 5  Pulmonology consult--added steroids. Dr. Melendez believes this is fat emboli that led to ARDs                Acute blood loss anemia  Transfused 1 unit of packed red blood cells 2/20.  Developed acute decompensation of resp status and transferred to ICU  Hgb stable   Follow CBC  Started prophylactic lovenox 2/20  Wound dressing dry     Acute respiratory failure with hypoxia  Improved  Continue Bipap d/c today to see how she does. We are close to d/c and need to see if she will do well without        VTE Risk Mitigation         Ordered     enoxaparin injection 40 mg  Daily     Route:  Subcutaneous        02/20/17 1131     Place sequential compression device  Until discontinued      02/19/17 1827     Medium Risk of VTE  Once      02/15/17 1550          Tamera Bowen MD  Department of Hospital Medicine   [facility]

## 2017-02-23 NOTE — ASSESSMENT & PLAN NOTE
Continue DuoNeb treatments 4 times daily  D/c rocephin day 7 and levaquin day 5  Pulmonology consult--added steroids. Dr. Melendez believes this is fat emboli that led to ARDs

## 2017-02-23 NOTE — PLAN OF CARE
Problem: Patient Care Overview  Goal: Plan of Care Review  Outcome: Ongoing (interventions implemented as appropriate)  Quiet shift on BIPAP after Ativan administered and tolerated well with HOB elevated. Short arm cast in place with neurovascular check WNL. Telemetry in use. Dressing to left hip with dry drainage and intact. Up with assistance to BSC and tolerated well. Saline patent. Call bell in place and use and bed alarm in use. Plan of care discussed and verbalizes understanding. VICKY Molina

## 2017-02-23 NOTE — PT/OT/SLP PROGRESS
Physical Therapy      Marycarmen Louis  MRN: [id number]    Patient not seen 2/23/2017 a.m. secondary to Nursing care. Will follow-up 2/23/2017 p.pernell.    Rip Cole PT

## 2017-02-23 NOTE — ASSESSMENT & PLAN NOTE
Ortho following  S/p Left bipolar endoprosthetic hemiarthroplasty  Continue physical therapy  Pain well controlled  enoxaprin for DVT prophylaxis  She will need 2 weeks xarelto at d/c  Add ot

## 2017-02-23 NOTE — PLAN OF CARE
Problem: Patient Care Overview  Goal: Plan of Care Review  Outcome: Ongoing (interventions implemented as appropriate)  Vitals remained stable, afebrile. Complained of pain that was relieved with ibuprofen. Ambulating with standby assist. Paul d/c this afternoon, voiding well. O2 removed today. Sating in mid to high 90s. Coughing still productive at times. abx d/c'd. Discussed plan of care, stated understanding

## 2017-02-23 NOTE — PROGRESS NOTES
"Marycarmen Louis is a 71 y.o. female patient.    Active Hospital Problems    Diagnosis  POA    *Closed fracture of left hip [S72.002A]  Yes    Acute respiratory failure with hypoxia [J96.01]  No    Acute blood loss anemia [D62]  No    COPD exacerbation [J44.1]  No    Pulmonary edema [J81.1]  Yes    Osteoporosis [M81.0]  Yes    Left wrist fracture [S62.102A]  Yes    Coronary artery calcification seen on CAT scan [I25.10]  Yes    Degeneration of lumbar or lumbosacral intervertebral disc [M51.37]  Yes      Resolved Hospital Problems    Diagnosis Date Resolved POA    Fever [R50.9] 02/18/2017 No     Temp: 97.5 °F (36.4 °C) (02/23/17 0300)  Pulse: 94 (02/23/17 0732)  Resp: 16 (02/23/17 0732)  BP: (!) 149/62 (02/23/17 0300)  SpO2: 99 % (02/23/17 0732)  Weight: 59 kg (130 lb) (02/14/17 1746)  Height: 5' 4" (162.6 cm) (02/19/17 1541)    Subjective:  Symptoms:  Stable.  She reports shortness of breath, cough and anxiety.  No weakness.    Diet:  Adequate intake.  No nausea or vomiting.    Activity level: Returning to normal.    Pain:  She complains of pain that is moderate.  She reports pain is improving.  Pain is partially controlled.      Objective:  General Appearance:  Ill-appearing.    Vital signs: (most recent): Blood pressure (!) 149/62, pulse 94, temperature 97.5 °F (36.4 °C), temperature source Oral, resp. rate 16, height 5' 4" (1.626 m), weight 59 kg (130 lb), SpO2 99 %, not currently breastfeeding.  Vital signs are normal.    Output: Producing urine and producing stool.    Lungs:  Tachypnea.  Respiratory distress: mild to moderate.  No stridor.  There are wheezes, rhonchi and decreased breath sounds.    Heart: Tachycardia.  S1 normal and S2 normal.  Positive for murmur.  No gallop or friction rub.   Chest: No chest wall tenderness.  Symmetric chest wall expansion.   Extremities: Normal range of motion.  There is dependent edema.    Neurological: Patient is alert and oriented to person, place and time.  " Normal strength.    Skin:  Warm.  No rash, ecchymosis or cyanosis.   Abdomen: Abdomen is soft.  There are no signs of ascites.  Bowel sounds are normal.     Pupils:  Pupils are equal, round, and reactive to light.    Pulses: Distal pulses are intact.      Assessment:  (Asthmatic Bronchitis  Bronchiolitis  ARDS resolving   Fat Emboli resolving   COPD Exacerbation   Volume OverLoad   Anemia ).     Plan:   NIV increase time on BIPAP  Beta agonist   Cxr improving need to follow  Got early Steroids and now  let shower of fatty acids resolve   On .       Reji Melendez MD  2/23/2017

## 2017-02-23 NOTE — SUBJECTIVE & OBJECTIVE
Interval History: see     Review of Systems   Constitutional: Negative for activity change, fatigue, fever and unexpected weight change.   HENT: Negative for congestion, ear pain, hearing loss, rhinorrhea and sore throat.    Eyes: Negative for pain, redness and visual disturbance.   Respiratory: Negative for cough, shortness of breath and wheezing.    Cardiovascular: Negative for chest pain, palpitations and leg swelling.   Gastrointestinal: Negative for abdominal pain, constipation, diarrhea, nausea and vomiting.   Genitourinary: Negative for dysuria, frequency, pelvic pain and urgency.   Musculoskeletal: Negative for back pain, joint swelling and neck pain.        Pain control with tramadol   Skin: Negative for color change, rash and wound.   Neurological: Negative for dizziness, weakness, light-headedness and headaches.   Psychiatric/Behavioral: Positive for dysphoric mood. The patient is nervous/anxious.      Objective:     Vital Signs (Most Recent):  Temp: 97.2 °F (36.2 °C) (02/23/17 0731)  Pulse: 94 (02/23/17 0732)  Resp: 16 (02/23/17 0732)  BP: 130/66 (02/23/17 0731)  SpO2: 99 % (02/23/17 0732) Vital Signs (24h Range):  Temp:  [97.2 °F (36.2 °C)-99 °F (37.2 °C)] 97.2 °F (36.2 °C)  Pulse:  [] 94  Resp:  [16-18] 16  SpO2:  [95 %-100 %] 99 %  BP: (128-149)/(60-70) 130/66     Weight: 59 kg (130 lb)  Body mass index is 22.31 kg/(m^2).    Intake/Output Summary (Last 24 hours) at 02/23/17 0934  Last data filed at 02/23/17 0500   Gross per 24 hour   Intake             1010 ml   Output             4450 ml   Net            -3440 ml      Physical Exam   Constitutional: She is oriented to person, place, and time. She appears well-developed and well-nourished. No distress.   HENT:   Head: Normocephalic and atraumatic.   Right Ear: External ear normal.   Left Ear: External ear normal.   Eyes: Conjunctivae and EOM are normal. Pupils are equal, round, and reactive to light. Right eye exhibits no discharge. Left eye  exhibits no discharge.   Neck: Neck supple. No tracheal deviation present.   Cardiovascular: Normal rate and regular rhythm.  Exam reveals no gallop and no friction rub.    No murmur heard.  Pulmonary/Chest: Effort normal. No respiratory distress. She has no wheezes. She has rales (rales at right lower base only).   Decreased breath sounds in bases b/l   Abdominal: Soft. Bowel sounds are normal. She exhibits no distension. There is no tenderness.   Musculoskeletal: She exhibits no edema.   Cast with hardware to left wrist   Lymphadenopathy:     She has no cervical adenopathy.   Neurological: She is alert and oriented to person, place, and time. No cranial nerve deficit.   Skin: Skin is warm and dry.   Bandage to left hip   Psychiatric: She has a normal mood and affect. Her behavior is normal.   Nursing note and vitals reviewed.      Significant Labs:   CBC:     Recent Labs  Lab 17  0528   WBC 12.12 11.54   HGB 8.8* 8.6*   HCT 26.8* 26.6*   * 499*     CMP:     Recent Labs  Lab 175 17  0528    137   K 4.3 4.3    103   CO2 33* 30*   * 117*   BUN 16 16   CREATININE 0.6 0.6   CALCIUM 8.5* 8.5*   ANIONGAP 6* 4*   EGFRNONAA >60 >60       Significant Imagin2017 CXR There has been no detrimental change in the cardiopulmonary findings as compared to the previous study of 2017.  The heart is normal in size.  The skeletal structures are intact

## 2017-02-23 NOTE — PROGRESS NOTES
Seen on rounds. Continues to show improvement.  PT progressing, continues with excellent ROM and little noticeable pain.

## 2017-02-23 NOTE — ASSESSMENT & PLAN NOTE
Resolved. Lasix was held earlier this admission.  Monitor chest x-rays.  Dr. camacho consulted.  Chest x-ray-was increasing bilateral perihilar and diffuse lung infiltrates bilaterally.  This may represent fat emboli leading to ARDS  After receiving 1 unit of blood, Lasix 40 mg IV push was given.  Patient's BNP is normal (44)  BioZ performed showed a TFC in the upper normal range    TTE Tuesday to eval heart function---normal  No other clinical evidence of CHF exacerbation and no known CHF history. I do not feel this is all pulmonary edema. I think she has developed ARDS. See ARDS plans    She is getting much better no O2 requirements, d/cing abx after today. Bipap as tolerated. IS during day, wean steroids as she has no wheezing

## 2017-02-23 NOTE — ASSESSMENT & PLAN NOTE
Improved  Continue Bipap d/c today to see how she does. We are close to d/c and need to see if she will do well without

## 2017-02-23 NOTE — PT/OT/SLP PROGRESS
"Physical Therapy  Treatment    Marycarmen Louis   MRN: [id number]   Admitting Diagnosis: Closed fracture of left hip    PT Received On: 17  PT Start Time: 1226     PT Stop Time: 1251    PT Total Time (min): 25 min       Billable Minutes:  Gait [id number] minutes and Therapeutic Exercise 10 minutes    Treatment Type: Treatment  PT/PTA: PT             General Precautions: Standard, fall  Orthopedic Precautions: LUE non weight bearing, LLE weight bearing as tolerated (LUE NWB Wrist only)   Braces:  (Hard Cast LUE)    Do you have any cultural, spiritual, Islam conflicts, given your current situation?: None verbalized    Subjective:  Communicated with patient prior to session.  "I want to do more."    Pain Ratin/10  Location - Side: Left     Location: hip  Pain Addressed: Nurse notified  Pain Rating Post-Intervention: 0/10    Objective:   Patient found with: peripheral IV    Functional Mobility:  Bed Mobility:   Rolling/Turning to Left: Stand by assistance  Rolling/Turning Right: Stand by assistance  Scooting/Bridging: Stand by Assistance  Supine to Sit: Stand by Assistance  Sit to Supine: Stand by Assistance    Transfers:  Sit <> Stand Assistance: Contact Guard Assistance  Sit <> Stand Assistive Device: Rolling Walker, Platform  Bed <> Chair Technique: Stand Pivot  Bed <> Chair Assistance: Contact Guard Assistance  Bed <> Chair Assistive Device: Rolling Walker, Platform    Gait:   Gait Distance: 40 feet  Assistance 1: Contact Guard Assistance  Gait Assistive Device: Platform walker left, Rolling walker  Gait Pattern: swing-to gait  Gait Deviation(s): decreased velocity of limb motion, decreased step length, decreased weight-shifting ability, decreased toe-to-floor clearance  Gait Training:  Pt. Amb. 40' with CGA, platform RW.  VC's given to pt. For step placement and A.D. Placement.      Balance:   Static Sit: GOOD-: Takes MODERATE challenges from all directions but inconsistently  Dynamic Sit: GOOD-: " Maintains balance through MODERATE excursions of active trunk movement,     Static Stand: FAIR: Maintains without assist but unable to take challenges  Dynamic stand: FAIR: Needs CONTACT GUARD during gait     Therapeutic Activities and Exercises:  There. Ex.:  Pt. Performed gentle A/A exercises of LLE while supine in bed, for N.M. Tone and strength and fluid dynamics. 1-on-1 LLE HC stretches performed with pt.  LLE: ankle pumps, quad sets, glut sets, heel slides, SAQ (2x10) performed with pt.     AM-PAC 6 CLICK MOBILITY  How much help from another person does this patient currently need?   1 = Unable, Total/Dependent Assistance  2 = A lot, Maximum/Moderate Assistance  3 = A little, Minimum/Contact Guard/Supervision  4 = None, Modified [place]/Independent    Turning over in bed (including adjusting bedclothes, sheets and blankets)?: 3  Sitting down on and standing up from a chair with arms (e.g., wheelchair, bedside commode, etc.): 3  Moving from lying on back to sitting on the side of the bed?: 3  Moving to and from a bed to a chair (including a wheelchair)?: 3  Need to walk in hospital room?: 3  Climbing 3-5 steps with a railing?: 1  Total Score: 16    AM-PAC Raw Score CMS G-Code Modifier Level of Impairment Assistance   6 % Total / Unable   7 - 9 CM 80 - 100% Maximal Assist   10 - 14 CL 60 - 80% Moderate Assist   15 - 19 CK 40 - 60% Moderate Assist   20 - 22 CJ 20 - 40% Minimal Assist   23 CI 1-20% SBA / CGA   24 CH 0% Independent/ Mod I     Patient left HOB elevated with all lines intact, call button in reach and RN notified.    Assessment:  Marycarmen Louis is a 71 y.o. female with a medical diagnosis of Closed fracture of left hip and presents with increased distance with gait training.  Pt. Is progressing towards PT POC goals.    Rehab identified problem list/impairments: Rehab identified problem list/impairments: weakness, impaired balance, impaired endurance, impaired functional mobilty, gait  instability, decreased lower extremity function    Rehab potential is fair.    Activity tolerance: Fair    Discharge recommendations: Discharge Facility/Level Of Care Needs: rehabilitation facility     Barriers to discharge: Barriers to Discharge: None    Equipment recommendations: Equipment Needed After Discharge: walker, rolling     GOALS:   Physical Therapy Goals        Problem: Physical Therapy Goal    Goal Priority Disciplines Outcome Goal Variances Interventions   Physical Therapy Goal     PT/OT, PT Unable to achieve outcome(s) by discharge     Description:  Goals to be met by: 3/10/2017     Patient will increase functional independence with mobility by performin. Supine to sit with Stand-by Assistance  2. Sit to supine with Stand-by Assistance  3. Sit to stand transfer with Stand-by Assistance  4. Bed to chair transfer with Stand-by Assistance using Platform walker  5. Gait  x 50 feet with Contact Guard Assistance using Platform walker.              Problem: Physical Therapy Goal    Goal Priority Disciplines Outcome Goal Variances Interventions   Physical Therapy Goal     PT/OT, PT      Description:  Goals to be met by: 3/11/2017     Patient will increase functional independence with mobility by performin. Supine to sit with Stand-by Assistance  2. Sit to supine with Stand-by Assistance  3. Rolling to Left and Right with Stand-by Assistance.  4. Sit to stand transfer with Contact Guard Assistance, with Platform Walker  5. Bed to chair transfer with Contact Guard Assistance using Platform walker  6. Gait  x 50 feet with Contact Guard Assistance using Platform walker.   7. [place] with Home Exercise Program.                PLAN:    Patient to be seen  (1-2x/day(M-F); PRN(Sat.,Sun.))  to address the above listed problems via gait training, therapeutic activities, therapeutic exercises  Plan of Care expires:  (Upon D/C from facility)  Plan of Care reviewed with: patient, family         Luke  Douglas, PT  02/23/2017

## 2017-02-23 NOTE — NURSING
Report received and assessment completed. Stable condition. O2 per N/C at 2 liters and sats high 90's and tolerating well with HOB elevated. Congested non productive cough. Dressing left hip with old dried drainage. Short arm cast left forearm and hand with good neurovascular checks and warmth. Telemetry in place. Call bell vera reach and use. Plan of care discussed and verbalizes understanding. VICKY Molina

## 2017-02-24 LAB
ANION GAP SERPL CALC-SCNC: 6 MMOL/L
ANISOCYTOSIS BLD QL SMEAR: SLIGHT
BASOPHILS # BLD AUTO: ABNORMAL K/UL
BASOPHILS NFR BLD: 0 %
BNP SERPL-MCNC: <10 PG/ML
BUN SERPL-MCNC: 18 MG/DL
CALCIUM SERPL-MCNC: 8.3 MG/DL
CHLORIDE SERPL-SCNC: 102 MMOL/L
CO2 SERPL-SCNC: 30 MMOL/L
CREAT SERPL-MCNC: 0.6 MG/DL
DIFFERENTIAL METHOD: ABNORMAL
EOSINOPHIL # BLD AUTO: ABNORMAL K/UL
EOSINOPHIL NFR BLD: 5 %
ERYTHROCYTE [DISTWIDTH] IN BLOOD BY AUTOMATED COUNT: 13.8 %
EST. GFR  (AFRICAN AMERICAN): >60 ML/MIN/1.73 M^2
EST. GFR  (NON AFRICAN AMERICAN): >60 ML/MIN/1.73 M^2
GLUCOSE SERPL-MCNC: 87 MG/DL
HCT VFR BLD AUTO: 28.2 %
HGB BLD-MCNC: 9 G/DL
LYMPHOCYTES # BLD AUTO: ABNORMAL K/UL
LYMPHOCYTES NFR BLD: 34 %
MCH RBC QN AUTO: 30.4 PG
MCHC RBC AUTO-ENTMCNC: 31.9 %
MCV RBC AUTO: 95 FL
METAMYELOCYTES NFR BLD MANUAL: 1 %
MONOCYTES # BLD AUTO: ABNORMAL K/UL
MONOCYTES NFR BLD: 2 %
MYELOCYTES NFR BLD MANUAL: 2 %
NEUTROPHILS NFR BLD: 55 %
NEUTS BAND NFR BLD MANUAL: 1 %
PLATELET # BLD AUTO: 561 K/UL
PMV BLD AUTO: 8.5 FL
POTASSIUM SERPL-SCNC: 3.9 MMOL/L
RBC # BLD AUTO: 2.96 M/UL
SODIUM SERPL-SCNC: 138 MMOL/L
WBC # BLD AUTO: 12.27 K/UL

## 2017-02-24 PROCEDURE — 27000221 HC OXYGEN, UP TO 24 HOURS

## 2017-02-24 PROCEDURE — 80048 BASIC METABOLIC PNL TOTAL CA: CPT

## 2017-02-24 PROCEDURE — 83880 ASSAY OF NATRIURETIC PEPTIDE: CPT

## 2017-02-24 PROCEDURE — 94640 AIRWAY INHALATION TREATMENT: CPT

## 2017-02-24 PROCEDURE — 85007 BL SMEAR W/DIFF WBC COUNT: CPT

## 2017-02-24 PROCEDURE — 25000242 PHARM REV CODE 250 ALT 637 W/ HCPCS: Performed by: FAMILY MEDICINE

## 2017-02-24 PROCEDURE — 85027 COMPLETE CBC AUTOMATED: CPT

## 2017-02-24 PROCEDURE — 27200120 HC KIT IV START (RUSH ONLY)

## 2017-02-24 PROCEDURE — 97110 THERAPEUTIC EXERCISES: CPT

## 2017-02-24 PROCEDURE — 27000339 *HC DAILY SUPPLY KIT

## 2017-02-24 PROCEDURE — 63600175 PHARM REV CODE 636 W HCPCS: Performed by: INTERNAL MEDICINE

## 2017-02-24 PROCEDURE — 11000001 HC ACUTE MED/SURG PRIVATE ROOM

## 2017-02-24 PROCEDURE — 63600175 PHARM REV CODE 636 W HCPCS: Performed by: FAMILY MEDICINE

## 2017-02-24 PROCEDURE — 94761 N-INVAS EAR/PLS OXIMETRY MLT: CPT

## 2017-02-24 PROCEDURE — 25000003 PHARM REV CODE 250: Performed by: SURGERY

## 2017-02-24 PROCEDURE — 25000003 PHARM REV CODE 250: Performed by: ORTHOPAEDIC SURGERY

## 2017-02-24 PROCEDURE — 94660 CPAP INITIATION&MGMT: CPT

## 2017-02-24 PROCEDURE — 97116 GAIT TRAINING THERAPY: CPT

## 2017-02-24 PROCEDURE — 99232 SBSQ HOSP IP/OBS MODERATE 35: CPT | Mod: ,,, | Performed by: INTERNAL MEDICINE

## 2017-02-24 PROCEDURE — 63600175 PHARM REV CODE 636 W HCPCS: Performed by: SURGERY

## 2017-02-24 PROCEDURE — 25000003 PHARM REV CODE 250: Performed by: FAMILY MEDICINE

## 2017-02-24 PROCEDURE — 36415 COLL VENOUS BLD VENIPUNCTURE: CPT

## 2017-02-24 RX ADMIN — ONDANSETRON 4 MG: 2 INJECTION INTRAMUSCULAR; INTRAVENOUS at 10:02

## 2017-02-24 RX ADMIN — IBUPROFEN 600 MG: 600 TABLET ORAL at 12:02

## 2017-02-24 RX ADMIN — PANTOPRAZOLE SODIUM 40 MG: 40 TABLET, DELAYED RELEASE ORAL at 10:02

## 2017-02-24 RX ADMIN — DOCUSATE SODIUM AND SENNOSIDES 1 TABLET: 8.6; 5 TABLET, FILM COATED ORAL at 09:02

## 2017-02-24 RX ADMIN — IPRATROPIUM BROMIDE AND ALBUTEROL SULFATE 3 ML: .5; 3 SOLUTION RESPIRATORY (INHALATION) at 01:02

## 2017-02-24 RX ADMIN — SODIUM CHLORIDE, PRESERVATIVE FREE 3 ML: 5 INJECTION INTRAVENOUS at 05:02

## 2017-02-24 RX ADMIN — SODIUM CHLORIDE, PRESERVATIVE FREE 3 ML: 5 INJECTION INTRAVENOUS at 09:02

## 2017-02-24 RX ADMIN — VENLAFAXINE HYDROCHLORIDE 75 MG: 75 CAPSULE, EXTENDED RELEASE ORAL at 10:02

## 2017-02-24 RX ADMIN — IBUPROFEN 600 MG: 600 TABLET ORAL at 09:02

## 2017-02-24 RX ADMIN — LORAZEPAM 0.5 MG: 2 INJECTION INTRAMUSCULAR; INTRAVENOUS at 09:02

## 2017-02-24 RX ADMIN — TRAMADOL HYDROCHLORIDE 50 MG: 50 TABLET, COATED ORAL at 10:02

## 2017-02-24 RX ADMIN — TRAMADOL HYDROCHLORIDE 50 MG: 50 TABLET, COATED ORAL at 04:02

## 2017-02-24 RX ADMIN — IPRATROPIUM BROMIDE AND ALBUTEROL SULFATE 3 ML: .5; 3 SOLUTION RESPIRATORY (INHALATION) at 07:02

## 2017-02-24 RX ADMIN — ENOXAPARIN SODIUM 40 MG: 40 INJECTION, SOLUTION INTRAVENOUS; SUBCUTANEOUS at 12:02

## 2017-02-24 RX ADMIN — LORAZEPAM 0.5 MG: 2 INJECTION INTRAMUSCULAR; INTRAVENOUS at 01:02

## 2017-02-24 RX ADMIN — DOCUSATE SODIUM AND SENNOSIDES 1 TABLET: 8.6; 5 TABLET, FILM COATED ORAL at 10:02

## 2017-02-24 RX ADMIN — SODIUM CHLORIDE, PRESERVATIVE FREE 3 ML: 5 INJECTION INTRAVENOUS at 04:02

## 2017-02-24 NOTE — PROGRESS NOTES
[facility] Hospital Medicine  Progress Note    Patient Name: Marycarmen Louis  MRN: [id number]  Patient Class: IP- Inpatient   Admission Date: 2/14/2017  Length of Stay: 10 days  Attending Physician: Jose Jaeger MD  Primary Care Provider: Willie Santos MD (Inactive)        Subjective:     Principal Problem:Closed fracture of left hip    HPI:  70 y/o female reports she fell off of gym apparatus. Reports falling after missing step. ER w/up shows left wrist fracture and left hip fracture. Reports feeling fine prior to fall yesterday. No fever prior to this am.    Hospital Course:  She has surgery yesterday right lateral decubitus. Was given IVF. Has slight fluid overload caused resp distress. Hypoxic. CXR showed right lung infiltrate. She had no s/s of pneumonia and clear  X ray prior to surgery. Given dose of lasix and improved well. She was moved  From ICU to floor. Did well over night. PT to start this am. Still on O2 today 4L NC 96%    2/17/17-patient still becomes hypoxic when oxygen is removed.  CTA of the chest and lungs were done today that were negative for pulmonary embolism.  She did have significant emphysematous changes and lower lobe infiltrates and atelectasis bilaterally.  She probably has an exacerbation of COPD probably from perioperative intubation.  DuoNeb treatments, Rocephin, azithromycin started.  She seems be doing better.    2/18.  Patient evaluated this morning.  She was working with physical therapy and able to ambulate across the room with assistance.  She does feel short of breath with exertion.  Her hemoglobin is now 8.2.  Dr. Melendez consulted.  Her sats still drop when she takes her oxygen off.  Patient had a large amount of bloody drainage.  I stopped her Xarelto today for now until her hemoglobin stabilizes.    2/18 patient never received blood.  Pulmonologist wanted to wait in x-ray today because he felt she may be fluid overloaded.  Chest x-ray is  starting to look a little worse.  She has bilateral infiltrates consistent with bilateral perihilar and diffuse lung infiltrates bilaterally.  This may represent a atypical/viral pneumonia or ARDS.  Today her hemoglobin is 7.8 and everybody agrees that she could benefit from one unit of blood.  Her Xarelto has been discontinued.  Patient is ambulating with therapy.  She does have dyspnea with exertion.  She is on 4 L nasal cannula and her O2 sats are 92%.    2/20  Yesterday patient had acute hypoxic episode with sats into the 50% after receiving 1 unit of blood. Transferred to ICU on BiPaP. Got Lasix 40mg IV x1. Maintaining sats in 90% on BipaP with 35% FiO2, sats drop to <80% within 1 minutes of coming off BiPaP. However, even with the hypoxia this AM she does not report feeling SOB, no use of accessory muscles, speaking in complete sentences. CXR has been worsneing, looks like b/l infiltrates consistent with ARDS. Pain is well controlled from surgery. She is not on any anticoagulation s/p surgery due to bleeding and worsening H/H. She had CTA which was negative for PE. She is on levaquin and Ceftriaxone for possible PNA (there was some question of aspiration after her surgery). She was also started on steroids.     2/21/17  She is still on BIPAP  She is on steroids;  Nebs   She diuresed with lasix  She is still in ventimax  Her HH is stable  Dressing is dry   Echo was normal   She is levaquin and rocephin   Temp 99    2/22/17  She is weaned down to 3liters Nc while eating but hypoxic(88)  Still requiring bipap  Getting OOB to chair with PT      2/23/17 She still has dick, bladder training started today and will d/c after. She has gotten out of bed with OT. She has no fever and no elevated WBC. She is 7 days with rocephin and 5 days levaquin today. She is feeling great today just ready to get out of here. No weakness, no SOB/CP. No more O2 requirement     2/24/17 As of this am, o2 at 88%. Pt is ready to go home.  Reports she didn't wear bipap last night and now regrets it now that respiratory status is not as well as it was yesterday.           Interval History: see     Review of Systems   Constitutional: Negative for activity change, fatigue, fever and unexpected weight change.   HENT: Negative for congestion, ear pain, hearing loss, rhinorrhea and sore throat.    Eyes: Negative for pain, redness and visual disturbance.   Respiratory: Negative for cough, shortness of breath and wheezing.    Cardiovascular: Negative for chest pain, palpitations and leg swelling.   Gastrointestinal: Negative for abdominal pain, constipation, diarrhea, nausea and vomiting.   Genitourinary: Negative for dysuria, frequency, pelvic pain and urgency.   Musculoskeletal: Negative for back pain, joint swelling and neck pain.        Pain control with tramadol   Skin: Negative for color change, rash and wound.   Neurological: Negative for dizziness, weakness, light-headedness and headaches.   Psychiatric/Behavioral: Positive for dysphoric mood (reports depressed about not going home). The patient is nervous/anxious.      Objective:     Vital Signs (Most Recent):  Temp: 97.4 °F (36.3 °C) (02/24/17 0815)  Pulse: 110 (02/24/17 0815)  Resp: 18 (02/24/17 0815)  BP: (!) 104/53 (02/24/17 0815)  SpO2: (!) 93 % (02/24/17 0830) Vital Signs (24h Range):  Temp:  [97.4 °F (36.3 °C)-99.3 °F (37.4 °C)] 97.4 °F (36.3 °C)  Pulse:  [] 110  Resp:  [16-19] 18  SpO2:  [93 %-98 %] 93 %  BP: (104-145)/(53-72) 104/53     Weight: 59 kg (130 lb)  Body mass index is 22.31 kg/(m^2).    Intake/Output Summary (Last 24 hours) at 02/24/17 1035  Last data filed at 02/24/17 0800   Gross per 24 hour   Intake              240 ml   Output             3200 ml   Net            -2960 ml      Physical Exam   Constitutional: She is oriented to person, place, and time. She appears well-developed and well-nourished. No distress.   HENT:   Head: Normocephalic and atraumatic.   Right Ear:  External ear normal.   Left Ear: External ear normal.   Eyes: Conjunctivae and EOM are normal. Pupils are equal, round, and reactive to light. Right eye exhibits no discharge. Left eye exhibits no discharge.   Neck: Neck supple. No tracheal deviation present.   Cardiovascular: Normal rate and regular rhythm.  Exam reveals no gallop and no friction rub.    No murmur heard.  Pulmonary/Chest: Effort normal. No respiratory distress. She has no wheezes. She has rales (rales at right lower base only).   Decreased breath sounds in bases b/l   Abdominal: Soft. Bowel sounds are normal. She exhibits no distension. There is no tenderness.   Musculoskeletal: She exhibits no edema.   Cast with hardware to left wrist   Lymphadenopathy:     She has no cervical adenopathy.   Neurological: She is alert and oriented to person, place, and time. No cranial nerve deficit.   Skin: Skin is warm and dry.   Bandage to left hip   Psychiatric: She has a normal mood and affect. Her behavior is normal.   Nursing note and vitals reviewed.      Significant Labs:   CBC:     Recent Labs  Lab 17  0528 17  0429   WBC 11.54 12.27   HGB 8.6* 9.0*   HCT 26.6* 28.2*   * 561*     CMP:     Recent Labs  Lab 17  0528 17  0429    138   K 4.3 3.9    102   CO2 30* 30*   * 87   BUN 16 18   CREATININE 0.6 0.6   CALCIUM 8.5* 8.3*   ANIONGAP 4* 6*   EGFRNONAA >60 >60       Significant Imagin2017 CXR There has been no detrimental change in the cardiopulmonary findings as compared to the previous study of 2017.  The heart is normal in size.  The skeletal structures are intact    Assessment/Plan:      * Closed fracture of left hip  Ortho following  S/p Left bipolar endoprosthetic hemiarthroplasty  Continue physical therapy  Pain well controlled  enoxaprin for DVT prophylaxis  She will need 2 weeks xarelto at d/c  Add ot  Has gotten up and ambulated 40' with PT      Degeneration of lumbar or lumbosacral  intervertebral disc        Coronary artery calcification seen on CAT scan  Cardiology consulted.  No changes at this time.  Can be followed further as outpatient  No chest pain, no signs of ACS this admission      Left wrist fracture  Ortho follwing  S/p closed reduction and percutaneous distraction pinning of the left distal radius.  Continue physical therapy  Pain well controlled    Osteoporosis  Failed fosamax   Will need forteo x 2 years at discharge       Pulmonary edema   Resolved. Lasix was held earlier this admission.  Monitor chest x-rays.  Dr. melendez consulted.  Chest x-ray-was increasing bilateral perihilar and diffuse lung infiltrates bilaterally.  This may represent fat emboli leading to ARDS  After receiving 1 unit of blood, Lasix 40 mg IV push was given.  Patient's BNP is normal (44)  BioZ performed showed a TFC in the upper normal range    TTE Tuesday to eval heart function---normal  No other clinical evidence of CHF exacerbation and no known CHF history. I do not feel this is all pulmonary edema. I think she has developed ARDS. See ARDS plans    Pt had no o2 requirements yesterday and a little more sob this am with productive mucous after not wearing bipap. Needs to wear bipap for a couple more nights. Continue oxygen for now.        COPD exacerbation  Continue DuoNeb treatments 4 times daily  D/c rocephin day 7 and levaquin day 5  Pulmonology consult--added steroids. 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                 Acute blood loss anemia  Transfused 1 unit of packed red blood cells 2/20.  Developed acute decompensation of resp status and transferred to ICU  Hgb stable   Follow CBC  Started prophylactic lovenox 2/20  Wound dressing dry     Acute respiratory failure with hypoxia  Improved  Was doing well until no bipap last night, hopefully d/c on Monday.         VTE Risk Mitigation         Ordered      enoxaparin injection 40 mg  Daily     Route:  Subcutaneous        02/20/17 1131     Place sequential compression device  Until discontinued      02/19/17 1827     Medium Risk of VTE  Once      02/15/17 1550          Paul Higgins MD  Department of Hospital Medicine   [facility]

## 2017-02-24 NOTE — PLAN OF CARE
Problem: Patient Care Overview  Goal: Plan of Care Review  Outcome: Ongoing (interventions implemented as appropriate)  Nutrition Recommendation/Intervention:   Continue Low Sodium diet as tolerated encouraging good po intake  Goals: adequate po intake >=75%  Nutrition Goal Status: new  Communication of RD Recs: other (comment) (note)     Nutrition Discharge Planning: Pt to consume and tolerate >=75% of meals.

## 2017-02-24 NOTE — PROGRESS NOTES
Bedside report received from VICKY [place]. Patient lying in bed with oxygen per nasal cannula. Dressing to left hip dry and intact. Short arm cast intact to left wrist. No distress noted.

## 2017-02-24 NOTE — PLAN OF CARE
Problem: Patient Care Overview  Goal: Plan of Care Review  Outcome: Ongoing (interventions implemented as appropriate)  Pain controlled with oral medication. Patient up in chair and out to sit in lobby today. Ambulating with specialized walker and with physical therapy. Up to bedside commode without difficulty. One episode of nausea relieved w zofran. Dressing changed to left hip. Staples clean and intact with no drainage. Short arm cast to left wrist dry and intact. Neurovascular checks WDL. Traction bed continued. Lovenox continued. Tolerating room air throughout the day. Free of falls/injury. No distress this shift. Patient understands plan of care and agrees.

## 2017-02-24 NOTE — PROGRESS NOTES
"Marycarmen Louis is a 71 y.o. female patient.    Active Hospital Problems    Diagnosis  POA    *Closed fracture of left hip [S72.002A]  Yes    Acute respiratory failure with hypoxia [J96.01]  No    Acute blood loss anemia [D62]  No    COPD exacerbation [J44.1]  No    Pulmonary edema [J81.1]  Yes    Osteoporosis [M81.0]  Yes    Left wrist fracture [S62.102A]  Yes    Coronary artery calcification seen on CAT scan [I25.10]  Yes    Degeneration of lumbar or lumbosacral intervertebral disc [M51.37]  Yes      Resolved Hospital Problems    Diagnosis Date Resolved POA    Fever [R50.9] 02/18/2017 No     Temp: 97.4 °F (36.3 °C) (02/24/17 0815)  Pulse: 110 (02/24/17 0815)  Resp: 18 (02/24/17 0815)  BP: (!) 104/53 (02/24/17 0815)  SpO2: (!) 93 % (02/24/17 0830)  Weight: 59 kg (130 lb) (02/14/17 1746)  Height: 5' 4" (162.6 cm) (02/19/17 1541)    Subjective  Objective:  Vital signs: (most recent): Blood pressure (!) 104/53, pulse 110, temperature 97.4 °F (36.3 °C), temperature source Oral, resp. rate 18, height 5' 4" (1.626 m), weight 59 kg (130 lb), SpO2 (!) 93 %, not currently breastfeeding.      Assessment:  (ARDS secondary to Fat emboli and trauma   Acute Hypoxic Respiratory failure(Now 88% of O2)  COPD  Fracture Left Hip and Forearm ).     Plan:   Ambulate OOB  On lovenox .       Reji Melendez MD  2/24/2017    "

## 2017-02-24 NOTE — PT/OT/SLP PROGRESS
"Occupational Therapy  Treatment    Marycarmen Louis   MRN: [id number]   Admitting Diagnosis: Closed fracture of left hip    OT Date of Treatment: 17   OT Start Time: 1154  OT Stop Time: 1204  OT Total Time (min): 10 min    Billable Minutes:  Therapeutic Exercise 10 min    General Precautions: Standard, fall  Orthopedic Precautions: LLE weight bearing as tolerated, LUE non weight bearing  Braces:      Do you have any cultural, spiritual, Spiritism conflicts, given your current situation?: none voiced    Subjective:  Communicated with nsg prior to session.  " I'm having a bad day, I'm nauseous."    Pain Ratin/10              Pain Rating Post-Intervention: 0/10    Objective:  Patient found with: peripheral IV      Therapeutic Activities and Exercises:  PROM to digits x 10 reps; 0# Tendon glides x 10 reps to increase tendon excursion and maintain ROM; Ball roll to increase dexterity x 10 reps    AM-PAC 6 CLICK ADL   How much help from another person does this patient currently need?   1 = Unable, Total/Dependent Assistance  2 = A lot, Maximum/Moderate Assistance  3 = A little, Minimum/Contact Guard/Supervision  4 = None, Modified [place]/Independent          AM-PAC Raw Score CMS G-Code Modifier Level of Impairment Assistance   6 % Total / Unable   7 - 9 CM 80 - 100% Maximal Assist   10 - 14 CL 60 - 80% Moderate Assist   15 - 19 CK 40 - 60% Moderate Assist   20 - 22 CJ 20 - 40% Minimal Assist   23 CI 1-20% SBA / CGA   24 CH 0% Independent/ Mod I     Patient left HOB elevated with all lines intact and call button in reach    ASSESSMENT:  Marycarmen oLuis is a 71 y.o. female with a medical diagnosis of Closed fracture of left hip.  Pt tolerated tx well, and left with RED theraputty ball to complete dexterity exercises throughout the day.  Pt voices understanding.    Rehab identified problem list/impairments: Rehab identified problem list/impairments: impaired coordination, impaired fine motor    Rehab " potential is excellent.    Activity tolerance: Good    Discharge recommendations: Discharge Facility/Level Of Care Needs: outpatient OT     Barriers to discharge: Barriers to Discharge: None    Equipment recommendations: none     GOALS:   Occupational Therapy Goals        Problem: Occupational Therapy Goal    Goal Priority Disciplines Outcome Interventions   Occupational Therapy Goal     OT, PT/OT Ongoing (interventions implemented as appropriate)    Description:  Goals to be met by: d/c from facility    Patient will increase functional independence with ADLs by performing:    Pt will perform palm to tip, while maintaining to objects in her palm by time of d/c to increase fine motor coordination.                 Plan:  Patient to be seen 2 x/week, 3 x/week to address the above listed problems via therapeutic activities, therapeutic exercises  Plan of Care expires:  (upon d/c)  Plan of Care reviewed with: patient         Sarah Matias, OT  02/24/2017

## 2017-02-24 NOTE — PT/OT/SLP PROGRESS
"Physical Therapy  Treatment    Marycarmen Louis   MRN: [id number]   Admitting Diagnosis: Closed fracture of left hip    PT Received On: 17  PT Start Time: 1220     PT Stop Time: 1236    PT Total Time (min): 16 min       Billable Minutes:  Gait [id number] minutes    Treatment Type: Treatment  PT/PTA: PT             General Precautions: Standard, fall  Orthopedic Precautions: LLE weight bearing as tolerated, LUE non weight bearing (LUE NWB wrist only)   Braces:  (LUE hard cast)    Do you have any cultural, spiritual, Orthodox conflicts, given your current situation?: None verbalized    Subjective:  Communicated with patient prior to session.  "I'll try to walk."    Pain Ratin/10  Location - Side: Left  Location - Orientation: generalized  Location: hip  Pain Addressed: Nurse notified  Pain Rating Post-Intervention: 2/10    Objective:   Patient found with: peripheral IV    Functional Mobility:  Bed Mobility:   Rolling/Turning to Left: Stand by assistance  Rolling/Turning Right: Stand by assistance  Scooting/Bridging: Stand by Assistance  Supine to Sit: Stand by Assistance  Sit to Supine: Stand by Assistance    Transfers:  Sit <> Stand Assistance: Stand By Assistance  Sit <> Stand Assistive Device: Rolling Walker, Platform  Bed <> Chair Technique: Stand Pivot  Bed <> Chair Assistance: Stand By Assistance  Bed <> Chair Assistive Device: Rolling Walker, Platform    Gait:   Gait Distance: 55 feet  Assistance 1: Contact Guard Assistance  Gait Assistive Device: Platform walker left, Rolling walker  Gait Pattern: swing-to gait  Gait Deviation(s): decreased velocity of limb motion, decreased step length, decreased weight-shifting ability, decreased toe-to-floor clearance  Gait Training: Pt. Amb. 55' with platform RW, CGA.  VC's given to pt. For step placement and A.D. Placement.      Balance:   Static Sit: GOOD: Takes MODERATE challenges from all directions  Dynamic Sit: GOOD-: Maintains balance through MODERATE " excursions of active trunk movement,     Static Stand: FAIR+: Takes MINIMAL challenges from all directions  Dynamic stand: FAIR: Needs CONTACT GUARD during gait        AM-PAC 6 CLICK MOBILITY  How much help from another person does this patient currently need?   1 = Unable, Total/Dependent Assistance  2 = A lot, Maximum/Moderate Assistance  3 = A little, Minimum/Contact Guard/Supervision  4 = None, Modified [place]/Independent    Turning over in bed (including adjusting bedclothes, sheets and blankets)?: 3  Sitting down on and standing up from a chair with arms (e.g., wheelchair, bedside commode, etc.): 3  Moving from lying on back to sitting on the side of the bed?: 3  Moving to and from a bed to a chair (including a wheelchair)?: 3  Need to walk in hospital room?: 3  Climbing 3-5 steps with a railing?: 1  Total Score: 16    AM-PAC Raw Score CMS G-Code Modifier Level of Impairment Assistance   6 % Total / Unable   7 - 9 CM 80 - 100% Maximal Assist   10 - 14 CL 60 - 80% Moderate Assist   15 - 19 CK 40 - 60% Moderate Assist   20 - 22 CJ 20 - 40% Minimal Assist   23 CI 1-20% SBA / CGA   24 CH 0% Independent/ Mod I     Patient left up in chair with all lines intact, call button in reach and RN notified.    Assessment:  Marycarmen Louis is a 71 y.o. female with a medical diagnosis of Closed fracture of left hip and presents with increased distance with gait training.  Pt. Is progressing towards PT POC goals.      Rehab identified problem list/impairments: Rehab identified problem list/impairments: weakness, impaired balance, impaired endurance, impaired functional mobilty, gait instability, decreased lower extremity function, orthopedic precautions, pain    Rehab potential is fair.    Activity tolerance: Fair    Discharge recommendations: Discharge Facility/Level Of Care Needs: home, home health PT     Barriers to discharge: Barriers to Discharge: None    Equipment recommendations: Equipment Needed After  Discharge: none     GOALS:   Physical Therapy Goals        Problem: Physical Therapy Goal    Goal Priority Disciplines Outcome Goal Variances Interventions   Physical Therapy Goal     PT/OT, PT Unable to achieve outcome(s) by discharge     Description:  Goals to be met by: 3/10/2017     Patient will increase functional independence with mobility by performin. Supine to sit with Stand-by Assistance  2. Sit to supine with Stand-by Assistance  3. Sit to stand transfer with Stand-by Assistance  4. Bed to chair transfer with Stand-by Assistance using Platform walker  5. Gait  x 50 feet with Contact Guard Assistance using Platform walker.              Problem: Physical Therapy Goal    Goal Priority Disciplines Outcome Goal Variances Interventions   Physical Therapy Goal     PT/OT, PT      Description:  Goals to be met by: 3/11/2017     Patient will increase functional independence with mobility by performin. Supine to sit with Stand-by Assistance  2. Sit to supine with Stand-by Assistance  3. Rolling to Left and Right with Stand-by Assistance.  4. Sit to stand transfer with Contact Guard Assistance, with Platform Walker  5. Bed to chair transfer with Contact Guard Assistance using Platform walker  6. Gait  x 50 feet with Contact Guard Assistance using Platform walker.   7. [place] with Home Exercise Program.                PLAN:    Patient to be seen  (1-2x/day(M-F); PRN(Sat.,Sun.))  to address the above listed problems via gait training, therapeutic activities, therapeutic exercises  Plan of Care expires:  (Upon D/C from facility)  Plan of Care reviewed with: patient         Rip Cole, PT  2017

## 2017-02-24 NOTE — ASSESSMENT & PLAN NOTE
Resolved. Lasix was held earlier this admission.  Monitor chest x-rays.  Dr. camacho consulted.  Chest x-ray-was increasing bilateral perihilar and diffuse lung infiltrates bilaterally.  This may represent fat emboli leading to ARDS  After receiving 1 unit of blood, Lasix 40 mg IV push was given.  Patient's BNP is normal (44)  BioZ performed showed a TFC in the upper normal range    TTE Tuesday to eval heart function---normal  No other clinical evidence of CHF exacerbation and no known CHF history. I do not feel this is all pulmonary edema. I think she has developed ARDS. See ARDS plans    Pt had no o2 requirements yesterday and a little more sob this am with productive mucous after not wearing bipap. Needs to wear bipap for a couple more nights. Continue oxygen for now.

## 2017-02-24 NOTE — PT/OT/SLP PROGRESS
Physical Therapy      Marycarmen Louis  MRN: [id number]    Patient not seen 2/24/2017 a.m. secondary to Patient unwilling to participate, Patient ill (Comment) (Pt. had c/o nausea). Will follow-up 2/24/2017 p.m.    Rip Cole, PT

## 2017-02-24 NOTE — PROGRESS NOTES
Seen on rounds.  Back on nasal O2.  Not happy.  PT doing well.  OT told her they could not help the left hand??? We talked about Rehab. I don't think she needs that, maybe swing, especially if it is for respiratory reasons.

## 2017-02-24 NOTE — PLAN OF CARE
Problem: Physical Therapy Goal  Goal: Physical Therapy Goal  Goals to be met by: 3/11/2017     Patient will increase functional independence with mobility by performin. Supine to sit with Stand-by Assistance  2. Sit to supine with Stand-by Assistance  3. Rolling to Left and Right with Stand-by Assistance.  4. Sit to stand transfer with Contact Guard Assistance, with Platform Walker  5. Bed to chair transfer with Contact Guard Assistance using Platform walker  6. Gait x 75 feet with Contact Guard Assistance using Platform walker.   7. [place] with Home Exercise Program.   Outcome: Revised  PT gait goal modified secondary to pt. Progress with PT.    Comments:   PT gait goal modified secondary to pt. Progress with PT.

## 2017-02-24 NOTE — ASSESSMENT & PLAN NOTE
Ortho following  S/p Left bipolar endoprosthetic hemiarthroplasty  Continue physical therapy  Pain well controlled  enoxaprin for DVT prophylaxis  She will need 2 weeks xarelto at d/c  Add ot  Has gotten up and ambulated 40' with PT

## 2017-02-24 NOTE — NURSING
Report received and assessment completed. Stable condition. Room air and tolerating well. HOB elevated. Non productive cough. Dressing to left hip dry and intact. Short arm cast to left hand/arm in tact with neurovascular checks WNL. Up to BSC with assistance. Plan of care discussed and verbalizes understanding. VICKY Molina

## 2017-02-24 NOTE — PROGRESS NOTES
"Marycarmen Louis is a 71 y.o. female patient.    Active Hospital Problems    Diagnosis  POA    *Closed fracture of left hip [S72.002A]  Yes    Acute respiratory failure with hypoxia [J96.01]  No    Acute blood loss anemia [D62]  No    COPD exacerbation [J44.1]  No    Pulmonary edema [J81.1]  Yes    Osteoporosis [M81.0]  Yes    Left wrist fracture [S62.102A]  Yes    Coronary artery calcification seen on CAT scan [I25.10]  Yes    Degeneration of lumbar or lumbosacral intervertebral disc [M51.37]  Yes      Resolved Hospital Problems    Diagnosis Date Resolved POA    Fever [R50.9] 02/18/2017 No     Temp: 98.2 °F (36.8 °C) (02/24/17 0400)  Pulse: 91 (02/24/17 0744)  Resp: 19 (02/24/17 0744)  BP: 121/62 (02/24/17 0400)  SpO2: (!) 94 % (02/24/17 0744)  Weight: 59 kg (130 lb) (02/14/17 1746)  Height: 5' 4" (162.6 cm) (02/19/17 1541)    Subjective  Objective  Assessment:  (ARDS secondary to Fat emboli and trauma   Acute Hypoxic Respiratory failure(Now 88% of O2)  COPD  Fracture Left Hip and Forearm ).     Plan:   Ambulate OOB  On lovenox .       Reji Melendez MD  2/24/2017    "

## 2017-02-24 NOTE — SUBJECTIVE & OBJECTIVE
Interval History: see     Review of Systems   Constitutional: Negative for activity change, fatigue, fever and unexpected weight change.   HENT: Negative for congestion, ear pain, hearing loss, rhinorrhea and sore throat.    Eyes: Negative for pain, redness and visual disturbance.   Respiratory: Negative for cough, shortness of breath and wheezing.    Cardiovascular: Negative for chest pain, palpitations and leg swelling.   Gastrointestinal: Negative for abdominal pain, constipation, diarrhea, nausea and vomiting.   Genitourinary: Negative for dysuria, frequency, pelvic pain and urgency.   Musculoskeletal: Negative for back pain, joint swelling and neck pain.        Pain control with tramadol   Skin: Negative for color change, rash and wound.   Neurological: Negative for dizziness, weakness, light-headedness and headaches.   Psychiatric/Behavioral: Positive for dysphoric mood (reports depressed about not going home). The patient is nervous/anxious.      Objective:     Vital Signs (Most Recent):  Temp: 97.4 °F (36.3 °C) (02/24/17 0815)  Pulse: 110 (02/24/17 0815)  Resp: 18 (02/24/17 0815)  BP: (!) 104/53 (02/24/17 0815)  SpO2: (!) 93 % (02/24/17 0830) Vital Signs (24h Range):  Temp:  [97.4 °F (36.3 °C)-99.3 °F (37.4 °C)] 97.4 °F (36.3 °C)  Pulse:  [] 110  Resp:  [16-19] 18  SpO2:  [93 %-98 %] 93 %  BP: (104-145)/(53-72) 104/53     Weight: 59 kg (130 lb)  Body mass index is 22.31 kg/(m^2).    Intake/Output Summary (Last 24 hours) at 02/24/17 1035  Last data filed at 02/24/17 0800   Gross per 24 hour   Intake              240 ml   Output             3200 ml   Net            -2960 ml      Physical Exam   Constitutional: She is oriented to person, place, and time. She appears well-developed and well-nourished. No distress.   HENT:   Head: Normocephalic and atraumatic.   Right Ear: External ear normal.   Left Ear: External ear normal.   Eyes: Conjunctivae and EOM are normal. Pupils are equal, round, and reactive  to light. Right eye exhibits no discharge. Left eye exhibits no discharge.   Neck: Neck supple. No tracheal deviation present.   Cardiovascular: Normal rate and regular rhythm.  Exam reveals no gallop and no friction rub.    No murmur heard.  Pulmonary/Chest: Effort normal. No respiratory distress. She has no wheezes. She has rales (rales at right lower base only).   Decreased breath sounds in bases b/l   Abdominal: Soft. Bowel sounds are normal. She exhibits no distension. There is no tenderness.   Musculoskeletal: She exhibits no edema.   Cast with hardware to left wrist   Lymphadenopathy:     She has no cervical adenopathy.   Neurological: She is alert and oriented to person, place, and time. No cranial nerve deficit.   Skin: Skin is warm and dry.   Bandage to left hip   Psychiatric: She has a normal mood and affect. Her behavior is normal.   Nursing note and vitals reviewed.      Significant Labs:   CBC:     Recent Labs  Lab 1728 17  0429   WBC 11.54 12.27   HGB 8.6* 9.0*   HCT 26.6* 28.2*   * 561*     CMP:     Recent Labs  Lab 1728 17  0429    138   K 4.3 3.9    102   CO2 30* 30*   * 87   BUN 16 18   CREATININE 0.6 0.6   CALCIUM 8.5* 8.3*   ANIONGAP 4* 6*   EGFRNONAA >60 >60       Significant Imagin2017 CXR There has been no detrimental change in the cardiopulmonary findings as compared to the previous study of 2017.  The heart is normal in size.  The skeletal structures are intact

## 2017-02-24 NOTE — ASSESSMENT & PLAN NOTE
Continue DuoNeb treatments 4 times daily  D/c rocephin day 7 and levaquin day 5  Pulmonology consult--added steroids. 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

## 2017-02-24 NOTE — PLAN OF CARE
Problem: Patient Care Overview  Goal: Plan of Care Review  Outcome: Ongoing (interventions implemented as appropriate)  Quiet shift. ambulating to BSC and doing better with assistance and tolerating well. Short arm cast intact and elevated with neurovascular checks WNL. Dressing to left hip intact. Trapeze in use. Plan of care discussed and verbalizes understanding. Rested better after Ativan IV administered. Call valentine in reach. RODNEY Churchill RN

## 2017-02-25 LAB
ANION GAP SERPL CALC-SCNC: 6 MMOL/L
BASOPHILS # BLD AUTO: ABNORMAL K/UL
BASOPHILS NFR BLD: 0 %
BNP SERPL-MCNC: <10 PG/ML
BUN SERPL-MCNC: 17 MG/DL
CALCIUM SERPL-MCNC: 8.3 MG/DL
CHLORIDE SERPL-SCNC: 102 MMOL/L
CO2 SERPL-SCNC: 30 MMOL/L
CREAT SERPL-MCNC: 0.6 MG/DL
DIFFERENTIAL METHOD: ABNORMAL
EOSINOPHIL # BLD AUTO: ABNORMAL K/UL
EOSINOPHIL NFR BLD: 7 %
ERYTHROCYTE [DISTWIDTH] IN BLOOD BY AUTOMATED COUNT: 13.7 %
EST. GFR  (AFRICAN AMERICAN): >60 ML/MIN/1.73 M^2
EST. GFR  (NON AFRICAN AMERICAN): >60 ML/MIN/1.73 M^2
GLUCOSE SERPL-MCNC: 91 MG/DL
HCT VFR BLD AUTO: 27.7 %
HGB BLD-MCNC: 8.9 G/DL
LYMPHOCYTES # BLD AUTO: ABNORMAL K/UL
LYMPHOCYTES NFR BLD: 21 %
MCH RBC QN AUTO: 30.8 PG
MCHC RBC AUTO-ENTMCNC: 32.1 %
MCV RBC AUTO: 96 FL
MONOCYTES # BLD AUTO: ABNORMAL K/UL
MONOCYTES NFR BLD: 13 %
NEUTROPHILS NFR BLD: 57 %
NEUTS BAND NFR BLD MANUAL: 2 %
PLATELET # BLD AUTO: 572 K/UL
PMV BLD AUTO: 8.4 FL
POTASSIUM SERPL-SCNC: 4.4 MMOL/L
RBC # BLD AUTO: 2.89 M/UL
SODIUM SERPL-SCNC: 138 MMOL/L
WBC # BLD AUTO: 10.83 K/UL

## 2017-02-25 PROCEDURE — 85027 COMPLETE CBC AUTOMATED: CPT

## 2017-02-25 PROCEDURE — 94660 CPAP INITIATION&MGMT: CPT

## 2017-02-25 PROCEDURE — 25000003 PHARM REV CODE 250: Performed by: ORTHOPAEDIC SURGERY

## 2017-02-25 PROCEDURE — 97530 THERAPEUTIC ACTIVITIES: CPT

## 2017-02-25 PROCEDURE — 97116 GAIT TRAINING THERAPY: CPT

## 2017-02-25 PROCEDURE — 25000003 PHARM REV CODE 250: Performed by: FAMILY MEDICINE

## 2017-02-25 PROCEDURE — 83880 ASSAY OF NATRIURETIC PEPTIDE: CPT

## 2017-02-25 PROCEDURE — 80048 BASIC METABOLIC PNL TOTAL CA: CPT

## 2017-02-25 PROCEDURE — 11000001 HC ACUTE MED/SURG PRIVATE ROOM

## 2017-02-25 PROCEDURE — 25000242 PHARM REV CODE 250 ALT 637 W/ HCPCS: Performed by: FAMILY MEDICINE

## 2017-02-25 PROCEDURE — 25000003 PHARM REV CODE 250: Performed by: SURGERY

## 2017-02-25 PROCEDURE — 94640 AIRWAY INHALATION TREATMENT: CPT

## 2017-02-25 PROCEDURE — 99232 SBSQ HOSP IP/OBS MODERATE 35: CPT | Mod: ,,, | Performed by: INTERNAL MEDICINE

## 2017-02-25 PROCEDURE — 36415 COLL VENOUS BLD VENIPUNCTURE: CPT

## 2017-02-25 PROCEDURE — 63600175 PHARM REV CODE 636 W HCPCS: Performed by: FAMILY MEDICINE

## 2017-02-25 PROCEDURE — 63600175 PHARM REV CODE 636 W HCPCS: Performed by: INTERNAL MEDICINE

## 2017-02-25 PROCEDURE — 85007 BL SMEAR W/DIFF WBC COUNT: CPT

## 2017-02-25 PROCEDURE — 27000339 *HC DAILY SUPPLY KIT

## 2017-02-25 PROCEDURE — 94761 N-INVAS EAR/PLS OXIMETRY MLT: CPT

## 2017-02-25 RX ADMIN — IPRATROPIUM BROMIDE AND ALBUTEROL SULFATE 3 ML: .5; 3 SOLUTION RESPIRATORY (INHALATION) at 07:02

## 2017-02-25 RX ADMIN — DOCUSATE SODIUM AND SENNOSIDES 1 TABLET: 8.6; 5 TABLET, FILM COATED ORAL at 10:02

## 2017-02-25 RX ADMIN — IBUPROFEN 600 MG: 600 TABLET ORAL at 10:02

## 2017-02-25 RX ADMIN — DOCUSATE SODIUM AND SENNOSIDES 1 TABLET: 8.6; 5 TABLET, FILM COATED ORAL at 09:02

## 2017-02-25 RX ADMIN — SODIUM CHLORIDE, PRESERVATIVE FREE 3 ML: 5 INJECTION INTRAVENOUS at 06:02

## 2017-02-25 RX ADMIN — PANTOPRAZOLE SODIUM 40 MG: 40 TABLET, DELAYED RELEASE ORAL at 10:02

## 2017-02-25 RX ADMIN — IPRATROPIUM BROMIDE AND ALBUTEROL SULFATE 3 ML: .5; 3 SOLUTION RESPIRATORY (INHALATION) at 01:02

## 2017-02-25 RX ADMIN — VENLAFAXINE HYDROCHLORIDE 75 MG: 75 CAPSULE, EXTENDED RELEASE ORAL at 10:02

## 2017-02-25 RX ADMIN — SODIUM CHLORIDE, PRESERVATIVE FREE 3 ML: 5 INJECTION INTRAVENOUS at 09:02

## 2017-02-25 RX ADMIN — IBUPROFEN 600 MG: 600 TABLET ORAL at 03:02

## 2017-02-25 RX ADMIN — TRAMADOL HYDROCHLORIDE 50 MG: 50 TABLET, COATED ORAL at 09:02

## 2017-02-25 RX ADMIN — LORAZEPAM 0.5 MG: 2 INJECTION INTRAMUSCULAR; INTRAVENOUS at 09:02

## 2017-02-25 RX ADMIN — SODIUM CHLORIDE, PRESERVATIVE FREE 3 ML: 5 INJECTION INTRAVENOUS at 01:02

## 2017-02-25 RX ADMIN — IBUPROFEN 600 MG: 600 TABLET ORAL at 09:02

## 2017-02-25 RX ADMIN — ENOXAPARIN SODIUM 40 MG: 40 INJECTION, SOLUTION INTRAVENOUS; SUBCUTANEOUS at 01:02

## 2017-02-25 NOTE — PROGRESS NOTES
See her this morning on rounds find her with her usual cheerful attitude. We did have to disconnect the BiPAP to communicate, she is still quite favorably anxious about going home. Arrangements have been made for home health/physical therapy. It also sounds like she has given up any talk of going to the rehab unit. From all indications, she is continuing to improve both orthopaedically and from a pulmonary standpoint. I would anticipate her ability to be discharged on Monday. My intention is to remove her staples either tomorrow or Monday to avoid a quick return visit to clinic.

## 2017-02-25 NOTE — PROGRESS NOTES
[facility] Hospital Medicine  Progress Note    Patient Name: Marycarmen Louis  MRN: [id number]  Patient Class: IP- Inpatient   Admission Date: 2/14/2017  Length of Stay: 11 days  Attending Physician: Joes Jaeger MD  Primary Care Provider: Willie Santos MD (Inactive)        Subjective:     Principal Problem:Closed fracture of left hip    HPI:  72 y/o female reports she fell off of gym apparatus. Reports falling after missing step. ER w/up shows left wrist fracture and left hip fracture. Reports feeling fine prior to fall yesterday. No fever prior to this am.    Hospital Course:  She has surgery yesterday right lateral decubitus. Was given IVF. Has slight fluid overload caused resp distress. Hypoxic. CXR showed right lung infiltrate. She had no s/s of pneumonia and clear  X ray prior to surgery. Given dose of lasix and improved well. She was moved  From ICU to floor. Did well over night. PT to start this am. Still on O2 today 4L NC 96%    2/17/17-patient still becomes hypoxic when oxygen is removed.  CTA of the chest and lungs were done today that were negative for pulmonary embolism.  She did have significant emphysematous changes and lower lobe infiltrates and atelectasis bilaterally.  She probably has an exacerbation of COPD probably from perioperative intubation.  DuoNeb treatments, Rocephin, azithromycin started.  She seems be doing better.    2/18.  Patient evaluated this morning.  She was working with physical therapy and able to ambulate across the room with assistance.  She does feel short of breath with exertion.  Her hemoglobin is now 8.2.  Dr. Melendez consulted.  Her sats still drop when she takes her oxygen off.  Patient had a large amount of bloody drainage.  I stopped her Xarelto today for now until her hemoglobin stabilizes.      2/18 patient never received blood.  Pulmonologist wanted to wait in x-ray today because he felt she may be fluid overloaded.  Chest x-ray is  starting to look a little worse.  She has bilateral infiltrates consistent with bilateral perihilar and diffuse lung infiltrates bilaterally.  This may represent a atypical/viral pneumonia or ARDS.  Today her hemoglobin is 7.8 and everybody agrees that she could benefit from one unit of blood.  Her Xarelto has been discontinued.  Patient is ambulating with therapy.  She does have dyspnea with exertion.  She is on 4 L nasal cannula and her O2 sats are 92%.    2/20  Yesterday patient had acute hypoxic episode with sats into the 50% after receiving 1 unit of blood. Transferred to ICU on BiPaP. Got Lasix 40mg IV x1. Maintaining sats in 90% on BipaP with 35% FiO2, sats drop to <80% within 1 minutes of coming off BiPaP. However, even with the hypoxia this AM she does not report feeling SOB, no use of accessory muscles, speaking in complete sentences. CXR has been worsneing, looks like b/l infiltrates consistent with ARDS. Pain is well controlled from surgery. She is not on any anticoagulation s/p surgery due to bleeding and worsening H/H. She had CTA which was negative for PE. She is on levaquin and Ceftriaxone for possible PNA (there was some question of aspiration after her surgery). She was also started on steroids.     2/21/17  She is still on BIPAP  She is on steroids;  Nebs   She diuresed with lasix  She is still in ventimax  Her HH is stable  Dressing is dry   Echo was normal   She is levaquin and rocephin   Temp 99    2/22/17  She is weaned down to 3liters Nc while eating but hypoxic(88)  Still requiring bipap  Getting OOB to chair with PT      2/23/17 She still has dick, bladder training started today and will d/c after. She has gotten out of bed with OT. She has no fever and no elevated WBC. She is 7 days with rocephin and 5 days levaquin today. She is feeling great today just ready to get out of here. No weakness, no SOB/CP. No more O2 requirement     2/24/17 As of this am, o2 at 88%. Pt is ready to go home.  Reports she didn't wear bipap last night and now regrets it now that respiratory status is not as well as it was yesterday.     2/25/17  Doing better asked me about going to rehab upon discharge  Looks better ;  Close to discharge      No new subjective & objective note has been filed under this hospital service since the last note was generated.    Assessment/Plan:      * Closed fracture of left hip  Ortho following  S/p Left bipolar endoprosthetic hemiarthroplasty  Continue physical therapy  Pain well controlled  enoxaprin for DVT prophylaxis  She will need 2 weeks xarelto at d/c  Add ot  Has gotten up and ambulated 40' with PT      Degeneration of lumbar or lumbosacral intervertebral disc  Pain controlled.      Coronary artery calcification seen on CAT scan  Cardiology consulted.  No changes at this time.  Can be followed further as outpatient  No chest pain, no signs of ACS this admission      Disorder of bone and cartilage, unspecified        Left wrist fracture  Ortho follwing  S/p closed reduction and percutaneous distraction pinning of the left distal radius.  Continue physical therapy  Pain well controlled    Osteoporosis  Failed fosamax   Will need forteo x 2 years at discharge       Pulmonary edema   Resolved. Lasix was held earlier this admission.  Monitor chest x-rays.  Dr. camacho consulted.  Chest x-ray-was increasing bilateral perihilar and diffuse lung infiltrates bilaterally.  This may represent fat emboli leading to ARDS  After receiving 1 unit of blood, Lasix 40 mg IV push was given.  Patient's BNP is normal (44)  BioZ performed showed a TFC in the upper normal range    TTE Tuesday to eval heart function---normal  No other clinical evidence of CHF exacerbation and no known CHF history. I do not feel this is all pulmonary edema. I think she has developed ARDS. See ARDS plans    Pt had no o2 requirements yesterday and a little more sob this am with productive mucous after not wearing bipap. Needs to  wear bipap for a couple more nights. Continue oxygen for now.        COPD exacerbation  Continue DuoNeb treatments 4 times daily  D/c rocephin day 7 and levaquin day 5  Pulmonology consult--added steroids. 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                 Acute blood loss anemia  Transfused 1 unit of packed red blood cells 2/20.  Developed acute decompensation of resp status and transferred to ICU  Hgb stable   Follow CBC  Started prophylactic lovenox 2/20  Wound dressing dry     Acute respiratory failure with hypoxia  Improved  Was doing well until no bipap last night, hopefully d/c on Monday.     Rehab consult     VTE Risk Mitigation         Ordered     enoxaparin injection 40 mg  Daily     Route:  Subcutaneous        02/20/17 1131     Place sequential compression device  Until discontinued      02/19/17 1827     Medium Risk of VTE  Once      02/15/17 1550          Paul Higgins MD  Department of Hospital Medicine   [facility]

## 2017-02-25 NOTE — PROGRESS NOTES
"Marycarmen Louis is a 71 y.o. female patient.    Active Hospital Problems    Diagnosis  POA    *Closed fracture of left hip [S72.002A]  Yes    Acute respiratory failure with hypoxia [J96.01]  No    Acute blood loss anemia [D62]  No    COPD exacerbation [J44.1]  No    Pulmonary edema [J81.1]  Yes    Osteoporosis [M81.0]  Yes    Left wrist fracture [S62.102A]  Yes    Coronary artery calcification seen on CAT scan [I25.10]  Yes    Degeneration of lumbar or lumbosacral intervertebral disc [M51.37]  Yes      Resolved Hospital Problems    Diagnosis Date Resolved POA    Fever [R50.9] 02/18/2017 No     Temp: 97.7 °F (36.5 °C) (02/25/17 0408)  Pulse: 109 (02/25/17 0750)  Resp: 16 (02/25/17 0750)  BP: (!) 109/52 (02/25/17 0408)  SpO2: 97 % (02/25/17 0750)  Weight: 59 kg (130 lb) (02/14/17 1746)  Height: 5' 4" (162.6 cm) (02/19/17 1541)    Subjective:  Symptoms:  Worsening.  She reports shortness of breath, cough, chest pain, weakness and anxiety.    Diet:  Adequate intake.  No nausea or vomiting.    Activity level: Impaired due to weakness.    Pain:  She complains of pain that is mild.  She reports pain is improving.  Pain is partially controlled.      Objective:  General Appearance:  Comfortable and not in pain.    Vital signs: (most recent): Blood pressure (!) 109/52, pulse 109, temperature 97.7 °F (36.5 °C), temperature source Axillary, resp. rate 16, height 5' 4" (1.626 m), weight 59 kg (130 lb), SpO2 97 %, not currently breastfeeding.  Vital signs are normal.  No fever.    Output: Producing urine and producing stool.    Lungs:  Normal respiratory rate and increased effort.  She is not in respiratory distress.  No stridor.  There are wheezes.  No rhonchi or decreased breath sounds.    Heart: Normal rate.  Regular rhythm.  S1 normal and S2 normal.    Chest: No chest wall tenderness.  Symmetric chest wall expansion.   Extremities: Normal range of motion.  There is no dependent edema.    Neurological: Patient is " alert and oriented to person, place and time.  Normal strength.    Skin:  Warm and dry.  No rash, ecchymosis or cyanosis.   Abdomen: Abdomen is soft and non-distended.  There are no signs of ascites.  Bowel sounds are normal.   There is no mass.   Pupils:  Pupils are equal, round, and reactive to light.    Pulses: Distal pulses are intact.      Assessment:  (ARDS secondary to Fat emboli and trauma resolved    Acute Hypoxic Respiratory failure(Now 88% of O2) resolved   COPD mild  Fracture Left Hip and Forearm ).     Plan:   Could go home today or tomorrow   Ambulate OOB  On lovenox   Follow up in clinic 3/6/17.       Reji Melendez MD  2/25/2017

## 2017-02-25 NOTE — ASSESSMENT & PLAN NOTE
Continue DuoNeb treatments 4 times daily  D/c rocephin day 7 and levaquin day 5  Pulmonology consult--added steroids. Dr. Meelndez believes this is fat emboli that led to ARDs  Lets see how she does off antibiotics and with continued pulm support.

## 2017-02-25 NOTE — PROGRESS NOTES
Pt placed on BIPAP with settings as charted per flow sheet. Pt tolerating well and in no distress at this time.

## 2017-02-25 NOTE — PLAN OF CARE
Problem: Fall Risk (Adult)  Goal: Absence of Falls  Patient will demonstrate the desired outcomes by discharge/transition of care.   Outcome: Ongoing (interventions implemented as appropriate)  Fall precautions maintained. No falls this shift.     Problem: Patient Care Overview  Goal: Plan of Care Review  Outcome: Ongoing (interventions implemented as appropriate)  Plan of care reviewed with patient and she agrees with the plan of care. Patient is compliant with wearing BIPAP at night. O2 sat room air 98%. Telemetry monitoring continues. SCD in use on Right leg. Short arm cast intact to left hand. Neurovascular checks WNL.     Problem: Pressure Ulcer Risk (Harjinder Scale) (Adult,Obstetrics,Pediatric)  Goal: Skin Integrity  Patient will demonstrate the desired outcomes by discharge/transition of care.   Outcome: Ongoing (interventions implemented as appropriate)  Patient is able to to turn and reposition herself using trapeze.     Problem: Fractured Hip (Adult)  Goal: Signs and Symptoms of Listed Potential Problems Will be Absent, Minimized or Managed (Fractured Hip)  Signs and symptoms of listed potential problems will be absent, minimized or managed by discharge/transition of care (reference Fractured Hip (Adult) CPG).   Outcome: Ongoing (interventions implemented as appropriate)  Dressing intact to left hip with no drainage noted. Afebrile. C/o pain x 1 this shift. Ibuprofen given and was effective for pain. Patient able to walk to bedside commode using walker.

## 2017-02-25 NOTE — PROGRESS NOTES
Spoke with patient about inpatient rehab referral.  She is aware information has been faxed to [facility] for review and placement.  Discharge to a rehab facility is not expected this weekend.

## 2017-02-26 LAB
ANION GAP SERPL CALC-SCNC: 5 MMOL/L
BASOPHILS # BLD AUTO: ABNORMAL K/UL
BASOPHILS NFR BLD: 0 %
BNP SERPL-MCNC: 11 PG/ML
BUN SERPL-MCNC: 11 MG/DL
CALCIUM SERPL-MCNC: 8.5 MG/DL
CHLORIDE SERPL-SCNC: 105 MMOL/L
CO2 SERPL-SCNC: 30 MMOL/L
CREAT SERPL-MCNC: 0.6 MG/DL
DIFFERENTIAL METHOD: ABNORMAL
EOSINOPHIL # BLD AUTO: ABNORMAL K/UL
EOSINOPHIL NFR BLD: 11 %
ERYTHROCYTE [DISTWIDTH] IN BLOOD BY AUTOMATED COUNT: 13.9 %
EST. GFR  (AFRICAN AMERICAN): >60 ML/MIN/1.73 M^2
EST. GFR  (NON AFRICAN AMERICAN): >60 ML/MIN/1.73 M^2
GLUCOSE SERPL-MCNC: 92 MG/DL
HCT VFR BLD AUTO: 29.2 %
HGB BLD-MCNC: 9.5 G/DL
LYMPHOCYTES # BLD AUTO: ABNORMAL K/UL
LYMPHOCYTES NFR BLD: 11 %
MCH RBC QN AUTO: 30.9 PG
MCHC RBC AUTO-ENTMCNC: 32.5 %
MCV RBC AUTO: 95 FL
METAMYELOCYTES NFR BLD MANUAL: 4 %
MONOCYTES # BLD AUTO: ABNORMAL K/UL
MONOCYTES NFR BLD: 2 %
MYELOCYTES NFR BLD MANUAL: 2 %
NEUTROPHILS NFR BLD: 67 %
NEUTS BAND NFR BLD MANUAL: 3 %
PLATELET # BLD AUTO: 599 K/UL
PMV BLD AUTO: 8.3 FL
POTASSIUM SERPL-SCNC: 4.5 MMOL/L
RBC # BLD AUTO: 3.07 M/UL
SODIUM SERPL-SCNC: 140 MMOL/L
WBC # BLD AUTO: 11.22 K/UL

## 2017-02-26 PROCEDURE — 83880 ASSAY OF NATRIURETIC PEPTIDE: CPT

## 2017-02-26 PROCEDURE — 63600175 PHARM REV CODE 636 W HCPCS: Performed by: INTERNAL MEDICINE

## 2017-02-26 PROCEDURE — 25000003 PHARM REV CODE 250: Performed by: ORTHOPAEDIC SURGERY

## 2017-02-26 PROCEDURE — 25000242 PHARM REV CODE 250 ALT 637 W/ HCPCS: Performed by: FAMILY MEDICINE

## 2017-02-26 PROCEDURE — 94640 AIRWAY INHALATION TREATMENT: CPT

## 2017-02-26 PROCEDURE — 25000003 PHARM REV CODE 250: Performed by: SURGERY

## 2017-02-26 PROCEDURE — 94761 N-INVAS EAR/PLS OXIMETRY MLT: CPT

## 2017-02-26 PROCEDURE — 36415 COLL VENOUS BLD VENIPUNCTURE: CPT

## 2017-02-26 PROCEDURE — 80048 BASIC METABOLIC PNL TOTAL CA: CPT

## 2017-02-26 PROCEDURE — 97116 GAIT TRAINING THERAPY: CPT

## 2017-02-26 PROCEDURE — 99900035 HC TECH TIME PER 15 MIN (STAT)

## 2017-02-26 PROCEDURE — 99232 SBSQ HOSP IP/OBS MODERATE 35: CPT | Mod: ,,, | Performed by: INTERNAL MEDICINE

## 2017-02-26 PROCEDURE — 85007 BL SMEAR W/DIFF WBC COUNT: CPT

## 2017-02-26 PROCEDURE — 25000003 PHARM REV CODE 250: Performed by: FAMILY MEDICINE

## 2017-02-26 PROCEDURE — 11000001 HC ACUTE MED/SURG PRIVATE ROOM

## 2017-02-26 PROCEDURE — 63600175 PHARM REV CODE 636 W HCPCS: Performed by: FAMILY MEDICINE

## 2017-02-26 PROCEDURE — 27000339 *HC DAILY SUPPLY KIT

## 2017-02-26 PROCEDURE — 85027 COMPLETE CBC AUTOMATED: CPT

## 2017-02-26 PROCEDURE — 97110 THERAPEUTIC EXERCISES: CPT

## 2017-02-26 PROCEDURE — 27200120 HC KIT IV START (RUSH ONLY)

## 2017-02-26 RX ADMIN — IPRATROPIUM BROMIDE AND ALBUTEROL SULFATE 3 ML: .5; 3 SOLUTION RESPIRATORY (INHALATION) at 07:02

## 2017-02-26 RX ADMIN — SODIUM CHLORIDE, PRESERVATIVE FREE 3 ML: 5 INJECTION INTRAVENOUS at 03:02

## 2017-02-26 RX ADMIN — TRAMADOL HYDROCHLORIDE 50 MG: 50 TABLET, COATED ORAL at 08:02

## 2017-02-26 RX ADMIN — IPRATROPIUM BROMIDE AND ALBUTEROL SULFATE 3 ML: .5; 3 SOLUTION RESPIRATORY (INHALATION) at 01:02

## 2017-02-26 RX ADMIN — ACETAMINOPHEN 500 MG: 500 TABLET ORAL at 03:02

## 2017-02-26 RX ADMIN — ENOXAPARIN SODIUM 40 MG: 40 INJECTION, SOLUTION INTRAVENOUS; SUBCUTANEOUS at 11:02

## 2017-02-26 RX ADMIN — SODIUM CHLORIDE, PRESERVATIVE FREE 3 ML: 5 INJECTION INTRAVENOUS at 06:02

## 2017-02-26 RX ADMIN — VENLAFAXINE HYDROCHLORIDE 75 MG: 75 CAPSULE, EXTENDED RELEASE ORAL at 09:02

## 2017-02-26 RX ADMIN — LORAZEPAM 0.5 MG: 2 INJECTION INTRAMUSCULAR; INTRAVENOUS at 08:02

## 2017-02-26 RX ADMIN — TRAMADOL HYDROCHLORIDE 50 MG: 50 TABLET, COATED ORAL at 11:02

## 2017-02-26 RX ADMIN — SODIUM CHLORIDE, PRESERVATIVE FREE 3 ML: 5 INJECTION INTRAVENOUS at 10:02

## 2017-02-26 RX ADMIN — DOCUSATE SODIUM AND SENNOSIDES 1 TABLET: 8.6; 5 TABLET, FILM COATED ORAL at 08:02

## 2017-02-26 RX ADMIN — PANTOPRAZOLE SODIUM 40 MG: 40 TABLET, DELAYED RELEASE ORAL at 09:02

## 2017-02-26 RX ADMIN — IBUPROFEN 600 MG: 600 TABLET ORAL at 09:02

## 2017-02-26 RX ADMIN — IBUPROFEN 600 MG: 600 TABLET ORAL at 04:02

## 2017-02-26 RX ADMIN — ACETAMINOPHEN 500 MG: 500 TABLET ORAL at 08:02

## 2017-02-26 RX ADMIN — DOCUSATE SODIUM AND SENNOSIDES 1 TABLET: 8.6; 5 TABLET, FILM COATED ORAL at 09:02

## 2017-02-26 NOTE — ASSESSMENT & PLAN NOTE
Ortho following  S/p Left bipolar endoprosthetic hemiarthroplasty  Continue physical therapy  Pain well controlled  enoxaprin for DVT prophylaxis  She will need 2 weeks xarelto at d/c  Added ot  Has gotten up and ambulated 40' with PT      Plan;   REHAB if approved  Otherwise HH with home PT

## 2017-02-26 NOTE — PROGRESS NOTES
Report received from VICKY Simms. Patient sitting up in chair. Denies pain. Denies sob. No distress noted.

## 2017-02-26 NOTE — PROGRESS NOTES
[facility] Medicine  Progress Note    Patient Name: Marycarmen Louis  MRN: [id number]  Patient Class: IP- Inpatient   Admission Date: 2/14/2017  Length of Stay: 12 days  Attending Physician: Jose Jaeger MD  Primary Care Provider: Willie Santos MD (Inactive)        Subjective:     Principal Problem:Closed fracture of left hip    HPI:  72 y/o female reports she fell off of gym apparatus. Reports falling after missing step. ER w/up shows left wrist fracture and left hip fracture. Reports feeling fine prior to fall yesterday. No fever prior to this am.    Hospital Course:  She has surgery yesterday right lateral decubitus. Was given IVF. Has slight fluid overload caused resp distress. Hypoxic. CXR showed right lung infiltrate. She had no s/s of pneumonia and clear  X ray prior to surgery. Given dose of lasix and improved well. She was moved  From ICU to floor. Did well over night. PT to start this am. Still on O2 today 4L NC 96%    2/17/17-patient still becomes hypoxic when oxygen is removed.  CTA of the chest and lungs were done today that were negative for pulmonary embolism.  She did have significant emphysematous changes and lower lobe infiltrates and atelectasis bilaterally.  She probably has an exacerbation of COPD probably from perioperative intubation.  DuoNeb treatments, Rocephin, azithromycin started.  She seems be doing better.    2/18.  Patient evaluated this morning.  She was working with physical therapy and able to ambulate across the room with assistance.  She does feel short of breath with exertion.  Her hemoglobin is now 8.2.  Dr. Melendez consulted.  Her sats still drop when she takes her oxygen off.  Patient had a large amount of bloody drainage.  I stopped her Xarelto today for now until her hemoglobin stabilizes.      2/18 patient never received blood.  Pulmonologist wanted to wait in x-ray today because he felt she may be fluid overloaded.  Chest x-ray is  starting to look a little worse.  She has bilateral infiltrates consistent with bilateral perihilar and diffuse lung infiltrates bilaterally.  This may represent a atypical/viral pneumonia or ARDS.  Today her hemoglobin is 7.8 and everybody agrees that she could benefit from one unit of blood.  Her Xarelto has been discontinued.  Patient is ambulating with therapy.  She does have dyspnea with exertion.  She is on 4 L nasal cannula and her O2 sats are 92%.    2/20  Yesterday patient had acute hypoxic episode with sats into the 50% after receiving 1 unit of blood. Transferred to ICU on BiPaP. Got Lasix 40mg IV x1. Maintaining sats in 90% on BipaP with 35% FiO2, sats drop to <80% within 1 minutes of coming off BiPaP. However, even with the hypoxia this AM she does not report feeling SOB, no use of accessory muscles, speaking in complete sentences. CXR has been worsneing, looks like b/l infiltrates consistent with ARDS. Pain is well controlled from surgery. She is not on any anticoagulation s/p surgery due to bleeding and worsening H/H. She had CTA which was negative for PE. She is on levaquin and Ceftriaxone for possible PNA (there was some question of aspiration after her surgery). She was also started on steroids.     2/21/17  She is still on BIPAP  She is on steroids;  Nebs   She diuresed with lasix  She is still in ventimax  Her HH is stable  Dressing is dry   Echo was normal   She is levaquin and rocephin   Temp 99    2/22/17  She is weaned down to 3liters Nc while eating but hypoxic(88)  Still requiring bipap  Getting OOB to chair with PT      2/23/17 She still has dick, bladder training started today and will d/c after. She has gotten out of bed with OT. She has no fever and no elevated WBC. She is 7 days with rocephin and 5 days levaquin today. She is feeling great today just ready to get out of here. No weakness, no SOB/CP. No more O2 requirement     2/24/17 As of this am, o2 at 88%. Pt is ready to go home.  Reports she didn't wear bipap last night and now regrets it now that respiratory status is not as well as it was yesterday.     2/25/17  Doing better asked me about going to rehab upon discharge  Looks better ;  Close to discharge    2/26/17  Seems to be doing better everyday  Waiting for insurance for rehab approval  Walking better  Her breathing/ARDS issues has gotten back to complete resoluation  Hopefully tomorrow to REHAB or HOME WITH HH with home PT            Interval History: doing much better    Review of Systems   Constitutional: Negative for activity change, fatigue, fever and unexpected weight change.   HENT: Negative for congestion, ear pain, hearing loss, rhinorrhea and sore throat.    Eyes: Negative for pain, redness and visual disturbance.   Respiratory: Positive for cough. Negative for wheezing.         SOB better   Cardiovascular: Negative for chest pain, palpitations and leg swelling.   Gastrointestinal: Negative for abdominal pain, constipation, diarrhea, nausea and vomiting.   Genitourinary: Negative for dysuria, frequency, pelvic pain and urgency.   Musculoskeletal: Negative for back pain, joint swelling and neck pain.        Pain control with tramadol   Skin: Negative for color change, rash and wound.   Neurological: Negative for dizziness, weakness, light-headedness and headaches.   Psychiatric/Behavioral: The patient is nervous/anxious.      Objective:     Vital Signs (Most Recent):  Temp: 97.9 °F (36.6 °C) (02/26/17 0404)  Pulse: 96 (02/26/17 0600)  Resp: 20 (02/26/17 0404)  BP: (!) 99/58 (02/26/17 0404)  SpO2: 96 % (02/26/17 0404) Vital Signs (24h Range):  Temp:  [96.7 °F (35.9 °C)-98.6 °F (37 °C)] 97.9 °F (36.6 °C)  Pulse:  [] 96  Resp:  [18-20] 20  SpO2:  [95 %-99 %] 96 %  BP: ()/(51-59) 99/58     Weight: 59 kg (130 lb)  Body mass index is 22.31 kg/(m^2).    Intake/Output Summary (Last 24 hours) at 02/26/17 0756  Last data filed at 02/26/17 0700   Gross per 24 hour   Intake               240 ml   Output             2100 ml   Net            -1860 ml      Physical Exam   Constitutional: She is oriented to person, place, and time. She appears well-developed and well-nourished. No distress.   HENT:   Head: Normocephalic and atraumatic.   Right Ear: External ear normal.   Left Ear: External ear normal.   Eyes: Conjunctivae and EOM are normal. Pupils are equal, round, and reactive to light. Right eye exhibits no discharge. Left eye exhibits no discharge.   Neck: Neck supple. No tracheal deviation present.   Cardiovascular: Normal rate and regular rhythm.  Exam reveals no gallop and no friction rub.    No murmur heard.  Pulmonary/Chest: Effort normal. No respiratory distress. She has no wheezes. She has rales (rales at right lower base only).   Decreased breath sounds in bases b/l   Abdominal: Soft. Bowel sounds are normal. She exhibits no distension. There is no tenderness.   Musculoskeletal: She exhibits no edema.   Cast with hardware to left wrist   Lymphadenopathy:     She has no cervical adenopathy.   Neurological: She is alert and oriented to person, place, and time. No cranial nerve deficit.   Skin: Skin is warm and dry.   Bandage to left hip   Psychiatric: She has a normal mood and affect. Her behavior is normal.   Nursing note and vitals reviewed.      Significant Labs:   CBC:   Recent Labs  Lab 02/25/17  0454 02/26/17  0507   WBC 10.83 11.22   HGB 8.9* 9.5*   HCT 27.7* 29.2*   * 599*     CMP:   Recent Labs  Lab 02/25/17  0454 02/26/17  0507    140   K 4.4 4.5    105   CO2 30* 30*   GLU 91 92   BUN 17 11   CREATININE 0.6 0.6   CALCIUM 8.3* 8.5*   ANIONGAP 6* 5*   EGFRNONAA >60 >60         Assessment/Plan:      * Closed fracture of left hip  Ortho following  S/p Left bipolar endoprosthetic hemiarthroplasty  Continue physical therapy  Pain well controlled  enoxaprin for DVT prophylaxis  She will need 2 weeks xarelto at d/c  Added ot  Has gotten up and ambulated 40' with  PT      Plan;   REHAB if approved  Otherwise  with home PT      Degeneration of lumbar or lumbosacral intervertebral disc  Pain controlled.      Coronary artery calcification seen on CAT scan  Cardiology consulted.  No changes at this time.  Can be followed further as outpatient  No chest pain, no signs of ACS this admission      Disorder of bone and cartilage, unspecified        Left wrist fracture  Ortho follwing  S/p closed reduction and percutaneous distraction pinning of the left distal radius.  Continue physical therapy  Pain well controlled    Osteoporosis  Failed fosamax   Will need forteo x 2 years at discharge       Pulmonary edema   Resolved. Lasix was held earlier this admission.  Monitor chest x-rays.  Dr. melendez consulted.  Chest x-ray-was increasing bilateral perihilar and diffuse lung infiltrates bilaterally.  This may represent fat emboli leading to ARDS  After receiving 1 unit of blood, Lasix 40 mg IV push was given.  Patient's BNP is normal (44)  BioZ performed showed a TFC in the upper normal range    TTE Tuesday to eval heart function---normal  No other clinical evidence of CHF exacerbation and no known CHF history. I do not feel this is all pulmonary edema. I think she has developed ARDS. See ARDS plans    Pt had no o2 requirements yesterday and a little more sob this am with productive mucous after not wearing bipap. Needs to wear bipap for a couple more nights. Continue oxygen for now.        COPD exacerbation  Continue DuoNeb treatments 4 times daily  D/c rocephin day 7 and levaquin day 5  Pulmonology consult--added steroids. 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                 Acute blood loss anemia  Transfused 1 unit of packed red blood cells 2/20.  Developed acute decompensation of resp status and transferred to ICU  Hgb stable   Follow CBC  Started prophylactic lovenox 2/20  Wound dressing dry      Acute respiratory failure with hypoxia  Improved  Was doing well until no bipap last night, hopefully d/c on Monday.     Rehab consult     VTE Risk Mitigation         Ordered     enoxaparin injection 40 mg  Daily     Route:  Subcutaneous        02/20/17 1131     Place sequential compression device  Until discontinued      02/19/17 1827     Medium Risk of VTE  Once      02/15/17 1550          Paul Higgins MD  Department of Hospital Medicine   [facility]

## 2017-02-26 NOTE — SUBJECTIVE & OBJECTIVE
Interval History: doing much better    Review of Systems   Constitutional: Negative for activity change, fatigue, fever and unexpected weight change.   HENT: Negative for congestion, ear pain, hearing loss, rhinorrhea and sore throat.    Eyes: Negative for pain, redness and visual disturbance.   Respiratory: Positive for cough. Negative for wheezing.         SOB better   Cardiovascular: Negative for chest pain, palpitations and leg swelling.   Gastrointestinal: Negative for abdominal pain, constipation, diarrhea, nausea and vomiting.   Genitourinary: Negative for dysuria, frequency, pelvic pain and urgency.   Musculoskeletal: Negative for back pain, joint swelling and neck pain.        Pain control with tramadol   Skin: Negative for color change, rash and wound.   Neurological: Negative for dizziness, weakness, light-headedness and headaches.   Psychiatric/Behavioral: The patient is nervous/anxious.      Objective:     Vital Signs (Most Recent):  Temp: 97.9 °F (36.6 °C) (02/26/17 0404)  Pulse: 96 (02/26/17 0600)  Resp: 20 (02/26/17 0404)  BP: (!) 99/58 (02/26/17 0404)  SpO2: 96 % (02/26/17 0404) Vital Signs (24h Range):  Temp:  [96.7 °F (35.9 °C)-98.6 °F (37 °C)] 97.9 °F (36.6 °C)  Pulse:  [] 96  Resp:  [18-20] 20  SpO2:  [95 %-99 %] 96 %  BP: ()/(51-59) 99/58     Weight: 59 kg (130 lb)  Body mass index is 22.31 kg/(m^2).    Intake/Output Summary (Last 24 hours) at 02/26/17 0756  Last data filed at 02/26/17 0700   Gross per 24 hour   Intake              240 ml   Output             2100 ml   Net            -1860 ml      Physical Exam   Constitutional: She is oriented to person, place, and time. She appears well-developed and well-nourished. No distress.   HENT:   Head: Normocephalic and atraumatic.   Right Ear: External ear normal.   Left Ear: External ear normal.   Eyes: Conjunctivae and EOM are normal. Pupils are equal, round, and reactive to light. Right eye exhibits no discharge. Left eye exhibits no  discharge.   Neck: Neck supple. No tracheal deviation present.   Cardiovascular: Normal rate and regular rhythm.  Exam reveals no gallop and no friction rub.    No murmur heard.  Pulmonary/Chest: Effort normal. No respiratory distress. She has no wheezes. She has rales (rales at right lower base only).   Decreased breath sounds in bases b/l   Abdominal: Soft. Bowel sounds are normal. She exhibits no distension. There is no tenderness.   Musculoskeletal: She exhibits no edema.   Cast with hardware to left wrist   Lymphadenopathy:     She has no cervical adenopathy.   Neurological: She is alert and oriented to person, place, and time. No cranial nerve deficit.   Skin: Skin is warm and dry.   Bandage to left hip   Psychiatric: She has a normal mood and affect. Her behavior is normal.   Nursing note and vitals reviewed.      Significant Labs:   CBC:   Recent Labs  Lab 02/25/17  0454 02/26/17  0507   WBC 10.83 11.22   HGB 8.9* 9.5*   HCT 27.7* 29.2*   * 599*     CMP:   Recent Labs  Lab 02/25/17  0454 02/26/17  0507    140   K 4.4 4.5    105   CO2 30* 30*   GLU 91 92   BUN 17 11   CREATININE 0.6 0.6   CALCIUM 8.3* 8.5*   ANIONGAP 6* 5*   EGFRNONAA >60 >60

## 2017-02-26 NOTE — PLAN OF CARE
Problem: Fall Risk (Adult)  Goal: Absence of Falls  Patient will demonstrate the desired outcomes by discharge/transition of care.   Outcome: Ongoing (interventions implemented as appropriate)  Fall precautions maintained. No falls this shift.     Problem: Patient Care Overview  Goal: Plan of Care Review  Outcome: Ongoing (interventions implemented as appropriate)  Plan of care reviewed with patient and she agrees with the plan of care. C/o anxiety Lorazepam given at 2145 and was effective for anxiety. Short arm cast intact to left wrist. Neurovascular checks WNL. Patient is compliant with wearing BIPAP at night. O2 sat on room air 98%. Lungs clear but diminished.     Problem: Pressure Ulcer Risk (Harjinder Scale) (Adult,Obstetrics,Pediatric)  Goal: Skin Integrity  Patient will demonstrate the desired outcomes by discharge/transition of care.   Outcome: Ongoing (interventions implemented as appropriate)  Patient is able to turn and reposition herself using trapeze.     Problem: Fractured Hip (Adult)  Goal: Signs and Symptoms of Listed Potential Problems Will be Absent, Minimized or Managed (Fractured Hip)  Signs and symptoms of listed potential problems will be absent, minimized or managed by discharge/transition of care (reference Fractured Hip (Adult) CPG).   Outcome: Ongoing (interventions implemented as appropriate)  Dressing to left hip dry and intact. C/o left hip pain ibuprofen and ultram given at 2144 and were effective for pain.  Patient is using using walker and is ambulating in room with minimal assistance.

## 2017-02-26 NOTE — PROGRESS NOTES
Seen on rounds.  She continues to do well.  A question has been if she wants to go to rehab or home.  She admits her  continues to work and will not be available during normal business hours.  Lungs seem to be doing well enough for her to go home.  Hopefully we can all come to a consensus tomorrow when all hands are on deck. I will remove her sutures tomorrow.

## 2017-02-26 NOTE — PROGRESS NOTES
"Marycarmen Louis is a 71 y.o. female patient.    Active Hospital Problems    Diagnosis  POA    *Closed fracture of left hip [S72.002A]  Yes    Acute respiratory failure with hypoxia [J96.01]  No    Acute blood loss anemia [D62]  No    COPD exacerbation [J44.1]  No    Pulmonary edema [J81.1]  Yes    Osteoporosis [M81.0]  Yes    Left wrist fracture [S62.102A]  Yes    Coronary artery calcification seen on CAT scan [I25.10]  Yes    Degeneration of lumbar or lumbosacral intervertebral disc [M51.37]  Yes      Resolved Hospital Problems    Diagnosis Date Resolved POA    Fever [R50.9] 02/18/2017 No     Temp: 97.9 °F (36.6 °C) (02/26/17 0404)  Pulse: (!) 119 (02/26/17 0800)  Resp: 19 (02/26/17 0800)  BP: (!) 99/58 (02/26/17 0404)  SpO2: (!) 93 % (02/26/17 0800)  Weight: 59 kg (130 lb) (02/14/17 1746)  Height: 5' 4" (162.6 cm) (02/19/17 1541)    Subjective:  Symptoms:  Worsening.  She reports shortness of breath, cough, chest pain, weakness and anxiety.    Diet:  Adequate intake.  No nausea or vomiting.    Activity level: Impaired due to weakness.    Pain:  She complains of pain that is mild.  She reports pain is improving.  Pain is partially controlled.      Objective:  General Appearance:  Comfortable and not in pain.    Vital signs: (most recent): Blood pressure (!) 99/58, pulse (!) 119, temperature 97.9 °F (36.6 °C), temperature source Oral, resp. rate 19, height 5' 4" (1.626 m), weight 59 kg (130 lb), SpO2 (!) 93 %, not currently breastfeeding.  Vital signs are normal.  No fever.    Output: Producing urine and producing stool.    Lungs:  Normal respiratory rate and increased effort.  She is not in respiratory distress.  No stridor.  There are wheezes.  No rhonchi or decreased breath sounds.    Heart: Normal rate.  Regular rhythm.  S1 normal and S2 normal.    Chest: No chest wall tenderness.  Symmetric chest wall expansion.   Extremities: Normal range of motion.  There is no dependent edema.    Neurological: " Patient is alert and oriented to person, place and time.  Normal strength.    Skin:  Warm and dry.  No rash, ecchymosis or cyanosis.   Abdomen: Abdomen is soft and non-distended.  There are no signs of ascites.  Bowel sounds are normal.   There is no mass.   Pupils:  Pupils are equal, round, and reactive to light.    Pulses: Distal pulses are intact.      Assessment:  (Fracture Left Hip and Forearm ARDS secondary to Fat emboli and trauma resolved    Acute Hypoxic Respiratory failure(Now 88% of O2) resolved   COPD mild  ).     Plan:   Needs rehab   Ambulate OOB  On lovenox   Follow up in clinic 3/6/17.       Reji Melendez MD  2/26/2017

## 2017-02-26 NOTE — PLAN OF CARE
Problem: Patient Care Overview  Goal: Plan of Care Review  Outcome: Ongoing (interventions implemented as appropriate)  Ambulating without difficulty with specialized walker. Short arm cast dry and intact. N/V checks WDL. Complains of pain to left wrist. States doing more movement today. V/S stable. Denies pain to left hip. Free of falls/injury. Up in chair and ambulating in garcia today. Awaiting rehab approval. Patient understands plan of care and agrees.

## 2017-02-26 NOTE — PROGRESS NOTES
Staff Handoff  Bedside shift change report completed with Adelia Rucker RN. Patient denies needs. Safety and comfort maintained.       Resident Handoff

## 2017-02-26 NOTE — PT/OT/SLP PROGRESS
Physical Therapy  Treatment    Marycarmen Louis   MRN: [id number]   Admitting Diagnosis: Closed fracture of left hip    PT Received On: 02/27/17  PT Start Time: 0705     PT Stop Time: 0730    PT Total Time (min): 25 min       Billable Minutes:  Gait [id number] min and Therapeutic Exercise 10 min    Treatment Type: Treatment  PT/PTA: PT             General Precautions: Standard, fall  Orthopedic Precautions: LLE weight bearing as tolerated, LUE non weight bearing (LUE NWB wrist only)   Braces:  (left wrist casted.)    Do you have any cultural, spiritual, Confucianism conflicts, given your current situation?: None verbalized    Subjective:  Communicated with patient prior to session.  I am improving with my mobility and walking                        Objective:        Functional Mobility:  Bed Mobility:        Transfers:       Gait:   Gait Distance: 125  ft  Assistance 1: Stand by Assistance    Stairs:      Balance:   Static Sit: NORMAL: No deviations seen in posture held statically  Dynamic Sit: NORMAL: No deviations seen in posture held dynamically  Static Stand: GOOD-: Takes MODERATE challenges from all directions inconsistently  Dynamic stand: GOOD-: Needs SUPERVISION only during gait and able to self right with moderate      Therapeutic Activities and Exercises:  Iinitated standnig : static left leg and hip forward flexion, abduction, and extension excursions for strength and motion.Recommended three times per day routine  Initiated left upper extremity overhead reaches and elbow exercises  for motion and strength of left upper extremity.   Gait training with RW and platform increasing distance and endurance and weight bearing tolerance.  AM-PAC 6 CLICK MOBILITY  How much help from another person does this patient currently need?   1 = Unable, Total/Dependent Assistance  2 = A lot, Maximum/Moderate Assistance  3 = A little, Minimum/Contact Guard/Supervision  4 = None, Modified Conrad/Independent         AM-PAC Raw  Score CMS G-Code Modifier Level of Impairment Assistance   6 % Total / Unable   7 - 9 CM 80 - 100% Maximal Assist   10 - 14 CL 60 - 80% Moderate Assist   15 - 19 CK 40 - 60% Moderate Assist   20 - 22 CJ 20 - 40% Minimal Assist   23 CI 1-20% SBA / CGA   24 CH 0% Independent/ Mod I     Patient left up in chair with call button in reach.    Assessment:  Marycarmen Louis is a 71 y.o. female with a medical diagnosis of Closed fracture of left hip and presents with limited motion of left wrist (ORIF)    Rehab identified problem list/impairments: Rehab identified problem list/impairments: impaired fine motor    Rehab potential is good.    Activity tolerance: Good    Discharge recommendations: Discharge Facility/Level Of Care Needs: outpatient PT, outpatient OT     Barriers to discharge: Barriers to Discharge: None    Equipment recommendations: Equipment Needed After Discharge: none     GOALS:   Physical Therapy Goals        Problem: Physical Therapy Goal    Goal Priority Disciplines Outcome Goal Variances Interventions   Physical Therapy Goal     PT/OT, PT Unable to achieve outcome(s) by discharge     Description:  Goals to be met by: 3/10/2017     Patient will increase functional independence with mobility by performin. Supine to sit with Stand-by Assistance  2. Sit to supine with Stand-by Assistance  3. Sit to stand transfer with Stand-by Assistance  4. Bed to chair transfer with Stand-by Assistance using Platform walker  5. Gait  x 50 feet with Contact Guard Assistance using Platform walker.              Problem: Physical Therapy Goal    Goal Priority Disciplines Outcome Goal Variances Interventions   Physical Therapy Goal     PT/OT, PT Revised     Description:  Goals to be met by: 3/11/2017     Patient will increase functional independence with mobility by performin. Supine to sit with Stand-by Assistance  2. Sit to supine with Stand-by Assistance  3. Rolling to Left and Right with Stand-by  Assistance.  4. Sit to stand transfer with Contact Guard Assistance, with Platform Walker  5. Bed to chair transfer with Contact Guard Assistance using Platform walker  6. Gait  x 75 feet with Contact Guard Assistance using Platform walker.   7. [place] with Home Exercise Program.                 PLAN:    Patient to be seen daily  to address the above listed problems via gait training, therapeutic activities, therapeutic exercises  Plan of Care expires:  (Upon D/C from facility)  Plan of Care reviewed with: patient         Guido Katnard, PT  02/26/2017

## 2017-02-26 NOTE — PT/OT/SLP PROGRESS
Physical Therapy  Treatment    Marycarmen Louis   MRN: [id number]   Admitting Diagnosis: Closed fracture of left hip    PT Received On: 17  PT Start Time: 930     PT Stop Time: 955    PT Total Time (min): 25 min       Billable Minutes:  Gait [id number] min and Therapeutic Activity 10 min    Treatment Type: Treatment  PT/PTA: PT             General Precautions: Standard, fall  Orthopedic Precautions: LLE weight bearing as tolerated, LUE non weight bearing (LUE NWB wrist only)   Braces:      Do you have any cultural, spiritual, Latter day conflicts, given your current situation?: None verbalized    Subjective:  Communicated with patient and spouse prior to session.  I am ready to go home , I feel    Pain Ratin/10              Pain Rating Post-Intervention: 0/10    Objective:        Functional Mobility:  Bed Mobility:   Rolling/Turning to Left: Independent  Rolling/Turning Right: Independent  Scooting/Bridging: Stand by Assistance  Supine to Sit: Stand by Assistance  Sit to Supine: Stand by Assistance    Transfers:  Sit <> Stand Assistance: Stand By Assistance  Sit <> Stand Assistive Device: Rolling Walker, Platform  Bed <> Chair Technique: Stand Pivot  Bed <> Chair Assistance: Stand By Assistance  Bed <> Chair Assistive Device: Rolling Walker, Platform    Gait:   Gait Distance: 115 ft  Assistance 1: Stand by Assistance  Gait Assistive Device: Platform walker left, Rolling walker  Gait Pattern: swing-through gait  Gait Deviation(s): decreased velocity of limb motion, decreased stride length    Stairs:      Balance:   Static Sit: NORMAL: No deviations seen in posture held statically  Dynamic Sit: NORMAL: No deviations seen in posture held dynamically  Static Stand: GOOD: Takes MODERATE challenges from all directions  Dynamic stand: GOOD-: Needs SUPERVISION only during gait and able to self right with moderate      Therapeutic Activities and Exercises:  Out of bed siting PRN and gentle exercises for left and right  hips and legs..      AM-PAC 6 CLICK MOBILITY  How much help from another person does this patient currently need?   1 = Unable, Total/Dependent Assistance  2 = A lot, Maximum/Moderate Assistance  3 = A little, Minimum/Contact Guard/Supervision  4 = None, Modified Kimberly/Independent         AM-PAC Raw Score CMS G-Code Modifier Level of Impairment Assistance   6 % Total / Unable   7 - 9 CM 80 - 100% Maximal Assist   10 - 14 CL 60 - 80% Moderate Assist   15 - 19 CK 40 - 60% Moderate Assist   20 - 22 CJ 20 - 40% Minimal Assist   23 CI 1-20% SBA / CGA   24 CH 0% Independent/ Mod I     Patient left up in chair with all lines intact, call button in reach and spouse present.    Assessment:  Marycarmen Louis is a 71 y.o. female with a medical diagnosis of Closed fracture of left hip and presents with decreased left wrist motion and improving  left leg motion and strength.    Rehab identified problem list/impairments: Rehab identified problem list/impairments: impaired fine motor    Rehab potential is good.    Activity tolerance: Good    Discharge recommendations: Discharge Facility/Level Of Care Needs: rehabilitation facility     Barriers to discharge: Barriers to Discharge: None    Equipment recommendations: Equipment Needed After Discharge: none     GOALS:   Physical Therapy Goals        Problem: Physical Therapy Goal    Goal Priority Disciplines Outcome Goal Variances Interventions   Physical Therapy Goal     PT/OT, PT Unable to achieve outcome(s) by discharge     Description:  Goals to be met by: 3/10/2017     Patient will increase functional independence with mobility by performin. Supine to sit with Stand-by Assistance  2. Sit to supine with Stand-by Assistance  3. Sit to stand transfer with Stand-by Assistance  4. Bed to chair transfer with Stand-by Assistance using Platform walker  5. Gait  x 50 feet with Contact Guard Assistance using Platform walker.              Problem: Physical Therapy Goal     Goal Priority Disciplines Outcome Goal Variances Interventions   Physical Therapy Goal     PT/OT, PT Revised     Description:  Goals to be met by: 3/11/2017     Patient will increase functional independence with mobility by performin. Supine to sit with Stand-by Assistance  2. Sit to supine with Stand-by Assistance  3. Rolling to Left and Right with Stand-by Assistance.  4. Sit to stand transfer with Contact Guard Assistance, with Platform Walker  5. Bed to chair transfer with Contact Guard Assistance using Platform walker  6. Gait  x 75 feet with Contact Guard Assistance using Platform walker.   7. [place] with Home Exercise Program.                 PLAN:    Patient to be seen daily  to address the above listed problems via gait training, therapeutic activities, therapeutic exercises  Plan of Care expires:  (Upon D/C from facility)  Plan of Care reviewed with: patient, spouse         Guido Douglas, PT  2017

## 2017-02-26 NOTE — PLAN OF CARE
Problem: Patient Care Overview  Goal: Plan of Care Review  Outcome: Ongoing (interventions implemented as appropriate)  Patient understands to call for needs; patient hoping to go home tomorrow.  Patient had no concerns today, no respiratory problems.  Patient reports that she is feeling much better.  Patient would like rehabilitation upon discharge.  was notified and work is being done to find rehab for patient.  Patient has no other needs or concerns at this time.

## 2017-02-27 VITALS
SYSTOLIC BLOOD PRESSURE: 118 MMHG | HEART RATE: 104 BPM | WEIGHT: 130 LBS | TEMPERATURE: 96 F | OXYGEN SATURATION: 97 % | BODY MASS INDEX: 22.2 KG/M2 | HEIGHT: 64 IN | RESPIRATION RATE: 18 BRPM | DIASTOLIC BLOOD PRESSURE: 56 MMHG

## 2017-02-27 LAB
ANION GAP SERPL CALC-SCNC: 5 MMOL/L
BASOPHILS # BLD AUTO: ABNORMAL K/UL
BASOPHILS NFR BLD: 1 %
BNP SERPL-MCNC: 11 PG/ML
BUN SERPL-MCNC: 12 MG/DL
CALCIUM SERPL-MCNC: 8.3 MG/DL
CHLORIDE SERPL-SCNC: 106 MMOL/L
CO2 SERPL-SCNC: 29 MMOL/L
CREAT SERPL-MCNC: 0.6 MG/DL
DIFFERENTIAL METHOD: ABNORMAL
EOSINOPHIL # BLD AUTO: ABNORMAL K/UL
EOSINOPHIL NFR BLD: 7 %
ERYTHROCYTE [DISTWIDTH] IN BLOOD BY AUTOMATED COUNT: 14.3 %
EST. GFR  (AFRICAN AMERICAN): >60 ML/MIN/1.73 M^2
EST. GFR  (NON AFRICAN AMERICAN): >60 ML/MIN/1.73 M^2
GLUCOSE SERPL-MCNC: 89 MG/DL
HCT VFR BLD AUTO: 28.1 %
HGB BLD-MCNC: 9 G/DL
LYMPHOCYTES # BLD AUTO: ABNORMAL K/UL
LYMPHOCYTES NFR BLD: 16 %
MCH RBC QN AUTO: 30.6 PG
MCHC RBC AUTO-ENTMCNC: 32 %
MCV RBC AUTO: 96 FL
MONOCYTES # BLD AUTO: ABNORMAL K/UL
MONOCYTES NFR BLD: 9 %
NEUTROPHILS NFR BLD: 66 %
NEUTS BAND NFR BLD MANUAL: 1 %
PLATELET # BLD AUTO: 580 K/UL
PMV BLD AUTO: 7.9 FL
POTASSIUM SERPL-SCNC: 4.3 MMOL/L
RBC # BLD AUTO: 2.94 M/UL
SODIUM SERPL-SCNC: 140 MMOL/L
WBC # BLD AUTO: 8.68 K/UL

## 2017-02-27 PROCEDURE — 25000003 PHARM REV CODE 250: Performed by: SURGERY

## 2017-02-27 PROCEDURE — 25000003 PHARM REV CODE 250: Performed by: FAMILY MEDICINE

## 2017-02-27 PROCEDURE — 83880 ASSAY OF NATRIURETIC PEPTIDE: CPT

## 2017-02-27 PROCEDURE — 25000003 PHARM REV CODE 250: Performed by: ORTHOPAEDIC SURGERY

## 2017-02-27 PROCEDURE — 25000242 PHARM REV CODE 250 ALT 637 W/ HCPCS: Performed by: FAMILY MEDICINE

## 2017-02-27 PROCEDURE — 25000003 PHARM REV CODE 250: Performed by: NURSE PRACTITIONER

## 2017-02-27 PROCEDURE — 99239 HOSP IP/OBS DSCHRG MGMT >30: CPT | Mod: ,,, | Performed by: FAMILY MEDICINE

## 2017-02-27 PROCEDURE — 97110 THERAPEUTIC EXERCISES: CPT

## 2017-02-27 PROCEDURE — 80048 BASIC METABOLIC PNL TOTAL CA: CPT

## 2017-02-27 PROCEDURE — 94640 AIRWAY INHALATION TREATMENT: CPT

## 2017-02-27 PROCEDURE — 85007 BL SMEAR W/DIFF WBC COUNT: CPT

## 2017-02-27 PROCEDURE — 85027 COMPLETE CBC AUTOMATED: CPT

## 2017-02-27 PROCEDURE — 36415 COLL VENOUS BLD VENIPUNCTURE: CPT

## 2017-02-27 RX ORDER — LORAZEPAM 0.5 MG/1
0.5 TABLET ORAL EVERY 6 HOURS PRN
Status: DISCONTINUED | OUTPATIENT
Start: 2017-02-27 | End: 2017-02-27 | Stop reason: HOSPADM

## 2017-02-27 RX ORDER — TRAMADOL HYDROCHLORIDE 50 MG/1
50 TABLET ORAL EVERY 6 HOURS PRN
Qty: 60 TABLET | Refills: 0 | Status: SHIPPED | OUTPATIENT
Start: 2017-02-27 | End: 2017-03-09

## 2017-02-27 RX ORDER — LORAZEPAM 0.5 MG/1
0.5 TABLET ORAL EVERY 6 HOURS PRN
Qty: 30 TABLET | Refills: 0 | Status: SHIPPED | OUTPATIENT
Start: 2017-02-27 | End: 2017-06-28

## 2017-02-27 RX ORDER — IBUPROFEN 600 MG/1
600 TABLET ORAL EVERY 6 HOURS PRN
Qty: 60 TABLET | Refills: 0 | Status: SHIPPED | OUTPATIENT
Start: 2017-02-27 | End: 2018-04-13

## 2017-02-27 RX ORDER — TERIPARATIDE 250 UG/ML
20 INJECTION, SOLUTION SUBCUTANEOUS DAILY
Qty: 2.4 ML | Refills: 11 | Status: SHIPPED | OUTPATIENT
Start: 2017-02-27 | End: 2017-03-13 | Stop reason: SDUPTHER

## 2017-02-27 RX ADMIN — TRAMADOL HYDROCHLORIDE 50 MG: 50 TABLET, COATED ORAL at 03:02

## 2017-02-27 RX ADMIN — SODIUM CHLORIDE, PRESERVATIVE FREE 3 ML: 5 INJECTION INTRAVENOUS at 01:02

## 2017-02-27 RX ADMIN — PANTOPRAZOLE SODIUM 40 MG: 40 TABLET, DELAYED RELEASE ORAL at 09:02

## 2017-02-27 RX ADMIN — IBUPROFEN 600 MG: 600 TABLET ORAL at 07:02

## 2017-02-27 RX ADMIN — TRAMADOL HYDROCHLORIDE 50 MG: 50 TABLET, COATED ORAL at 09:02

## 2017-02-27 RX ADMIN — IBUPROFEN 600 MG: 600 TABLET ORAL at 01:02

## 2017-02-27 RX ADMIN — SODIUM CHLORIDE, PRESERVATIVE FREE 3 ML: 5 INJECTION INTRAVENOUS at 05:02

## 2017-02-27 RX ADMIN — LORAZEPAM 0.5 MG: 0.5 TABLET ORAL at 01:02

## 2017-02-27 RX ADMIN — IPRATROPIUM BROMIDE AND ALBUTEROL SULFATE 3 ML: .5; 3 SOLUTION RESPIRATORY (INHALATION) at 07:02

## 2017-02-27 RX ADMIN — DOCUSATE SODIUM AND SENNOSIDES 1 TABLET: 8.6; 5 TABLET, FILM COATED ORAL at 09:02

## 2017-02-27 RX ADMIN — IPRATROPIUM BROMIDE AND ALBUTEROL SULFATE 3 ML: .5; 3 SOLUTION RESPIRATORY (INHALATION) at 01:02

## 2017-02-27 RX ADMIN — VENLAFAXINE HYDROCHLORIDE 75 MG: 75 CAPSULE, EXTENDED RELEASE ORAL at 09:02

## 2017-02-27 NOTE — PROGRESS NOTES
Seen on rounds.  Getting a little depressed, feeling like she should be out of here.  Hopefully [facility] will approve her going to [facility].  She is complaining of some wrist pain that wasn't a complaint over the last week.  She thinks it is overuse. I explained again the ulna styloid or the pin may be the cause, and it will eventually go away, but she does need to be vigilant and watchful for the possibility of an infection.  Nurse to remove staples.

## 2017-02-27 NOTE — ASSESSMENT & PLAN NOTE
Continue DuoNeb treatments 4 times daily  D/c rocephin day 7 and levaquin day 5  Pulmonology consult--added steroids. 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     Did well over the weekend with no a/b, no oxygen, now off bipap

## 2017-02-27 NOTE — PROGRESS NOTES
Called [facility] [phone] to get update on patient's walker.  Cindy with [facility] states they have not received an order from [employer] [employer] for a walker.  I faxed the order to [facility] at [phone].

## 2017-02-27 NOTE — PLAN OF CARE
Called Dari at [facility] to begin authorization for IP rehab. Patient requests [facility] as she lives in [place]. Dari made aware of this. Offered assistance in placing patient if needed.

## 2017-02-27 NOTE — DISCHARGE INSTRUCTIONS
Discharge Instructions for Hip Fracture Surgery  You had surgery to repair a hip fracture. The type of surgery you received depends on the location and severity of the fracture. You may have pins, screws, or rods (internal fixation devices) holding the fractured bone in place. Or, some or all of your hip may have been replaced. You must take care of your new hip as you recover at home or in a rehabilitation facility. This means moving and sitting the way you were taught in the hospital. You must also see your doctor for follow-up visits as you slowly return to activity.  Hip repair for fracture or hip replacement is major surgery. So dont be surprised if it takes a few months before you can move comfortably. Plan to have your family and friends help when you return home.  Home care  · Take your pain medicine exactly as directed.  · Dont drive until your doctor says its OK. And never drive if you are taking opioid pain medicine.  · Wear the support stockings you were given in the hospital. Wear them 24 hours a day for 3 to 4 week(s).  · Make arrangements to have your staples removed 2 weeks after surgery. The staples were used to close the skin incision.  · Get up and carefully move around to relieve pain.  · If you received an artificial hip joint, tell all your healthcare providers--including your dentist--about the joint before any procedure. You may need to take antibiotics before dental work and other medical procedures to reduce the risk of infection.  Incision care  · Avoid infection by washing your hands often. If an infection occurs, it will need to be treated with antibiotics immediately. So call your doctor right away if you think you may have an infection. Symptoms of infection include a fever or leakage of white, greenish, or yellowish-colored fluid from the incision.  · Check your incision daily for redness, tenderness, or drainage.  · Avoid soaking your wound in water (no hot tubs, bathtubs,  swimming pools) until your doctor says its OK.  · Wait 7 day(s) after your surgery to begin showering. Then shower as needed. Carefully wash your incision with soap and water. Gently pat it dry. Dont rub the incision, or apply creams or lotions. And sit on a shower stool when you shower to keep from falling.  Sitting and sleeping  · Dont sit for more than 30 to 45 minutes at a time.  · Use chairs with arms, and sit with your knees slightly lower than your hips. Dont sit on low or sagging chairs or couches.  · Dont lean forward while sitting.  · Dont cross your legs.  · Keep your feet flat on the floor. Dont turn your foot or leg inward. This stresses your hip joint.  · Use an elevated toilet seat for 6 week(s) after surgery.  · Use pillows between your legs when sleeping on your back or on your healthy side.  · Sit on a firm cushion when you ride in a car and avoid sitting too low. Try not to bend your hip too much when getting in and out of the car.  Moving safely  · Dont bend at the hip when you bend over. Dont bend at the waist to put on socks and shoes. And avoid picking up items from the floor.  · Use a cane, crutches, a walker, or handrails until your balance, flexibility, and strength improve. And remember to ask for help from others when you need it.  · Free up your hands so that you can use them to keep balance. Use a sanjuanita pack, apron, or pockets to carry things.  · Follow your doctors orders regarding how much weight to place on the affected leg.  · Do all exercises as instructed.  · Arrange your household to keep the items you need within reach.  · Remove electrical cords, throw rugs, and anything else that may cause you to fall.  · Use nonslip bath mats, grab bars, an elevated toilet seat, and a shower chair in your bathroom.  Follow-up  Make a follow-up appointment as directed by your doctor.     When to seek medical attention  Call 911 right away if you have any of the following:  · Chest  pain  · Shortness of breath  Otherwise, call your doctor right away if you have any of the following:  · Increased hip pain  · Pain or swelling of your calf or leg  · Fever above 100.4°F  (38.0°C) or shaking chills  · Excessive swelling, increased redness, or any drainage from the incision  · Swelling, tenderness, or cramps in your leg   Date Last Reviewed: 11/15/2015  © 5332-5243 [employer]. [address] [place] [address], [place] [address]. All rights reserved. This information is not intended as a substitute for professional medical care. Always follow your healthcare professional's instructions.      The Xarelto needs to be taken for only 28 days (including the days you took it in the hospital). Your first dose was given in the hospital on 2/16/17. This will help prevent blood clots. Please take the medication with a meal. For any questions about this medication please contact Dr. Cesar's office.

## 2017-02-27 NOTE — DISCHARGE SUMMARY
[facility] Medicine  Discharge Summary      Patient Name: Marycarmen Louis  MRN: [id number]  Admission Date: 2/14/2017  Hospital Length of Stay: 13 days  Discharge Date and Time:  02/27/2017 11:27 AM  Attending Physician: Jose Jaeger MD   Discharging Provider: Yael Bird NP  Primary Care Provider: Willie Santos MD (Inactive)      HPI:   70 y/o female reports she fell off of gym apparatus. Reports falling after missing step. ER w/up shows left wrist fracture and left hip fracture. Reports feeling fine prior to fall yesterday. No fever prior to this am.    Procedure(s) (LRB):  BIPOLAR ENDOPROSTHETIC HEMIARTHROPLASTY (Left)  CLOSED REDUCTION AND PERCUTANEOUS DISTRACTION PINNING-DISTAL RADIUS (Left)  APPLICATION-SHORT ARM FIBERGLASS CAST (Left)      Indwelling Lines/Drains at time of discharge:   Lines/Drains/Airways          No matching active lines, drains, or airways        Hospital Course:   She has surgery yesterday right lateral decubitus. Was given IVF. Has slight fluid overload caused resp distress. Hypoxic. CXR showed right lung infiltrate. She had no s/s of pneumonia and clear  X ray prior to surgery. Given dose of lasix and improved well. She was moved  From ICU to floor. Did well over night. PT to start this am. Still on O2 today 4L NC 96%    2/17/17-patient still becomes hypoxic when oxygen is removed.  CTA of the chest and lungs were done today that were negative for pulmonary embolism.  She did have significant emphysematous changes and lower lobe infiltrates and atelectasis bilaterally.  She probably has an exacerbation of COPD probably from perioperative intubation.  DuoNeb treatments, Rocephin, azithromycin started.  She seems be doing better.    2/18.  Patient evaluated this morning.  She was working with physical therapy and able to ambulate across the room with assistance.  She does feel short of breath with exertion.  Her hemoglobin is now 8.2.     consulted.  Her sats still drop when she takes her oxygen off.  Patient had a large amount of bloody drainage.  I stopped her Xarelto today for now until her hemoglobin stabilizes.      2/18 patient never received blood.  Pulmonologist wanted to wait in x-ray today because he felt she may be fluid overloaded.  Chest x-ray is starting to look a little worse.  She has bilateral infiltrates consistent with bilateral perihilar and diffuse lung infiltrates bilaterally.  This may represent a atypical/viral pneumonia or ARDS.  Today her hemoglobin is 7.8 and everybody agrees that she could benefit from one unit of blood.  Her Xarelto has been discontinued.  Patient is ambulating with therapy.  She does have dyspnea with exertion.  She is on 4 L nasal cannula and her O2 sats are 92%.    2/20  Yesterday patient had acute hypoxic episode with sats into the 50% after receiving 1 unit of blood. Transferred to ICU on BiPaP. Got Lasix 40mg IV x1. Maintaining sats in 90% on BipaP with 35% FiO2, sats drop to <80% within 1 minutes of coming off BiPaP. However, even with the hypoxia this AM she does not report feeling SOB, no use of accessory muscles, speaking in complete sentences. CXR has been worsneing, looks like b/l infiltrates consistent with ARDS. Pain is well controlled from surgery. She is not on any anticoagulation s/p surgery due to bleeding and worsening H/H. She had CTA which was negative for PE. She is on levaquin and Ceftriaxone for possible PNA (there was some question of aspiration after her surgery). She was also started on steroids.     2/21/17  She is still on BIPAP  She is on steroids;  Nebs   She diuresed with lasix  She is still in ventimax  Her HH is stable  Dressing is dry   Echo was normal   She is levaquin and rocephin   Temp 99    2/22/17  She is weaned down to 3liters Nc while eating but hypoxic(88)  Still requiring bipap  Getting OOB to chair with PT      2/23/17 She still has dick, bladder  training started today and will d/c after. She has gotten out of bed with OT. She has no fever and no elevated WBC. She is 7 days with rocephin and 5 days levaquin today. She is feeling great today just ready to get out of here. No weakness, no SOB/CP. No more O2 requirement     2/24/17 As of this am, o2 at 88%. Pt is ready to go home. Reports she didn't wear bipap last night and now regrets it now that respiratory status is not as well as it was yesterday.     2/25/17  Doing better asked me about going to rehab upon discharge  Looks better ;  Close to discharge    2/26/17  Seems to be doing better everyday  Waiting for insurance for rehab approval  Walking better  Her breathing/ARDS issues have gotten back to complete resolution  Hopefully tomorrow to REHAB or HOME WITH HH with home PT    2/27   No change in last 24 hours other than pain this am, but no pain meds since last night. Questioning pain vs anxiety. Ambulating, breathing well, no other new issues today. Awaiting Rehab placement.             Consults:   Consults         Status Ordering Provider     Inpatient consult to Cardiology-[facility]  Once     Provider:  Kurtis Cesar MD    Completed BARB ROGEL     Inpatient consult to Orthopedic Surgery  Once     Provider:  Shaun Sanchez MD    Completed BARB ROGEL     Inpatient consult to Orthopedic Surgery  Once     Provider:  Shaun Sanchez MD    Acknowledged BARB ROGEL     Inpatient consult to Pulmonology  Once     Provider:  Reji Melendez MD    Completed LASHAWN CARL     Inpatient consult to Social Work  Once     Provider:  (Not yet assigned)    Completed INNA MEJIA          Significant Diagnostic Studies: Labs:   BMP:     Recent Labs  Lab 02/26/17  0507 02/27/17  0512   GLU 92 89    140   K 4.5 4.3    106   CO2 30* 29   BUN 11 12   CREATININE 0.6 0.6   CALCIUM 8.5* 8.3*    and CBC     Recent Labs  Lab 02/26/17  0507 02/27/17  0512   WBC 11.22 8.68   HGB 9.5* 9.0*    HCT 29.2* 28.1*   * 580*       Pending Diagnostic Studies:     Procedure Component Value Units Date/Time    X-Ray Chest AP Portable [[id number]] Resulted:  02/21/17 0759    Order Status:  Sent Lab Status:  In process Updated:  02/21/17 0759    X-Ray Wrist 2 View Left [[id number]] Resulted:  02/27/17 0927    Order Status:  Sent Lab Status:  In process Updated:  02/27/17 0927        Final Active Diagnoses:    Diagnosis Date Noted POA    PRINCIPAL PROBLEM:  Closed fracture of left hip [S72.002A] 02/15/2017 Yes    Acute respiratory failure with hypoxia [J96.01] 02/20/2017 No    Acute blood loss anemia [D62] 02/19/2017 No    COPD exacerbation [J44.1] 02/17/2017 No    Pulmonary edema [J81.1] 02/16/2017 Yes    Osteoporosis [M81.0] 02/15/2017 Yes    Left wrist fracture [S62.102A] 02/14/2017 Yes    Coronary artery calcification seen on CAT scan [I25.10] 06/29/2013 Yes    Degeneration of lumbar or lumbosacral intervertebral disc [M51.37] 11/09/2012 Yes      Problems Resolved During this Admission:    Diagnosis Date Noted Date Resolved POA    Fever [R50.9] 02/15/2017 02/18/2017 No      Hospital Problem List           Codes Noted - Resolved POA     * (Principal)Closed fracture of hip ICD-10-CM: S72.002A  ICD-9-CM: 820.8 2/15/2017 - Present Yes     Current Assessment & Plan 2/14/2017 Hospital Encounter Edited 2/27/2017 10:29 AM by Tamera Bowen MD      Ortho following  S/p Left bipolar endoprosthetic hemiarthroplasty  Continue physical therapy  Pain well controlled  enoxaprin for DVT prophylaxis  She will need to complete xarelto prophylaxis script to be written at d/c; will send home with 13 days as she has had 12 days of therapy here.   Added ot  Has gotten up and ambulated 125' with PT      Plan;   REHAB if approved  Otherwise HH with home PT            Degeneration of lumbar or lumbosacral intervertebral disc ICD-10-CM: M51.37  ICD-9-CM: 722.52 11/9/2012 - Present Yes     Current Assessment & Plan  2/14/2017 Hospital Encounter Written 2/27/2017  8:50 AM by Yael Bird NP      Pain controlled.            Coronary artery calcification seen on CAT scan ICD-10-CM: I25.10  ICD-9-CM: 414.00 6/29/2013 - Present Yes     Current Assessment & Plan 2/14/2017 Hospital Encounter Written 2/27/2017  8:50 AM by Yael Bird NP      Cardiology consulted.  No changes at this time.  Can be followed further as outpatient  No chest pain, no signs of ACS this admission            Broken wrist ICD-10-CM: S62.102A  ICD-9-CM: 814.00 2/14/2017 - Present Yes     Current Assessment & Plan 2/14/2017 Hospital Encounter Written 2/27/2017  8:51 AM by Yael Bird NP      Ortho follwing  S/p closed reduction and percutaneous distraction pinning of the left distal radius.  Continue physical therapy  Pain well controlled          Osteoporosis ICD-10-CM: M81.0  ICD-9-CM: 733.00 2/15/2017 - Present Yes     Current Assessment & Plan 2/14/2017 Hospital Encounter Written 2/27/2017  8:51 AM by Yael Bird NP      Failed fosamax   Will need forteo x 2 years at discharge   Sent rx in. Advised pt on this plan. Will need follow-up calcium outpt.             RESOLVED: Fever ICD-10-CM: R50.9  ICD-9-CM: 780.60 2/15/2017 - 2/18/2017 No     Current Assessment & Plan 2/14/2017 Hospital Encounter Written 2/18/2017  3:23 PM by Jose Jaeger MD      Monitor temperature            Fluid in the lungs ICD-10-CM: J81.1  ICD-9-CM: 514 2/16/2017 - Present Yes     Current Assessment & Plan 2/14/2017 Hospital Encounter Edited 2/27/2017 10:31 AM by Tamera Bowen MD      Resolved. Lasix was held earlier this admission.  Monitor chest x-rays.  Dr. camacho consulted.  Chest x-ray-was increasing bilateral perihilar and diffuse lung infiltrates bilaterally.  This may represent fat emboli leading to ARDS  After receiving 1 unit of blood, Lasix 40 mg IV push was given.  Patient's BNP is normal (44)  BioZ performed showed a TFC in the  upper normal range    TTE Tuesday to eval heart function---normal  No other clinical evidence of CHF exacerbation and no known CHF history. I do not feel this is all pulmonary edema. I think she has developed ARDS. See ARDS plans    Wore bipap a couple more nights with oxygen. No longer requiring oxygen nor bipap.             Chronic bronchitis ICD-10-CM: J44.1  ICD-9-CM: 491.21 2/17/2017 - Present No     Current Assessment & Plan 2/14/2017 Hospital Encounter Edited 2/27/2017 10:04 AM by Yael Bird NP      Continue DuoNeb treatments 4 times daily  D/c rocephin day 7 and levaquin day 5  Pulmonology consult--added steroids. 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     Did well over the weekend with no a/b, no oxygen, now off bipap                      Anemia due to acute blood loss ICD-10-CM: D62  ICD-9-CM: 285.1 2/19/2017 - Present No     Current Assessment & Plan 2/14/2017 Hospital Encounter Edited 2/27/2017 10:05 AM by Yael Bird NP      Transfused 1 unit of packed red blood cells 2/20.  Developed acute decompensation of resp status and transferred to ICU  Hgb stable   Follow CBC  Started prophylactic lovenox 2/20  Wound dressing dry   Holding counts well          Respiratory insufficiency ICD-10-CM: J96.01  ICD-9-CM: 518.81 2/20/2017 - Present No     Current Assessment & Plan 2/14/2017 Hospital Encounter Edited 2/27/2017 10:32 AM by Tamera Bowen MD      Improved    Rehab consult done, waiting for approval from [employer]'s insurance.                Discharged Condition: good    Disposition: Home or Self Care with HH With PT/OT vs rehab    Follow Up:  Follow-up Information     Follow up with Shaun Sanchez MD.    Specialty:  Orthopedic Surgery    Why:  Outpatient Services    Contact information:    [place] [address]  [phone]          Follow up with Kurtis Cesar MD.    Specialty:   Cardiology    Why:  please follow-up with Dr. Cesar's clinic on 3/17/17 at 2:10pm. The appointment will be with KM Thomas.    Contact information:    [address] [place] [id number] [phone]          Follow up with Dileep Moulton MD.    Specialty:  Family Medicine    Why:  either 1 week or s/p release from rehab    Contact information:    [address] [place] [address]  [phone]          Patient Instructions:     Referral to Home health   Referral Priority: Routine Referral Type: Home Health   Referral Reason: Specialty Services Required    Requested Specialty: Home Health Services    Number of Visits Requested: 1      Diet general     Activity as tolerated       Medications:  Reconciled Home Medications:   Current Discharge Medication List      START taking these medications    Details   ibuprofen (ADVIL,MOTRIN) 600 MG tablet Take 1 tablet (600 mg total) by mouth every 6 (six) hours as needed.  Qty: 60 tablet, Refills: 0      lorazepam (ATIVAN) 0.5 MG tablet Take 1 tablet (0.5 mg total) by mouth every 6 (six) hours as needed for Anxiety.  Qty: 30 tablet, Refills: 0      rivaroxaban (XARELTO) 10 mg Tab Take 1 tablet (10 mg total) by mouth daily with dinner or evening meal.  Qty: 13 tablet, Refills: 0      teriparatide (FORTEO) 20 mcg/dose - 600 mcg/2.4 mL PnIj Inject 0.08 mLs (20 mcg total) into the skin once daily.  Qty: 2.4 mL, Refills: 11    Comments: Failed biphosphenate; now with hip and wrist fracture      tramadol (ULTRAM) 50 mg tablet Take 1 tablet (50 mg total) by mouth every 6 (six) hours as needed.  Qty: 60 tablet, Refills: 0         CONTINUE these medications which have NOT CHANGED    Details   b complex vitamins capsule Take 1 capsule by mouth once daily.      calcium-vitamin D3 500 mg(1,250mg) -200 unit per tablet Take 1 tablet by mouth 2 (two) times daily with meals.      CYANOCOBALAMIN, VITAMIN B-12, (VITAMIN B-12 ORAL) Take by mouth.      melatonin 5 mg Tab Take 5 mg by mouth  2 (two) times daily.      multivitamin capsule Take 1 capsule by mouth once daily.      venlafaxine (EFFEXOR-XR) 75 MG 24 hr capsule Take 1 capsule (75 mg total) by mouth once daily.  Qty: 30 capsule, Refills: 12    Associated Diagnoses: Anxiety; Depression, unspecified depression type      ASCORBIC ACID (SHEREEN-C ORAL) Take by mouth.      glucosamine-chondroitin 500-400 mg tablet Take 1 tablet by mouth 3 (three) times daily.      meclizine (ANTIVERT) 25 mg tablet Take 1 tablet (25 mg total) by mouth 3 (three) times daily as needed for Dizziness.  Qty: 30 tablet, Refills: 1    Associated Diagnoses: Vertigo      ondansetron (ZOFRAN-ODT) 8 MG TbDL 1 sublingual Q 8 hours, prn  Qty: 6 tablet, Refills: 1    Associated Diagnoses: Vertigo      thiamine (VITAMIN B-1) 100 MG tablet Take 100 mg by mouth once daily.         STOP taking these medications       alendronate (FOSAMAX) 70 MG tablet Comments:   Reason for Stopping:             Time spent on the discharge of patient: 30 minutes    Yael Bird NP  Department of Hospital Medicine  [facility]

## 2017-02-27 NOTE — PT/OT/SLP DISCHARGE
Physical Therapy Discharge Summary    Marycarmen Louis  MRN: [id number]   Closed fracture of left hip   Patient Discharged from acute Physical Therapy on 2017 following a.m. PT tx. session.  Please refer to prior PT noted date on 2017 for functional status.     Assessment:   Patient has met all goals and is not appropriate for therapy. Patient appropriate for care in another setting.  GOALS:   Physical Therapy Goals        Problem: Physical Therapy Goal    Goal Priority Disciplines Outcome Goal Variances Interventions   Physical Therapy Goal     PT/OT, PT Unable to achieve outcome(s) by discharge     Description:  Goals to be met by: 3/10/2017     Patient will increase functional independence with mobility by performin. Supine to sit with Stand-by Assistance  2. Sit to supine with Stand-by Assistance  3. Sit to stand transfer with Stand-by Assistance  4. Bed to chair transfer with Stand-by Assistance using Platform walker  5. Gait  x 50 feet with Contact Guard Assistance using Platform walker.              Problem: Physical Therapy Goal    Goal Priority Disciplines Outcome Goal Variances Interventions   Physical Therapy Goal     PT/OT, PT Revised     Description:  Goals to be met by: 3/11/2017     Patient will increase functional independence with mobility by performin. Supine to sit with Stand-by Assistance  2. Sit to supine with Stand-by Assistance  3. Rolling to Left and Right with Stand-by Assistance.  4. Sit to stand transfer with Contact Guard Assistance, with Platform Walker  5. Bed to chair transfer with Contact Guard Assistance using Platform walker  6. Gait  x 75 feet with Contact Guard Assistance using Platform walker.   7. [place] with Home Exercise Program.               Reasons for Discontinuation of Therapy Services  Transfer to alternate level of care.      Plan:  Patient Discharged to: Home with Home Health Service.

## 2017-02-27 NOTE — ASSESSMENT & PLAN NOTE
Resolved. Lasix was held earlier this admission.  Monitor chest x-rays.  Dr. camacho consulted.  Chest x-ray-was increasing bilateral perihilar and diffuse lung infiltrates bilaterally.  This may represent fat emboli leading to ARDS  After receiving 1 unit of blood, Lasix 40 mg IV push was given.  Patient's BNP is normal (44)  BioZ performed showed a TFC in the upper normal range    TTE Tuesday to eval heart function---normal  No other clinical evidence of CHF exacerbation and no known CHF history. I do not feel this is all pulmonary edema. I think she has developed ARDS. See ARDS plans    Wore bipap a couple more nights with oxygen. No longer requiring oxygen nor bipap.

## 2017-02-27 NOTE — PT/OT/SLP PROGRESS
"Occupational Therapy  Treatment    Marycarmen Louis   MRN: [id number]   Admitting Diagnosis: Closed fracture of left hip    OT Date of Treatment: 17   OT Start Time: 1255  OT Stop Time: 1310  OT Total Time (min): 15 min    Billable Minutes:  Therapeutic Exercise 10 min, therapeutic act x 5 min    General Precautions: Standard, fall  Orthopedic Precautions: LUE non weight bearing, LLE weight bearing as tolerated  Braces:  (left wrist cast )    Do you have any cultural, spiritual, Faith conflicts, given your current situation?: none voiced    Subjective:  Communicated with nursing, patient, and family member prior to session.  "I am having a lot more pain in my fingers. It started yesterday afternoon." per pt    Pain Ratin/10  Location - Side: Left     Location:  (fingers and hand going down into wrist, per pt)  Pain Addressed: Nurse notified    Objective:  Functional Mobility:  Bed Mobility:  Supine to Sit:  (pt up in bed side chair on OT arrival)    Transfers:   Patient reported she had just come back from walking to the commode on OT arrival, however is finding she is now having a harder time with ambulation to bathroom due to the increased pain in her UE she is less able to  the handle of the platform walker and required assistance from her family member    Activities of Daily Living:  Patient previously able to manipulate and utilize reacher for ADL's, however today patient had much more difficulty requiring mod A from clinician due to decreased ability to use left hand as a functional assist with ADL's due to increased pain and decreased ability to  and pinch fingers without significant pain.    Therapeutic Activities and Exercises:  Completed gentle AAROM with left hand digits flexion and extension to comfort levels as well as opposition exercises and tendon glides in an attempt to decrease stiffness and pain, however this did not lessen pain.    Patient left up in chair with all lines intact, " call button in reach and family member present present    ASSESSMENT:  Marycarmen Louis is a 71 y.o. female with a medical diagnosis of Closed fracture of left hip as well as left wrist fracture. Patient is currently non weightbearing left UE. Patient demonstrated increased left hand pain today with a decreased ability to utilize left hand as a functional assist during ADL's requiring increased assistance from clinician than previously and also decreased safety with ambulation to commode due to decreased ability to  platform walker due to increased left hand pain.    Education:  Reviewed education on left hand digit ROM with patient demonstrating understanding.    Rehab identified problem list/impairments: Rehab identified problem list/impairments: weakness, impaired self care skills, pain    Rehab potential is good.    Activity tolerance: Good    Discharge recommendations: Occupational therapy follow up at facility of patient's choice    Barriers to discharge:      Equipment recommendations:  (platform for rolling walker, due to pt left UE non weight bearing status)     GOALS:   Occupational Therapy Goals        Problem: Occupational Therapy Goal    Goal Priority Disciplines Outcome Interventions   Occupational Therapy Goal     OT, PT/OT Ongoing (interventions implemented as appropriate)    Description:  Goals to be met by: d/c from facility    Patient will increase functional independence with ADLs by performing:    Pt will perform palm to tip, while maintaining to objects in her palm by time of d/c to increase fine motor coordination.                 Plan:  Patient to be seen 3 to 5 x week to address the above listed problems via therapeutic exercises, therapeutic activities, self-care/home management  Plan of Care expires:  (upon d/c)  Plan of Care reviewed with: patient         Patricia RandallJARRELL alejo  02/27/2017

## 2017-02-27 NOTE — PROGRESS NOTES
[facility] Medicine  Progress Note    Patient Name: Marycarmen Louis  MRN: [id number]  Patient Class: IP- Inpatient   Admission Date: 2/14/2017  Length of Stay: 13 days  Attending Physician: Jose Jaeger MD  Primary Care Provider: Willie Santos MD (Inactive)        Subjective:     Principal Problem:Closed fracture of left hip    HPI:  70 y/o female reports she fell off of gym apparatus. Reports falling after missing step. ER w/up shows left wrist fracture and left hip fracture. Reports feeling fine prior to fall yesterday. No fever prior to this am.    Hospital Course:  She has surgery yesterday right lateral decubitus. Was given IVF. Has slight fluid overload caused resp distress. Hypoxic. CXR showed right lung infiltrate. She had no s/s of pneumonia and clear  X ray prior to surgery. Given dose of lasix and improved well. She was moved  From ICU to floor. Did well over night. PT to start this am. Still on O2 today 4L NC 96%    2/17/17-patient still becomes hypoxic when oxygen is removed.  CTA of the chest and lungs were done today that were negative for pulmonary embolism.  She did have significant emphysematous changes and lower lobe infiltrates and atelectasis bilaterally.  She probably has an exacerbation of COPD probably from perioperative intubation.  DuoNeb treatments, Rocephin, azithromycin started.  She seems be doing better.    2/18.  Patient evaluated this morning.  She was working with physical therapy and able to ambulate across the room with assistance.  She does feel short of breath with exertion.  Her hemoglobin is now 8.2.  Dr. Melendez consulted.  Her sats still drop when she takes her oxygen off.  Patient had a large amount of bloody drainage.  I stopped her Xarelto today for now until her hemoglobin stabilizes.      2/18 patient never received blood.  Pulmonologist wanted to wait in x-ray today because he felt she may be fluid overloaded.  Chest x-ray is  starting to look a little worse.  She has bilateral infiltrates consistent with bilateral perihilar and diffuse lung infiltrates bilaterally.  This may represent a atypical/viral pneumonia or ARDS.  Today her hemoglobin is 7.8 and everybody agrees that she could benefit from one unit of blood.  Her Xarelto has been discontinued.  Patient is ambulating with therapy.  She does have dyspnea with exertion.  She is on 4 L nasal cannula and her O2 sats are 92%.    2/20  Yesterday patient had acute hypoxic episode with sats into the 50% after receiving 1 unit of blood. Transferred to ICU on BiPaP. Got Lasix 40mg IV x1. Maintaining sats in 90% on BipaP with 35% FiO2, sats drop to <80% within 1 minutes of coming off BiPaP. However, even with the hypoxia this AM she does not report feeling SOB, no use of accessory muscles, speaking in complete sentences. CXR has been worsneing, looks like b/l infiltrates consistent with ARDS. Pain is well controlled from surgery. She is not on any anticoagulation s/p surgery due to bleeding and worsening H/H. She had CTA which was negative for PE. She is on levaquin and Ceftriaxone for possible PNA (there was some question of aspiration after her surgery). She was also started on steroids.     2/21/17  She is still on BIPAP  She is on steroids;  Nebs   She diuresed with lasix  She is still in ventimax  Her HH is stable  Dressing is dry   Echo was normal   She is levaquin and rocephin   Temp 99    2/22/17  She is weaned down to 3liters Nc while eating but hypoxic(88)  Still requiring bipap  Getting OOB to chair with PT      2/23/17 She still has dick, bladder training started today and will d/c after. She has gotten out of bed with OT. She has no fever and no elevated WBC. She is 7 days with rocephin and 5 days levaquin today. She is feeling great today just ready to get out of here. No weakness, no SOB/CP. No more O2 requirement     2/24/17 As of this am, o2 at 88%. Pt is ready to go home.  Reports she didn't wear bipap last night and now regrets it now that respiratory status is not as well as it was yesterday.     2/25/17  Doing better asked me about going to rehab upon discharge  Looks better ;  Close to discharge    2/26/17  Seems to be doing better everyday  Waiting for insurance for rehab approval  Walking better  Her breathing/ARDS issues have gotten back to complete resolution  Hopefully tomorrow to REHAB or HOME WITH HH with home PT    2/27   No change in last 24 hours other than pain this am, but no pain meds since last night. Questioning pain vs anxiety. Ambulating, breathing well, no other new issues today. Awaiting Rehab placement.            Interval History: doing much better; ready to go to rehab    Review of Systems   Constitutional: Negative for activity change, fatigue, fever and unexpected weight change.   HENT: Negative for congestion, ear pain, hearing loss, rhinorrhea and sore throat.    Eyes: Negative for pain, redness and visual disturbance.   Respiratory: Negative for cough and wheezing.         SOB better   Cardiovascular: Negative for chest pain, palpitations and leg swelling.   Gastrointestinal: Negative for abdominal pain, constipation, diarrhea, nausea and vomiting.   Genitourinary: Negative for dysuria, frequency, pelvic pain and urgency.   Musculoskeletal: Positive for arthralgias. Negative for back pain, joint swelling and neck pain.        Pain control with tramadol   Skin: Negative for color change, rash and wound.   Neurological: Negative for dizziness, weakness, light-headedness and headaches.   Psychiatric/Behavioral: The patient is nervous/anxious (doing well with ativan).      Objective:     Vital Signs (Most Recent):  Temp: 97.8 °F (36.6 °C) (02/27/17 0702)  Pulse: 100 (02/27/17 0708)  Resp: 18 (02/27/17 0708)  BP: 93/61 (02/27/17 0702)  SpO2: 95 % (02/27/17 0708) Vital Signs (24h Range):  Temp:  [97 °F (36.1 °C)-98.4 °F (36.9 °C)] 97.8 °F (36.6 °C)  Pulse:   [] 100  Resp:  [16-18] 18  SpO2:  [93 %-100 %] 95 %  BP: ()/(53-61) 93/61     Weight: 59 kg (130 lb)  Body mass index is 22.31 kg/(m^2).  No intake or output data in the 24 hours ending 02/27/17 0849   Physical Exam   Constitutional: She is oriented to person, place, and time. She appears well-developed and well-nourished. No distress.   HENT:   Head: Normocephalic and atraumatic.   Right Ear: External ear normal.   Left Ear: External ear normal.   Eyes: Conjunctivae and EOM are normal. Pupils are equal, round, and reactive to light. Right eye exhibits no discharge. Left eye exhibits no discharge.   Neck: Neck supple. No tracheal deviation present.   Cardiovascular: Normal rate and regular rhythm.  Exam reveals no gallop and no friction rub.    No murmur heard.  Pulmonary/Chest: Effort normal. No respiratory distress. She has no wheezes. She has rales (rales at right lower base only).   Decreased breath sounds in bases b/l   Abdominal: Soft. Bowel sounds are normal. She exhibits no distension. There is no tenderness.   Musculoskeletal: She exhibits no edema.   Cast with hardware to left wrist   Lymphadenopathy:     She has no cervical adenopathy.   Neurological: She is alert and oriented to person, place, and time. No cranial nerve deficit.   Skin: Skin is warm and dry.   Bandage to left hip   Psychiatric: She has a normal mood and affect. Her behavior is normal.   Nursing note and vitals reviewed.      Significant Labs:   CBC:     Recent Labs  Lab 02/26/17  0507 02/27/17  0512   WBC 11.22 8.68   HGB 9.5* 9.0*   HCT 29.2* 28.1*   * 580*     CMP:     Recent Labs  Lab 02/26/17  0507 02/27/17  0512    140   K 4.5 4.3    106   CO2 30* 29   GLU 92 89   BUN 11 12   CREATININE 0.6 0.6   CALCIUM 8.5* 8.3*   ANIONGAP 5* 5*   EGFRNONAA >60 >60         Assessment/Plan:      * Closed fracture of left hip  Ortho following  S/p Left bipolar endoprosthetic hemiarthroplasty  Continue physical  therapy  Pain well controlled  enoxaprin for DVT prophylaxis  She will need to complete xarelto prophylaxis script to be written at d/c  Added ot  Has gotten up and ambulated 125' with PT      Plan;   REHAB if approved; Best case scenario for patient is inpatient rehab with PT and OT to get her home safely.   Otherwise HH with home PT      Degeneration of lumbar or lumbosacral intervertebral disc  Pain controlled.      Coronary artery calcification seen on CAT scan  Cardiology consulted.  No changes at this time.  Can be followed further as outpatient  No chest pain, no signs of ACS this admission      Left wrist fracture  Ortho follwing  S/p closed reduction and percutaneous distraction pinning of the left distal radius.  Continue physical therapy  Pain well controlled    Osteoporosis  Failed fosamax   Will need forteo x 2 years at discharge       Pulmonary edema  Resolved. Lasix was held earlier this admission.  Monitor chest x-rays.  Dr. melendez consulted.  Chest x-ray-was increasing bilateral perihilar and diffuse lung infiltrates bilaterally.  This may represent fat emboli leading to ARDS  After receiving 1 unit of blood, Lasix 40 mg IV push was given.  Patient's BNP is normal (44)  BioZ performed showed a TFC in the upper normal range    TTE Tuesday to eval heart function---normal  No other clinical evidence of CHF exacerbation and no known CHF history. I do not feel this is all pulmonary edema. I think she has developed ARDS. See ARDS plans    Wore bipap a couple more nights with oxygen. No longer requiring oxygen nor bipap.       COPD exacerbation  Continue DuoNeb treatments 4 times daily  D/c rocephin day 7 and levaquin day 5  Pulmonology consult--added steroids. 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     Did well over the weekend with no a/b, no oxygen, now off bipap                Acute blood loss anemia  Transfused 1 unit  of packed red blood cells 2/20.  Developed acute decompensation of resp status and transferred to ICU  Hgb stable   Follow CBC  Started prophylactic lovenox 2/20  Wound dressing dry   Holding counts well    Acute respiratory failure with hypoxia  Improved    Rehab consult done, waiting for approval from [employer].     VTE Risk Mitigation         Ordered     enoxaparin injection 40 mg  Daily     Route:  Subcutaneous        02/20/17 1131     Place sequential compression device  Until discontinued      02/19/17 1827     Medium Risk of VTE  Once      02/15/17 1550          Tamera Bowen MD  Department of Hospital Medicine   [facility]

## 2017-02-27 NOTE — SUBJECTIVE & OBJECTIVE
Interval History: doing much better; ready to go to rehab    Review of Systems   Constitutional: Negative for activity change, fatigue, fever and unexpected weight change.   HENT: Negative for congestion, ear pain, hearing loss, rhinorrhea and sore throat.    Eyes: Negative for pain, redness and visual disturbance.   Respiratory: Negative for cough and wheezing.         SOB better   Cardiovascular: Negative for chest pain, palpitations and leg swelling.   Gastrointestinal: Negative for abdominal pain, constipation, diarrhea, nausea and vomiting.   Genitourinary: Negative for dysuria, frequency, pelvic pain and urgency.   Musculoskeletal: Positive for arthralgias. Negative for back pain, joint swelling and neck pain.        Pain control with tramadol   Skin: Negative for color change, rash and wound.   Neurological: Negative for dizziness, weakness, light-headedness and headaches.   Psychiatric/Behavioral: The patient is nervous/anxious (doing well with ativan).      Objective:     Vital Signs (Most Recent):  Temp: 97.8 °F (36.6 °C) (02/27/17 0702)  Pulse: 100 (02/27/17 0708)  Resp: 18 (02/27/17 0708)  BP: 93/61 (02/27/17 0702)  SpO2: 95 % (02/27/17 0708) Vital Signs (24h Range):  Temp:  [97 °F (36.1 °C)-98.4 °F (36.9 °C)] 97.8 °F (36.6 °C)  Pulse:  [] 100  Resp:  [16-18] 18  SpO2:  [93 %-100 %] 95 %  BP: ()/(53-61) 93/61     Weight: 59 kg (130 lb)  Body mass index is 22.31 kg/(m^2).  No intake or output data in the 24 hours ending 02/27/17 0849   Physical Exam   Constitutional: She is oriented to person, place, and time. She appears well-developed and well-nourished. No distress.   HENT:   Head: Normocephalic and atraumatic.   Right Ear: External ear normal.   Left Ear: External ear normal.   Eyes: Conjunctivae and EOM are normal. Pupils are equal, round, and reactive to light. Right eye exhibits no discharge. Left eye exhibits no discharge.   Neck: Neck supple. No tracheal deviation present.    Cardiovascular: Normal rate and regular rhythm.  Exam reveals no gallop and no friction rub.    No murmur heard.  Pulmonary/Chest: Effort normal. No respiratory distress. She has no wheezes. She has rales (rales at right lower base only).   Decreased breath sounds in bases b/l   Abdominal: Soft. Bowel sounds are normal. She exhibits no distension. There is no tenderness.   Musculoskeletal: She exhibits no edema.   Cast with hardware to left wrist   Lymphadenopathy:     She has no cervical adenopathy.   Neurological: She is alert and oriented to person, place, and time. No cranial nerve deficit.   Skin: Skin is warm and dry.   Bandage to left hip   Psychiatric: She has a normal mood and affect. Her behavior is normal.   Nursing note and vitals reviewed.      Significant Labs:   CBC:     Recent Labs  Lab 02/26/17  0507 02/27/17  0512   WBC 11.22 8.68   HGB 9.5* 9.0*   HCT 29.2* 28.1*   * 580*     CMP:     Recent Labs  Lab 02/26/17  0507 02/27/17  0512    140   K 4.5 4.3    106   CO2 30* 29   GLU 92 89   BUN 11 12   CREATININE 0.6 0.6   CALCIUM 8.5* 8.3*   ANIONGAP 5* 5*   EGFRNONAA >60 >60

## 2017-02-27 NOTE — PROGRESS NOTES
Keely called and states patient received a walker in 2015.  Her insurance will not pay for another walker, however, [employer] will provide the platform for her left arm.  Her spouse will go home and get her walker.  [employer] will deliver the platform to the hospital and attach it to the walker.  [employer] could not give me a time they will get here.  It depends on where the  is.  Patient has been kept informed of the situation.

## 2017-02-27 NOTE — PLAN OF CARE
Notified by Tanvi from [employer] that IP rehab will not be approved. She recommends home with home Health which is ok with Dr Sanchez.

## 2017-02-27 NOTE — PLAN OF CARE
Patient and  updated on discharge plan. Patient will go home with home health and a rolling walker with a platform. She and her  are in agreement with this. [employer] will provide her walker and will deliver it here today. Refaxed home health orders to [employer] and spoke to Ingrid. It has been approved.

## 2017-02-27 NOTE — PLAN OF CARE
02/27/17 1054   Discharge Reassessment   Assessment Type Discharge Planning Reassessment   Can the patient answer the patient profile reliably? Yes, cognitively intact   How does the patient rate their overall health at the present time? Good   Describe the patient's ability to walk at the present time. Major restrictions/daily assistance from another person   How often would a person be available to care for the patient? Often   Number of comorbid conditions (as recorded on the chart) One   During the past month, has the patient often been bothered by feeling down, depressed or hopeless? No   During the past month, has the patient often been bothered by little interest or pleasure in doing things? No   Discharge plan remains the same: No   Provided patient/caregiver education on the expected discharge date and the discharge plan Yes   Discharge Plan A Rehab   Discharge Plan B Home Health;Home with family   Change in patient condition or support system No   Patient choice form signed by patient/caregiver Yes   Explained to the the patient/caregiver why the discharge planned changed: Yes   Involved the patient/caregiver in establishing a new discharge plan: Yes

## 2017-02-27 NOTE — PLAN OF CARE
Spoke to Tanvi at [employer] concerning IP rehab. We will get accepting facility. Patient's choice is [facility]. Packet faxed to them at [phone]. Awaiting acceptance..

## 2017-02-27 NOTE — PT/OT/SLP PROGRESS
"Physical Therapy  Treatment    Marycarmen Louis   MRN: [id number]   Admitting Diagnosis: Closed fracture of left hip    PT Received On: 17  PT Start Time: 1125     PT Stop Time: 1140    PT Total Time (min): 15 min       Billable Minutes:  Gait [id number] minutes    Treatment Type: Treatment  PT/PTA: PT             General Precautions: Standard, fall  Orthopedic Precautions: LUE non weight bearing, LLE weight bearing as tolerated   Braces:  (Hard Cast Left Wrist)    Do you have any cultural, spiritual, [employer] conflicts, given your current situation?: None verbalized    Subjective:  Communicated with patient prior to session.  "I'm leaving today."    Pain Ratin/10  Location - Side: Left  Location - Orientation: generalized  Location: hand  Pain Addressed: Nurse notified  Pain Rating Post-Intervention: 6/10    Objective:        Functional Mobility:  Bed Mobility:   Rolling/Turning to Left: Independent  Rolling/Turning Right: Independent  Scooting/Bridging: Supervision  Supine to Sit: Independent, Supervision  Sit to Supine: Supervision    Transfers:  Sit <> Stand Assistance: Stand By Assistance  Sit <> Stand Assistive Device: Rolling Walker, Platform  Bed <> Chair Technique: Stand Pivot  Bed <> Chair Assistance: Stand By Assistance  Bed <> Chair Assistive Device: Rolling Walker, Platform    Gait:   Gait Distance: 125 feet  Assistance 1: Stand by Assistance  Gait Assistive Device: Rolling walker, Platform walker left  Gait Pattern: swing-through gait  Gait Deviation(s): decreased velocity of limb motion, decreased stride length  Gait Training:  Pt. Amb. 125 with RW, platform LUE, with SBA.  VC's given to pt. For step placement and A.D. Placement.      Balance:   Static Sit: GOOD-: Takes MODERATE challenges from all directions but inconsistently  Dynamic Sit: GOOD-: Maintains balance through MODERATE excursions of active trunk movement,     Static Stand: FAIR+: Takes MINIMAL challenges from all " directions  Dynamic stand: FAIR+: Needs CLOSE SUPERVISION during gait and is able to right self with minor LOB       AM-PAC 6 CLICK MOBILITY  How much help from another person does this patient currently need?   1 = Unable, Total/Dependent Assistance  2 = A lot, Maximum/Moderate Assistance  3 = A little, Minimum/Contact Guard/Supervision  4 = None, Modified [place]/Independent    Turning over in bed (including adjusting bedclothes, sheets and blankets)?: 3  Sitting down on and standing up from a chair with arms (e.g., wheelchair, bedside commode, etc.): 3  Moving from lying on back to sitting on the side of the bed?: 3  Moving to and from a bed to a chair (including a wheelchair)?: 3  Need to walk in hospital room?: 3  Climbing 3-5 steps with a railing?: 1  Total Score: 16    AM-PAC Raw Score CMS G-Code Modifier Level of Impairment Assistance   6 % Total / Unable   7 - 9 CM 80 - 100% Maximal Assist   10 - 14 CL 60 - 80% Moderate Assist   15 - 19 CK 40 - 60% Moderate Assist   20 - 22 CJ 20 - 40% Minimal Assist   23 CI 1-20% SBA / CGA   24 CH 0% Independent/ Mod I     Patient left up in chair with all lines intact, call button in reach, RN notified and family present.    Assessment:  Marycarmen Louis is a 71 y.o. female with a medical diagnosis of Closed fracture of left hip and presents with increased endurance. Pt. Reports compliance with H.E.P.  Anticipated pt. D/C home today.  Please see PT discharge note.    Rehab identified problem list/impairments: Rehab identified problem list/impairments: weakness, impaired balance, decreased lower extremity function, gait instability, impaired functional mobilty, impaired endurance    Rehab potential is good.    Activity tolerance: Fair    Discharge recommendations: Discharge Facility/Level Of Care Needs: home with home health, home health PT     Barriers to discharge: Barriers to Discharge: None    Equipment recommendations: Equipment Needed After Discharge:  walker, rolling (Platform Attachment LUE)     GOALS:   Physical Therapy Goals        Problem: Physical Therapy Goal    Goal Priority Disciplines Outcome Goal Variances Interventions   Physical Therapy Goal     PT/OT, PT Unable to achieve outcome(s) by discharge     Description:  Goals to be met by: 3/10/2017     Patient will increase functional independence with mobility by performin. Supine to sit with Stand-by Assistance  2. Sit to supine with Stand-by Assistance  3. Sit to stand transfer with Stand-by Assistance  4. Bed to chair transfer with Stand-by Assistance using Platform walker  5. Gait  x 50 feet with Contact Guard Assistance using Platform walker.              Problem: Physical Therapy Goal    Goal Priority Disciplines Outcome Goal Variances Interventions   Physical Therapy Goal     PT/OT, PT Revised     Description:  Goals to be met by: 3/11/2017     Patient will increase functional independence with mobility by performin. Supine to sit with Stand-by Assistance  2. Sit to supine with Stand-by Assistance  3. Rolling to Left and Right with Stand-by Assistance.  4. Sit to stand transfer with Contact Guard Assistance, with Platform Walker  5. Bed to chair transfer with Contact Guard Assistance using Platform walker  6. Gait  x 75 feet with Contact Guard Assistance using Platform walker.   7. [place] with Home Exercise Program.                 PLAN:    Patient to be seen daily  to address the above listed problems via gait training, therapeutic activities, therapeutic exercises  Plan of Care expires: 17 (Following a.m. PT tx. session.)  Plan of Care reviewed with: patient         Rip Katnard, PT  2017

## 2017-02-27 NOTE — PROGRESS NOTES
[facility]  Cardiology  Progress Note    Patient Name: Marycarmen Louis  MRN: [id number]  Admission Date: 2/14/2017  Hospital Length of Stay: 13 days  Code Status: Full Code   Attending Physician: Jose Jaeger MD   Primary Care Physician: Willie Santos MD (Inactive)  Expected Discharge Date:   Principal Problem:Closed fracture of left hip    Subjective:     Hospital Course: admitted for hip/wrist tx    Interval History: hospitalization complicated by ARDS, anemia, volume overload    Review of Systems   Constitution: Positive for weakness.   HENT: Negative.    Cardiovascular: Negative.    Respiratory: Negative.    Skin: Negative.    Gastrointestinal: Negative.    Genitourinary: Negative.    Psychiatric/Behavioral: Negative.    Allergic/Immunologic: Negative.      Objective:     Vital Signs (Most Recent):  Temp: 97.8 °F (36.6 °C) (02/27/17 0702)  Pulse: 100 (02/27/17 0708)  Resp: 18 (02/27/17 0708)  BP: 93/61 (02/27/17 0702)  SpO2: 95 % (02/27/17 0708) Vital Signs (24h Range):  Temp:  [97 °F (36.1 °C)-98.4 °F (36.9 °C)] 97.8 °F (36.6 °C)  Pulse:  [] 100  Resp:  [16-18] 18  SpO2:  [93 %-100 %] 95 %  BP: ()/(53-61) 93/61     Weight: 59 kg (130 lb)  Body mass index is 22.31 kg/(m^2).    SpO2: 95 %  O2 Device (Oxygen Therapy): room air    No intake or output data in the 24 hours ending 02/27/17 0922    Lines/Drains/Airways     Peripheral Intravenous Line                 Peripheral IV - Single Lumen 02/26/17 0720 Right Hand 1 day                Physical Exam   Constitutional: She is oriented to person, place, and time. She appears well-developed and well-nourished.   HENT:   Head: Normocephalic.   Neck: Normal range of motion. Neck supple.   Cardiovascular: Normal rate, regular rhythm and normal heart sounds.    Pulmonary/Chest: Effort normal and breath sounds normal.   Abdominal: Soft. Bowel sounds are normal.   Musculoskeletal: Normal range of motion.   Neurological: She is alert  and oriented to person, place, and time.   Skin: Skin is warm and dry.   Psychiatric: Her behavior is normal.       Significant Labs: All pertinent lab results from the last 24 hours have been reviewed.    Significant Imaging: EKG, echo reviewed  Assessment and Plan:     Brief HPI: has progressed and ready for transfer to inpatient rehab, possibly [facility]     Lt hip and Lt wrist fracture due to fall while at [facility] center  S/p ORIF 2/15/17  Hypoxia -- ARDS probably from transfusion (+ COPD exacerbation?) but no clear sepsis  Postop anemia  COPD/remote smoker  Osteoporosis   Coronary artery calcification by CT 2013      Plan:  Echo showing normal LV systolic function  Venous u/s negative for DVT  No evidence of perioperative cardiovascular event.  Has progressed, ready for inpatient rehab.  Needs eval of coronary artery calcification seen on CT, Outpatient follow up in the office after DC from rehab  Resume xarelto and dc lovenox and follow H/H, consider ASA in future  Watch CBC        Active Diagnoses:    Diagnosis Date Noted POA    PRINCIPAL PROBLEM:  Closed fracture of left hip [S72.002A] 02/15/2017 Yes    Acute respiratory failure with hypoxia [J96.01] 02/20/2017 No    Acute blood loss anemia [D62] 02/19/2017 No    COPD exacerbation [J44.1] 02/17/2017 No    Pulmonary edema [J81.1] 02/16/2017 Yes    Osteoporosis [M81.0] 02/15/2017 Yes    Left wrist fracture [S62.102A] 02/14/2017 Yes    Coronary artery calcification seen on CAT scan [I25.10] 06/29/2013 Yes    Degeneration of lumbar or lumbosacral intervertebral disc [M51.37] 11/09/2012 Yes      Problems Resolved During this Admission:    Diagnosis Date Noted Date Resolved POA    Fever [R50.9] 02/15/2017 02/18/2017 No       VTE Risk Mitigation         Ordered     enoxaparin injection 40 mg  Daily     Route:  Subcutaneous        02/20/17 1131     Place sequential compression device  Until discontinued      02/19/17 1827     Medium  Risk of VTE  Once      02/15/17 1550          Anticipated Disposition: Rehab Facility    Discharge Needs: outpatient followup    Cathy Mckeon NP  Cardiology  [facility]  I attest that I have personally seen and examined this patient. I have reviewed and discussed the management in detail as outlined above.

## 2017-02-27 NOTE — ASSESSMENT & PLAN NOTE
Ortho following  S/p Left bipolar endoprosthetic hemiarthroplasty  Continue physical therapy  Pain well controlled  enoxaprin for DVT prophylaxis  She will need to complete xarelto prophylaxis script to be written at d/c  Added ot  Has gotten up and ambulated 125' with PT      Plan;   REHAB if approved  Otherwise HH with home PT

## 2017-02-27 NOTE — PROGRESS NOTES
Called [facility] [phone] and gave information for platform walker.  They cannot deliver until [facility] gets them the order.  Expressed [facility] in process of completing order.  [facility] and [facility] are both aware patient cannot be discharged home with out her walker.

## 2017-02-27 NOTE — PROGRESS NOTES
Vitals remained stable, afebrile. Pain controled with PO meds. Participated in PT and OT. Staples removed from hip incision and steri strips placed in incision. Lungs clear and a little diminished. Cough much improved. Discharge instructions given to pt, stated understanding. Home health set up. IV removed and site wrapped. Waiting on walker before pt can be discharged.

## 2017-02-27 NOTE — PLAN OF CARE
Problem: Fall Risk (Adult)  Goal: Absence of Falls  Patient will demonstrate the desired outcomes by discharge/transition of care.   Outcome: Ongoing (interventions implemented as appropriate)  Fall precautions maintained. No falls this shift.     Problem: Patient Care Overview  Goal: Plan of Care Review  Outcome: Ongoing (interventions implemented as appropriate)  Plan of care reviewed with patient and she agrees with the plan of care. C/o anxiety Lorazepam given and was effective for anxiety. Short arm cast intact to left wrist. Neurovascular checks WNL. Patient refused BIPAP last night. O2 sat on room air 98%. Lungs clear but diminished.     Problem: Pressure Ulcer Risk (Harjinder Scale) (Adult,Obstetrics,Pediatric)  Goal: Skin Integrity  Patient will demonstrate the desired outcomes by discharge/transition of care.   Outcome: Ongoing (interventions implemented as appropriate)  Patient is able to turn and reposition herself as needed.     Problem: Fractured Hip (Adult)  Goal: Signs and Symptoms of Listed Potential Problems Will be Absent, Minimized or Managed (Fractured Hip)  Signs and symptoms of listed potential problems will be absent, minimized or managed by discharge/transition of care (reference Fractured Hip (Adult) CPG).   Dressing to left hip dry and intact. C/o left hip pain acetaminophen and ultram given and were effective for pain. Patient is using using walker and is ambulating in room with minimal assistance.

## 2017-03-01 ENCOUNTER — TELEPHONE (OUTPATIENT)
Dept: FAMILY MEDICINE | Facility: CLINIC | Age: 72
End: 2017-03-01

## 2017-03-01 NOTE — PHYSICIAN QUERY
"PT Name: Marycarmen Louis  MR #: [id number]    Physician Query Form - Pneumonia Clarification    Reviewer  Dev Leo RN, CDI  Ext [phone]    This form is a permanent document in the medical record.    Query Date:  March 1, 2017    By submitting this query, we are merely seeking further clarification of documentation. Please utilize your independent clinical judgment when addressing the question(s) below.  (The Medical record reflects the following:)   Indicators   Supporting Clinical Findings Location in Medical Record   X "Pneumonia" documented atypical/viral pneumonia or ARDS    She is on levaquin and Ceftriaxone for possible PNA (there was some question of aspiration after her surgery).   Progress Note: 2/19      Progress Note: 2/20   X Chest X-Ray: CXR showed right lung infiltrate    The possibility of aspiration pneumonia should be considered.    Chest x-ray is starting to look a little worse. She has bilateral infiltrates consistent with bilateral perihilar and diffuse lung infiltrates bilaterally.    CXR has been worsneing, looks like b/l infiltrates consistent with ARDS   Progress note: 2/16              Progress Note: 2/19              Progress Note: 2/20    PaO2=   PaCO2=    O2 sat=     X Cultures :     X Treatment : Rocephin, azithromycin started    Add Levaquin   Progress note: 2/17      Progress Note: 2/19    Supplemental O2:     X Other no s/s of pneumonia and clear X ray prior to surgery    If fever persistent and continued O2 requirement will need to add in aspiration coverage   Progress note: 2/16   Provider, please specify type of pneumonia.    [  ] Bacterial (Specify organism) PNA        [  ] Bacterial, Gram Negative organism PNA        [  ] Viral (Specify virus) PNA       [  ] Fungal (Specify organism) PNA       [  ] Aspiration PNA        [x  ] Other type of pneumonia (Specify)   Not bacterial.  This is from pulmonary fat emboli     [ ] Clinically undetermined    Please document in your progress " notes daily for the duration of treatment, until resolved, and include in your discharge summary.

## 2017-03-01 NOTE — PHYSICIAN QUERY
PT Name: Marycarmen Louis  MR #: [id number]  Physician Query Form - Cause and Effect Relationship Clarification    Reviewer Dev Leo RN, CDI  Ext [phone]    This form is a permanent document in the medical record.     Query Date: March 1, 2017  By submitting this query, we are merely seeking further clarification of documentation. Please utilize your independent clinical judgment when addressing the question(s) below.      Supporting Clinical Findings     Location in record      While patient started receiving blood she developed sudden respiratory distress. Her O2 sats dropped into the 70's. She had basilar crackles. Blood transfusion was stopped and given Lasix 40 IVP. Transferred to ICU. Now on Bipap and doing better.     Possible transfusion reaction- I am doubtful                                                                                                                                                                                      Progress Note: 2/19      acute hypoxic episode with sats into the 50% after receiving 1 unit of blood. Transferred to ICU on BiPaP. Got Lasix 40mg IV x1. Maintaining sats in 90% on BipaP with 35% FiO2, sats drop to <80% within 1 minutes of coming off BiPaP.                    CXR has been worsneing, looks like b/l infiltrates consistent with ARDS           Acute respiratory failure with hypoxia          We are treating with IV Lasix for possible pulm edema as had acute worsening after blood transfusion yesterday                                                                                                                                                       Progress Note: 2/20     Provider, please clarify if there is any correlation between ____blood transfusion_____ and __Acute respiratory failure with hypoxia______.     Are the conditions:        [  ] Due to or associated with each other      [  ] Unrelated to each other      [  ] Other (Please Specify):  _________________________      [X  ] Clinically Undetermined

## 2017-03-01 NOTE — PT/OT/SLP DISCHARGE
Occupational Therapy Discharge Summary    Marycarmen Louis  MRN: [id number]   Closed fracture of left hip   Patient Discharged from acute Occupational Therapy on 3/1/2017.  Please refer to prior OT note dated on 2/27/2017 for functional status.     Assessment:   Patient was discharge unexpectedly.  Information required to complete and accurate discharge summary is unknown.  Refer to therapy initial evaluation and last progress note for initial and most recent functional status and goal achievement.  Recommendations made may be found in medical record.  GOALS:   Occupational Therapy Goals     Not on file      Multidisciplinary Problems (Resolved)        Problem: Occupational Therapy Goal    Goal Priority Disciplines Outcome Interventions   Occupational Therapy Goal   (Resolved)     OT, PT/OT Outcome(s) achieved    Description:  Goals to be met by: d/c from facility    Patient will increase functional independence with ADLs by performing:    Pt will perform palm to tip, while maintaining to objects in her palm by time of d/c to increase fine motor coordination.               Reasons for Discontinuation of Therapy Services  Transfer to alternate level of care.      Plan:  Patient Discharged to: Home with Home Health Service.    JARRELL Soto

## 2017-03-01 NOTE — PHYSICIAN QUERY
PT Name: Marycarmen Louis  MR #: [id number]    Physician Query Form - Relationship to Procedure Clarification     Reviewer  Dev Leo RN, CDI Ext [phone]  This form is a permanent document in the medical record.     Query Date: March 1, 2017    Dear Provider,   By submitting this query, we are merely seeking further clarification of documentation.. Please utilize your independent clinical judgment when addressing the question(s) below.      Supporting Clinical Findings Location in Medical Record   Left bipolar endoprosthetic hemiarthroplasty and closed reduction   and percutaneous distraction pinning of the left distal radius.    She has surgery yesterday right lateral decubitus. Was given IVF. Has slight fluid overload caused resp distress. Hypoxic. CXR showed right lung infiltrate. She had no s/s of pneumonia and clear X ray prior to surgery. Given dose of lasix and improved well.    She probably has an exacerbation of COPD probably from perioperative intubation    COPD exacerbation  Continue DuoNeb treatments 4 times daily  Add Rocephin  Monitor respiratory status closely   Op note: 2/15          Progress note: 2/16            Progress Note: 2/17       Please clarify if ___COPD exacerbation________ is    [  ] Inherent/Integral to procedure  [  ] Routine outcome  [  ] Incidental finding  [ x ] Complication of procedure  [  ] Clinically insignificant  [  ] Clinically undetermined

## 2017-03-03 NOTE — PHYSICIAN QUERY
PT Name: Marycarmen Louis  MR #: [id number]    Physician Query Form - Relationship to Procedure Clarification     Reviewer Dev Leo RN, CDI  Ext [phone]  This form is a permanent document in the medical record.     Query Date: March 3, 2017    Dear Provider,   By submitting this query, we are merely seeking further clarification of documentation.. Please utilize your independent clinical judgment when addressing the question(s) below.      Supporting Clinical Findings Location in Medical Record   Left bipolar endoprosthetic hemiarthroplasty and closed reduction   and percutaneous distraction pinning of the left distal radius.    Pneumonia    She has surgery yesterday right lateral decubitus. Was given IVF. Has slight fluid overload caused resp distress. Hypoxic. CXR showed right lung infiltrate. She had no s/s of pneumonia and clear X ray prior to surgery    CXR has been worsneing, looks like b/l infiltrates consistent with ARDS Op Note: 2/15          Progress Note: 2/19    Progress Note: 2/16            Progress Note: 2/20       Please clarify if __pneumonia_____ is    [  ] Inherent/Integral to procedure  [  ] Routine outcome  [  ] Incidental finding  [  ] Complication of procedure  [  ] Clinically insignificant  [  ] Clinically undetermined    Was not pneumonia.  This was fat emboli cause by long bone fracture.  This was not a complication cause by the hospital but a result of her injury

## 2017-03-06 ENCOUNTER — TELEPHONE (OUTPATIENT)
Dept: FAMILY MEDICINE | Facility: CLINIC | Age: 72
End: 2017-03-06

## 2017-03-06 DIAGNOSIS — S72.002D HIP FRACTURE, LEFT, CLOSED, WITH ROUTINE HEALING, SUBSEQUENT ENCOUNTER: Primary | ICD-10-CM

## 2017-03-06 NOTE — TELEPHONE ENCOUNTER
----- Message from Clement Moran sent at 3/6/2017 12:05 PM CST -----  Contact: Len @ [employer] [facility]  Marycarmen Louis  MRN: [id number]  : 1945  PCP: Willie Santos (Inactive)  Home Phone      [phone]  Work Phone      Not on file.  [employer]          [phone]      MESSAGE: requesting order for a new walker -- old one is out dated -- send order to [employer] @ fax # [phone]    PCP: Saige (was seen in hospital)

## 2017-03-07 NOTE — TELEPHONE ENCOUNTER
Spoke with pt and notified her that the request was sent in for a new walker. Verbalized understanding.

## 2017-03-13 ENCOUNTER — TELEPHONE (OUTPATIENT)
Dept: INTERNAL MEDICINE | Facility: CLINIC | Age: 72
End: 2017-03-13

## 2017-03-13 RX ORDER — TERIPARATIDE 250 UG/ML
20 INJECTION, SOLUTION SUBCUTANEOUS DAILY
Qty: 2.4 ML | Refills: 11 | Status: SHIPPED | OUTPATIENT
Start: 2017-03-13 | End: 2018-05-01

## 2017-03-13 NOTE — TELEPHONE ENCOUNTER
Can you send in the pen needles for Forteo once daily? Yael's pt but she will be out the rest of the week.

## 2017-03-13 NOTE — TELEPHONE ENCOUNTER
----- Message from Krista Mejias sent at 3/13/2017  2:50 PM CDT -----  Contact: SIMONE GANTYI Louis  MRN: [id number]  : 1945  PCP: Willie Santos (Inactive)  Home Phone      [phone]  Work Phone      Not on file.  [employer]          [phone]      MESSAGE: PEN NEEDLES FOR FORTEO ONCE DAILY---------[phone] NEED ASAP BEEN TRYING TO GET SINCE ;LAST WEEK

## 2017-03-15 ENCOUNTER — OFFICE VISIT (OUTPATIENT)
Dept: FAMILY MEDICINE | Facility: CLINIC | Age: 72
End: 2017-03-15
Payer: MEDICARE

## 2017-03-15 VITALS
TEMPERATURE: 99 F | HEART RATE: 96 BPM | HEIGHT: 64 IN | WEIGHT: 142 LBS | OXYGEN SATURATION: 95 % | SYSTOLIC BLOOD PRESSURE: 122 MMHG | DIASTOLIC BLOOD PRESSURE: 70 MMHG | BODY MASS INDEX: 24.24 KG/M2

## 2017-03-15 DIAGNOSIS — Z09 HOSPITAL DISCHARGE FOLLOW-UP: Primary | ICD-10-CM

## 2017-03-15 DIAGNOSIS — Z87.891 FORMER SMOKER: ICD-10-CM

## 2017-03-15 DIAGNOSIS — R05.9 COUGH: ICD-10-CM

## 2017-03-15 DIAGNOSIS — S62.102A LEFT WRIST FRACTURE, CLOSED, INITIAL ENCOUNTER: ICD-10-CM

## 2017-03-15 DIAGNOSIS — M81.0 OSTEOPOROSIS: ICD-10-CM

## 2017-03-15 DIAGNOSIS — S72.002A CLOSED FRACTURE OF LEFT HIP, INITIAL ENCOUNTER: ICD-10-CM

## 2017-03-15 DIAGNOSIS — J44.1 COPD EXACERBATION: ICD-10-CM

## 2017-03-15 DIAGNOSIS — D62 ACUTE BLOOD LOSS ANEMIA: ICD-10-CM

## 2017-03-15 LAB
BASOPHILS # BLD AUTO: 0.02 K/UL
BASOPHILS NFR BLD: 0.4 %
DIFFERENTIAL METHOD: ABNORMAL
EOSINOPHIL # BLD AUTO: 0.3 K/UL
EOSINOPHIL NFR BLD: 5.5 %
ERYTHROCYTE [DISTWIDTH] IN BLOOD BY AUTOMATED COUNT: 14.6 %
HCT VFR BLD AUTO: 36.2 %
HGB BLD-MCNC: 11.5 G/DL
LYMPHOCYTES # BLD AUTO: 1 K/UL
LYMPHOCYTES NFR BLD: 19.5 %
MCH RBC QN AUTO: 30.6 PG
MCHC RBC AUTO-ENTMCNC: 31.8 %
MCV RBC AUTO: 96 FL
MONOCYTES # BLD AUTO: 0.5 K/UL
MONOCYTES NFR BLD: 9.8 %
NEUTROPHILS # BLD AUTO: 3.5 K/UL
NEUTROPHILS NFR BLD: 64.8 %
PLATELET # BLD AUTO: 472 K/UL
PMV BLD AUTO: 8.6 FL
RBC # BLD AUTO: 3.76 M/UL
WBC # BLD AUTO: 5.32 K/UL

## 2017-03-15 PROCEDURE — 1160F RVW MEDS BY RX/DR IN RCRD: CPT | Mod: S$GLB,,, | Performed by: FAMILY MEDICINE

## 2017-03-15 PROCEDURE — 1159F MED LIST DOCD IN RCRD: CPT | Mod: S$GLB,,, | Performed by: FAMILY MEDICINE

## 2017-03-15 PROCEDURE — 99999 PR PBB SHADOW E&M-EST. PATIENT-LVL II: CPT | Mod: PBBFAC,,, | Performed by: FAMILY MEDICINE

## 2017-03-15 PROCEDURE — 36415 COLL VENOUS BLD VENIPUNCTURE: CPT | Mod: S$GLB,,, | Performed by: FAMILY MEDICINE

## 2017-03-15 PROCEDURE — 99214 OFFICE O/P EST MOD 30 MIN: CPT | Mod: 25,S$GLB,, | Performed by: FAMILY MEDICINE

## 2017-03-15 PROCEDURE — 1157F ADVNC CARE PLAN IN RCRD: CPT | Mod: S$GLB,,, | Performed by: FAMILY MEDICINE

## 2017-03-15 PROCEDURE — 85025 COMPLETE CBC W/AUTO DIFF WBC: CPT

## 2017-03-15 PROCEDURE — 99499 UNLISTED E&M SERVICE: CPT | Mod: S$GLB,,, | Performed by: FAMILY MEDICINE

## 2017-03-15 NOTE — PROGRESS NOTES
Subjective:       Patient ID: Marycarmen Louis is a 71 y.o. female.    Chief Complaint: Cough and Follow-up (hospital followup)    Pt is a 71 y.o. female who presents for evaluation and management of   Encounter Diagnoses   Name Primary?    Hospital discharge follow-up Yes    Left wrist fracture, closed, initial encounter     Closed fracture of left hip, initial encounter     Osteoporosis     COPD exacerbation     Acute blood loss anemia     Cough     Former smoker    .  Doing well on current meds. Denies any side effects. Prevention is up to date.    Review of Systems   Constitutional: Positive for fatigue. Negative for activity change, appetite change, chills, diaphoresis, fever and unexpected weight change.   HENT: Negative.  Negative for congestion, dental problem, drooling, ear discharge, ear pain, facial swelling, hearing loss, mouth sores, nosebleeds, postnasal drip, rhinorrhea, sinus pressure, sneezing, sore throat, tinnitus, trouble swallowing and voice change.    Eyes: Negative.  Negative for photophobia, pain, discharge, redness, itching and visual disturbance.   Respiratory: Positive for cough and wheezing. Negative for apnea, choking, chest tightness and shortness of breath.         SOB with some ADL's, like showering      Cardiovascular: Negative.  Negative for chest pain, palpitations and leg swelling.   Gastrointestinal: Negative.  Negative for abdominal distention, abdominal pain, anal bleeding, blood in stool, constipation, diarrhea, nausea, rectal pain and vomiting.   Genitourinary: Negative.  Negative for difficulty urinating, dyspareunia, dysuria, enuresis, flank pain, frequency, hematuria, menstrual problem, pelvic pain, urgency, vaginal bleeding, vaginal discharge and vaginal pain.   Musculoskeletal: Positive for arthralgias and back pain. Negative for gait problem, joint swelling, myalgias, neck pain and neck stiffness.   Skin: Negative.  Negative for color change, pallor, rash and  wound.   Neurological: Negative.  Negative for dizziness, tremors, seizures, syncope, facial asymmetry, speech difficulty, weakness, light-headedness, numbness and headaches.   Hematological: Negative for adenopathy. Does not bruise/bleed easily.   Psychiatric/Behavioral: Negative.  Negative for agitation, behavioral problems, confusion, decreased concentration, dysphoric mood, hallucinations, self-injury, sleep disturbance and suicidal ideas. The patient is not nervous/anxious and is not hyperactive.        Objective:      Physical Exam   Constitutional: She is oriented to person, place, and time. She appears well-developed and well-nourished.   HENT:   Head: Normocephalic and atraumatic.   Right Ear: External ear normal.   Left Ear: External ear normal.   Nose: Nose normal.   Mouth/Throat: Oropharynx is clear and moist.   Eyes: EOM are normal. Pupils are equal, round, and reactive to light.   Neck: Normal range of motion. Neck supple. No JVD present. No tracheal deviation present. No thyromegaly present.   Cardiovascular: Normal rate, normal heart sounds and intact distal pulses.    No murmur heard.  Pulmonary/Chest: Effort normal and breath sounds normal. No respiratory distress. She has no wheezes. She has no rales. She exhibits no tenderness.   Reduced tidal volume      Abdominal: Soft. Bowel sounds are normal. She exhibits no distension and no mass. There is no tenderness. There is no rebound and no guarding.   Musculoskeletal: Normal range of motion. She exhibits no edema or tenderness.   Left wrist casted    Lymphadenopathy:     She has no cervical adenopathy.   Neurological: She is alert and oriented to person, place, and time. She has normal reflexes. She displays normal reflexes. No cranial nerve deficit. She exhibits normal muscle tone. Coordination normal.   Skin: Skin is warm and dry. No rash noted. No erythema. No pallor.   Psychiatric: She has a normal mood and affect. Her behavior is normal.  Judgment and thought content normal.       Assessment:       1. Hospital discharge follow-up    2. Left wrist fracture, closed, initial encounter    3. Closed fracture of left hip, initial encounter    4. Osteoporosis    5. COPD exacerbation    6. Acute blood loss anemia    7. Cough    8. Former smoker        Plan:   Marycarmen was seen today for cough and follow-up.    Diagnoses and all orders for this visit:    Hospital discharge follow-up    Left wrist fracture, closed, initial encounter  Daniel    Closed fracture of left hip, initial encounter    Osteoporosis  -     Vitamin D; Future  Also doing forteo     COPD exacerbation  -     umeclidinium-vilanterol 62.5-25 mcg/actuation DsDv; Inhale 1 puff into the lungs once daily. Controller    Acute blood loss anemia  -     CBC auto differential; Future  -     Iron and TIBC; Future  -     Ferritin; Future    Cough    Former smoker    Above labs and adding anoro  RTC 1 month     No Follow-up on file.

## 2017-03-15 NOTE — MR AVS SNAPSHOT
[address] [address]  Phone: [phone]  Fax: [phone]                  Marycarmen Louis   3/15/2017 2:45 PM   Office Visit    Description:  Female : 1945   Provider:  Dileep Moulton MD   Department:  [facility]           Reason for Visit     Cough     Follow-up           Diagnoses this Visit        Comments    Hospital discharge follow-up    -  Primary     Left wrist fracture, closed, initial encounter         Closed fracture of left hip, initial encounter         Osteoporosis         COPD exacerbation         Acute blood loss anemia         Cough         Former smoker                To Do List           Future Appointments        Provider Department Dept Phone    2017 12:45 PM Dileep Moulton MD [facility] [phone]      Goals (5 Years of Data)     None      Follow-Up and Disposition     Return in about 1 month (around 4/15/2017).    Follow-up and Disposition History       These Medications        Disp Refills Start End    umeclidinium-vilanterol 62.5-25 mcg/actuation DsDv 1 each 5 3/15/2017     Inhale 1 puff into the lungs once daily. Controller - Inhalation    Ph[facility]acy: [facility] [address] Ph #: [phone]         [facility] On Call     [facility] On Call Nurse Care Line -  Assistance  Registered nurses in the [facility] provide clinical advisement, health education, appointment booking, and other advisory services.  Call for this free service at [phone].             Medications           Message regarding Medications     Verify the changes and/or additions to your medication regime listed below are the same as discussed with your clinician today.  If any of these changes or additions are incorrect, please notify your healthcare provider.        START taking these NEW medications        Refills    umeclidinium-vilanterol  62.5-25 mcg/actuation DsDv 5    Sig: Inhale 1 puff into the lungs once daily. Controller    Class: Normal    Route: Inhalation      STOP taking these medications     rivaroxaban (XARELTO) 10 mg Tab Take 1 tablet (10 mg total) by mouth daily with dinner or evening meal.           Verify that the below list of medications is an accurate representation of the medications you are currently taking.  If none reported, the list may be blank. If incorrect, please contact your healthcare provider. Carry this list with you in case of emergency.           Current Medications     ASCORBIC ACID (SHEREEN-C ORAL) Take by mouth.    b complex vitamins capsule Take 1 capsule by mouth once daily.    calcium-vitamin D3 500 mg(1,250mg) -200 unit per tablet Take 1 tablet by mouth 2 (two) times daily with meals.    CYANOCOBALAMIN, VITAMIN B-12, (VITAMIN B-12 ORAL) Take by mouth.    glucosamine-chondroitin 500-400 mg tablet Take 1 tablet by mouth 3 (three) times daily.    ibuprofen (ADVIL,MOTRIN) 600 MG tablet Take 1 tablet (600 mg total) by mouth every 6 (six) hours as needed.    lorazepam (ATIVAN) 0.5 MG tablet Take 1 tablet (0.5 mg total) by mouth every 6 (six) hours as needed for Anxiety.    meclizine (ANTIVERT) 25 mg tablet Take 1 tablet (25 mg total) by mouth 3 (three) times daily as needed for Dizziness.    melatonin 5 mg Tab Take 5 mg by mouth 2 (two) times daily.    multivitamin capsule Take 1 capsule by mouth once daily.    ondansetron (ZOFRAN-ODT) 8 MG TbDL 1 sublingual Q 8 hours, prn    thiamine (VITAMIN B-1) 100 MG tablet Take 100 mg by mouth once daily.    venlafaxine (EFFEXOR-XR) 75 MG 24 hr capsule Take 1 capsule (75 mg total) by mouth once daily.    teriparatide (FORTEO) 20 mcg/dose - 600 mcg/2.4 mL PnIj Inject 0.08 mLs (20 mcg total) into the skin once daily.    umeclidinium-vilanterol 62.5-25 mcg/actuation DsDv Inhale 1 puff into the lungs once daily. Controller           Clinical Reference Information           Your Vitals  "Were     BP Pulse Temp Height Weight SpO2    122/70 (BP Location: Right arm, Patient Position: Sitting, BP Method: Manual) 96 99 °F (37.2 °C) (Tympanic) 5' 4" (1.626 m) 64.4 kg (142 lb) 95%    BMI                24.37 kg/m2          Blood Pressure          Most Recent Value    BP  122/70      Allergies as of 3/15/2017     Codeine    Fentanyl    Phenytoin Sodium Extended    Vicodin  [Hydrocodone-acetaminophen]      Immunizations Administered on Date of Encounter - 3/15/2017     None      Orders Placed During Today's Visit     Future Labs/Procedures Expected by Expires    CBC auto differential  3/15/2017 3/15/2018    Ferritin  3/15/2017 5/14/2018    Iron and TIBC  3/15/2017 5/14/2018    Vitamin D  3/15/2017 3/15/2018      Language Assistance Services     ATTENTION: Language assistance services are available, free of charge. Please call [phone].      ATENCIÓN: Si habla gina, tiene a weiss disposición servicios gratuitos de asistencia lingüística. Llame al [phone].     CHÚ Ý: N?u b?n nói Ti?ng Vi?t, có các d?ch v? h? tr? ngôn ng? mi?n phí dành cho b?n. G?i s? [phone].         [facility] complies with applicable Federal civil rights laws and does not discriminate on the basis of race, color, national origin, age, disability, or sex.        "

## 2017-03-16 ENCOUNTER — TELEPHONE (OUTPATIENT)
Dept: FAMILY MEDICINE | Facility: CLINIC | Age: 72
End: 2017-03-16

## 2017-03-16 NOTE — TELEPHONE ENCOUNTER
I have no idea what this is? Can a PT or DME rep send me an order to sign? Not sure what they want thanks !

## 2017-03-16 NOTE — TELEPHONE ENCOUNTER
----- Message from Angela Loera MA sent at 3/16/2017  2:00 PM CDT -----  Regarding: Rx  Donaldo with [employer] [phone]) called requesting RX for physical and occupational therapy for above patient.

## 2017-03-16 NOTE — TELEPHONE ENCOUNTER
----- Message from [place] sent at 3/16/2017  9:55 AM CDT -----  Contact: [facility]  Marycarmen Louis  MRN: [id number]  : 1945  PCP: Willie Santos (Inactive)  Home Phone      [phone]  Work Phone      Not on file.  Mobile          [phone]      MESSAGE: pt requesting arm brace attachment for walker, need a signed order and clinical notes for this    Phone: [phone]    Fax: [phone]

## 2017-03-16 NOTE — TELEPHONE ENCOUNTER
----- Message from Angela Loera MA sent at 3/16/2017  2:00 PM CDT -----  Regarding: Rx  Donaldo with [employer] [phone]) called requesting RX for physical and occupational therapy for above patient.

## 2017-03-17 ENCOUNTER — CLINICAL SUPPORT (OUTPATIENT)
Dept: FAMILY MEDICINE | Facility: CLINIC | Age: 72
End: 2017-03-17
Payer: MEDICARE

## 2017-03-17 DIAGNOSIS — D64.9 ANEMIA, UNSPECIFIED TYPE: Primary | ICD-10-CM

## 2017-03-17 PROCEDURE — 83540 ASSAY OF IRON: CPT

## 2017-03-17 PROCEDURE — 82306 VITAMIN D 25 HYDROXY: CPT

## 2017-03-17 PROCEDURE — 82728 ASSAY OF FERRITIN: CPT

## 2017-03-17 PROCEDURE — 84466 ASSAY OF TRANSFERRIN: CPT

## 2017-03-17 NOTE — TELEPHONE ENCOUNTER
Message from Angela Loera MA sent at 3/16/2017 2:00 PM CDT -----  Regarding: Rx  Donaldo with [employer] [phone]) called requesting RX for physical and occupational therapy for above patient.

## 2017-03-18 LAB
25(OH)D3+25(OH)D2 SERPL-MCNC: 31 NG/ML
FERRITIN SERPL-MCNC: 104 NG/ML
IRON SERPL-MCNC: 43 UG/DL
SATURATED IRON: 14 %
TOTAL IRON BINDING CAPACITY: 312 UG/DL
TRANSFERRIN SERPL-MCNC: 211 MG/DL

## 2017-04-18 ENCOUNTER — OFFICE VISIT (OUTPATIENT)
Dept: FAMILY MEDICINE | Facility: CLINIC | Age: 72
End: 2017-04-18
Payer: MEDICARE

## 2017-04-18 VITALS
DIASTOLIC BLOOD PRESSURE: 62 MMHG | WEIGHT: 141.19 LBS | HEIGHT: 64 IN | SYSTOLIC BLOOD PRESSURE: 126 MMHG | HEART RATE: 80 BPM | BODY MASS INDEX: 24.1 KG/M2

## 2017-04-18 DIAGNOSIS — J44.1 COPD EXACERBATION: ICD-10-CM

## 2017-04-18 DIAGNOSIS — J43.1 PANLOBULAR EMPHYSEMA: Primary | ICD-10-CM

## 2017-04-18 PROCEDURE — 1159F MED LIST DOCD IN RCRD: CPT | Mod: S$GLB,,, | Performed by: FAMILY MEDICINE

## 2017-04-18 PROCEDURE — 99213 OFFICE O/P EST LOW 20 MIN: CPT | Mod: S$GLB,,, | Performed by: FAMILY MEDICINE

## 2017-04-18 PROCEDURE — 99999 PR PBB SHADOW E&M-EST. PATIENT-LVL II: CPT | Mod: PBBFAC,,, | Performed by: FAMILY MEDICINE

## 2017-04-18 PROCEDURE — 1160F RVW MEDS BY RX/DR IN RCRD: CPT | Mod: S$GLB,,, | Performed by: FAMILY MEDICINE

## 2017-04-18 PROCEDURE — 99499 UNLISTED E&M SERVICE: CPT | Mod: S$GLB,,, | Performed by: FAMILY MEDICINE

## 2017-04-18 NOTE — MR AVS SNAPSHOT
[address] [address]  Phone: [phone]  Fax: [phone]                  Marycarmen Louis   2017 12:45 PM   Office Visit    Description:  Female : 1945   Provider:  Dileep Moulton MD   Department:  [facility]           Reason for Visit     Follow-up           Diagnoses this Visit        Comments    Panlobular emphysema    -  Primary     COPD exacerbation                To Do List           Future Appointments        Provider Department Dept Phone    10/17/2017 1:15 PM Dileep Moulton MD [facility] [phone]      Goals (5 Years of Data)     None      Follow-Up and Disposition     Return in about 6 months (around 10/18/2017).    Follow-up and Disposition History      [facility] On Call     [facility] On Call Nurse Care Line -  Assistance  Unless otherwise directed by your provider, please contact [facility], our nurse care line that is available for  assistance.     Registered nurses in the [facility] provide: appointment scheduling, clinical advisement, health education, and other advisory services.  Call: [phone] (toll free)               Medications           Message regarding Medications     Verify the changes and/or additions to your medication regime listed below are the same as discussed with your clinician today.  If any of these changes or additions are incorrect, please notify your healthcare provider.             Verify that the below list of medications is an accurate representation of the medications you are currently taking.  If none reported, the list may be blank. If incorrect, please contact your healthcare provider. Carry this list with you in case of emergency.           Current Medications     b complex vitamins capsule Take 1 capsule by mouth once daily.    CYANOCOBALAMIN, VITAMIN B-12, (VITAMIN B-12 ORAL) Take by mouth.    multivitamin capsule Take 1 capsule  "by mouth once daily.    thiamine (VITAMIN B-1) 100 MG tablet Take 100 mg by mouth once daily.    umeclidinium-vilanterol 62.5-25 mcg/actuation DsDv Inhale 1 puff into the lungs once daily. Controller    venlafaxine (EFFEXOR-XR) 75 MG 24 hr capsule Take 1 capsule (75 mg total) by mouth once daily.    ASCORBIC ACID (SHEREEN-C ORAL) Take by mouth.    calcium-vitamin D3 500 mg(1,250mg) -200 unit per tablet Take 1 tablet by mouth 2 (two) times daily with meals.    glucosamine-chondroitin 500-400 mg tablet Take 1 tablet by mouth 3 (three) times daily.    ibuprofen (ADVIL,MOTRIN) 600 MG tablet Take 1 tablet (600 mg total) by mouth every 6 (six) hours as needed.    lorazepam (ATIVAN) 0.5 MG tablet Take 1 tablet (0.5 mg total) by mouth every 6 (six) hours as needed for Anxiety.    meclizine (ANTIVERT) 25 mg tablet Take 1 tablet (25 mg total) by mouth 3 (three) times daily as needed for Dizziness.    melatonin 5 mg Tab Take 5 mg by mouth 2 (two) times daily.    ondansetron (ZOFRAN-ODT) 8 MG TbDL 1 sublingual Q 8 hours, prn    teriparatide (FORTEO) 20 mcg/dose - 600 mcg/2.4 mL PnIj Inject 0.08 mLs (20 mcg total) into the skin once daily.           Clinical Reference Information           Your Vitals Were     BP Pulse Height Weight BMI    126/62 80 5' 4" (1.626 m) 64 kg (141 lb 3.2 oz) 24.24 kg/m2      Blood Pressure          Most Recent Value    BP  126/62      Allergies as of 4/18/2017     Codeine    Fentanyl    Phenytoin Sodium Extended    Vicodin  [Hydrocodone-acetaminophen]      Immunizations Administered on Date of Encounter - 4/18/2017     None      Language Assistance Services     ATTENTION: Language assistance services are available, free of charge. Please call [phone].      ATENCIÓN: Si luis enrique fuentes, tiene a weiss disposición servicios gratuitos de asistencia lingüística. Llame al [phone].     CHÚ Ý: N?u b?n nói Ti?ng Vi?t, có các d?ch v? h? tr? ngôn ng? mi?n phí dành cho b?n. G?i s? [phone].         " [facility] complies with applicable Federal civil rights laws and does not discriminate on the basis of race, color, national origin, age, disability, or sex.

## 2017-04-18 NOTE — PROGRESS NOTES
Subjective:       Patient ID: Marycarmen Louis is a 71 y.o. female.    Chief Complaint: Follow-up    Pt is a 71 y.o. female who presents for evaluation and management of   Encounter Diagnoses   Name Primary?    Panlobular emphysema Yes    COPD exacerbation    .  Doing well on current meds. Denies any side effects. Prevention is up to date.  Review of Systems   Constitutional: Negative for fatigue.   Respiratory: Positive for cough. Negative for shortness of breath.         SOB improved        Objective:      Physical Exam   Constitutional: She is oriented to person, place, and time. She appears well-developed and well-nourished.   HENT:   Head: Normocephalic and atraumatic.   Right Ear: External ear normal.   Left Ear: External ear normal.   Nose: Nose normal.   Mouth/Throat: Oropharynx is clear and moist.   Eyes: EOM are normal. Pupils are equal, round, and reactive to light.   Neck: Normal range of motion. Neck supple. No JVD present. No tracheal deviation present. No thyromegaly present.   Cardiovascular: Normal rate, normal heart sounds and intact distal pulses.    No murmur heard.  Pulmonary/Chest: Effort normal and breath sounds normal. No respiratory distress. She has no wheezes. She has no rales. She exhibits no tenderness.   Abdominal: Soft. Bowel sounds are normal. She exhibits no distension and no mass. There is no tenderness. There is no rebound and no guarding.   Musculoskeletal: Normal range of motion. She exhibits no edema or tenderness.   Lymphadenopathy:     She has no cervical adenopathy.   Neurological: She is alert and oriented to person, place, and time. She has normal reflexes. She displays normal reflexes. No cranial nerve deficit. She exhibits normal muscle tone. Coordination normal.   Skin: Skin is warm and dry. No rash noted. No erythema. No pallor.   Psychiatric: She has a normal mood and affect. Her behavior is normal. Judgment and thought content normal.       Assessment:       1.  Panlobular emphysema    2. COPD exacerbation        Plan:   Marycarmen was seen today for follow-up.    Diagnoses and all orders for this visit:    Panlobular emphysema    COPD exacerbation    continue anoro  RTC 6 months     No Follow-up on file.

## 2017-06-09 ENCOUNTER — OFFICE VISIT (OUTPATIENT)
Dept: OPTOMETRY | Facility: CLINIC | Age: 72
End: 2017-06-09
Payer: MEDICARE

## 2017-06-09 DIAGNOSIS — H52.4 PRESBYOPIA: ICD-10-CM

## 2017-06-09 DIAGNOSIS — H25.13 NUCLEAR SCLEROSIS, BILATERAL: Primary | ICD-10-CM

## 2017-06-09 PROCEDURE — 99499 UNLISTED E&M SERVICE: CPT | Mod: S$GLB,,, | Performed by: OPTOMETRIST

## 2017-06-09 PROCEDURE — 99999 PR PBB SHADOW E&M-EST. PATIENT-LVL II: CPT | Mod: PBBFAC,,, | Performed by: OPTOMETRIST

## 2017-06-09 PROCEDURE — 92014 COMPRE OPH EXAM EST PT 1/>: CPT | Mod: S$GLB,,, | Performed by: OPTOMETRIST

## 2017-06-12 NOTE — PROGRESS NOTES
HPI     Concerns About Ocular Health    Additional comments: glasses rx           Comments   Patient's last dilated exam was: 3/12/2015  Pt states: not seeing as well as she used to. having trouble seeing facial   features. No longer drives at night. Feels she is more sensitive to light.   Floaters os.  Patient denies flashes, pain and double vision. Here for   glasses rx and to see if there is any change in her cataracts.          Last edited by Destiny Vick, PCT on 6/9/2017  3:16 PM.   (History)        ROS     Positive for: Eyes    Negative for: Constitutional, Gastrointestinal, Neurological, Skin,   Genitourinary, Musculoskeletal, HENT, Endocrine, Cardiovascular,   Respiratory, Psychiatric, Allergic/Imm, Heme/Lymph    Last edited by Kady Dey, OD on 6/12/2017  8:34 AM. (History)        Assessment /Plan     For exam results, see Encounter Report.    Nuclear sclerosis, bilateral    Presbyopia            1.  Educated on cataracts and affects on vision.  Patient very unhappy with vision.  Educated pt borderline qualifying for surgery.  Consult Dr. Liriano for cataract surgery.  2.  No rx given.

## 2017-06-28 ENCOUNTER — OFFICE VISIT (OUTPATIENT)
Dept: OPHTHALMOLOGY | Facility: CLINIC | Age: 72
End: 2017-06-28
Payer: MEDICARE

## 2017-06-28 DIAGNOSIS — H25.13 NUCLEAR SCLEROSIS, BILATERAL: Primary | ICD-10-CM

## 2017-06-28 PROCEDURE — 92136 OPHTHALMIC BIOMETRY: CPT | Mod: RT,S$GLB,, | Performed by: OPHTHALMOLOGY

## 2017-06-28 PROCEDURE — 92014 COMPRE OPH EXAM EST PT 1/>: CPT | Mod: S$GLB,,, | Performed by: OPHTHALMOLOGY

## 2017-06-28 PROCEDURE — 99999 PR PBB SHADOW E&M-EST. PATIENT-LVL II: CPT | Mod: PBBFAC,,, | Performed by: OPHTHALMOLOGY

## 2017-06-28 PROCEDURE — 99499 UNLISTED E&M SERVICE: CPT | Mod: S$GLB,,, | Performed by: OPHTHALMOLOGY

## 2017-06-28 RX ORDER — MOXIFLOXACIN 5 MG/ML
1 SOLUTION/ DROPS OPHTHALMIC
Status: CANCELLED | OUTPATIENT
Start: 2017-06-28

## 2017-06-28 RX ORDER — TROPICAMIDE 10 MG/ML
1 SOLUTION/ DROPS OPHTHALMIC
Status: CANCELLED | OUTPATIENT
Start: 2017-06-28

## 2017-06-28 RX ORDER — PHENYLEPHRINE HYDROCHLORIDE 25 MG/ML
1 SOLUTION/ DROPS OPHTHALMIC
Status: CANCELLED | OUTPATIENT
Start: 2017-06-28

## 2017-06-28 RX ORDER — LIDOCAINE HYDROCHLORIDE 10 MG/ML
1 INJECTION, SOLUTION EPIDURAL; INFILTRATION; INTRACAUDAL; PERINEURAL ONCE
Status: CANCELLED | OUTPATIENT
Start: 2017-06-28 | End: 2017-06-28

## 2017-06-28 RX ORDER — TETRACAINE HYDROCHLORIDE 5 MG/ML
1 SOLUTION OPHTHALMIC
Status: CANCELLED | OUTPATIENT
Start: 2017-06-28

## 2017-06-28 NOTE — PROGRESS NOTES
HPI     Cataract    Additional comments: Both Eyes.            Comments   70 y/o female presents for Cataract Eval.   Pt states she is not seeing as well as she used to. having trouble seeing   facial features. No longer drives at night. Feels she is more sensitive to   light. Floaters os.  Patient denies flashes, pain and double vision. Here   for glasses rx and to see if there is any change in her cataracts.          Last edited by Haylie Velarde on 6/28/2017  9:11 AM. (History)            Assessment /Plan     For exam results, see Encounter Report.    Nuclear sclerosis, bilateral  -     IOL Master - OU - Both Eyes      Visually Significant Cataract: Patient reports decreased vision consistent with the clinical amount of lenticular opacity, which reaches the level of visual significance and affects activities of daily living. Risks, benefits, and alternatives to cataract surgery were discussed and the consent reviewed. IOL options were discussed, including ATIOLs and the associated side effects and additional patient cost associated with them.   IOL Selections:   Right eye  IOL: ZXROO 21.0      Left eye  IOL: ZXROO 21.5     Pt wishes to have right eye done first.   The patient expresses a desire to reduce spectacle dependence. I reviewed various IOL and LASER refractive surgical options and we will attempt to minimize spectacle dependence by managing astigmatism and optimizing IOL selection. Femtosecond LASER assisted cataract surgery (FLACS) technology was explained to the patient with educational videos and discussion.  The patient voices understanding and wishes to implement this technology during the cataract procedure.  I explained the increased precision of the LASER versus manual techniques, especially as it relates to astigmatism reduction with arcuate incisions.  I emphasized that although our goal is to reduce the need for refractive correction after surgery, there may still be a need for  spectacle correction to achieve optimal visual acuity, and that a reasonable range of functional vision should be the expectation.  No guarantees are made about post operative refraction or visual acuity, as the eye may heal in unpredictable ways, and the standard risks, benefits, and alternatives to cataract surgery were explained.  The patient understands that the refractive portions of this cataract procedure are not covered by insurance, and that there is an out of pocket expense of $2250 per eye. I also explained that even though our pre-operative plan is to utilize advanced refractive technologies during surgery, that I may decide to eliminate part or all of this plan if surgical challenges or complications arise, or I feel that it is not in the patient's best interest. Consent forms and an ABN form were given to the patient to review.      Catalys Parameters:  Right Eye:   LADAN:  12mm   ?Need to yessica patient sitting up?: n  Capsulotomy: Scanned Capsule   stGstrstastdstest:st st1st Arcuate: OFF  Incisions: OFF  Left Eye:   LADAN:  12mm   ?Need to yessica patient sitting up?: n  Capsulotomy: Scanned Capsule  stGstrstastdstest:st st1st Arcuate:  OFF  Incisions:  OFF

## 2017-07-26 ENCOUNTER — TELEPHONE (OUTPATIENT)
Dept: OPHTHALMOLOGY | Facility: CLINIC | Age: 72
End: 2017-07-26

## 2017-07-26 DIAGNOSIS — H25.11 NUCLEAR SCLEROTIC CATARACT OF RIGHT EYE: Primary | ICD-10-CM

## 2017-08-12 DIAGNOSIS — J44.1 COPD EXACERBATION: ICD-10-CM

## 2017-08-13 RX ORDER — UMECLIDINIUM BROMIDE AND VILANTEROL TRIFENATATE 62.5; 25 UG/1; UG/1
POWDER RESPIRATORY (INHALATION)
Qty: 1 EACH | Refills: 5 | Status: SHIPPED | OUTPATIENT
Start: 2017-08-13 | End: 2017-10-17 | Stop reason: SDUPTHER

## 2017-09-26 RX ORDER — ASPIRIN 81 MG/1
81 TABLET ORAL DAILY
COMMUNITY
End: 2018-01-18

## 2017-09-26 RX ORDER — DIPHENHYDRAMINE HCL 25 MG
25 CAPSULE ORAL EVERY 6 HOURS PRN
COMMUNITY
End: 2017-10-27 | Stop reason: CLARIF

## 2017-09-27 ENCOUNTER — TELEPHONE (OUTPATIENT)
Dept: OPHTHALMOLOGY | Facility: CLINIC | Age: 72
End: 2017-09-27

## 2017-09-27 NOTE — TELEPHONE ENCOUNTER
----- Message from Jonathan Paniagua sent at 9/27/2017  1:32 PM CDT -----  I received this from Carlene.  The chart says standard IOL with laser.  Let me know.  [facility]    Jonathan,     Can you confirm something for me. I spoke with patient Marycarmen Louis MRN [id number] and I gave her the quote of $1250 for the laser procedure. The patient stated that she thought she was having the Multifocal/Laser procedure. Can you please confirm if the Laser is correct so I can call her back to collect payment.     Thank You very much,   Carlene

## 2017-09-28 ENCOUNTER — TELEPHONE (OUTPATIENT)
Dept: OPTOMETRY | Facility: CLINIC | Age: 72
End: 2017-09-28

## 2017-10-02 ENCOUNTER — HOSPITAL ENCOUNTER (OUTPATIENT)
Facility: OTHER | Age: 72
Discharge: HOME | End: 2017-10-02
Attending: OPHTHALMOLOGY | Admitting: OPHTHALMOLOGY
Payer: MEDICARE

## 2017-10-02 ENCOUNTER — SURGERY (OUTPATIENT)
Age: 72
End: 2017-10-02

## 2017-10-02 ENCOUNTER — ANESTHESIA EVENT (OUTPATIENT)
Dept: SURGERY | Facility: OTHER | Age: 72
End: 2017-10-02
Payer: MEDICARE

## 2017-10-02 ENCOUNTER — ANESTHESIA (OUTPATIENT)
Dept: SURGERY | Facility: OTHER | Age: 72
End: 2017-10-02
Payer: MEDICARE

## 2017-10-02 VITALS
OXYGEN SATURATION: 95 % | WEIGHT: 138 LBS | SYSTOLIC BLOOD PRESSURE: 126 MMHG | BODY MASS INDEX: 23.56 KG/M2 | RESPIRATION RATE: 16 BRPM | DIASTOLIC BLOOD PRESSURE: 70 MMHG | HEIGHT: 64 IN | HEART RATE: 84 BPM | TEMPERATURE: 98 F

## 2017-10-02 DIAGNOSIS — H25.13 NUCLEAR SCLEROSIS, BILATERAL: ICD-10-CM

## 2017-10-02 PROCEDURE — 25000003 PHARM REV CODE 250: Performed by: OPHTHALMOLOGY

## 2017-10-02 PROCEDURE — 37000009 HC ANESTHESIA EA ADD 15 MINS: Performed by: OPHTHALMOLOGY

## 2017-10-02 PROCEDURE — 36000707: Performed by: OPHTHALMOLOGY

## 2017-10-02 PROCEDURE — 25000003 PHARM REV CODE 250: Performed by: NURSE ANESTHETIST, CERTIFIED REGISTERED

## 2017-10-02 PROCEDURE — V2788 PRESBYOPIA-CORRECT FUNCTION: HCPCS | Performed by: OPHTHALMOLOGY

## 2017-10-02 PROCEDURE — 66999 UNLISTED PX ANT SEGMENT EYE: CPT | Mod: RT,,, | Performed by: OPHTHALMOLOGY

## 2017-10-02 PROCEDURE — 71000015 HC POSTOP RECOV 1ST HR: Performed by: OPHTHALMOLOGY

## 2017-10-02 PROCEDURE — 37000008 HC ANESTHESIA 1ST 15 MINUTES: Performed by: OPHTHALMOLOGY

## 2017-10-02 PROCEDURE — 36000706: Performed by: OPHTHALMOLOGY

## 2017-10-02 PROCEDURE — 63600175 PHARM REV CODE 636 W HCPCS: Performed by: NURSE ANESTHETIST, CERTIFIED REGISTERED

## 2017-10-02 PROCEDURE — 66984 XCAPSL CTRC RMVL W/O ECP: CPT | Mod: RT,,, | Performed by: OPHTHALMOLOGY

## 2017-10-02 DEVICE — IMPLANTABLE DEVICE: Type: IMPLANTABLE DEVICE | Site: EYE | Status: FUNCTIONAL

## 2017-10-02 RX ORDER — MOXIFLOXACIN 5 MG/ML
1 SOLUTION/ DROPS OPHTHALMIC
Status: COMPLETED | OUTPATIENT
Start: 2017-10-02 | End: 2017-10-02

## 2017-10-02 RX ORDER — MIDAZOLAM HYDROCHLORIDE 1 MG/ML
INJECTION INTRAMUSCULAR; INTRAVENOUS
Status: DISCONTINUED | OUTPATIENT
Start: 2017-10-02 | End: 2017-10-02

## 2017-10-02 RX ORDER — LIDOCAINE HYDROCHLORIDE 10 MG/ML
1 INJECTION, SOLUTION EPIDURAL; INFILTRATION; INTRACAUDAL; PERINEURAL ONCE
Status: DISCONTINUED | OUTPATIENT
Start: 2017-10-02 | End: 2017-10-02 | Stop reason: HOSPADM

## 2017-10-02 RX ORDER — LIDOCAINE HYDROCHLORIDE 40 MG/ML
INJECTION, SOLUTION RETROBULBAR
Status: DISCONTINUED | OUTPATIENT
Start: 2017-10-02 | End: 2017-10-02 | Stop reason: HOSPADM

## 2017-10-02 RX ORDER — ACETAMINOPHEN 325 MG/1
650 TABLET ORAL EVERY 4 HOURS PRN
Status: DISCONTINUED | OUTPATIENT
Start: 2017-10-02 | End: 2017-10-02 | Stop reason: HOSPADM

## 2017-10-02 RX ORDER — SODIUM CHLORIDE 9 MG/ML
INJECTION, SOLUTION INTRAVENOUS CONTINUOUS PRN
Status: DISCONTINUED | OUTPATIENT
Start: 2017-10-02 | End: 2017-10-02

## 2017-10-02 RX ORDER — TETRACAINE HYDROCHLORIDE 5 MG/ML
SOLUTION OPHTHALMIC
Status: DISCONTINUED | OUTPATIENT
Start: 2017-10-02 | End: 2017-10-02 | Stop reason: HOSPADM

## 2017-10-02 RX ORDER — TROPICAMIDE 10 MG/ML
1 SOLUTION/ DROPS OPHTHALMIC
Status: COMPLETED | OUTPATIENT
Start: 2017-10-02 | End: 2017-10-02

## 2017-10-02 RX ORDER — PHENYLEPHRINE HYDROCHLORIDE 25 MG/ML
1 SOLUTION/ DROPS OPHTHALMIC
Status: COMPLETED | OUTPATIENT
Start: 2017-10-02 | End: 2017-10-02

## 2017-10-02 RX ORDER — MOXIFLOXACIN 5 MG/ML
SOLUTION/ DROPS OPHTHALMIC
Status: DISCONTINUED | OUTPATIENT
Start: 2017-10-02 | End: 2017-10-02 | Stop reason: HOSPADM

## 2017-10-02 RX ORDER — PROPARACAINE HYDROCHLORIDE 5 MG/ML
1 SOLUTION/ DROPS OPHTHALMIC
Status: DISCONTINUED | OUTPATIENT
Start: 2017-10-02 | End: 2017-10-02 | Stop reason: HOSPADM

## 2017-10-02 RX ORDER — PHENYLEPHRINE HYDROCHLORIDE 100 MG/ML
SOLUTION/ DROPS OPHTHALMIC
Status: DISCONTINUED | OUTPATIENT
Start: 2017-10-02 | End: 2017-10-02 | Stop reason: HOSPADM

## 2017-10-02 RX ORDER — TETRACAINE HYDROCHLORIDE 5 MG/ML
1 SOLUTION OPHTHALMIC
Status: COMPLETED | OUTPATIENT
Start: 2017-10-02 | End: 2017-10-02

## 2017-10-02 RX ADMIN — SODIUM CHLORIDE: 0.9 INJECTION, SOLUTION INTRAVENOUS at 09:10

## 2017-10-02 RX ADMIN — PHENYLEPHRINE HYDROCHLORIDE 1 DROP: 25 SOLUTION/ DROPS OPHTHALMIC at 08:10

## 2017-10-02 RX ADMIN — MOXIFLOXACIN HYDROCHLORIDE 1 DROP: 5 SOLUTION/ DROPS OPHTHALMIC at 09:10

## 2017-10-02 RX ADMIN — MIDAZOLAM HYDROCHLORIDE 2 MG: 1 INJECTION, SOLUTION INTRAMUSCULAR; INTRAVENOUS at 09:10

## 2017-10-02 RX ADMIN — TETRACAINE HYDROCHLORIDE 1 DROP: 5 SOLUTION OPHTHALMIC at 08:10

## 2017-10-02 RX ADMIN — BALANCED SALT SOLUTION ENRICHED WITH BICARBONATE, DEXTROSE, AND GLUTATHIONE 5 ML: KIT at 08:10

## 2017-10-02 RX ADMIN — BALANCED SALT SOLUTION ENRICHED WITH BICARBONATE, DEXTROSE, AND GLUTATHIONE 500 ML: KIT at 09:10

## 2017-10-02 RX ADMIN — MOXIFLOXACIN HYDROCHLORIDE 1 DROP: 5 SOLUTION/ DROPS OPHTHALMIC at 08:10

## 2017-10-02 RX ADMIN — TROPICAMIDE 1 DROP: 10 SOLUTION/ DROPS OPHTHALMIC at 08:10

## 2017-10-02 RX ADMIN — TETRACAINE HYDROCHLORIDE 1 DROP: 5 SOLUTION OPHTHALMIC at 09:10

## 2017-10-02 RX ADMIN — MOXIFLOXACIN HYDROCHLORIDE 1 DROP: 5 SOLUTION/ DROPS OPHTHALMIC at 10:10

## 2017-10-02 RX ADMIN — SODIUM CHONDROITIN SULFATE / SODIUM HYALURONATE 2 ML: 0.55-0.5 INJECTION INTRAOCULAR at 09:10

## 2017-10-02 RX ADMIN — PHENYLEPHRINE HYDROCHLORIDE 1 DROP: 100 SOLUTION/ DROPS OPHTHALMIC at 08:10

## 2017-10-02 RX ADMIN — LIDOCAINE HYDROCHLORIDE 1 DROP: 40 INJECTION, SOLUTION RETROBULBAR; TOPICAL at 09:10

## 2017-10-02 NOTE — ANESTHESIA PREPROCEDURE EVALUATION
10/02/2017  Marycarmen Louis is a 71 y.o., female.    Pre-op Assessment    I have reviewed the Patient Summary Reports.     I have reviewed the Nursing Notes.   I have reviewed the Medications.     Review of Systems  Anesthesia Hx:  Hx of Anesthetic complications PONV   Social:  Former Smoker, Social Alcohol Use    Hematology/Oncology:  Hematology Normal   Oncology Normal     EENT/Dental:EENT/Dental Normal   Cardiovascular:   Exercise tolerance: good CAD asymptomatic     Pulmonary:   COPD, mild Asthma asymptomatic    Renal/:  Renal/ Normal     Musculoskeletal:   Arthritis     Neurological:  Neurology Normal    Endocrine:  Endocrine Normal    Psych:   Psychiatric History          Physical Exam  General:  Well nourished    Airway/Jaw/Neck:  Airway Findings: Mouth Opening: Normal Tongue: Normal  General Airway Assessment: Adult  Mallampati: II  TM Distance: Normal, at least 6 cm  Jaw/Neck Findings:  Neck ROM: Normal ROM      Dental:  Dental Findings: In tact        Mental Status:  Mental Status Findings:  Cooperative         Anesthesia Plan  Type of Anesthesia, risks & benefits discussed:  Anesthesia Type:  MAC  Patient's Preference:   Intra-op Monitoring Plan:   Intra-op Monitoring Plan Comments:   Post Op Pain Control Plan:   Post Op Pain Control Plan Comments:   Induction:    Beta Blocker:  Patient is not currently on a Beta-Blocker (No further documentation required).       Informed Consent: Patient understands risks and agrees with Anesthesia plan.  Questions answered.   ASA Score: 3     Day of Surgery Review of History & Physical: I have interviewed and examined the patient. I have reviewed the patient's H&P dated: 2/15/17. There are no significant changes.  H&P update referred to the surgeon.         Ready For Surgery From Anesthesia Perspective.

## 2017-10-02 NOTE — OP NOTE
SURGEON:  Margy Liriano M.D.    PREOPERATIVE DIAGNOSIS:    Nuclear Sclerotic Cataract Right Eye    POSTOPERATIVE DIAGNOSIS:    Nuclear Sclerotic Cataract Right Eye    PROCEDURES:    Phacoemulsification with  intraocular lens, Right eye (79695)  With Femtosecond LASER assist    DATE OF SURGERY: 10/02/2017    IMPLANT: ZXROO 21.5    ANESTHESIA:  MAC with topical Lidocaine    COMPLICATIONS:  None    ESTIMATED BLOOD LOSS: None    SPECIMENS: None    INDICATIONS:    The patient has a history of painless progressive visual loss and  difficulty with activities of daily living secondary to cataract formation.  After a thorough discussion of the risks, benefits, and alternatives to cataract surgery, including, but not limited to, the rare risks of infection, retinal detachment, hemorrhage, need for additional surgery, loss of vision, and even loss of the eye, the patient voices understanding and desires to proceed.    DESCRIPTION OF PROCEDURE:    After verification of consent and marking of the operative eye, the patient was positioned under the femtosecond LASER. Topical anesthetic drops were administered. A surgical timeout was initiated with verification of patient identifiers and the laser surgical plan. The eye was docked securely and the laser portion of the cataract procedure was carried out without complication.  The patient was returned to the pre-operative area to await the intraocular surgical portion of the cataract procedure.  The patients IOL calculations were reviewed, and the lens selection confirmed.   After verification and marking of the proper eye in the preop holding area, the patient was brought to the operating room in supine position where the eye was prepped and draped in standard sterile fashion with 5% Betadine and a lid speculum placed in the eye.   Topical 4% Lidocaine was used in addition to the preoperative anesthesia and the procedure was begun by the creation of a paracentesis incision through  which viscoelastic was used to fill the anterior chamber.  Next, a keratome blade was used to create a triplanar temporal clear corneal incision and a cystotome and Utrata forceps used to fashion a continuous curvilinear capsulorrhexis.  Hydrodissection was carried out using the Pearson hydrodissection cannula and the nucleus was found to be mobile.  Phacoemulsification of the nucleus was carried out using a quick chop technique, and all remaining epinuclear and cortical material was removed.  The eye was then reformed with Viscoelastic and the  intraocular lens was implanted into the capsular bag.  All remaining viscoelastics were removed from the eye and at the end of the case the pupil was round, the lens was well-centered within the capsular bag and all wounds were found to be water tight.  Drops of Vigamox and Pred Forte were instilled and a shield was placed over the eye. The patient will follow up with Dr. Liriano in the morning.

## 2017-10-02 NOTE — ANESTHESIA POSTPROCEDURE EVALUATION
"Anesthesia Post Evaluation    Patient: Marycarmen Louis    Procedure(s) Performed: Procedure(s) (LRB):  PHACOEMULSIFICATION-ASPIRATION-CATARACT (Right)  INSERTION-INTRAOCULAR LENS (IOL) (Right)    Final Anesthesia Type: MAC  Patient location during evaluation: [facility]  Patient participation: Yes- Able to Participate  Level of consciousness: awake and alert  Post-procedure vital signs: reviewed and stable  Pain management: adequate  Airway patency: patent  PONV status at discharge: No PONV  Anesthetic complications: no      Cardiovascular status: blood pressure returned to baseline  Respiratory status: unassisted  Hydration status: euvolemic  Follow-up not needed.        Visit Vitals  /70 (BP Location: Right arm, Patient Position: Lying)   Pulse 87   Temp 36.9 °C (98.4 °F) (Oral)   Resp 16   Ht 5' 4" (1.626 m)   Wt 62.6 kg (138 lb)   SpO2 97%   Breastfeeding? No   BMI 23.69 kg/m²       Pain/Chanda Score: Presence of Pain: denies (10/2/2017  8:20 AM)      "
normal behavior

## 2017-10-02 NOTE — DISCHARGE SUMMARY
Outcome: Successful outpatient ophthalmic surgical procedure  Preprinted Instructions given to patient.  Regular diet.  Activity: No restrictions  Meds: see Med Rec  Condition: stable  Follow up: 1 day with Dr Liriano  Disposition: Home  Diagnosis: s/p eye surgery

## 2017-10-02 NOTE — DISCHARGE INSTRUCTIONS
Margy Liriano MD  [facility]  Department of Ophthalmology      AFTER: Cataract Surgery:  Relax at home and DO NOT exert yourself for the rest of the day.  Plan to see Dr. Liriano tomorrow at the eye clinic:   [facility] [address]  [place] [address]    Refer to attached eye drop instruction sheet     Precautions:  DO NOT rub your eye.  You may resume moderate activity the day after surgery.  Wear protective sunglasses during the day and a shield at night for 1(one) week.  If you have pain, redness and decreased vision, call Dr. Liriano (or the on-call doctor after hours) @[phone].            Home Care Instructions for Eye Surgeries    1. ACTIVITY:  Limit your activity today. Relax at home and DO NOT exert yourself for the rest of the day. Increase activity gradually. You may return to work or school as directed by your physician.    2. DIET:  Drink plenty of fluids. Resume your normal diet unless instructed otherwise.    3. PAIN:  Expect a moderate amount of pain. If a prescription for pain is not sent home with you, you may take your commonly used pain reliever as directed. If this is not sufficient, call your physician. You may resume any other prescription medication unless otherwise directed by your physician.     Discuss any problem with your physician as soon as it arises. Do not Delay.      EMERGENCY- If you are unable to contact your physician, please go to the nearest Emergency Room.       Anesthesia: Monitored Anesthesia Care (MAC)    Anesthesia Safety  · Have an adult family member or friend drive you home after the procedure.  · For the first 24 hours after your surgery:  · Do not drive or use heavy equipment.  · Do not make important decisions or sign documents.  · Avoid alcohol.  · Have someone stay with you, if possible. They can watch for problems and help keep you safe.

## 2017-10-03 ENCOUNTER — OFFICE VISIT (OUTPATIENT)
Dept: OPHTHALMOLOGY | Facility: CLINIC | Age: 72
End: 2017-10-03
Attending: OPHTHALMOLOGY
Payer: MEDICARE

## 2017-10-03 DIAGNOSIS — Z98.890 POST-OPERATIVE STATE: Primary | ICD-10-CM

## 2017-10-03 DIAGNOSIS — H25.11 NUCLEAR SCLEROTIC CATARACT OF RIGHT EYE: ICD-10-CM

## 2017-10-03 PROCEDURE — 99024 POSTOP FOLLOW-UP VISIT: CPT | Mod: S$GLB,,, | Performed by: OPHTHALMOLOGY

## 2017-10-03 PROCEDURE — 99999 PR PBB SHADOW E&M-EST. PATIENT-LVL II: CPT | Mod: PBBFAC,,, | Performed by: OPHTHALMOLOGY

## 2017-10-03 NOTE — PROGRESS NOTES
HPI     POD 1 Phaco w/IOL OD October 2, 2017    MEDS:    Pred/Gati/Nepaf TID OD    Patient states she is doing well with no complaints.    Last edited by Estrellita Laird on 10/3/2017  8:10 AM. (History)            Assessment /Plan     For exam results, see Encounter Report.    Post-operative state    Nuclear sclerotic cataract of right eye      Slit lamp exam:  L/L: nl  K: clear, wound sealed  AC: 1+ cell  Lens: IOL centered and stable    POD1 s/p Phaco/IOL  Appropriate precautions and post op medications reviewed.  Patient instructed to call or come in if symptoms of redness, decreased vision, or pain are experienced.  [employer]

## 2017-10-11 ENCOUNTER — TELEPHONE (OUTPATIENT)
Dept: OPHTHALMOLOGY | Facility: CLINIC | Age: 72
End: 2017-10-11

## 2017-10-11 ENCOUNTER — OFFICE VISIT (OUTPATIENT)
Dept: OPHTHALMOLOGY | Facility: CLINIC | Age: 72
End: 2017-10-11
Payer: MEDICARE

## 2017-10-11 DIAGNOSIS — H25.13 NUCLEAR SCLEROSIS, BILATERAL: Primary | ICD-10-CM

## 2017-10-11 DIAGNOSIS — H25.12 NUCLEAR SCLEROTIC CATARACT OF LEFT EYE: Primary | ICD-10-CM

## 2017-10-11 DIAGNOSIS — Z98.890 POST-OPERATIVE STATE: ICD-10-CM

## 2017-10-11 PROCEDURE — 99499 UNLISTED E&M SERVICE: CPT | Mod: S$GLB,,, | Performed by: OPHTHALMOLOGY

## 2017-10-11 PROCEDURE — 99999 PR PBB SHADOW E&M-EST. PATIENT-LVL II: CPT | Mod: PBBFAC,,, | Performed by: OPHTHALMOLOGY

## 2017-10-11 PROCEDURE — 99024 POSTOP FOLLOW-UP VISIT: CPT | Mod: S$GLB,,, | Performed by: OPHTHALMOLOGY

## 2017-10-11 RX ORDER — MOXIFLOXACIN 5 MG/ML
1 SOLUTION/ DROPS OPHTHALMIC
Status: CANCELLED | OUTPATIENT
Start: 2017-10-11

## 2017-10-11 RX ORDER — TROPICAMIDE 10 MG/ML
1 SOLUTION/ DROPS OPHTHALMIC
Status: CANCELLED | OUTPATIENT
Start: 2017-10-11

## 2017-10-11 RX ORDER — PHENYLEPHRINE HYDROCHLORIDE 25 MG/ML
1 SOLUTION/ DROPS OPHTHALMIC
Status: CANCELLED | OUTPATIENT
Start: 2017-10-11

## 2017-10-11 RX ORDER — TETRACAINE HYDROCHLORIDE 5 MG/ML
1 SOLUTION OPHTHALMIC
Status: CANCELLED | OUTPATIENT
Start: 2017-10-11

## 2017-10-11 RX ORDER — LIDOCAINE HYDROCHLORIDE 10 MG/ML
1 INJECTION, SOLUTION EPIDURAL; INFILTRATION; INTRACAUDAL; PERINEURAL ONCE
Status: CANCELLED | OUTPATIENT
Start: 2017-10-11 | End: 2017-10-11

## 2017-10-11 NOTE — PROGRESS NOTES
HPI     S/p Phaco w/IOL OD October 2, 2017    MEDS:    Pred/Gati/Nepaf TID OD    Pt here for 1 week post op check OD.  Pt states doing well. Pt denies pain   OD.     Last edited by Kely Wharton MA on 10/11/2017 10:32 AM.   (History)            Assessment /Plan     For exam results, see Encounter Report.    Nuclear sclerosis, bilateral    Post-operative state      Slit lamp exam:  L/L: nl  K: clear, wound sealed  AC: trace cell  Iris/Lens: IOL centered and stable    POW1 s/p phaco: Surgery healing well with no signs of infection or abnormal inflammation.    Patient wishes to proceed with surgery in the second eye. Risks, benefits, alternatives reviewed. IOL selection reviewed.     Left eye  IOL: ZXROO 21.5 (aim plano)    The patient expresses a desire to reduce spectacle dependence. I reviewed various IOL and LASER refractive surgical options and we will attempt to minimize spectacle dependence by managing astigmatism and optimizing IOL selection. Femtosecond LASER assisted cataract surgery (FLACS) technology was explained to the patient with educational videos and discussion.  The patient voices understanding and wishes to implement this technology during the cataract procedure.  I explained the increased precision of the LASER versus manual techniques, especially as it relates to astigmatism reduction with arcuate incisions.  I emphasized that although our goal is to reduce the need for refractive correction after surgery, there may still be a need for spectacle correction to achieve optimal visual acuity, and that a reasonable range of functional vision should be the expectation.  No guarantees are made about post operative refraction or visual acuity, as the eye may heal in unpredictable ways, and the standard risks, benefits, and alternatives to cataract surgery were explained.  The patient understands that the refractive portions of this cataract procedure are not covered by insurance, and that there  is an out of pocket expense of $2250 per eye. I also explained that even though our pre-operative plan is to utilize advanced refractive technologies during surgery, that I may decide to eliminate part or all of this plan if surgical challenges or complications arise, or I feel that it is not in the patient's best interest. Consent forms and an ABN form were given to the patient to review.      Catalys Parameters:    Left Eye:   LADAN:  12mm   ?Need to yessica patient sitting up?: n  Capsulotomy: Scanned Capsule  stGstrstastdstest:st st1st Arcuate:  OFF  Incisions:  OFF

## 2017-10-17 ENCOUNTER — OFFICE VISIT (OUTPATIENT)
Dept: FAMILY MEDICINE | Facility: CLINIC | Age: 72
End: 2017-10-17
Payer: MEDICARE

## 2017-10-17 VITALS
WEIGHT: 140.19 LBS | BODY MASS INDEX: 23.93 KG/M2 | DIASTOLIC BLOOD PRESSURE: 62 MMHG | HEIGHT: 64 IN | SYSTOLIC BLOOD PRESSURE: 102 MMHG | RESPIRATION RATE: 18 BRPM | HEART RATE: 84 BPM

## 2017-10-17 DIAGNOSIS — J44.9 CHRONIC OBSTRUCTIVE PULMONARY DISEASE, UNSPECIFIED COPD TYPE: ICD-10-CM

## 2017-10-17 DIAGNOSIS — F41.9 ANXIETY: ICD-10-CM

## 2017-10-17 DIAGNOSIS — M80.052D OSTEOPOROTIC HIP FRACTURE, LEFT, WITH ROUTINE HEALING, SUBSEQUENT ENCOUNTER: ICD-10-CM

## 2017-10-17 DIAGNOSIS — J44.1 COPD EXACERBATION: ICD-10-CM

## 2017-10-17 DIAGNOSIS — F32.A DEPRESSION, UNSPECIFIED DEPRESSION TYPE: Primary | ICD-10-CM

## 2017-10-17 PROCEDURE — 99999 PR PBB SHADOW E&M-EST. PATIENT-LVL II: CPT | Mod: PBBFAC,,, | Performed by: FAMILY MEDICINE

## 2017-10-17 PROCEDURE — 99499 UNLISTED E&M SERVICE: CPT | Mod: S$GLB,,, | Performed by: FAMILY MEDICINE

## 2017-10-17 PROCEDURE — 99214 OFFICE O/P EST MOD 30 MIN: CPT | Mod: S$GLB,,, | Performed by: FAMILY MEDICINE

## 2017-10-17 RX ORDER — MECLIZINE HYDROCHLORIDE 25 MG/1
25 TABLET ORAL 3 TIMES DAILY
Refills: 0 | COMMUNITY
Start: 2017-08-07 | End: 2017-10-17

## 2017-10-17 RX ORDER — PNEUMOCOCCAL VACCINE POLYVALENT 25; 25; 25; 25; 25; 25; 25; 25; 25; 25; 25; 25; 25; 25; 25; 25; 25; 25; 25; 25; 25; 25; 25 UG/.5ML; UG/.5ML; UG/.5ML; UG/.5ML; UG/.5ML; UG/.5ML; UG/.5ML; UG/.5ML; UG/.5ML; UG/.5ML; UG/.5ML; UG/.5ML; UG/.5ML; UG/.5ML; UG/.5ML; UG/.5ML; UG/.5ML; UG/.5ML; UG/.5ML; UG/.5ML; UG/.5ML; UG/.5ML; UG/.5ML
INJECTION, SOLUTION INTRAMUSCULAR; SUBCUTANEOUS
Refills: 0 | COMMUNITY
Start: 2017-08-15 | End: 2018-05-01

## 2017-10-17 RX ORDER — VENLAFAXINE HYDROCHLORIDE 75 MG/1
75 CAPSULE, EXTENDED RELEASE ORAL DAILY
Qty: 30 CAPSULE | Refills: 12 | Status: SHIPPED | OUTPATIENT
Start: 2017-10-17 | End: 2018-11-14 | Stop reason: SDUPTHER

## 2017-10-17 RX ORDER — PEN NEEDLE, DIABETIC 31 GX5/16"
NEEDLE, DISPOSABLE MISCELLANEOUS
Refills: 1 | COMMUNITY
Start: 2017-09-22 | End: 2018-05-01

## 2017-10-17 NOTE — PROGRESS NOTES
Subjective:       Patient ID: Marycarmen Louis is a 72 y.o. female.    Chief Complaint: Follow-up (6 mo)    Pt is a 72 y.o. female who presents for evaluation and management of   Encounter Diagnoses   Name Primary?    Depression, unspecified depression type Yes    Osteoporotic hip fracture, left, with routine healing, subsequent encounter     Chronic obstructive pulmonary disease, unspecified COPD type     COPD exacerbation     Anxiety    .  Doing well on current meds. Denies any side effects. Prevention is up to date.    Review of Systems   Constitutional: Negative for chills and fever.   Respiratory: Negative for shortness of breath.    Cardiovascular: Negative for chest pain and palpitations.   Gastrointestinal: Negative for abdominal pain, blood in stool, constipation and nausea.   Genitourinary: Negative for difficulty urinating.   Psychiatric/Behavioral: Negative for dysphoric mood, sleep disturbance and suicidal ideas. The patient is not nervous/anxious.        Objective:      Physical Exam   Constitutional: She is oriented to person, place, and time. She appears well-developed and well-nourished.   HENT:   Head: Normocephalic and atraumatic.   Right Ear: External ear normal.   Left Ear: External ear normal.   Nose: Nose normal.   Mouth/Throat: Oropharynx is clear and moist.   Eyes: EOM are normal. Pupils are equal, round, and reactive to light.   Neck: Normal range of motion. Neck supple. No JVD present. No tracheal deviation present. No thyromegaly present.   Cardiovascular: Normal rate, normal heart sounds and intact distal pulses.    No murmur heard.  Pulmonary/Chest: Effort normal and breath sounds normal. No respiratory distress. She has no wheezes. She has no rales. She exhibits no tenderness.   Abdominal: Soft. Bowel sounds are normal. She exhibits no distension and no mass. There is no tenderness. There is no rebound and no guarding.   Musculoskeletal: Normal range of motion. She exhibits no  edema or tenderness.   Lymphadenopathy:     She has no cervical adenopathy.   Neurological: She is alert and oriented to person, place, and time. She has normal reflexes. She displays normal reflexes. No cranial nerve deficit. She exhibits normal muscle tone. Coordination normal.   Skin: Skin is warm and dry. No rash noted. No erythema. No pallor.   Psychiatric: She has a normal mood and affect. Her behavior is normal. Judgment and thought content normal.       Assessment:       1. Depression, unspecified depression type    2. Osteoporotic hip fracture, left, with routine healing, subsequent encounter    3. Chronic obstructive pulmonary disease, unspecified COPD type    4. COPD exacerbation    5. Anxiety        Plan:   Marycarmen was seen today for follow-up.    Diagnoses and all orders for this visit:    Depression, unspecified depression type  -     venlafaxine (EFFEXOR-XR) 75 MG 24 hr capsule; Take 1 capsule (75 mg total) by mouth once daily.    Osteoporotic hip fracture, left, with routine healing, subsequent encounter    Chronic obstructive pulmonary disease, unspecified COPD type    COPD exacerbation  -     umeclidinium-vilanterol (ANORO ELLIPTA) 62.5-25 mcg/actuation DsDv; Inhale 1 puff into the lungs once daily. Controller    Anxiety  -     venlafaxine (EFFEXOR-XR) 75 MG 24 hr capsule; Take 1 capsule (75 mg total) by mouth once daily.    continue forteo for 2 years       No Follow-up on file.

## 2017-10-26 ENCOUNTER — TELEPHONE (OUTPATIENT)
Dept: OPTOMETRY | Facility: CLINIC | Age: 72
End: 2017-10-26

## 2017-10-30 ENCOUNTER — SURGERY (OUTPATIENT)
Age: 72
End: 2017-10-30

## 2017-10-30 ENCOUNTER — ANESTHESIA EVENT (OUTPATIENT)
Dept: SURGERY | Facility: OTHER | Age: 72
End: 2017-10-30
Payer: MEDICARE

## 2017-10-30 ENCOUNTER — HOSPITAL ENCOUNTER (OUTPATIENT)
Facility: OTHER | Age: 72
Discharge: HOME | End: 2017-10-30
Attending: OPHTHALMOLOGY | Admitting: OPHTHALMOLOGY
Payer: MEDICARE

## 2017-10-30 ENCOUNTER — ANESTHESIA (OUTPATIENT)
Dept: SURGERY | Facility: OTHER | Age: 72
End: 2017-10-30
Payer: MEDICARE

## 2017-10-30 VITALS
OXYGEN SATURATION: 100 % | TEMPERATURE: 98 F | RESPIRATION RATE: 18 BRPM | DIASTOLIC BLOOD PRESSURE: 75 MMHG | HEART RATE: 66 BPM | HEIGHT: 64 IN | BODY MASS INDEX: 23.56 KG/M2 | SYSTOLIC BLOOD PRESSURE: 140 MMHG | WEIGHT: 138 LBS

## 2017-10-30 DIAGNOSIS — H25.13 NUCLEAR SCLEROSIS, BILATERAL: ICD-10-CM

## 2017-10-30 PROCEDURE — 37000008 HC ANESTHESIA 1ST 15 MINUTES: Performed by: OPHTHALMOLOGY

## 2017-10-30 PROCEDURE — 71000015 HC POSTOP RECOV 1ST HR: Performed by: OPHTHALMOLOGY

## 2017-10-30 PROCEDURE — 37000009 HC ANESTHESIA EA ADD 15 MINS: Performed by: OPHTHALMOLOGY

## 2017-10-30 PROCEDURE — 36000706: Performed by: OPHTHALMOLOGY

## 2017-10-30 PROCEDURE — 36000707: Performed by: OPHTHALMOLOGY

## 2017-10-30 PROCEDURE — 66999 UNLISTED PX ANT SEGMENT EYE: CPT | Mod: ,,, | Performed by: OPHTHALMOLOGY

## 2017-10-30 PROCEDURE — 63600175 PHARM REV CODE 636 W HCPCS: Performed by: NURSE ANESTHETIST, CERTIFIED REGISTERED

## 2017-10-30 PROCEDURE — V2788 PRESBYOPIA-CORRECT FUNCTION: HCPCS | Performed by: OPHTHALMOLOGY

## 2017-10-30 PROCEDURE — 66984 XCAPSL CTRC RMVL W/O ECP: CPT | Mod: 79,LT,, | Performed by: OPHTHALMOLOGY

## 2017-10-30 PROCEDURE — 25000003 PHARM REV CODE 250: Performed by: OPHTHALMOLOGY

## 2017-10-30 RX ORDER — ONDANSETRON 2 MG/ML
INJECTION INTRAMUSCULAR; INTRAVENOUS
Status: DISCONTINUED | OUTPATIENT
Start: 2017-10-30 | End: 2017-10-30

## 2017-10-30 RX ORDER — TETRACAINE HYDROCHLORIDE 5 MG/ML
1 SOLUTION OPHTHALMIC
Status: COMPLETED | OUTPATIENT
Start: 2017-10-30 | End: 2017-10-30

## 2017-10-30 RX ORDER — PHENYLEPHRINE HYDROCHLORIDE 25 MG/ML
1 SOLUTION/ DROPS OPHTHALMIC
Status: COMPLETED | OUTPATIENT
Start: 2017-10-30 | End: 2017-10-30

## 2017-10-30 RX ORDER — PHENYLEPHRINE HYDROCHLORIDE 100 MG/ML
SOLUTION/ DROPS OPHTHALMIC
Status: DISCONTINUED | OUTPATIENT
Start: 2017-10-30 | End: 2017-10-30 | Stop reason: HOSPADM

## 2017-10-30 RX ORDER — MOXIFLOXACIN 5 MG/ML
1 SOLUTION/ DROPS OPHTHALMIC
Status: COMPLETED | OUTPATIENT
Start: 2017-10-30 | End: 2017-10-30

## 2017-10-30 RX ORDER — MOXIFLOXACIN 5 MG/ML
SOLUTION/ DROPS OPHTHALMIC
Status: DISCONTINUED | OUTPATIENT
Start: 2017-10-30 | End: 2017-10-30 | Stop reason: HOSPADM

## 2017-10-30 RX ORDER — MIDAZOLAM HYDROCHLORIDE 1 MG/ML
INJECTION INTRAMUSCULAR; INTRAVENOUS
Status: DISCONTINUED | OUTPATIENT
Start: 2017-10-30 | End: 2017-10-30

## 2017-10-30 RX ORDER — LIDOCAINE HYDROCHLORIDE 10 MG/ML
1 INJECTION, SOLUTION EPIDURAL; INFILTRATION; INTRACAUDAL; PERINEURAL ONCE
Status: DISCONTINUED | OUTPATIENT
Start: 2017-10-30 | End: 2017-10-30 | Stop reason: HOSPADM

## 2017-10-30 RX ORDER — ACETAMINOPHEN 325 MG/1
650 TABLET ORAL EVERY 4 HOURS PRN
Status: DISCONTINUED | OUTPATIENT
Start: 2017-10-30 | End: 2017-10-30 | Stop reason: HOSPADM

## 2017-10-30 RX ORDER — LIDOCAINE HYDROCHLORIDE 40 MG/ML
INJECTION, SOLUTION RETROBULBAR
Status: DISCONTINUED | OUTPATIENT
Start: 2017-10-30 | End: 2017-10-30 | Stop reason: HOSPADM

## 2017-10-30 RX ORDER — TROPICAMIDE 10 MG/ML
1 SOLUTION/ DROPS OPHTHALMIC
Status: COMPLETED | OUTPATIENT
Start: 2017-10-30 | End: 2017-10-30

## 2017-10-30 RX ORDER — PROPARACAINE HYDROCHLORIDE 5 MG/ML
1 SOLUTION/ DROPS OPHTHALMIC
Status: DISCONTINUED | OUTPATIENT
Start: 2017-10-30 | End: 2017-10-30 | Stop reason: HOSPADM

## 2017-10-30 RX ORDER — TETRACAINE HYDROCHLORIDE 5 MG/ML
SOLUTION OPHTHALMIC
Status: DISCONTINUED | OUTPATIENT
Start: 2017-10-30 | End: 2017-10-30 | Stop reason: HOSPADM

## 2017-10-30 RX ADMIN — PHENYLEPHRINE HYDROCHLORIDE 1 DROP: 100 SOLUTION/ DROPS OPHTHALMIC at 08:10

## 2017-10-30 RX ADMIN — TETRACAINE HYDROCHLORIDE 1 DROP: 5 SOLUTION OPHTHALMIC at 08:10

## 2017-10-30 RX ADMIN — MOXIFLOXACIN HYDROCHLORIDE 1 DROP: 5 SOLUTION/ DROPS OPHTHALMIC at 08:10

## 2017-10-30 RX ADMIN — MOXIFLOXACIN HYDROCHLORIDE 1 DROP: 5 SOLUTION/ DROPS OPHTHALMIC at 09:10

## 2017-10-30 RX ADMIN — TETRACAINE HYDROCHLORIDE 1 DROP: 5 SOLUTION OPHTHALMIC at 09:10

## 2017-10-30 RX ADMIN — LIDOCAINE HYDROCHLORIDE 4 DROP: 40 INJECTION, SOLUTION RETROBULBAR; TOPICAL at 09:10

## 2017-10-30 RX ADMIN — PHENYLEPHRINE HYDROCHLORIDE 1 DROP: 25 SOLUTION/ DROPS OPHTHALMIC at 08:10

## 2017-10-30 RX ADMIN — BALANCED SALT SOLUTION ENRICHED WITH BICARBONATE, DEXTROSE, AND GLUTATHIONE 5 ML: KIT at 08:10

## 2017-10-30 RX ADMIN — TROPICAMIDE 1 DROP: 10 SOLUTION/ DROPS OPHTHALMIC at 08:10

## 2017-10-30 RX ADMIN — MIDAZOLAM HYDROCHLORIDE 2 MG: 1 INJECTION, SOLUTION INTRAMUSCULAR; INTRAVENOUS at 09:10

## 2017-10-30 RX ADMIN — MOXIFLOXACIN HYDROCHLORIDE 1 DROP: 5 SOLUTION/ DROPS OPHTHALMIC at 10:10

## 2017-10-30 RX ADMIN — ONDANSETRON 4 MG: 2 INJECTION INTRAMUSCULAR; INTRAVENOUS at 10:10

## 2017-10-30 RX ADMIN — BALANCED SALT SOLUTION ENRICHED WITH BICARBONATE, DEXTROSE, AND GLUTATHIONE 500 ML: KIT at 09:10

## 2017-10-30 RX ADMIN — SODIUM CHONDROITIN SULFATE / SODIUM HYALURONATE 0.5 ML: 0.55-0.5 INJECTION INTRAOCULAR at 09:10

## 2017-10-30 NOTE — ANESTHESIA PREPROCEDURE EVALUATION
10/30/2017  Marycarmen Louis is a 72 y.o., female.    Anesthesia Evaluation    I have reviewed the Patient Summary Reports.    I have reviewed the Nursing Notes.   I have reviewed the Medications.     Review of Systems  Anesthesia Hx:  Hx of Anesthetic complications PONV History of prior surgery of interest to airway management or planning: Previous anesthesia: MAC  10/2/17 phaco, rec'd versed 2 mg, did well with MAC.    Social:  Former Smoker, Social Alcohol Use    Hematology/Oncology:  Hematology Normal   Oncology Normal     EENT/Dental:EENT/Dental Normal   Cardiovascular:   Exercise tolerance: good CAD asymptomatic     Pulmonary:   COPD, mild Asthma asymptomatic    Renal/:  Renal/ Normal     Musculoskeletal:   Arthritis     Neurological:  Neurology Normal    Endocrine:  Endocrine Normal    Psych:   Psychiatric History          Physical Exam  General:  Well nourished    Airway/Jaw/Neck:  Airway Findings: Mouth Opening: Normal Tongue: Normal  General Airway Assessment: Adult  Mallampati: II  TM Distance: Normal, at least 6 cm  Jaw/Neck Findings:  Neck ROM: Normal ROM      Dental:  Dental Findings: In tact        Mental Status:  Mental Status Findings:  Cooperative         Anesthesia Plan  Type of Anesthesia, risks & benefits discussed:  Anesthesia Type:  MAC  Patient's Preference:   Intra-op Monitoring Plan:   Intra-op Monitoring Plan Comments:   Post Op Pain Control Plan:   Post Op Pain Control Plan Comments:   Induction:    Beta Blocker:  Patient is not currently on a Beta-Blocker (No further documentation required).       Informed Consent: Patient understands risks and agrees with Anesthesia plan.  Questions answered.   ASA Score: 3     Day of Surgery Review of History & Physical:    H&P update referred to the surgeon.         Ready For Surgery From Anesthesia Perspective.

## 2017-10-30 NOTE — ANESTHESIA POSTPROCEDURE EVALUATION
"Anesthesia Post Evaluation    Patient: Marycarmen Louis    Procedure(s) Performed: Procedure(s) (LRB):  PHACOEMULSIFICATION-ASPIRATION-CATARACT (Left)  INSERTION-INTRAOCULAR LENS (IOL) (Left)    Final Anesthesia Type: MAC  Patient location during evaluation: [facility]  Patient participation: Yes- Able to Participate  Level of consciousness: awake and alert  Post-procedure vital signs: reviewed and stable  Pain management: adequate  Airway patency: patent  PONV status at discharge: No PONV  Anesthetic complications: no      Cardiovascular status: blood pressure returned to baseline  Respiratory status: spontaneous ventilation, unassisted and room air  Hydration status: euvolemic  Follow-up not needed.        Visit Vitals  BP (!) 145/77 (BP Location: Right arm, Patient Position: Lying)   Pulse 87   Temp 36.4 °C (97.5 °F) (Oral)   Resp 20   Ht 5' 4" (1.626 m)   Wt 62.6 kg (138 lb)   SpO2 96%   Breastfeeding? No   BMI 23.69 kg/m²       Pain/Chanda Score: Pain Assessment Performed: Yes (10/30/2017  7:52 AM)  Presence of Pain: denies (10/30/2017  7:52 AM)      "

## 2017-10-30 NOTE — PLAN OF CARE
Marycarmen Louis has met all discharge criteria from Phase II. Vital Signs are stable, ambulating  without difficulty. Discharge instructions given, patient verbalized understanding. Discharged from facility via wheelchair in stable condition.

## 2017-10-30 NOTE — DISCHARGE INSTRUCTIONS
Margy Liriano MD  [facility]  Department of Ophthalmology      AFTER: Cataract Surgery:  Relax at home and DO NOT exert yourself for the rest of the day.  Plan to see Dr. Liriano tomorrow at the eye clinic:   [facility] [address]  [place] [address]    Refer to attached eye drop instruction sheet     Precautions:  DO NOT rub your eye.  You may resume moderate activity the day after surgery.  Wear protective sunglasses during the day and a shield at night for 1(one) week.  If you have pain, redness and decreased vision, call Dr. Liriano (or the on-call doctor after hours) @[phone].            Home Care Instructions for Eye Surgeries    1. ACTIVITY:  Limit your activity today. Relax at home and DO NOT exert yourself for the rest of the day. Increase activity gradually. You may return to work or school as directed by your physician.    2. DIET:  Drink plenty of fluids. Resume your normal diet unless instructed otherwise.    3. PAIN:  Expect a moderate amount of pain. If a prescription for pain is not sent home with you, you may take your commonly used pain reliever as directed. If this is not sufficient, call your physician. You may resume any other prescription medication unless otherwise directed by your physician.     Discuss any problem with your physician as soon as it arises. Do not Delay.      EMERGENCY- If you are unable to contact your physician, please go to the nearest Emergency Room.       Anesthesia: Monitored Anesthesia Care (MAC)    Anesthesia Safety  · Have an adult family member or friend drive you home after the procedure.  · For the first 24 hours after your surgery:  · Do not drive or use heavy equipment.  · Do not make important decisions or sign documents.  · Avoid alcohol.  · Have someone stay with you, if possible. They can watch for problems and help keep you safe.

## 2017-10-30 NOTE — OP NOTE
SURGEON:  Margy Liriano M.D.    PREOPERATIVE DIAGNOSIS:    Nuclear Sclerotic Cataract Left Eye    POSTOPERATIVE DIAGNOSIS:    Nuclear Sclerotic Cataract Left Eye    PROCEDURES:    Phacoemulsification with  intraocular lens, Left eye (08563)  With Femtosecond LASER assist    DATE OF SURGERY: 10/30/2017    IMPLANT: ZXROO 21.5    ANESTHESIA:  MAC with topical Lidocaine    COMPLICATIONS:  None    ESTIMATED BLOOD LOSS: None    SPECIMENS: None    INDICATIONS:    The patient has a history of painless progressive visual loss and  difficulty with activities of daily living secondary to cataract formation.  After a thorough discussion of the risks, benefits, and alternatives to cataract surgery, including, but not limited to, the rare risks of infection, retinal detachment, hemorrhage, need for additional surgery, loss of vision, and even loss of the eye, the patient voices understanding and desires to proceed.    DESCRIPTION OF PROCEDURE:    After verification of consent and marking of the operative eye, the patient was positioned under the femtosecond LASER. Topical anesthetic drops were administered. A surgical timeout was initiated with verification of patient identifiers and the laser surgical plan. The eye was docked securely and the laser portion of the cataract procedure was carried out without complication.  The patient was returned to the pre-operative area to await the intraocular surgical portion of the cataract procedure.  The patients IOL calculations were reviewed, and the lens selection confirmed.   After verification and marking of the proper eye in the preop holding area, the patient was brought to the operating room in supine position where the eye was prepped and draped in standard sterile fashion with 5% Betadine and a lid speculum placed in the eye.   Topical 4% Lidocaine was used in addition to the preoperative anesthesia and the procedure was begun by the creation of a paracentesis incision through  which viscoelastic was used to fill the anterior chamber.  Next, a keratome blade was used to create a triplanar temporal clear corneal incision and a cystotome and Utrata forceps used to fashion a continuous curvilinear capsulorrhexis.  Hydrodissection was carried out using the Pearson hydrodissection cannula and the nucleus was found to be mobile.  Phacoemulsification of the nucleus was carried out using a quick chop technique, and all remaining epinuclear and cortical material was removed.  The eye was then reformed with Viscoelastic and the  intraocular lens was implanted into the capsular bag.  All remaining viscoelastics were removed from the eye and at the end of the case the pupil was round, the lens was well-centered within the capsular bag and all wounds were found to be water tight.  Drops of Vigamox and Pred Forte were instilled and a shield was placed over the eye. The patient will follow up with Dr. Liriano in the morning.

## 2017-10-31 ENCOUNTER — OFFICE VISIT (OUTPATIENT)
Dept: OPHTHALMOLOGY | Facility: CLINIC | Age: 72
End: 2017-10-31
Attending: OPHTHALMOLOGY
Payer: MEDICARE

## 2017-10-31 DIAGNOSIS — H25.12 NUCLEAR SCLEROTIC CATARACT OF LEFT EYE: ICD-10-CM

## 2017-10-31 DIAGNOSIS — Z98.890 POST-OPERATIVE STATE: Primary | ICD-10-CM

## 2017-10-31 PROCEDURE — 99999 PR PBB SHADOW E&M-EST. PATIENT-LVL II: CPT | Mod: PBBFAC,,, | Performed by: OPHTHALMOLOGY

## 2017-10-31 PROCEDURE — 99024 POSTOP FOLLOW-UP VISIT: CPT | Mod: S$GLB,,, | Performed by: OPHTHALMOLOGY

## 2017-10-31 PROCEDURE — 99499 UNLISTED E&M SERVICE: CPT | Mod: S$GLB,,, | Performed by: OPHTHALMOLOGY

## 2017-10-31 NOTE — PROGRESS NOTES
HPI     POD 1 Phaco w/IOL OS October 30, 2017    MEDS:    Pred/Gati/Nepaf TID OS    Patient states she is doing well with no complaints.    Last edited by Estrellita Laird on 10/31/2017  9:25 AM. (History)            Assessment /Plan     For exam results, see Encounter Report.    Post-operative state    Nuclear sclerotic cataract of left eye      Slit lamp exam:  L/L: nl  K: clear, wound sealed  AC: 1+ cell  Lens: IOL centered and stable    POD1 s/p Phaco/IOL  Appropriate precautions and post op medications reviewed.  Patient instructed to call or come in if symptoms of redness, decreased vision, or pain are experienced.

## 2017-12-14 ENCOUNTER — OFFICE VISIT (OUTPATIENT)
Dept: OPTOMETRY | Facility: CLINIC | Age: 72
End: 2017-12-14
Payer: MEDICARE

## 2017-12-14 DIAGNOSIS — H26.491 AFTER-CATARACT OBSCURING VISION, RIGHT: ICD-10-CM

## 2017-12-14 DIAGNOSIS — Z98.41 S/P BILATERAL CATARACT EXTRACTION: Primary | ICD-10-CM

## 2017-12-14 DIAGNOSIS — H35.372 EPIRETINAL MEMBRANE (ERM) OF LEFT EYE: ICD-10-CM

## 2017-12-14 DIAGNOSIS — H04.123 INSUFFICIENCY OF TEAR FILM OF BOTH EYES: ICD-10-CM

## 2017-12-14 DIAGNOSIS — Z98.42 S/P BILATERAL CATARACT EXTRACTION: Primary | ICD-10-CM

## 2017-12-14 PROCEDURE — 99999 PR PBB SHADOW E&M-EST. PATIENT-LVL II: CPT | Mod: PBBFAC,,, | Performed by: OPTOMETRIST

## 2017-12-14 PROCEDURE — 99024 POSTOP FOLLOW-UP VISIT: CPT | Mod: S$GLB,,, | Performed by: OPTOMETRIST

## 2017-12-14 NOTE — PROGRESS NOTES
"HPI     Post-op Evaluation    Additional comments: 6 wk phaco OS           Comments   Last eye exam was 10/31/17 with Dr. Liriano.  Patient states unhappy with distance vision since cataract sx's. OU are   constantly bloodshot and using OTC drops "like crazy". A few weeks ago, OD   became very scratchy and felt "raw" and used prescription drops that   night.     Blink (didn't help)  Systane Ultra BID-TID OU    10/02/2017 IMPLANT: ZXROO 21.5 OD  10/30/2017 IMPLANT: ZXROO 21.5 OS       Last edited by Martha Jones on 12/14/2017  9:56 AM. (History)            Assessment /Plan     For exam results, see Encounter Report.    S/P bilateral cataract extraction  -     OCT- Retina  -     lifitegrast (XIIDRA) 5 % Dpet; Apply 1 drop to eye 2 (two) times daily.  Dispense: 60 each; Refill: 11    After-cataract obscuring vision, right    Insufficiency of tear film of both eyes    Epiretinal membrane (ERM) of left eye            1-2.  Pt unhappy with distance vision OU.  Does have mild PCO OD.  Happy with near vision.  Will refer back to Dr. Liriano to discuss touch up for distance.   3.  Educated pt normal for dry eye to flare up after surgery.   Start Restasis(covered by insurance) bid ou.  Educated patient on SE--redness, stinging.  Supplement with artificial tears as needed.  4.  Educated pt on ERM--lamellar hole with cystic changes. Overall vision is good.  Refer to retina.                   "

## 2017-12-29 ENCOUNTER — PATIENT MESSAGE (OUTPATIENT)
Dept: OPHTHALMOLOGY | Facility: CLINIC | Age: 72
End: 2017-12-29

## 2018-01-12 ENCOUNTER — INITIAL CONSULT (OUTPATIENT)
Dept: OPHTHALMOLOGY | Facility: CLINIC | Age: 73
End: 2018-01-12
Payer: MEDICARE

## 2018-01-12 DIAGNOSIS — H35.372 EPIRETINAL MEMBRANE (ERM) OF LEFT EYE: Primary | ICD-10-CM

## 2018-01-12 DIAGNOSIS — H43.392 VITREOUS FLOATER, LEFT: ICD-10-CM

## 2018-01-12 DIAGNOSIS — H43.813 PVD (POSTERIOR VITREOUS DETACHMENT), BILATERAL: ICD-10-CM

## 2018-01-12 PROCEDURE — 92134 CPTRZ OPH DX IMG PST SGM RTA: CPT | Mod: S$GLB,,, | Performed by: OPHTHALMOLOGY

## 2018-01-12 PROCEDURE — 99999 PR PBB SHADOW E&M-EST. PATIENT-LVL II: CPT | Mod: PBBFAC,,, | Performed by: OPHTHALMOLOGY

## 2018-01-12 PROCEDURE — 92225 PR SPECIAL EYE EXAM, INITIAL: CPT | Mod: 59,RT,S$GLB, | Performed by: OPHTHALMOLOGY

## 2018-01-12 PROCEDURE — 92014 COMPRE OPH EXAM EST PT 1/>: CPT | Mod: S$GLB,,, | Performed by: OPHTHALMOLOGY

## 2018-01-12 RX ORDER — CYCLOSPORINE 0.5 MG/ML
0.05 EMULSION OPHTHALMIC
COMMUNITY
Start: 2017-12-21 | End: 2019-04-18 | Stop reason: SDUPTHER

## 2018-01-12 NOTE — LETTER
January 13, 2018      Kady Dey, OD  [address]  [facility] [address]           [facility] - Ophthal[facility]  [address]  [facility] [address]  Phone: [phone]  Fax: [phone]          Patient: Marycarmen Louis   MR Number: [id number]   YOB: 1945   Date of Visit: 1/12/2018       Dear Dr. Kady Dey:    Thank you for referring Marycarmen Louis to me for evaluation. Attached you will find relevant portions of my assessment and plan of care.    If you have questions, please do not hesitate to call me. I look forward to following Marycarmen Louis along with you.    Sincerely,    Jareth Brumfield MD    Enclosure  CC:  No Recipients    If you would like to receive this communication electronically, please contact [email or web address] or [phone] to request more information on [employer] Link access.    For providers and/or their staff who would like to refer a patient to [facility], please contact us through our one-stop-shop provider referral line, [facility], at [phone].    If you feel you have received this communication in error or would no longer like to receive these types of communications, please e-mail [email or web address]

## 2018-01-12 NOTE — PROGRESS NOTES
Assessment /Plan     For exam results, see Encounter Report.    Epiretinal membrane (ERM) of left eye    PVD (posterior vitreous detachment), bilateral    Vitreous floater, left    Other orders  -     OCT- Retina      ***

## 2018-01-13 NOTE — PROGRESS NOTES
HPI     DLS 10/31/17 by Dr. TO Liriano MD   71 Y/O F here today per referral by Dr. SAMMIE Dey, OD for Epiretinal membrane (ERM) of left eye. PT c/o blurred   vision at distance and near after Sx after CE. stj     Pt very unhappy with vision, damon distance Va.  Has flashes in sup VF when   goes into dim light (?OU-not sure)  +floater OS.  No pain.      Eye Meds: Systane OU prn                    Restasis OU as needed     POHx:   1. S/P Phacoemulsification with  intraocular lens, Left eye (48531)  With Femtosecond LASER assist (10/30/17)  By Dr. TO Liriano MD    2. S/P Phacoemulsification with  intraocular lens, Right eye (53786)  With Femtosecond LASER assist (10/02/17) by Dr. TO Liriano MD    3. After-cataract obscuring vision, right     4. Insufficiency of tear film of both eyes        Last edited by Jareth Brumfield MD on 1/12/2018  2:13 PM. (History)      [place] SDOCT:   OD: good quality, good contour, stable from 12/14/17 scan  OS: good quality, ERM with sharpening of foveal contour, min thickening, stable from 12/14/17        Assessment /Plan     For exam results, see Encounter Report.    Epiretinal membrane (ERM) of left eye    PVD (posterior vitreous detachment), bilateral    Vitreous floater, left    Other orders  -     OCT- Retina      ERM OS.  Refractable to 20/25 at last visit with Dr. Dey  Recommend keep plan for reeval with Dr. Liriano.  RTC with me if Va unsatisfactory but at this point doubt much benefit from PPV/MP    Pathology of PVD, Retinal Tear, Retinal Detachment reviewed in great detail  RD precautions discussed in detail, patient expressed understanding  RTC with me PRN (especially ANY change flashes, floaters, vision, visual field)

## 2018-01-18 ENCOUNTER — OFFICE VISIT (OUTPATIENT)
Dept: FAMILY MEDICINE | Facility: CLINIC | Age: 73
End: 2018-01-18
Payer: MEDICARE

## 2018-01-18 VITALS
BODY MASS INDEX: 24.54 KG/M2 | WEIGHT: 143.75 LBS | RESPIRATION RATE: 20 BRPM | HEIGHT: 64 IN | SYSTOLIC BLOOD PRESSURE: 116 MMHG | DIASTOLIC BLOOD PRESSURE: 76 MMHG | HEART RATE: 88 BPM

## 2018-01-18 DIAGNOSIS — M81.0 OSTEOPOROSIS, UNSPECIFIED OSTEOPOROSIS TYPE, UNSPECIFIED PATHOLOGICAL FRACTURE PRESENCE: Primary | ICD-10-CM

## 2018-01-18 LAB
ANION GAP SERPL CALC-SCNC: 7 MMOL/L
BUN SERPL-MCNC: 22 MG/DL
CALCIUM SERPL-MCNC: 9.2 MG/DL
CHLORIDE SERPL-SCNC: 104 MMOL/L
CO2 SERPL-SCNC: 31 MMOL/L
CREAT SERPL-MCNC: 0.6 MG/DL
EST. GFR  (AFRICAN AMERICAN): >60 ML/MIN/1.73 M^2
EST. GFR  (NON AFRICAN AMERICAN): >60 ML/MIN/1.73 M^2
GLUCOSE SERPL-MCNC: 95 MG/DL
POTASSIUM SERPL-SCNC: 3.9 MMOL/L
SODIUM SERPL-SCNC: 142 MMOL/L

## 2018-01-18 PROCEDURE — 99213 OFFICE O/P EST LOW 20 MIN: CPT | Mod: S$GLB,,, | Performed by: FAMILY MEDICINE

## 2018-01-18 PROCEDURE — 80048 BASIC METABOLIC PNL TOTAL CA: CPT

## 2018-01-18 PROCEDURE — 36415 COLL VENOUS BLD VENIPUNCTURE: CPT | Mod: S$GLB,,, | Performed by: FAMILY MEDICINE

## 2018-01-18 PROCEDURE — 99999 PR PBB SHADOW E&M-EST. PATIENT-LVL III: CPT | Mod: PBBFAC,,, | Performed by: FAMILY MEDICINE

## 2018-01-18 NOTE — PROGRESS NOTES
Subjective:       Patient ID: Marycarmen Louis is a 72 y.o. female.    Chief Complaint: Follow-up (discuss medication )    Pt is a 72 y.o. female who presents for evaluation and management of   Encounter Diagnosis   Name Primary?    Osteoporosis, unspecified osteoporosis type, unspecified pathological fracture presence Yes   .  Forteo no longer affordable---$600 per month   She has been on bisphosphonate's in past and obviously failed these---she had a osteoporotic fracture on them     Doing well on current meds. Denies any side effects. Prevention is up to date.    Review of Systems   Respiratory: Negative for shortness of breath.    Cardiovascular: Negative for chest pain.       Objective:      Physical Exam   Constitutional: She is oriented to person, place, and time. She appears well-developed and well-nourished.   HENT:   Head: Normocephalic and atraumatic.   Right Ear: External ear normal.   Left Ear: External ear normal.   Nose: Nose normal.   Mouth/Throat: Oropharynx is clear and moist.   Eyes: EOM are normal. Pupils are equal, round, and reactive to light.   Neck: Normal range of motion. Neck supple. No JVD present. No tracheal deviation present. No thyromegaly present.   Cardiovascular: Normal rate, normal heart sounds and intact distal pulses.    No murmur heard.  Pulmonary/Chest: Effort normal and breath sounds normal. No respiratory distress. She has no wheezes. She has no rales. She exhibits no tenderness.   Abdominal: Soft. Bowel sounds are normal. She exhibits no distension and no mass. There is no tenderness. There is no rebound and no guarding.   Musculoskeletal: Normal range of motion. She exhibits no edema or tenderness.   Lymphadenopathy:     She has no cervical adenopathy.   Neurological: She is alert and oriented to person, place, and time. She has normal reflexes. She displays normal reflexes. No cranial nerve deficit. She exhibits normal muscle tone. Coordination normal.   Skin: Skin is  warm and dry. No rash noted. No erythema. No pallor.   Psychiatric: She has a normal mood and affect. Her behavior is normal. Judgment and thought content normal.       Assessment:       1. Osteoporosis, unspecified osteoporosis type, unspecified pathological fracture presence        Plan:   Marycarmen was seen today for follow-up.    Diagnoses and all orders for this visit:    Osteoporosis, unspecified osteoporosis type, unspecified pathological fracture presence  -     denosumab (PROLIA) injection 60 mg; Inject 1 mL (60 mg total) into the skin one time.  -     Basic metabolic panel; Future    starting above   BMP to check renal function and Ca     No Follow-up on file.

## 2018-01-19 ENCOUNTER — TELEPHONE (OUTPATIENT)
Dept: FAMILY MEDICINE | Facility: CLINIC | Age: 73
End: 2018-01-19

## 2018-01-23 ENCOUNTER — OFFICE VISIT (OUTPATIENT)
Dept: OPHTHALMOLOGY | Facility: CLINIC | Age: 73
End: 2018-01-23
Attending: OPHTHALMOLOGY
Payer: MEDICARE

## 2018-01-23 DIAGNOSIS — H26.493 POSTERIOR CAPSULAR OPACIFICATION, BILATERAL: Primary | ICD-10-CM

## 2018-01-23 PROCEDURE — 92014 COMPRE OPH EXAM EST PT 1/>: CPT | Mod: S$GLB,,, | Performed by: OPHTHALMOLOGY

## 2018-01-23 PROCEDURE — 99999 PR PBB SHADOW E&M-EST. PATIENT-LVL I: CPT | Mod: PBBFAC,,, | Performed by: OPHTHALMOLOGY

## 2018-01-23 NOTE — PROGRESS NOTES
Assessment /Plan     For exam results, see Encounter Report.    Posterior capsular opacification, bilateral      Visually significant posterior capsular opacity present.  Discussed risks, benefits, and alternatives to laser surgery.  Schedule YAG OU

## 2018-01-24 DIAGNOSIS — M81.0 SENILE OSTEOPOROSIS: Primary | ICD-10-CM

## 2018-01-31 ENCOUNTER — INFUSION (OUTPATIENT)
Dept: INFUSION THERAPY | Facility: HOSPITAL | Age: 73
End: 2018-01-31
Attending: FAMILY MEDICINE
Payer: MEDICARE

## 2018-01-31 VITALS
TEMPERATURE: 98 F | SYSTOLIC BLOOD PRESSURE: 128 MMHG | RESPIRATION RATE: 18 BRPM | DIASTOLIC BLOOD PRESSURE: 65 MMHG | HEART RATE: 94 BPM

## 2018-01-31 DIAGNOSIS — M81.0 OSTEOPOROSIS, UNSPECIFIED OSTEOPOROSIS TYPE, UNSPECIFIED PATHOLOGICAL FRACTURE PRESENCE: Primary | ICD-10-CM

## 2018-01-31 PROCEDURE — 63600175 PHARM REV CODE 636 W HCPCS: Performed by: FAMILY MEDICINE

## 2018-01-31 PROCEDURE — 96372 THER/PROPH/DIAG INJ SC/IM: CPT

## 2018-01-31 RX ADMIN — DENOSUMAB 60 MG: 60 INJECTION SUBCUTANEOUS at 01:01

## 2018-01-31 NOTE — PATIENT INSTRUCTIONS
Denosumab injection  What is this medicine?  DENOSUMAB (den oh roni mab) slows bone breakdown. Prolia is used to treat osteoporosis in women after menopause and in men. Xgeva is used to prevent bone fractures and other bone problems caused by cancer bone metastases. Xgeva is also used to treat giant cell tumor of the bone.  How should I use this medicine?  This medicine is for injection under the skin. It is given by a health care professional in a hospital or clinic setting.  If you are getting Prolia, a special MedGuide will be given to you by the pharmacist with each prescription and refill. Be sure to read this information carefully each time.  For Prolia, talk to your pediatrician regarding the use of this medicine in children. Special care may be needed. For Xgeva, talk to your pediatrician regarding the use of this medicine in children. While this drug may be prescribed for children as young as 13 years for selected conditions, precautions do apply.  What side effects may I notice from receiving this medicine?  Side effects that you should report to your doctor or health care professional as soon as possible:  · allergic reactions like skin rash, itching or hives, swelling of the face, lips, or tongue  · breathing problems  · chest pain  · fast, irregular heartbeat  · feeling faint or lightheaded, falls  · fever, chills, or any other sign of infection  · muscle spasms, tightening, or twitches  · numbness or tingling  · skin blisters or bumps, or is dry, peels, or red  · slow healing or unexplained pain in the mouth or jaw  · unusual bleeding or bruising  Side effects that usually do not require medical attention (Report these to your doctor or health care professional if they continue or are bothersome.):  · muscle pain  · stomach upset, gas  What may interact with this medicine?  Do not take this medicine with any of the following medications:  · other medicines containing denosumab  This medicine may also  interact with the following medications:  · medicines that suppress the immune system  · medicines that treat cancer  · steroid medicines like prednisone or cortisone  What if I miss a dose?  It is important not to miss your dose. Call your doctor or health care professional if you are unable to keep an appointment.  Where should I keep my medicine?  This medicine is only given in a clinic, doctor's office, or other health care setting and will not be stored at home.  What should I tell my health care provider before I take this medicine?  They need to know if you have any of these conditions:  · dental disease  · eczema  · infection or history of infections  · kidney disease or on dialysis  · low blood calcium or vitamin D  · malabsorption syndrome  · scheduled to have surgery or tooth extraction  · taking medicine that contains denosumab  · thyroid or parathyroid disease  · an unusual reaction to denosumab, other medicines, foods, dyes, or preservatives  · pregnant or trying to get pregnant  · breast-feeding  What should I watch for while using this medicine?  Visit your doctor or health care professional for regular checks on your progress. Your doctor or health care professional may order blood tests and other tests to see how you are doing.  Call your doctor or health care professional if you get a cold or other infection while receiving this medicine. Do not treat yourself. This medicine may decrease your body's ability to fight infection.  You should make sure you get enough calcium and vitamin D while you are taking this medicine, unless your doctor tells you not to. Discuss the foods you eat and the vitamins you take with your health care professional.  See your dentist regularly. Brush and floss your teeth as directed. Before you have any dental work done, tell your dentist you are receiving this medicine.  Do not become pregnant while taking this medicine or for 5 months after stopping it. Women should  inform their doctor if they wish to become pregnant or think they might be pregnant. There is a potential for serious side effects to an unborn child. Talk to your health care professional or pharmacist for more information.  NOTE:This sheet is a summary. It may not cover all possible information. If you have questions about this medicine, talk to your doctor, pharmacist, or health care provider. Copyright© 2017 [employer]

## 2018-02-07 ENCOUNTER — TELEPHONE (OUTPATIENT)
Dept: ENDOSCOPY | Facility: HOSPITAL | Age: 73
End: 2018-02-07

## 2018-02-07 DIAGNOSIS — Z12.11 SPECIAL SCREENING FOR MALIGNANT NEOPLASMS, COLON: Primary | ICD-10-CM

## 2018-02-07 DIAGNOSIS — Z80.0 FH: COLON CANCER: Primary | ICD-10-CM

## 2018-02-07 RX ORDER — POLYETHYLENE GLYCOL 3350, SODIUM SULFATE ANHYDROUS, SODIUM BICARBONATE, SODIUM CHLORIDE, POTASSIUM CHLORIDE 236; 22.74; 6.74; 5.86; 2.97 G/4L; G/4L; G/4L; G/4L; G/4L
4 POWDER, FOR SOLUTION ORAL ONCE
Qty: 4000 ML | Refills: 0 | Status: SHIPPED | OUTPATIENT
Start: 2018-02-07 | End: 2018-02-07

## 2018-03-08 ENCOUNTER — HOSPITAL ENCOUNTER (OUTPATIENT)
Facility: HOSPITAL | Age: 73
Discharge: HOME | End: 2018-03-08
Attending: COLON & RECTAL SURGERY | Admitting: COLON & RECTAL SURGERY
Payer: MEDICARE

## 2018-03-08 ENCOUNTER — ANESTHESIA EVENT (OUTPATIENT)
Dept: ENDOSCOPY | Facility: HOSPITAL | Age: 73
End: 2018-03-08
Payer: MEDICARE

## 2018-03-08 ENCOUNTER — SURGERY (OUTPATIENT)
Age: 73
End: 2018-03-08

## 2018-03-08 ENCOUNTER — ANESTHESIA (OUTPATIENT)
Dept: ENDOSCOPY | Facility: HOSPITAL | Age: 73
End: 2018-03-08
Payer: MEDICARE

## 2018-03-08 VITALS
TEMPERATURE: 98 F | RESPIRATION RATE: 16 BRPM | OXYGEN SATURATION: 99 % | SYSTOLIC BLOOD PRESSURE: 125 MMHG | WEIGHT: 135 LBS | HEART RATE: 78 BPM | HEIGHT: 64 IN | DIASTOLIC BLOOD PRESSURE: 63 MMHG | BODY MASS INDEX: 23.05 KG/M2

## 2018-03-08 DIAGNOSIS — Z12.11 SCREENING FOR COLON CANCER: ICD-10-CM

## 2018-03-08 DIAGNOSIS — Z80.0 FAMILY HISTORY OF COLON CANCER: Primary | ICD-10-CM

## 2018-03-08 PROCEDURE — G0105 COLORECTAL SCRN; HI RISK IND: HCPCS | Performed by: COLON & RECTAL SURGERY

## 2018-03-08 PROCEDURE — 25000003 PHARM REV CODE 250: Performed by: NURSE PRACTITIONER

## 2018-03-08 PROCEDURE — D9220A PRA ANESTHESIA: Mod: CRNA,,, | Performed by: NURSE ANESTHETIST, CERTIFIED REGISTERED

## 2018-03-08 PROCEDURE — 37000009 HC ANESTHESIA EA ADD 15 MINS: Performed by: COLON & RECTAL SURGERY

## 2018-03-08 PROCEDURE — 63600175 PHARM REV CODE 636 W HCPCS: Performed by: NURSE ANESTHETIST, CERTIFIED REGISTERED

## 2018-03-08 PROCEDURE — D9220A PRA ANESTHESIA: Mod: ANES,,, | Performed by: ANESTHESIOLOGY

## 2018-03-08 PROCEDURE — 37000008 HC ANESTHESIA 1ST 15 MINUTES: Performed by: COLON & RECTAL SURGERY

## 2018-03-08 PROCEDURE — G0105 COLORECTAL SCRN; HI RISK IND: HCPCS | Mod: GC,,, | Performed by: COLON & RECTAL SURGERY

## 2018-03-08 RX ORDER — SODIUM CHLORIDE 9 MG/ML
INJECTION, SOLUTION INTRAVENOUS CONTINUOUS
Status: DISCONTINUED | OUTPATIENT
Start: 2018-03-08 | End: 2018-03-08 | Stop reason: HOSPADM

## 2018-03-08 RX ORDER — PROPOFOL 10 MG/ML
VIAL (ML) INTRAVENOUS
Status: DISCONTINUED | OUTPATIENT
Start: 2018-03-08 | End: 2018-03-08

## 2018-03-08 RX ORDER — PROPOFOL 10 MG/ML
VIAL (ML) INTRAVENOUS CONTINUOUS PRN
Status: DISCONTINUED | OUTPATIENT
Start: 2018-03-08 | End: 2018-03-08

## 2018-03-08 RX ORDER — LIDOCAINE HCL/PF 100 MG/5ML
SYRINGE (ML) INTRAVENOUS
Status: DISCONTINUED | OUTPATIENT
Start: 2018-03-08 | End: 2018-03-08

## 2018-03-08 RX ADMIN — PROPOFOL 100 MCG/KG/MIN: 10 INJECTION, EMULSION INTRAVENOUS at 11:03

## 2018-03-08 RX ADMIN — LIDOCAINE HYDROCHLORIDE 50 MG: 20 INJECTION, SOLUTION INTRAVENOUS at 11:03

## 2018-03-08 RX ADMIN — PROPOFOL 80 MG: 10 INJECTION, EMULSION INTRAVENOUS at 11:03

## 2018-03-08 RX ADMIN — SODIUM CHLORIDE: 9 INJECTION, SOLUTION INTRAVENOUS at 10:03

## 2018-03-08 NOTE — TRANSFER OF CARE
"Anesthesia Transfer of Care Note    Patient: Marycarmen Louis    Procedure(s) Performed: Procedure(s) (LRB):  COLONOSCOPY (N/A)    Patient location: PACU    Anesthesia Type: general    Transport from OR: Transported from OR on room air with adequate spontaneous ventilation    Post pain: adequate analgesia    Post assessment: tolerated procedure well and no apparent anesthetic complications    Post vital signs: stable    Level of consciousness: awake, alert and oriented    Nausea/Vomiting: no nausea/vomiting    Complications: none    Transfer of care protocol was followed      Last vitals:   Visit Vitals  /74 (BP Location: Left arm, Patient Position: Lying)   Pulse 88   Temp 37.5 °C (99.5 °F) (Skin)   Resp 20   Ht 5' 4" (1.626 m)   Wt 61.2 kg (135 lb)   SpO2 97%   Breastfeeding? No   BMI 23.17 kg/m²     "

## 2018-03-08 NOTE — PROVATION PATIENT INSTRUCTIONS
Discharge Summary/Instructions after an Endoscopic Procedure  Patient Name: Marycarmen Louis  Patient MRN: [id number]  Patient YOB: 1945  Thursday, March 08, 2018  Hari Dumont MD  RESTRICTIONS:  During your procedure today, you received medications for sedation.  These   medications may affect your judgment, balance and coordination.  Therefore,   for 24 hours, you have the following restrictions:   - DO NOT drive a car, operate machinery, make legal/financial decisions,   sign important papers or drink alcohol.    ACTIVITY:  The following day: return to full activity including work, except no heavy   lifting, straining or running for 3 days if polyps were removed.  DIET:  Eat and drink normally unless instructed otherwise.     TREATMENT FOR COMMON SIDE EFFECTS:  - Mild abdominal pain, nausea, belching, bloating or excessive gas:  rest,   eat lightly and use a heating pad.  - Sore Throat: treat with throat lozenges and/or gargle with warm salt   water.  - Because air was used during the procedure, expelling large amounts of air   from your rectum or belching is normal.  - If a bowel prep was taken, you may not have a bowel movement for 1-3 days.    This is normal.  SYMPTOMS TO WATCH FOR AND REPORT TO YOUR PHYSICIAN:  1. Abdominal pain or bloating, other than gas cramps.  2. Chest pain.  3. Back pain.  4. Signs of infection such as: chills or fever occurring within 24 hours   after the procedure.  5. Rectal bleeding, which would show as bright red, maroon, or black stools.   (A tablespoon of blood from the rectum is not serious, especially if   hemorrhoids are present.)  6. Vomiting.  7. Weakness or dizziness.  GO DIRECTLY TO THE NEAREST EMERGENCY ROOM IF YOU HAVE ANY OF THE FOLLOWING:      Difficulty breathing  Chills and/or fever over 101 F   Persistent vomiting and/or vomiting blood   Severe abdominal pain   Severe chest pain   Black, tarry stools   Bleeding- more than one tablespoon   Any other symptom or  condition that you feel may need urgent attention  Your doctor recommends these additional instructions:  If any biopsies were taken, your doctors clinic will contact you in 1 to 2   weeks with any results.  You are being discharged to home.   Your physician has recommended a repeat colonoscopy in five years for   screening purposes.  For questions, problems or results please call your physician - Hari Dumont MD at Work:  [phone].  [facility], EMERGENCY ROOM PHONE NUMBER: [phone]  IF A COMPLICATION OR EMERGENCY SITUATION ARISES AND YOU ARE UNABLE TO REACH   YOUR PHYSICIAN - GO DIRECTLY TO THE EMERGENCY ROOM.  Hari Dumont MD  3/8/2018 11:32:49 AM  This report has been verified and signed electronically.

## 2018-03-08 NOTE — ANESTHESIA PREPROCEDURE EVALUATION
03/08/2018  Marycarmen Louis is a 72 y.o., female.    Patient Active Problem List   Diagnosis    Degeneration of lumbar or lumbosacral intervertebral disc    Coronary artery calcification seen on CAT scan    Disorder of bone and cartilage, unspecified    Acute exacerbation of chronic obstructive pulmonary disease (COPD)    T1N0M0 low-grade mucoepidermoid carcinoma L parotid    Left wrist fracture    Closed fracture of left hip    Osteoporosis    Pulmonary edema    COPD exacerbation    Acute blood loss anemia    Acute respiratory failure with hypoxia    Nuclear sclerosis, bilateral         Pre-op Assessment    I have reviewed the Patient Summary Reports.     I have reviewed the Nursing Notes.   I have reviewed the Medications.     Review of Systems  Anesthesia Hx:  Hx of Anesthetic complications PONV History of prior surgery of interest to airway management or planning: Previous anesthesia: MAC  10/2/17 phaco, rec'd versed 2 mg, did well with MAC.    Social:  Former Smoker, Social Alcohol Use    Hematology/Oncology:  Hematology Normal   Oncology Normal     EENT/Dental:EENT/Dental Normal   Cardiovascular:   Exercise tolerance: good CAD asymptomatic     Pulmonary:   COPD, mild Asthma asymptomatic    Renal/:  Renal/ Normal     Musculoskeletal:   Arthritis     Neurological:  Neurology Normal    Endocrine:  Endocrine Normal    Psych:   Psychiatric History          Physical Exam  General:  Well nourished    Airway/Jaw/Neck:  Airway Findings: Mouth Opening: Normal Tongue: Normal  General Airway Assessment: Adult  Mallampati: II  TM Distance: Normal, at least 6 cm  Jaw/Neck Findings:  Neck ROM: Normal ROM      Dental:  Dental Findings: In tact        Mental Status:  Mental Status Findings:  Cooperative         Anesthesia Plan  Type of Anesthesia, risks & benefits discussed:  Anesthesia Type:   MAC  Patient's Preference:   Intra-op Monitoring Plan:   Intra-op Monitoring Plan Comments:   Post Op Pain Control Plan:   Post Op Pain Control Plan Comments:   Induction:    Beta Blocker:  Patient is not currently on a Beta-Blocker (No further documentation required).       Informed Consent: Patient understands risks and agrees with Anesthesia plan.  Questions answered.   ASA Score: 3     Day of Surgery Review of History & Physical:    H&P update referred to the surgeon.         Ready For Surgery From Anesthesia Perspective.

## 2018-03-08 NOTE — DISCHARGE INSTRUCTIONS
Colonoscopy     A camera attached to a flexible tube with a viewing lens is used to take video pictures.     Colonoscopy is a test to view the inside of your lower digestive tract (colon and rectum). Sometimes it can show the last part of the small intestine (ileum). During the test, small pieces of tissue may be removed for testing. This is called a biopsy. Small growths, such as polyps, may also be removed.   Why is colonoscopy done?  The test is done to help look for colon cancer. And it can help find the source of abdominal pain, bleeding, and changes in bowel habits. It may be needed once a year, depending on factors such as your:  · Age  · Health history  · Family health history  · Symptoms  · Results from any prior colonoscopy  Risks and possible complications  These include:  · Bleeding               · A puncture or tear in the colon   · Risks of anesthesia  · A cancer lesion not being seen  Getting ready   To prepare for the test:  · Talk with your healthcare provider about the risks of the test (see below). Also ask your healthcare provider about alternatives to the test.  · Tell your healthcare provider about any medicines you take. Also tell him or her about any health conditions you may have.  · Make sure your rectum and colon are empty for the test. Follow the diet and bowel prep instructions exactly. If you dont, the test may need to be rescheduled.  · Plan for a friend or family member to drive you home after the test.     Colonoscopy provides an inside view of the entire colon.     You may discuss the results with your doctor right away or at a future visit.  During the test   The test is usually done in the hospital on an outpatient basis. This means you go home the same day. The procedure takes about 30 minutes. During that time:  · You are given relaxing (sedating) medicine through an IV line. You may be drowsy, or fully asleep.  · The healthcare provider will first give you a physical exam to  check for anal and rectal problems.  · Then the anus is lubricated and the scope inserted.  · If you are awake, you may have a feeling similar to needing to have a bowel movement. You may also feel pressure as air is pumped into the colon. Its OK to pass gas during the procedure.  · Biopsy, polyp removal, or other treatments may be done during the test.  After the test   You may have gas right after the test. It can help to try to pass it to help prevent later bloating. Your healthcare provider may discuss the results with you right away. Or you may need to schedule a follow-up visit to talk about the results. After the test, you can go back to your normal eating and other activities. You may be tired from the sedation and need to rest for a few hours.  Date Last Reviewed: 11/1/2016 © 2000-2017 [employer]. [address] [place] [address], [place] [address]. All rights reserved. This information is not intended as a substitute for professional medical care. Always follow your healthcare professional's instructions.

## 2018-03-08 NOTE — H&P
Endoscopy H&P    Procedure : Colonoscopy      family history of colon cancer (father, diagnosed in his 80's), last colo 2012 normal      Past Medical History:   Diagnosis Date    Anxiety     Arthritis     Cancer     salivary gland    COPD (chronic obstructive pulmonary disease)     Degenerative disc disease     Depression     Emphysema of lung     Fractured hip     General anesthetics causing adverse effect in therapeutic use     Macrocytosis     Meibomianitis 11/3/10    Memory loss     Nuclear sclerosis, bilateral 10/2/2017    Osteoporosis     Platelet disorder     PONV (postoperative nausea and vomiting)              Review of Systems -ROS:  GENERAL: No fever, chills, fatigability or weight loss.  CHEST: Denies SHAIKH, cyanosis, wheezing, cough and sputum production.  CARDIOVASCULAR: Denies chest pain, PND, orthopnea or reduced exercise tolerance.   Musculoskeletal ROS: negative for - gait disturbance or joint pain  Neurological ROS: negative for - confusion or memory loss        Physical Exam:  General: well developed, well nourished, no distress  Head: normocephalic  Neck: supple, symmetrical, trachea midline  Lungs:  clear to auscultation bilaterally and normal respiratory effort  Heart: regular rate and rhythm, S1, S2 normal, no murmur, rub or gallop and regular rate and rhythm  Abdomen: soft, non-tender non-distented; bowel sounds normal; no masses,  no organomegaly  Extremities: no cyanosis or edema, or clubbing       Moderate Sedation (choice): Mallampati Score 1    ASA : II    IMP: As above    Plan: Colonoscopy with Moderate sedation.  I have explained the procedure including indications, alternatives, expected outcomes and potential complications. The patient appears to understand and gives informed consent. The patient is medically ready for surgery.

## 2018-03-08 NOTE — ANESTHESIA POSTPROCEDURE EVALUATION
"Anesthesia Post Evaluation    Patient: Marycarmen Louis    Procedure(s) Performed: Procedure(s) (LRB):  COLONOSCOPY (N/A)    Final Anesthesia Type: general  Patient location during evaluation: PACU  Patient participation: Yes- Able to Participate  Level of consciousness: awake and alert  Post-procedure vital signs: reviewed and stable  Pain management: adequate  Airway patency: patent  PONV status at discharge: No PONV  Anesthetic complications: no      Cardiovascular status: hemodynamically stable  Respiratory status: room air, spontaneous ventilation and unassisted  Hydration status: euvolemic  Follow-up not needed.        Visit Vitals  /60 (BP Location: Left arm)   Pulse 90   Temp 36.5 °C (97.7 °F)   Resp 16   Ht 5' 4" (1.626 m)   Wt 61.2 kg (135 lb)   SpO2 98%   Breastfeeding? No   BMI 23.17 kg/m²       Pain/Chanda Score: Pain Assessment Performed: Yes (3/8/2018 11:45 AM)  Presence of Pain: denies (3/8/2018 11:45 AM)  Chanda Score: 10 (3/8/2018 11:45 AM)      "

## 2018-03-09 ENCOUNTER — OFFICE VISIT (OUTPATIENT)
Dept: OPHTHALMOLOGY | Facility: CLINIC | Age: 73
End: 2018-03-09
Payer: MEDICARE

## 2018-03-09 DIAGNOSIS — H26.493 POSTERIOR CAPSULAR OPACIFICATION, BILATERAL: Primary | ICD-10-CM

## 2018-03-09 PROCEDURE — 66821 AFTER CATARACT LASER SURGERY: CPT | Mod: 50,S$GLB,, | Performed by: OPHTHALMOLOGY

## 2018-03-09 PROCEDURE — 99999 PR PBB SHADOW E&M-EST. PATIENT-LVL II: CPT | Mod: PBBFAC,,, | Performed by: OPHTHALMOLOGY

## 2018-03-09 PROCEDURE — 92012 INTRM OPH EXAM EST PATIENT: CPT | Mod: 57,S$GLB,, | Performed by: OPHTHALMOLOGY

## 2018-03-09 NOTE — PROGRESS NOTES
HPI     DLS 1/23/18 Dr. Liriano    Here for YAG Laser OU. Denies any probs or changes.      Last edited by Maria Del Rosario Rutledge on 3/9/2018  2:36 PM. (History)            Assessment /Plan     For exam results, see Encounter Report.    Posterior capsular opacification, bilateral      Visually significant posterior capsular opacity present.  Discussed risks, benefits, and alternatives to laser surgery.  YAG laser capsulotomy Procedure Note:   Informed consent obtained and correct eye(s) verified with patient.  1 drop of topical Proparacaine and Iopidine instilled, and eye(s) dilated with 1% Tropicamide 2.5% Phenylephrine.  YAG laser applied to posterior capsule in cruciate pattern OU  Patient tolerated procedure well. No complications. Follow up in 1 month/PRN.

## 2018-03-15 ENCOUNTER — TELEPHONE (OUTPATIENT)
Dept: ENDOSCOPY | Facility: HOSPITAL | Age: 73
End: 2018-03-15

## 2018-04-13 ENCOUNTER — TELEPHONE (OUTPATIENT)
Dept: FAMILY MEDICINE | Facility: CLINIC | Age: 73
End: 2018-04-13

## 2018-04-13 ENCOUNTER — OFFICE VISIT (OUTPATIENT)
Dept: FAMILY MEDICINE | Facility: CLINIC | Age: 73
End: 2018-04-13
Payer: MEDICARE

## 2018-04-13 VITALS
RESPIRATION RATE: 18 BRPM | HEIGHT: 64 IN | SYSTOLIC BLOOD PRESSURE: 90 MMHG | WEIGHT: 137.63 LBS | DIASTOLIC BLOOD PRESSURE: 62 MMHG | TEMPERATURE: 97 F | BODY MASS INDEX: 23.5 KG/M2 | OXYGEN SATURATION: 97 % | HEART RATE: 90 BPM

## 2018-04-13 DIAGNOSIS — J44.9 CHRONIC OBSTRUCTIVE PULMONARY DISEASE, UNSPECIFIED COPD TYPE: ICD-10-CM

## 2018-04-13 DIAGNOSIS — J20.9 ACUTE BRONCHITIS, UNSPECIFIED ORGANISM: Primary | ICD-10-CM

## 2018-04-13 PROCEDURE — 99213 OFFICE O/P EST LOW 20 MIN: CPT | Mod: 25,S$GLB,, | Performed by: FAMILY MEDICINE

## 2018-04-13 PROCEDURE — 99999 PR PBB SHADOW E&M-EST. PATIENT-LVL III: CPT | Mod: PBBFAC,,, | Performed by: FAMILY MEDICINE

## 2018-04-13 PROCEDURE — 99499 UNLISTED E&M SERVICE: CPT | Mod: S$GLB,,, | Performed by: FAMILY MEDICINE

## 2018-04-13 PROCEDURE — 96372 THER/PROPH/DIAG INJ SC/IM: CPT | Mod: S$GLB,,, | Performed by: FAMILY MEDICINE

## 2018-04-13 RX ORDER — AMOXICILLIN 875 MG/1
TABLET, FILM COATED ORAL
COMMUNITY
Start: 2018-04-06 | End: 2018-05-01 | Stop reason: ALTCHOICE

## 2018-04-13 RX ORDER — AZITHROMYCIN 250 MG/1
TABLET, FILM COATED ORAL
Qty: 6 TABLET | Refills: 0 | Status: SHIPPED | OUTPATIENT
Start: 2018-04-13 | End: 2018-05-01 | Stop reason: ALTCHOICE

## 2018-04-13 RX ORDER — METHYLPREDNISOLONE ACETATE 40 MG/ML
40 INJECTION, SUSPENSION INTRA-ARTICULAR; INTRALESIONAL; INTRAMUSCULAR; SOFT TISSUE
Status: COMPLETED | OUTPATIENT
Start: 2018-04-13 | End: 2018-04-13

## 2018-04-13 RX ORDER — CETIRIZINE HYDROCHLORIDE 10 MG/1
10 TABLET ORAL DAILY
Qty: 30 TABLET | Refills: 5 | Status: SHIPPED | OUTPATIENT
Start: 2018-04-13 | End: 2023-05-24

## 2018-04-13 RX ADMIN — METHYLPREDNISOLONE ACETATE 40 MG: 40 INJECTION, SUSPENSION INTRA-ARTICULAR; INTRALESIONAL; INTRAMUSCULAR; SOFT TISSUE at 03:04

## 2018-04-13 NOTE — PROGRESS NOTES
Chief complaint: Sinus congestion    History of present illness: Pt is 72 y.o. female complaints of sinus congestion, facial pressure, sore throat, ear ache.  Patient states glands are swollen and neck.  Patient has a cough.  This started 5 days ago.  Patient denies chest pain, shortness of breath, fever.  Patient has itchy watery eyes, itchy scratchy throat.  The patient complains of decreased hearing.  Cough is nonproductive    Review of systems:  Constitutional-no weight loss, weight gain  HEENT-allergy symptoms such as itchy watery eyes, post nasal drip, itchy palate, come and go.  Respiratory-no wheezing, see history of present illness  Neurological-no weakness or numbness    Past medical history, family history, social history-same as note dated today    Medications-all reviewed and verified in nurses notes.    Physical exam: Vital signs-reviewed and verified in nurses notes  Gen.-alert, oriented, no apparent distress.  Coughing.  Head-positive facial tenderness over the frontal and maxillary sinuses  Eyes: Pupils equal round reactive to light and accommodation, extraocular muscles intact, conjunctiva clear  Ears: Tympanic membranes are clear and mobile, no fluid present.  Nose: Injected mucous membranes, erythematous, mucopurulent discharge  Throat:  Injected red streaky mucosa,  tonsils normal  Neck: Shotty, tender anterior lymphadenopathy  Heart: Regular rate and rhythm, no murmurs, rubs or gallops  Lungs:Lungs were clear to auscultation and percussion, and with normal diaphragmatic excursion. No wheezes or rales were noted.     Assessment/Plan:   Acute bronchitis, unspecified organism  -     cetirizine (ZYRTEC) 10 MG tablet; Take 1 tablet (10 mg total) by mouth once daily.  Dispense: 30 tablet; Refill: 5  -     azithromycin (Z-JUAN) 250 MG tablet; 2 po day 1, then 1 po q day  Dispense: 6 tablet; Refill: 0  -     methylPREDNISolone acetate injection 40 mg; Inject 1 mL (40 mg total) into the muscle one  Please call jyoti  RE: landon Valenzuela.[phone]   time.    Chronic obstructive pulmonary disease, unspecified COPD type    Continue Anoro    Simply saline nasal lavage q.2 to 3 hours p.r.n.  Avoid dust, allergens, other sinus irritants.  Handwashing technique discussed to prevent spreading germs.

## 2018-04-13 NOTE — TELEPHONE ENCOUNTER
----- Message from Clement Moran sent at 2018 12:13 PM CDT -----  Contact: Patient  Marycarmen Louis  MRN: [id number]  : 1945  PCP: Dileep Moulton  Home Phone      [phone]  Work Phone      Not on file.  Mobile          [phone]      MESSAGE: seen last week @ [facility]gent [facility] -- chest congestion not getting any better -- thinks possibly Bronchitis -- has COPD -- requesting appt today     Call [phone]    PCP: Saige

## 2018-05-01 ENCOUNTER — OFFICE VISIT (OUTPATIENT)
Dept: FAMILY MEDICINE | Facility: CLINIC | Age: 73
End: 2018-05-01
Payer: MEDICARE

## 2018-05-01 VITALS
SYSTOLIC BLOOD PRESSURE: 110 MMHG | DIASTOLIC BLOOD PRESSURE: 70 MMHG | HEART RATE: 72 BPM | WEIGHT: 140.88 LBS | RESPIRATION RATE: 18 BRPM | HEIGHT: 64 IN | BODY MASS INDEX: 24.05 KG/M2

## 2018-05-01 DIAGNOSIS — R05.9 COUGH: Primary | ICD-10-CM

## 2018-05-01 DIAGNOSIS — F41.9 ANXIETY AND DEPRESSION: ICD-10-CM

## 2018-05-01 DIAGNOSIS — M81.0 OSTEOPOROSIS, UNSPECIFIED OSTEOPOROSIS TYPE, UNSPECIFIED PATHOLOGICAL FRACTURE PRESENCE: ICD-10-CM

## 2018-05-01 DIAGNOSIS — F32.A ANXIETY AND DEPRESSION: ICD-10-CM

## 2018-05-01 PROCEDURE — 99999 PR PBB SHADOW E&M-EST. PATIENT-LVL III: CPT | Mod: PBBFAC,,, | Performed by: FAMILY MEDICINE

## 2018-05-01 PROCEDURE — 99213 OFFICE O/P EST LOW 20 MIN: CPT | Mod: S$GLB,,, | Performed by: FAMILY MEDICINE

## 2018-05-01 NOTE — PROGRESS NOTES
Subjective:       Patient ID: Marycarmen Louis is a 72 y.o. female.    Chief Complaint: Follow-up (3 month follow up)    Pt is a 72 y.o. female who presents for evaluation and management of   Encounter Diagnoses   Name Primary?    Cough Yes    Osteoporosis, unspecified osteoporosis type, unspecified pathological fracture presence     Anxiety and depression    .  Doing well on current meds. Denies any side effects. Prevention is up to date.    Review of Systems   Constitutional: Negative for chills and fever.   Respiratory: Positive for cough. Negative for shortness of breath.    Cardiovascular: Negative for chest pain and palpitations.   Gastrointestinal: Negative for abdominal pain, blood in stool, constipation and nausea.   Genitourinary: Negative for difficulty urinating.   Psychiatric/Behavioral: Negative for dysphoric mood, sleep disturbance and suicidal ideas. The patient is not nervous/anxious.        Objective:      Physical Exam   Constitutional: She is oriented to person, place, and time. She appears well-developed and well-nourished.   HENT:   Head: Normocephalic and atraumatic.   Right Ear: External ear normal.   Left Ear: External ear normal.   Nose: Nose normal.   Mouth/Throat: Oropharynx is clear and moist.   Eyes: EOM are normal. Pupils are equal, round, and reactive to light.   Neck: Normal range of motion. Neck supple. No JVD present. No tracheal deviation present. No thyromegaly present.   Cardiovascular: Normal rate, normal heart sounds and intact distal pulses.    No murmur heard.  Pulmonary/Chest: Effort normal and breath sounds normal. No respiratory distress. She has no wheezes. She has no rales. She exhibits no tenderness.   Abdominal: Soft. Bowel sounds are normal. She exhibits no distension and no mass. There is no tenderness. There is no rebound and no guarding.   Musculoskeletal: Normal range of motion. She exhibits no edema or tenderness.   Lymphadenopathy:     She has no cervical  adenopathy.   Neurological: She is alert and oriented to person, place, and time. She has normal reflexes. She displays normal reflexes. No cranial nerve deficit. She exhibits normal muscle tone. Coordination normal.   Skin: Skin is warm and dry. No rash noted. No erythema. No pallor.   Psychiatric: She has a normal mood and affect. Her behavior is normal. Judgment and thought content normal.       Assessment:       1. Cough    2. Osteoporosis, unspecified osteoporosis type, unspecified pathological fracture presence    3. Anxiety and depression        Plan:   Marycarmen was seen today for follow-up.    Diagnoses and all orders for this visit:    Cough    Osteoporosis, unspecified osteoporosis type, unspecified pathological fracture presence    Anxiety and depression    cough is resolving URI  Observe   Delsym, vit c   RTC if condition acutely worsens or any other concerns, otherwise RTC as scheduled    No Follow-up on file.

## 2018-08-17 ENCOUNTER — INFUSION (OUTPATIENT)
Dept: INFUSION THERAPY | Facility: HOSPITAL | Age: 73
End: 2018-08-17
Attending: FAMILY MEDICINE
Payer: MEDICARE

## 2018-08-17 VITALS
BODY MASS INDEX: 23.9 KG/M2 | RESPIRATION RATE: 18 BRPM | DIASTOLIC BLOOD PRESSURE: 58 MMHG | HEART RATE: 84 BPM | WEIGHT: 140 LBS | HEIGHT: 64 IN | TEMPERATURE: 97 F | SYSTOLIC BLOOD PRESSURE: 110 MMHG

## 2018-08-17 DIAGNOSIS — M81.0 OSTEOPOROSIS, UNSPECIFIED OSTEOPOROSIS TYPE, UNSPECIFIED PATHOLOGICAL FRACTURE PRESENCE: Primary | ICD-10-CM

## 2018-08-17 PROCEDURE — 96372 THER/PROPH/DIAG INJ SC/IM: CPT

## 2018-08-17 PROCEDURE — 63600175 PHARM REV CODE 636 W HCPCS: Performed by: FAMILY MEDICINE

## 2018-08-17 RX ADMIN — DENOSUMAB 60 MG: 60 INJECTION SUBCUTANEOUS at 10:08

## 2018-08-17 NOTE — PATIENT INSTRUCTIONS
Denosumab injection (PROLIA)  What is this medicine?  DENOSUMAB (den oh roni mab) slows bone breakdown. Prolia is used to treat osteoporosis in women after menopause and in men. Xgeva is used to prevent bone fractures and other bone problems caused by cancer bone metastases. Xgeva is also used to treat giant cell tumor of the bone.  How should I use this medicine?  This medicine is for injection under the skin. It is given by a health care professional in a hospital or clinic setting.  If you are getting Prolia, a special MedGuide will be given to you by the pharmacist with each prescription and refill. Be sure to read this information carefully each time.  For Prolia, talk to your pediatrician regarding the use of this medicine in children. Special care may be needed. For Xgeva, talk to your pediatrician regarding the use of this medicine in children. While this drug may be prescribed for children as young as 13 years for selected conditions, precautions do apply.  What side effects may I notice from receiving this medicine?  Side effects that you should report to your doctor or health care professional as soon as possible:  · allergic reactions like skin rash, itching or hives, swelling of the face, lips, or tongue  · breathing problems  · chest pain  · fast, irregular heartbeat  · feeling faint or lightheaded, falls  · fever, chills, or any other sign of infection  · muscle spasms, tightening, or twitches  · numbness or tingling  · skin blisters or bumps, or is dry, peels, or red  · slow healing or unexplained pain in the mouth or jaw  · unusual bleeding or bruising  Side effects that usually do not require medical attention (Report these to your doctor or health care professional if they continue or are bothersome.):  · muscle pain  · stomach upset, gas  What may interact with this medicine?  Do not take this medicine with any of the following medications:  · other medicines containing denosumab  This medicine  may also interact with the following medications:  · medicines that suppress the immune system  · medicines that treat cancer  · steroid medicines like prednisone or cortisone  What if I miss a dose?  It is important not to miss your dose. Call your doctor or health care professional if you are unable to keep an appointment.  Where should I keep my medicine?  This medicine is only given in a clinic, doctor's office, or other health care setting and will not be stored at home.  What should I tell my health care provider before I take this medicine?  They need to know if you have any of these conditions:  · dental disease  · eczema  · infection or history of infections  · kidney disease or on dialysis  · low blood calcium or vitamin D  · malabsorption syndrome  · scheduled to have surgery or tooth extraction  · taking medicine that contains denosumab  · thyroid or parathyroid disease  · an unusual reaction to denosumab, other medicines, foods, dyes, or preservatives  · pregnant or trying to get pregnant  · breast-feeding  What should I watch for while using this medicine?  Visit your doctor or health care professional for regular checks on your progress. Your doctor or health care professional may order blood tests and other tests to see how you are doing.  Call your doctor or health care professional if you get a cold or other infection while receiving this medicine. Do not treat yourself. This medicine may decrease your body's ability to fight infection.  You should make sure you get enough calcium and vitamin D while you are taking this medicine, unless your doctor tells you not to. Discuss the foods you eat and the vitamins you take with your health care professional.  See your dentist regularly. Brush and floss your teeth as directed. Before you have any dental work done, tell your dentist you are receiving this medicine.  Do not become pregnant while taking this medicine or for 5 months after stopping it. Women  should inform their doctor if they wish to become pregnant or think they might be pregnant. There is a potential for serious side effects to an unborn child. Talk to your health care professional or pharmacist for more information.  NOTE:This sheet is a summary. It may not cover all possible information. If you have questions about this medicine, talk to your doctor, pharmacist, or health care provider. Copyright© 2017 [employer]

## 2018-10-23 ENCOUNTER — IMMUNIZATION (OUTPATIENT)
Dept: PHARMACY | Facility: CLINIC | Age: 73
End: 2018-10-23
Payer: MEDICARE

## 2018-11-05 DIAGNOSIS — J44.1 COPD EXACERBATION: ICD-10-CM

## 2018-11-05 RX ORDER — UMECLIDINIUM BROMIDE AND VILANTEROL TRIFENATATE 62.5; 25 UG/1; UG/1
POWDER RESPIRATORY (INHALATION)
Qty: 30 EACH | Refills: 5 | Status: SHIPPED | OUTPATIENT
Start: 2018-11-05 | End: 2018-11-12 | Stop reason: SDUPTHER

## 2018-11-12 DIAGNOSIS — J44.1 COPD EXACERBATION: ICD-10-CM

## 2018-11-13 RX ORDER — UMECLIDINIUM BROMIDE AND VILANTEROL TRIFENATATE 62.5; 25 UG/1; UG/1
POWDER RESPIRATORY (INHALATION)
Qty: 1 EACH | Refills: 5 | Status: SHIPPED | OUTPATIENT
Start: 2018-11-13 | End: 2019-03-26 | Stop reason: SDUPTHER

## 2018-11-14 DIAGNOSIS — F32.A DEPRESSION, UNSPECIFIED DEPRESSION TYPE: ICD-10-CM

## 2018-11-14 DIAGNOSIS — F41.9 ANXIETY: ICD-10-CM

## 2018-11-14 RX ORDER — VENLAFAXINE HYDROCHLORIDE 75 MG/1
CAPSULE, EXTENDED RELEASE ORAL
Qty: 30 CAPSULE | Refills: 0 | Status: SHIPPED | OUTPATIENT
Start: 2018-11-14 | End: 2018-12-12 | Stop reason: SDUPTHER

## 2018-12-07 DIAGNOSIS — Z12.39 BREAST CANCER SCREENING: ICD-10-CM

## 2018-12-12 DIAGNOSIS — F41.9 ANXIETY: ICD-10-CM

## 2018-12-12 DIAGNOSIS — F32.A DEPRESSION, UNSPECIFIED DEPRESSION TYPE: ICD-10-CM

## 2018-12-12 RX ORDER — VENLAFAXINE HYDROCHLORIDE 75 MG/1
CAPSULE, EXTENDED RELEASE ORAL
Qty: 30 CAPSULE | Refills: 0 | Status: SHIPPED | OUTPATIENT
Start: 2018-12-12 | End: 2019-01-13 | Stop reason: SDUPTHER

## 2019-01-13 DIAGNOSIS — F32.A DEPRESSION, UNSPECIFIED DEPRESSION TYPE: ICD-10-CM

## 2019-01-13 DIAGNOSIS — F41.9 ANXIETY: ICD-10-CM

## 2019-01-14 RX ORDER — VENLAFAXINE HYDROCHLORIDE 75 MG/1
CAPSULE, EXTENDED RELEASE ORAL
Qty: 30 CAPSULE | Refills: 0 | Status: SHIPPED | OUTPATIENT
Start: 2019-01-14 | End: 2019-02-14 | Stop reason: SDUPTHER

## 2019-01-23 ENCOUNTER — TELEPHONE (OUTPATIENT)
Dept: INFUSION THERAPY | Facility: HOSPITAL | Age: 74
End: 2019-01-23

## 2019-01-23 DIAGNOSIS — M81.0 OSTEOPOROSIS, UNSPECIFIED OSTEOPOROSIS TYPE, UNSPECIFIED PATHOLOGICAL FRACTURE PRESENCE: Primary | ICD-10-CM

## 2019-01-23 NOTE — TELEPHONE ENCOUNTER
Dr Moulton;  Mrs Monroe's due for her Prolia injection.  Can you please send me a rx for Prolia.  She's also due for a DEXA scan.  Thanks,  Dorothy

## 2019-01-25 ENCOUNTER — IMMUNIZATION (OUTPATIENT)
Dept: PHARMACY | Facility: CLINIC | Age: 74
End: 2019-01-25
Payer: MEDICARE

## 2019-02-01 ENCOUNTER — INFUSION (OUTPATIENT)
Dept: INFUSION THERAPY | Facility: HOSPITAL | Age: 74
End: 2019-02-01
Attending: FAMILY MEDICINE
Payer: MEDICARE

## 2019-02-01 VITALS
BODY MASS INDEX: 23.9 KG/M2 | WEIGHT: 140 LBS | HEIGHT: 64 IN | RESPIRATION RATE: 18 BRPM | SYSTOLIC BLOOD PRESSURE: 132 MMHG | TEMPERATURE: 97 F | HEART RATE: 87 BPM | DIASTOLIC BLOOD PRESSURE: 71 MMHG

## 2019-02-01 DIAGNOSIS — M81.0 OSTEOPOROSIS, UNSPECIFIED OSTEOPOROSIS TYPE, UNSPECIFIED PATHOLOGICAL FRACTURE PRESENCE: Primary | ICD-10-CM

## 2019-02-01 PROCEDURE — 96372 THER/PROPH/DIAG INJ SC/IM: CPT

## 2019-02-01 PROCEDURE — 63600175 PHARM REV CODE 636 W HCPCS: Performed by: FAMILY MEDICINE

## 2019-02-01 RX ADMIN — DENOSUMAB 60 MG: 60 INJECTION SUBCUTANEOUS at 10:02

## 2019-02-14 DIAGNOSIS — F32.A DEPRESSION, UNSPECIFIED DEPRESSION TYPE: ICD-10-CM

## 2019-02-14 DIAGNOSIS — F41.9 ANXIETY: ICD-10-CM

## 2019-02-14 RX ORDER — VENLAFAXINE HYDROCHLORIDE 75 MG/1
CAPSULE, EXTENDED RELEASE ORAL
Qty: 30 CAPSULE | Refills: 0 | Status: SHIPPED | OUTPATIENT
Start: 2019-02-14 | End: 2019-03-13 | Stop reason: SDUPTHER

## 2019-03-13 DIAGNOSIS — F41.9 ANXIETY: ICD-10-CM

## 2019-03-13 DIAGNOSIS — F32.A DEPRESSION, UNSPECIFIED DEPRESSION TYPE: ICD-10-CM

## 2019-03-13 RX ORDER — VENLAFAXINE HYDROCHLORIDE 75 MG/1
CAPSULE, EXTENDED RELEASE ORAL
Qty: 30 CAPSULE | Refills: 0 | Status: SHIPPED | OUTPATIENT
Start: 2019-03-13 | End: 2019-03-26 | Stop reason: SDUPTHER

## 2019-03-26 ENCOUNTER — OFFICE VISIT (OUTPATIENT)
Dept: FAMILY MEDICINE | Facility: CLINIC | Age: 74
End: 2019-03-26
Payer: MEDICARE

## 2019-03-26 VITALS
SYSTOLIC BLOOD PRESSURE: 128 MMHG | WEIGHT: 142.44 LBS | RESPIRATION RATE: 16 BRPM | HEART RATE: 70 BPM | HEIGHT: 64 IN | DIASTOLIC BLOOD PRESSURE: 70 MMHG | BODY MASS INDEX: 24.32 KG/M2

## 2019-03-26 DIAGNOSIS — J44.1 COPD EXACERBATION: ICD-10-CM

## 2019-03-26 DIAGNOSIS — Z87.891 FORMER SMOKER: ICD-10-CM

## 2019-03-26 DIAGNOSIS — F41.9 ANXIETY: ICD-10-CM

## 2019-03-26 DIAGNOSIS — R79.9 ABNORMAL FINDING OF BLOOD CHEMISTRY: ICD-10-CM

## 2019-03-26 DIAGNOSIS — F32.A DEPRESSION, UNSPECIFIED DEPRESSION TYPE: ICD-10-CM

## 2019-03-26 DIAGNOSIS — D50.9 IRON DEFICIENCY ANEMIA, UNSPECIFIED IRON DEFICIENCY ANEMIA TYPE: Primary | ICD-10-CM

## 2019-03-26 LAB
ALBUMIN SERPL BCP-MCNC: 3.8 G/DL (ref 3.5–5.2)
ALP SERPL-CCNC: 33 U/L (ref 55–135)
ALT SERPL W/O P-5'-P-CCNC: 26 U/L (ref 10–44)
ANION GAP SERPL CALC-SCNC: 7 MMOL/L (ref 8–16)
AST SERPL-CCNC: 27 U/L (ref 10–40)
BILIRUB SERPL-MCNC: 0.6 MG/DL (ref 0.1–1)
BUN SERPL-MCNC: 20 MG/DL (ref 8–23)
CALCIUM SERPL-MCNC: 9.2 MG/DL (ref 8.7–10.5)
CHLORIDE SERPL-SCNC: 104 MMOL/L (ref 95–110)
CHOLEST SERPL-MCNC: 174 MG/DL (ref 120–199)
CHOLEST/HDLC SERPL: 3.6 {RATIO} (ref 2–5)
CO2 SERPL-SCNC: 30 MMOL/L (ref 23–29)
CREAT SERPL-MCNC: 0.7 MG/DL (ref 0.5–1.4)
EST. GFR  (AFRICAN AMERICAN): >60 ML/MIN/1.73 M^2
EST. GFR  (NON AFRICAN AMERICAN): >60 ML/MIN/1.73 M^2
GLUCOSE SERPL-MCNC: 90 MG/DL (ref 70–110)
HDLC SERPL-MCNC: 49 MG/DL (ref 40–75)
HDLC SERPL: 28.2 % (ref 20–50)
LDLC SERPL CALC-MCNC: 105 MG/DL (ref 63–159)
NONHDLC SERPL-MCNC: 125 MG/DL
POTASSIUM SERPL-SCNC: 3.7 MMOL/L (ref 3.5–5.1)
PROT SERPL-MCNC: 6.3 G/DL (ref 6–8.4)
SODIUM SERPL-SCNC: 141 MMOL/L (ref 136–145)
TRIGL SERPL-MCNC: 100 MG/DL (ref 30–150)

## 2019-03-26 PROCEDURE — 99499 UNLISTED E&M SERVICE: CPT | Mod: S$GLB,,, | Performed by: FAMILY MEDICINE

## 2019-03-26 PROCEDURE — 99214 PR OFFICE/OUTPT VISIT, EST, LEVL IV, 30-39 MIN: ICD-10-PCS | Mod: S$GLB,,, | Performed by: FAMILY MEDICINE

## 2019-03-26 PROCEDURE — 36415 PR COLLECTION VENOUS BLOOD,VENIPUNCTURE: ICD-10-PCS | Mod: S$GLB,,, | Performed by: FAMILY MEDICINE

## 2019-03-26 PROCEDURE — 99999 PR PBB SHADOW E&M-EST. PATIENT-LVL III: CPT | Mod: PBBFAC,,, | Performed by: FAMILY MEDICINE

## 2019-03-26 PROCEDURE — 36415 COLL VENOUS BLD VENIPUNCTURE: CPT | Mod: S$GLB,,, | Performed by: FAMILY MEDICINE

## 2019-03-26 PROCEDURE — 1101F PT FALLS ASSESS-DOCD LE1/YR: CPT | Mod: CPTII,S$GLB,, | Performed by: FAMILY MEDICINE

## 2019-03-26 PROCEDURE — 80053 COMPREHEN METABOLIC PANEL: CPT

## 2019-03-26 PROCEDURE — 99214 OFFICE O/P EST MOD 30 MIN: CPT | Mod: S$GLB,,, | Performed by: FAMILY MEDICINE

## 2019-03-26 PROCEDURE — 99499 RISK ADDL DX/OHS AUDIT: ICD-10-PCS | Mod: S$GLB,,, | Performed by: FAMILY MEDICINE

## 2019-03-26 PROCEDURE — 1101F PR PT FALLS ASSESS DOC 0-1 FALLS W/OUT INJ PAST YR: ICD-10-PCS | Mod: CPTII,S$GLB,, | Performed by: FAMILY MEDICINE

## 2019-03-26 PROCEDURE — 99999 PR PBB SHADOW E&M-EST. PATIENT-LVL III: ICD-10-PCS | Mod: PBBFAC,,, | Performed by: FAMILY MEDICINE

## 2019-03-26 PROCEDURE — 80061 LIPID PANEL: CPT

## 2019-03-26 RX ORDER — VENLAFAXINE HYDROCHLORIDE 75 MG/1
75 CAPSULE, EXTENDED RELEASE ORAL DAILY
Qty: 30 CAPSULE | Refills: 5 | Status: SHIPPED | OUTPATIENT
Start: 2019-03-26 | End: 2019-10-09 | Stop reason: SDUPTHER

## 2019-03-26 NOTE — PROGRESS NOTES
Subjective:       Patient ID: Marycarmen Louis is a 73 y.o. female.    Chief Complaint: Follow-up    Pt is a 73 y.o. female who presents for evaluation and management of   Encounter Diagnoses   Name Primary?    Anxiety     Depression, unspecified depression type     COPD exacerbation     Iron deficiency anemia, unspecified iron deficiency anemia type Yes   .  Doing well on current meds. Denies any side effects. Prevention is up to date.  Review of Systems   Constitutional: Positive for diaphoresis. Negative for chills and fever.   Respiratory: Negative for shortness of breath.    Cardiovascular: Positive for chest pain. Negative for palpitations.        Substernal CP occassionally with deep breath   No exertional CP    Gastrointestinal: Negative for abdominal pain, blood in stool, constipation and nausea.   Endocrine: Positive for heat intolerance.   Genitourinary: Negative for difficulty urinating.   Psychiatric/Behavioral: Negative for dysphoric mood, sleep disturbance and suicidal ideas. The patient is not nervous/anxious.        Objective:      Physical Exam   Constitutional: She is oriented to person, place, and time. She appears well-developed and well-nourished.   HENT:   Head: Normocephalic and atraumatic.   Right Ear: External ear normal.   Left Ear: External ear normal.   Nose: Nose normal.   Mouth/Throat: Oropharynx is clear and moist.   Eyes: Pupils are equal, round, and reactive to light. EOM are normal.   Neck: Normal range of motion. Neck supple. No JVD present. No tracheal deviation present. No thyromegaly present.   Cardiovascular: Normal rate, normal heart sounds and intact distal pulses.   No murmur heard.  Pulmonary/Chest: Effort normal and breath sounds normal. No respiratory distress. She has no wheezes. She has no rales. She exhibits no tenderness.   Abdominal: Soft. Bowel sounds are normal. She exhibits no distension and no mass. There is no tenderness. There is no rebound and no  guarding.   Musculoskeletal: Normal range of motion. She exhibits no edema or tenderness.   Lymphadenopathy:     She has no cervical adenopathy.   Neurological: She is alert and oriented to person, place, and time. She has normal reflexes. She displays normal reflexes. No cranial nerve deficit. She exhibits normal muscle tone. Coordination normal.   Skin: Skin is warm and dry. No rash noted. No erythema. No pallor.   Psychiatric: She has a normal mood and affect. Her behavior is normal. Judgment and thought content normal.       Assessment:       1. Iron deficiency anemia, unspecified iron deficiency anemia type    2. Anxiety    3. Depression, unspecified depression type    4. COPD exacerbation        Plan:   Marycarmen was seen today for follow-up.    Diagnoses and all orders for this visit:    Iron deficiency anemia, unspecified iron deficiency anemia type    Anxiety  -     venlafaxine (EFFEXOR-XR) 75 MG 24 hr capsule; Take 1 capsule (75 mg total) by mouth once daily.    Depression, unspecified depression type  -     venlafaxine (EFFEXOR-XR) 75 MG 24 hr capsule; Take 1 capsule (75 mg total) by mouth once daily.    COPD exacerbation  -     umeclidinium-vilanterol (ANORO ELLIPTA) 62.5-25 mcg/actuation DsDv; INHALE 1 PUFF INTO THE LUNGS ONCE DAILY      Problem List Items Addressed This Visit     COPD exacerbation    Relevant Medications    umeclidinium-vilanterol (ANORO ELLIPTA) 62.5-25 mcg/actuation DsDv      Other Visit Diagnoses     Iron deficiency anemia, unspecified iron deficiency anemia type    -  Primary    Anxiety        Relevant Medications    venlafaxine (EFFEXOR-XR) 75 MG 24 hr capsule    Depression, unspecified depression type        Relevant Medications    venlafaxine (EFFEXOR-XR) 75 MG 24 hr capsule        No follow-ups on file.

## 2019-04-08 ENCOUNTER — HOSPITAL ENCOUNTER (OUTPATIENT)
Dept: RADIOLOGY | Facility: HOSPITAL | Age: 74
Discharge: HOME | End: 2019-04-08
Attending: FAMILY MEDICINE
Payer: MEDICARE

## 2019-04-08 VITALS — BODY MASS INDEX: 24.24 KG/M2 | WEIGHT: 142 LBS | HEIGHT: 64 IN

## 2019-04-08 DIAGNOSIS — Z12.39 BREAST CANCER SCREENING: ICD-10-CM

## 2019-04-08 DIAGNOSIS — M81.0 OSTEOPOROSIS, UNSPECIFIED OSTEOPOROSIS TYPE, UNSPECIFIED PATHOLOGICAL FRACTURE PRESENCE: ICD-10-CM

## 2019-04-08 PROCEDURE — 77080 DXA BONE DENSITY AXIAL: CPT | Mod: TC

## 2019-04-08 PROCEDURE — 77067 SCR MAMMO BI INCL CAD: CPT | Mod: TC

## 2019-04-08 PROCEDURE — 77067 MAMMO DIGITAL SCREENING BILAT WITH TOMOSYNTHESIS_CAD: ICD-10-PCS | Mod: 26,,, | Performed by: RADIOLOGY

## 2019-04-08 PROCEDURE — 77067 SCR MAMMO BI INCL CAD: CPT | Mod: 26,,, | Performed by: RADIOLOGY

## 2019-04-08 PROCEDURE — 77063 MAMMO DIGITAL SCREENING BILAT WITH TOMOSYNTHESIS_CAD: ICD-10-PCS | Mod: 26,,, | Performed by: RADIOLOGY

## 2019-04-08 PROCEDURE — 77063 BREAST TOMOSYNTHESIS BI: CPT | Mod: 26,,, | Performed by: RADIOLOGY

## 2019-04-12 ENCOUNTER — OFFICE VISIT (OUTPATIENT)
Dept: OPTOMETRY | Facility: CLINIC | Age: 74
End: 2019-04-12
Payer: MEDICARE

## 2019-04-12 DIAGNOSIS — G43.109 OCULAR MIGRAINE: Primary | ICD-10-CM

## 2019-04-12 PROCEDURE — 99999 PR PBB SHADOW E&M-EST. PATIENT-LVL II: ICD-10-PCS | Mod: PBBFAC,,, | Performed by: OPTOMETRIST

## 2019-04-12 PROCEDURE — 92014 PR EYE EXAM, EST PATIENT,COMPREHESV: ICD-10-PCS | Mod: S$GLB,,, | Performed by: OPTOMETRIST

## 2019-04-12 PROCEDURE — 99999 PR PBB SHADOW E&M-EST. PATIENT-LVL II: CPT | Mod: PBBFAC,,, | Performed by: OPTOMETRIST

## 2019-04-12 PROCEDURE — 92014 COMPRE OPH EXAM EST PT 1/>: CPT | Mod: S$GLB,,, | Performed by: OPTOMETRIST

## 2019-04-12 NOTE — PROGRESS NOTES
"HPI     dls  3-9-18 dr nelson    Pt states 2 weeks ago she had an episode in her left eye in the outter   corner periphery she noticed black and white "abstract" that lasted for   about 45 mins. And hasnt seen it again.  Pt states that she was only sitting in her chair when the episode   occurred.  No noticeable vision loss, no pains.   Floaters -not sure which eye- x years. No new onset  Hx of migraines. havent had one in a very long time. Told they were sinus   induced.    RESTATSIS OU BID    Last edited by Cathy Borjas on 4/12/2019 12:25 PM. (History)            Assessment /Plan     For exam results, see Encounter Report.    Ocular migraine            1.  Educated pt on ocular migraine.   Retina flat and intact OU--no holes, tears, breaks, or RDs.  Eye health normal OU.      RTC 1 week for refraction.                   "

## 2019-04-18 ENCOUNTER — OFFICE VISIT (OUTPATIENT)
Dept: OPTOMETRY | Facility: CLINIC | Age: 74
End: 2019-04-18
Payer: MEDICARE

## 2019-04-18 DIAGNOSIS — H52.13 MYOPIA WITH PRESBYOPIA OF BOTH EYES: ICD-10-CM

## 2019-04-18 DIAGNOSIS — H04.123 DRY EYE SYNDROME OF BOTH EYES: Primary | ICD-10-CM

## 2019-04-18 DIAGNOSIS — H52.4 MYOPIA WITH PRESBYOPIA OF BOTH EYES: ICD-10-CM

## 2019-04-18 PROCEDURE — 92012 INTRM OPH EXAM EST PATIENT: CPT | Mod: S$GLB,,, | Performed by: OPTOMETRIST

## 2019-04-18 PROCEDURE — 92012 PR EYE EXAM, EST PATIENT,INTERMED: ICD-10-PCS | Mod: S$GLB,,, | Performed by: OPTOMETRIST

## 2019-04-18 PROCEDURE — 99999 PR PBB SHADOW E&M-EST. PATIENT-LVL II: CPT | Mod: PBBFAC,,, | Performed by: OPTOMETRIST

## 2019-04-18 PROCEDURE — 92015 PR REFRACTION: ICD-10-PCS | Mod: S$GLB,,, | Performed by: OPTOMETRIST

## 2019-04-18 PROCEDURE — 99999 PR PBB SHADOW E&M-EST. PATIENT-LVL II: ICD-10-PCS | Mod: PBBFAC,,, | Performed by: OPTOMETRIST

## 2019-04-18 PROCEDURE — 92015 DETERMINE REFRACTIVE STATE: CPT | Mod: S$GLB,,, | Performed by: OPTOMETRIST

## 2019-04-18 RX ORDER — CYCLOSPORINE 0.5 MG/ML
0.05 EMULSION OPHTHALMIC 2 TIMES DAILY
Qty: 60 VIAL | Refills: 11 | Status: SHIPPED | OUTPATIENT
Start: 2019-04-18 | End: 2020-08-25 | Stop reason: SDUPTHER

## 2019-04-18 NOTE — PROGRESS NOTES
Assessment /Plan     For exam results, see Encounter Report.    Dry eye syndrome of both eyes  -     RESTASIS 0.05 % ophthalmic emulsion; Place 0.02 mLs (0.05 drops total) into both eyes 2 (two) times daily.  Dispense: 60 vial; Refill: 11    Myopia with presbyopia of both eyes            1.  Continue Restasis bid OU--refills sent to pharmacy.  Need to be more compliant.  Educated pt dry eye interfering with vision.  2.  Bifocal rx given

## 2019-05-23 ENCOUNTER — TELEPHONE (OUTPATIENT)
Dept: OBSTETRICS AND GYNECOLOGY | Facility: CLINIC | Age: 74
End: 2019-05-23

## 2019-05-23 ENCOUNTER — OFFICE VISIT (OUTPATIENT)
Dept: OBSTETRICS AND GYNECOLOGY | Facility: CLINIC | Age: 74
End: 2019-05-23
Payer: MEDICARE

## 2019-05-23 VITALS
HEIGHT: 64 IN | WEIGHT: 138.44 LBS | DIASTOLIC BLOOD PRESSURE: 66 MMHG | SYSTOLIC BLOOD PRESSURE: 110 MMHG | BODY MASS INDEX: 23.64 KG/M2

## 2019-05-23 DIAGNOSIS — Z78.0 POSTMENOPAUSE: ICD-10-CM

## 2019-05-23 DIAGNOSIS — Z01.419 WELL WOMAN EXAM WITH ROUTINE GYNECOLOGICAL EXAM: Primary | ICD-10-CM

## 2019-05-23 DIAGNOSIS — R10.2 PELVIC PAIN IN FEMALE: ICD-10-CM

## 2019-05-23 PROCEDURE — G0101 CA SCREEN;PELVIC/BREAST EXAM: HCPCS | Mod: S$GLB,,, | Performed by: NURSE PRACTITIONER

## 2019-05-23 PROCEDURE — G0101 PR CA SCREEN;PELVIC/BREAST EXAM: ICD-10-PCS | Mod: S$GLB,,, | Performed by: NURSE PRACTITIONER

## 2019-05-23 PROCEDURE — 99999 PR PBB SHADOW E&M-EST. PATIENT-LVL III: ICD-10-PCS | Mod: PBBFAC,,, | Performed by: NURSE PRACTITIONER

## 2019-05-23 PROCEDURE — 99999 PR PBB SHADOW E&M-EST. PATIENT-LVL III: CPT | Mod: PBBFAC,,, | Performed by: NURSE PRACTITIONER

## 2019-05-23 NOTE — PROGRESS NOTES
"HISTORY OF PRESENT ILLNESS:    Marycarmen Louis is a 73 y.o. female , presents for a routine exam and has c/o of "ovary pain".  -c/o chronic intermittent RLQ pain for about a year.  -When pain occurs it "doubles her over, it's so bad."  -Denies associated fever, GI or  symptoms, bleeding.     Past Medical History:   Diagnosis Date    Anxiety     Arthritis     Cancer     salivary gland    COPD (chronic obstructive pulmonary disease)     Degenerative disc disease     Depression     Emphysema of lung     Fractured hip     General anesthetics causing adverse effect in therapeutic use     Macrocytosis     Meibomianitis 11/3/10    Memory loss     Nuclear sclerosis, bilateral 10/2/2017    Osteoporosis     Platelet disorder     PONV (postoperative nausea and vomiting)        Past Surgical History:   Procedure Laterality Date    APPENDECTOMY      APPLICATION-SHORT ARM FIBERGLASS CAST Left 2/15/2017    [facility]    arm surgery      broken    BIPOLAR ENDOPROSTHETIC HEMIARTHROPLASTY Left 2/15/2017    [facility]    BREAST BIOPSY Left     CATARACT EXTRACTION W/  INTRAOCULAR LENS IMPLANT Right 10/02/2017    Dr. Liriano    CATARACT EXTRACTION W/  INTRAOCULAR LENS IMPLANT Left 10/30/2017    Dr. Liriano    CLOSED REDUCTION AND PERCUTANEOUS DISTRACTION PINNING-DISTAL RADIUS Left 2/15/2017    [facility]    COLONOSCOPY N/A 3/8/2018    Performed by H[facility]ont [facility] at [facility] (4TH [facility])    INSERTION-INTRAOCULAR LENS (IOL) Left 10/30/2017    [facility] at [facility]    INSERTION-INTRAOCULAR LENS (IOL) Right 10/2/2017    [facility] at [facility]    JOINT REPLACEMENT      left knee  tka/left knee    PHACOEMULSIFICATION-ASPIRATION-CATARACT Left 10/30/2017    Performed by [facility]    PHACOEMULSIFICATION-ASPIRATION-CATARACT Right 10/2/2017    " [facility]    salviary gland removed      TOTAL HIP ARTHROPLASTY          MEDICATIONS AND ALLERGIES:      Current Outpatient Medications:     cetirizine (ZYRTEC) 10 MG tablet, Take 1 tablet (10 mg total) by mouth once daily., Disp: 30 tablet, Rfl: 5    CYANOCOBALAMIN, VITAMIN B-12, (VITAMIN B-12 ORAL), Take by mouth., Disp: , Rfl:     denosumab (PROLIA) 60 mg/mL Syrg, Inject 1 mL (60 mg total) into the skin every 6 (six) months., Disp: 1 Syringe, Rfl: 1    DOCUSATE CALCIUM (STOOL SOFTENER ORAL), Take by mouth., Disp: , Rfl:     LACTOBAC NO.41/BIFIDOBACT NO.7 (PROBIOTIC-10 ORAL), Take by mouth., Disp: , Rfl:     melatonin 5 mg Tab, Take 5 mg by mouth nightly. , Disp: , Rfl:     multivitamin capsule, Take 1 capsule by mouth once daily., Disp: , Rfl:     RESTASIS 0.05 % ophthalmic emulsion, Place 0.02 mLs (0.05 drops total) into both eyes 2 (two) times daily., Disp: 60 vial, Rfl: 11    umeclidinium-vilanterol (ANORO ELLIPTA) 62.5-25 mcg/actuation DsDv, INHALE 1 PUFF INTO THE LUNGS ONCE DAILY, Disp: 1 each, Rfl: 5    venlafaxine (EFFEXOR-XR) 75 MG 24 hr capsule, Take 1 capsule (75 mg total) by mouth once daily., Disp: 30 capsule, Rfl: 5    Review of patient's allergies indicates:   Allergen Reactions    Codeine Nausea And Vomiting     Other reaction(s): Vomiting  Other reaction(s): Headache    Fentanyl Nausea And Vomiting     Other reaction(s): Vomiting  Other reaction(s): Nausea    Phenytoin sodium extended      Other reaction(s): Vomiting  Other reaction(s): Nausea    Versed  [midazolam] Nausea And Vomiting    Vicodin  [hydrocodone-acetaminophen]      Other reaction(s): Vomiting       Family History   Problem Relation Age of Onset    Heart disease Mother     Cancer Father         colon    No Known Problems Sister     No Known Problems Brother     No Known Problems Maternal Aunt     No Known Problems Maternal Uncle     No Known Problems Paternal Aunt     No Known  Problems Paternal Uncle     No Known Problems Maternal Grandmother     No Known Problems Maternal Grandfather     No Known Problems Paternal Grandmother     No Known Problems Paternal Grandfather     Breast cancer Neg Hx     Ovarian cancer Neg Hx        Social History     Socioeconomic History    Marital status:      Spouse name: Not on file    Number of children: Not on file    Years of education: Not on file    Highest education level: Not on file   Occupational History    Not on file   Social Needs    Financial resource strain: Not on file    Food insecurity:     Worry: Not on file     Inability: Not on file    Transportation needs:     Medical: Not on file     Non-medical: Not on file   Tobacco Use    Smoking status: Former Smoker    Smokeless tobacco: Never Used   Substance and Sexual Activity    Alcohol use: Yes     Comment: occasionally    Drug use: No    Sexual activity: Not Currently     Partners: Male     Birth control/protection: Post-menopausal   Lifestyle    Physical activity:     Days per week: Not on file     Minutes per session: Not on file    Stress: Not on file   Relationships    Social connections:     Talks on phone: Not on file     Gets together: Not on file     Attends Oriental orthodox service: Not on file     Active member of club or organization: Not on file     Attends meetings of clubs or organizations: Not on file     Relationship status: Not on file   Other Topics Concern    Not on file   Social History Narrative    Not on file       OB HISTORY: Number of vaginal deliveries:2    COMPREHENSIVE GYN HISTORY:  PAP History:  Denies abnormal Paps.  Infection History: Denies STDs. Denies PID.  Benign History: Denies uterine fibroids. Denies ovarian cysts. Denies endometriosis.  Denies other conditions.  Cancer History: Denies cervical cancer. Denies uterine cancer or hyperplasia. Denies ovarian cancer. Denies vulvar cancer or pre-cancer. Denies vaginal cancer or  pre-cancer. Denies breast cancer. Denies colon cancer.  Sexual Activity History: Denies currently being sexually active  Menstrual History: Denies menses. Pt is  not on HRT.     ROS:  GENERAL: No weight changes. No swelling. No fatigue. No fever.  CARDIOVASCULAR: No chest pain. + CHRONIC SOB. No leg cramps.   NEUROLOGICAL: No headaches. No vision changes.  BREASTS: No pain. No lumps. No discharge.  ABDOMEN: + RLQ PAIN.  No nausea. No vomiting. No diarrhea. No constipation.  REPRODUCTIVE: No abnormal bleeding.   VULVA: No pain. No lesions. No itching.  VAGINA: No relaxation. No itching. No odor. No discharge. No lesions.  URINARY: No incontinence. No nocturia. No frequency. No dysuria.    There were no vitals taken for this visit.    PE:  APPEARANCE: Well nourished, well developed, in no acute distress.  AFFECT: WNL, alert and oriented x 3.  SKIN: No hirsutism or acne.  NECK: Neck symmetric without masses or thyromegaly.  NODES: No inguinal, cervical, axillary or femoral lymph node enlargement.  CHEST: Good respiratory effort.   ABDOMEN: Soft. No tenderness or masses. NO PAIN ON EXAM.  BREASTS: Symmetrical, no skin changes or visible lesions. No palpable masses, nipple discharge bilaterally.  PELVIC: ATROPHIC EXTERNAL FEMALE GENITALIA without lesions. Normal hair distribution. Adequate perineal body, normal urethral meatus. VAGINA DRY / ATROPHICwithout lesions or discharge. CERVIX STENOTIC without lesions, discharge or tenderness. No significant cystocele or rectocele. Bimanual exam shows uterus to be normal size, regular, mobile and nontender. Adnexa without masses or tenderness. PAIN NOT REPRODUCED ON EXAM.  RECTAL: Rectovaginal exam confirms above with normal sphincter tone, no masses.  EXTREMITIES: No edema.    DIAGNOSIS:  1. Well woman exam with routine gynecological exam    2. Postmenopause    3. Pelvic pain in female        PLAN:    Orders Placed This Encounter    US Pelvis Complete Non OB   Up to date on  mammogram, DEXA and colonoscopy    COUNSELING:  The patient was counseled today on:  -osteoporosis prevention and regular weight bearing exercise;  -A.C.S. Pap and pelvic exam guidelines (pap every 3 years, no pap after age 65) and recommendations for yearly mammogram;  -to see her PCP for other health maintenance.    FOLLOW-UP with me pending test results and with Dr Kuo in two years.

## 2019-06-05 ENCOUNTER — HOSPITAL ENCOUNTER (OUTPATIENT)
Dept: RADIOLOGY | Facility: HOSPITAL | Age: 74
Discharge: HOME | End: 2019-06-05
Attending: NURSE PRACTITIONER
Payer: MEDICARE

## 2019-06-05 DIAGNOSIS — R10.2 PELVIC PAIN IN FEMALE: ICD-10-CM

## 2019-06-05 PROCEDURE — 76830 TRANSVAGINAL US NON-OB: CPT | Mod: TC

## 2019-06-05 PROCEDURE — 76830 TRANSVAGINAL US NON-OB: CPT | Mod: 26,,, | Performed by: RADIOLOGY

## 2019-06-05 PROCEDURE — 76856 US PELVIS COMP WITH TRANSVAG NON-OB (XPD): ICD-10-PCS | Mod: 26,,, | Performed by: RADIOLOGY

## 2019-06-05 PROCEDURE — 76830 US PELVIS COMP WITH TRANSVAG NON-OB (XPD): ICD-10-PCS | Mod: 26,,, | Performed by: RADIOLOGY

## 2019-06-05 PROCEDURE — 76856 US EXAM PELVIC COMPLETE: CPT | Mod: 26,,, | Performed by: RADIOLOGY

## 2019-06-10 ENCOUNTER — OFFICE VISIT (OUTPATIENT)
Dept: URGENT CARE | Facility: CLINIC | Age: 74
End: 2019-06-10
Payer: MEDICARE

## 2019-06-10 VITALS
BODY MASS INDEX: 23.56 KG/M2 | HEIGHT: 64 IN | OXYGEN SATURATION: 96 % | WEIGHT: 138 LBS | DIASTOLIC BLOOD PRESSURE: 74 MMHG | TEMPERATURE: 98 F | HEART RATE: 87 BPM | SYSTOLIC BLOOD PRESSURE: 128 MMHG

## 2019-06-10 DIAGNOSIS — J06.9 ACUTE URI: Primary | ICD-10-CM

## 2019-06-10 DIAGNOSIS — R03.0 ELEVATED BLOOD PRESSURE READING: ICD-10-CM

## 2019-06-10 PROCEDURE — 1101F PR PT FALLS ASSESS DOC 0-1 FALLS W/OUT INJ PAST YR: ICD-10-PCS | Mod: CPTII,S$GLB,, | Performed by: NURSE PRACTITIONER

## 2019-06-10 PROCEDURE — 99214 PR OFFICE/OUTPT VISIT, EST, LEVL IV, 30-39 MIN: ICD-10-PCS | Mod: S$GLB,,, | Performed by: NURSE PRACTITIONER

## 2019-06-10 PROCEDURE — 1101F PT FALLS ASSESS-DOCD LE1/YR: CPT | Mod: CPTII,S$GLB,, | Performed by: NURSE PRACTITIONER

## 2019-06-10 PROCEDURE — 99214 OFFICE O/P EST MOD 30 MIN: CPT | Mod: S$GLB,,, | Performed by: NURSE PRACTITIONER

## 2019-06-10 RX ORDER — FLUTICASONE PROPIONATE 50 MCG
1 SPRAY, SUSPENSION (ML) NASAL DAILY
Qty: 1 BOTTLE | Refills: 0 | Status: SHIPPED | OUTPATIENT
Start: 2019-06-10 | End: 2022-11-08

## 2019-06-10 RX ORDER — PREDNISONE 20 MG/1
20 TABLET ORAL DAILY
Qty: 4 TABLET | Refills: 0 | Status: SHIPPED | OUTPATIENT
Start: 2019-06-10 | End: 2019-06-14

## 2019-06-10 NOTE — PROGRESS NOTES
"Subjective:       Patient ID: Marycarmen Louis is a 73 y.o. female.    Vitals:  height is 5' 4" (1.626 m) and weight is 62.6 kg (138 lb). Her tympanic temperature is 97.8 °F (36.6 °C). Her blood pressure is 128/74 and her pulse is 87. Her oxygen saturation is 96%.     Chief Complaint: Sinus Problem    This is a 73-year-old female who presents with five-day history of intermittent postnasal drainage and sore throat with cough.  No fevers, no chest pain, no difficulty breathing or wheezing, no shortness of breath.  It is painful when she swallows but no difficulty swallowing or tolerating oral secretions, no drooling. Pt does have a copd history, no recent acute flares.    Sinus Problem   This is a new problem. The current episode started in the past 7 days. The problem has been gradually worsening since onset. There has been no fever. She is experiencing no pain. Associated symptoms include congestion, coughing, sinus pressure, sneezing, a sore throat and swollen glands. Pertinent negatives include no chills, diaphoresis, ear pain, headaches, hoarse voice, neck pain or shortness of breath. Treatments tried: Took mucinex once. The treatment provided no relief.       Constitution: Negative for chills, sweating, fatigue and fever.   HENT: Positive for congestion, postnasal drip, sinus pressure and sore throat. Negative for ear pain, sinus pain, trouble swallowing and voice change.    Neck: Negative for neck pain, neck stiffness and painful lymph nodes.   Cardiovascular: Negative for chest pain and leg swelling.   Eyes: Negative for eye discharge, eye itching and eye redness.   Respiratory: Positive for cough and COPD (no recent flares). Negative for chest tightness, sputum production, bloody sputum, shortness of breath, stridor, wheezing and asthma.    Gastrointestinal: Negative for abdominal pain, nausea, vomiting and diarrhea.   Genitourinary: Negative for dysuria and frequency.   Musculoskeletal: Negative for joint " pain, joint swelling and muscle ache.   Skin: Negative for pale and rash.   Allergic/Immunologic: Positive for sneezing. Negative for seasonal allergies and asthma.   Neurological: Negative for headaches.   Hematologic/Lymphatic: Negative for swollen lymph nodes.       Objective:      Physical Exam   Constitutional: She is oriented to person, place, and time. She appears well-developed and well-nourished. She is cooperative.  Non-toxic appearance. She does not appear ill. No distress.   HENT:   Head: Normocephalic and atraumatic.   Right Ear: Hearing, tympanic membrane, external ear and ear canal normal. No mastoid tenderness. Tympanic membrane is not erythematous.   Left Ear: Hearing, tympanic membrane, external ear and ear canal normal. No mastoid tenderness. Tympanic membrane is not erythematous.   Nose: Rhinorrhea present. No mucosal edema or nasal deformity. No epistaxis. Right sinus exhibits no maxillary sinus tenderness and no frontal sinus tenderness. Left sinus exhibits no maxillary sinus tenderness and no frontal sinus tenderness.   Mouth/Throat: Uvula is midline and mucous membranes are normal. Mucous membranes are not pale. No trismus in the jaw. Normal dentition. No uvula swelling. Posterior oropharyngeal erythema (with pnd) present. Tonsils are 0 on the right. Tonsils are 0 on the left. No tonsillar exudate.   Eyes: Conjunctivae and lids are normal. Right eye exhibits no discharge. Left eye exhibits no discharge. No scleral icterus.   Sclera clear bilat   Neck: Trachea normal, normal range of motion, full passive range of motion without pain and phonation normal. Neck supple. No tracheal deviation present.   Cardiovascular: Normal rate, regular rhythm, normal heart sounds, intact distal pulses and normal pulses.   No murmur heard.  Pulmonary/Chest: Effort normal and breath sounds normal. No respiratory distress. She has no wheezes. She exhibits no tenderness.   Abdominal: Soft. Normal appearance and  bowel sounds are normal. She exhibits no distension. There is no tenderness.   Musculoskeletal: Normal range of motion. She exhibits no edema or deformity.   Lymphadenopathy:     She has no cervical adenopathy.   Neurological: She is alert and oriented to person, place, and time. She exhibits normal muscle tone. Coordination normal.   Skin: Skin is warm, dry and intact. Capillary refill takes less than 2 seconds. No rash noted. She is not diaphoretic. No pallor.   Psychiatric: She has a normal mood and affect. Her speech is normal and behavior is normal. Judgment and thought content normal. Cognition and memory are normal.   Nursing note and vitals reviewed.      Assessment:       1. Acute URI    2. Elevated blood pressure reading        Plan:       Alert nontoxic and in no acute distress.  Afebrile.  On exam patient does have mild nasal congestion and postnasal drainage, no evidence of strep throat.  No significant nasal edema or sinus tenderness. Lungs are clear to auscultation with easy unlabored respirations and normal room air saturations.  No wheezing.  Speaking in full sentences without difficulty.  No concern for pneumonia are acute exacerbation of COPD.  Symptoms are likely viral at this time, symptomatic management, no antibiotics indicated.    Acute URI  -     fluticasone propionate (FLONASE) 50 mcg/actuation nasal spray; 1 spray (50 mcg total) by Each Nare route once daily.  Dispense: 1 Bottle; Refill: 0  -     predniSONE (DELTASONE) 20 MG tablet; Take 1 tablet (20 mg total) by mouth once daily. for 4 days  Dispense: 4 tablet; Refill: 0    Elevated blood pressure reading      Patient Instructions   Elevated Blood Pressure  Your blood pressure was elevated during your visit to the urgent care today.  It was not so high that immediate care was needed, but it is recommended that you monitor your blood pressure over the next week or two to make sure that it is not staying elevated.  If you are on blood  pressure medication currently, continue as already prescribed. Please have your blood pressure taken 2-3 times daily at different times of the day.  Keep a log of these blood pressure readings and take it with you to see your Primary Care Physician.  Bring today's discharge papers as well to your follow up appointment. If your blood pressure is consistently above 140/90, you should follow-up with your PCP without delay. If you develop chest pain, shortness of breath, dizziness, vision changes, or any other concerning symptoms, you should seek immediate care in the Emergency Department.          You have been diagnosed with a viral illness. Antibiotics will not help your infection to go away any faster.  You immune system must fight this illness.  You will likely have symptoms for 7-10 days as this is how long a typical virus lasts.  Below are a few things that you can do at home to help yourself feel better in the mean time.     1.  For patients above 6 months of age who are not allergic to and are not on anticoagulants, you can alternate Tylenol and Motrin every 4-6 hours for fever above 100.4F and/or pain.  For patients less than 6 months of age, allergic to or intolerant to NSAIDS, have gastritis, gastric ulcers, or history of GI bleeds, are pregnant, or are on anticoagulant therapy, you can take Tylenol every 4 hours as needed for fever above 100.4F and/or pain.     2.  Rest and keep yourself well hydrated.  Drink hot liquids (coffee, water, tea, hot chocolate, or soup) 10-12 times a day for 5-7 days.  Put liquid in a mug and place in microwave for 2.5 - 3 minutes. Pour hot liquid into another mug not used to microwave the liquid (to avoid burning your mouth) then sniff the steam from the cup and sip the heated liquid.    3.  You can use these over the counter medications/remedies to help with your symptoms:     Runny Nose:  Use an antihistamine such as Claritin, Zyrtec or Allegra to help dry you out.      Congestion:  Use pseudoephedrine (behind the counter) for congestion- Pseudoephedrine 30 mg up to 240 mg /day. Warning:  It can raise your blood pressure and give you palpitations, avoid with hypertension, palpitation history or severe cardiac disease.  Coricidin HBP is okay to use if you have high blood pressure.     Use mucinex (guaifenisin) up to 2400mg/day to break up/loosen any mucous. MucinexDM has a cough suppressant that can be used for cough and at night to stop the tickle in the back of your throat. Do not take Mucinex-D if taking pseudoephedrine or other decongestant.    Use Nasal Saline to mechanically move any post nasal drip from your eustachian tubes or from the back of your throat.    Use Afrin in each nare, for no longer than 3 days, as it is addictive. It can also dry out your mucous membranes and cause elevated blood pressure.    Use Flonase 1-2 sprays/nostril per day. It is a local acting steroid nasal spray.  If you develop a bloody nose, stop using the medication immediately. Lightly sniff into nares as the upper nasal mucosa is where this medication works, not the throat.     Sore throat:  Use warm, salt water gargles to ease your throat pain- 1/2 tsp salt to 1 cup warm water, gargle as desired.  Chloraseptic sprays and throat lozenges will also help to ease throat pain. Continue to push fluids, the drier your throat the more it will hurt.    Sometimes Nyquil at night is beneficial to help you get some rest; however, it is sedating and does contain an antihistamine and Tylenol.  Make sure not to double up on these medications. Nyquil may additionally raise you blood pressure.       These things will help you to feel better and will speed your recovery.  If your condition fails to improve in a timely manner, you should receive another evaluation by your Primary Care Provider/Pediatrician to discuss your concerns or return to urgent care for a recheck.  If your condition worsens at any time,  you should report immediately to your nearest Emergency Department for further evaluation. **You must understand that you have received Urgent Care treatment only and that you may be released before all of your medical problems are known or treated. You, the patient, are responsible to arrange for follow-up care as instructed.         ·   ·   ·   · Follow up with your primary care in 2-5 days if symptoms have not improved, or you may return here.  · If you were referred to a specialist, please follow up with that specialty.  · If you were prescribed antibiotics, please take them to completion.  · If you were prescribed a narcotic or any medication with sedative effects, do not drive or operate heavy equipment or machinery while taking these medications.  · You must understand that you have received treatment at an Urgent Care facility only, and that you may be released before all of your medical problems are known or treated. Urgent Care facilities are not equipped to handle life threatening emergencies. It is recommended that you go to an Emergency Department for further evaluation of worsening or concerning symptoms, or possibly life threatening conditions as discussed.                                        If you  smoke, please stop smoking

## 2019-06-11 NOTE — PATIENT INSTRUCTIONS
Elevated Blood Pressure  Your blood pressure was elevated during your visit to the urgent care today.  It was not so high that immediate care was needed, but it is recommended that you monitor your blood pressure over the next week or two to make sure that it is not staying elevated.  If you are on blood pressure medication currently, continue as already prescribed. Please have your blood pressure taken 2-3 times daily at different times of the day.  Keep a log of these blood pressure readings and take it with you to see your Primary Care Physician.  Bring today's discharge papers as well to your follow up appointment. If your blood pressure is consistently above 140/90, you should follow-up with your PCP without delay. If you develop chest pain, shortness of breath, dizziness, vision changes, or any other concerning symptoms, you should seek immediate care in the Emergency Department.          You have been diagnosed with a viral illness. Antibiotics will not help your infection to go away any faster.  You immune system must fight this illness.  You will likely have symptoms for 7-10 days as this is how long a typical virus lasts.  Below are a few things that you can do at home to help yourself feel better in the mean time.     1.  For patients above 6 months of age who are not allergic to and are not on anticoagulants, you can alternate Tylenol and Motrin every 4-6 hours for fever above 100.4F and/or pain.  For patients less than 6 months of age, allergic to or intolerant to NSAIDS, have gastritis, gastric ulcers, or history of GI bleeds, are pregnant, or are on anticoagulant therapy, you can take Tylenol every 4 hours as needed for fever above 100.4F and/or pain.     2.  Rest and keep yourself well hydrated.  Drink hot liquids (coffee, water, tea, hot chocolate, or soup) 10-12 times a day for 5-7 days.  Put liquid in a mug and place in microwave for 2.5 - 3 minutes. Pour hot liquid into another mug not used to  microwave the liquid (to avoid burning your mouth) then sniff the steam from the cup and sip the heated liquid.    3.  You can use these over the counter medications/remedies to help with your symptoms:     Runny Nose:  Use an antihistamine such as Claritin, Zyrtec or Allegra to help dry you out.     Congestion:  Use pseudoephedrine (behind the counter) for congestion- Pseudoephedrine 30 mg up to 240 mg /day. Warning:  It can raise your blood pressure and give you palpitations, avoid with hypertension, palpitation history or severe cardiac disease.  Coricidin HBP is okay to use if you have high blood pressure.     Use mucinex (guaifenisin) up to 2400mg/day to break up/loosen any mucous. MucinexDM has a cough suppressant that can be used for cough and at night to stop the tickle in the back of your throat. Do not take Mucinex-D if taking pseudoephedrine or other decongestant.    Use Nasal Saline to mechanically move any post nasal drip from your eustachian tubes or from the back of your throat.    Use Afrin in each nare, for no longer than 3 days, as it is addictive. It can also dry out your mucous membranes and cause elevated blood pressure.    Use Flonase 1-2 sprays/nostril per day. It is a local acting steroid nasal spray.  If you develop a bloody nose, stop using the medication immediately. Lightly sniff into nares as the upper nasal mucosa is where this medication works, not the throat.     Sore throat:  Use warm, salt water gargles to ease your throat pain- 1/2 tsp salt to 1 cup warm water, gargle as desired.  Chloraseptic sprays and throat lozenges will also help to ease throat pain. Continue to push fluids, the drier your throat the more it will hurt.    Sometimes Nyquil at night is beneficial to help you get some rest; however, it is sedating and does contain an antihistamine and Tylenol.  Make sure not to double up on these medications. Nyquil may additionally raise you blood pressure.       These things  will help you to feel better and will speed your recovery.  If your condition fails to improve in a timely manner, you should receive another evaluation by your Primary Care Provider/Pediatrician to discuss your concerns or return to urgent care for a recheck.  If your condition worsens at any time, you should report immediately to your nearest Emergency Department for further evaluation. **You must understand that you have received Urgent Care treatment only and that you may be released before all of your medical problems are known or treated. You, the patient, are responsible to arrange for follow-up care as instructed.         ·   ·   ·   · Follow up with your primary care in 2-5 days if symptoms have not improved, or you may return here.  · If you were referred to a specialist, please follow up with that specialty.  · If you were prescribed antibiotics, please take them to completion.  · If you were prescribed a narcotic or any medication with sedative effects, do not drive or operate heavy equipment or machinery while taking these medications.  · You must understand that you have received treatment at an Urgent Care facility only, and that you may be released before all of your medical problems are known or treated. Urgent Care facilities are not equipped to handle life threatening emergencies. It is recommended that you go to an Emergency Department for further evaluation of worsening or concerning symptoms, or possibly life threatening conditions as discussed.                                        If you  smoke, please stop smoking

## 2019-06-13 ENCOUNTER — TELEPHONE (OUTPATIENT)
Dept: URGENT CARE | Facility: CLINIC | Age: 74
End: 2019-06-13

## 2019-08-02 ENCOUNTER — INFUSION (OUTPATIENT)
Dept: INFUSION THERAPY | Facility: HOSPITAL | Age: 74
End: 2019-08-02
Attending: FAMILY MEDICINE
Payer: MEDICARE

## 2019-08-02 VITALS
WEIGHT: 140 LBS | SYSTOLIC BLOOD PRESSURE: 103 MMHG | RESPIRATION RATE: 18 BRPM | DIASTOLIC BLOOD PRESSURE: 63 MMHG | TEMPERATURE: 97 F | HEART RATE: 80 BPM | HEIGHT: 64 IN | BODY MASS INDEX: 23.9 KG/M2

## 2019-08-02 DIAGNOSIS — M81.0 OSTEOPOROSIS, UNSPECIFIED OSTEOPOROSIS TYPE, UNSPECIFIED PATHOLOGICAL FRACTURE PRESENCE: Primary | ICD-10-CM

## 2019-08-02 PROCEDURE — 63600175 PHARM REV CODE 636 W HCPCS: Performed by: FAMILY MEDICINE

## 2019-08-02 PROCEDURE — 96372 THER/PROPH/DIAG INJ SC/IM: CPT

## 2019-08-02 RX ADMIN — DENOSUMAB 60 MG: 60 INJECTION SUBCUTANEOUS at 11:08

## 2019-10-09 DIAGNOSIS — F32.A DEPRESSION, UNSPECIFIED DEPRESSION TYPE: ICD-10-CM

## 2019-10-09 DIAGNOSIS — F41.9 ANXIETY: ICD-10-CM

## 2019-10-09 RX ORDER — VENLAFAXINE HYDROCHLORIDE 75 MG/1
CAPSULE, EXTENDED RELEASE ORAL
Qty: 30 CAPSULE | Refills: 0 | Status: SHIPPED | OUTPATIENT
Start: 2019-10-09 | End: 2019-10-18 | Stop reason: SDUPTHER

## 2019-10-10 ENCOUNTER — IMMUNIZATION (OUTPATIENT)
Dept: FAMILY MEDICINE | Facility: CLINIC | Age: 74
End: 2019-10-10
Payer: MEDICARE

## 2019-10-10 PROCEDURE — G0008 FLU VACCINE - HIGH DOSE (65+) PRESERVATIVE FREE IM: ICD-10-PCS | Mod: S$GLB,,, | Performed by: FAMILY MEDICINE

## 2019-10-10 PROCEDURE — G0008 ADMIN INFLUENZA VIRUS VAC: HCPCS | Mod: S$GLB,,, | Performed by: FAMILY MEDICINE

## 2019-10-10 PROCEDURE — 90662 IIV NO PRSV INCREASED AG IM: CPT | Mod: S$GLB,,, | Performed by: FAMILY MEDICINE

## 2019-10-10 PROCEDURE — 90662 FLU VACCINE - HIGH DOSE (65+) PRESERVATIVE FREE IM: ICD-10-PCS | Mod: S$GLB,,, | Performed by: FAMILY MEDICINE

## 2019-10-18 DIAGNOSIS — F32.A DEPRESSION, UNSPECIFIED DEPRESSION TYPE: ICD-10-CM

## 2019-10-18 DIAGNOSIS — F41.9 ANXIETY: ICD-10-CM

## 2019-10-20 RX ORDER — VENLAFAXINE HYDROCHLORIDE 75 MG/1
CAPSULE, EXTENDED RELEASE ORAL
Qty: 30 CAPSULE | Refills: 0 | Status: SHIPPED | OUTPATIENT
Start: 2019-10-20 | End: 2019-11-29 | Stop reason: SDUPTHER

## 2019-11-10 DIAGNOSIS — F41.9 ANXIETY: ICD-10-CM

## 2019-11-10 DIAGNOSIS — F32.A DEPRESSION, UNSPECIFIED DEPRESSION TYPE: ICD-10-CM

## 2019-11-10 RX ORDER — VENLAFAXINE HYDROCHLORIDE 75 MG/1
CAPSULE, EXTENDED RELEASE ORAL
Qty: 30 CAPSULE | Refills: 0 | Status: SHIPPED | OUTPATIENT
Start: 2019-11-10 | End: 2019-11-29

## 2019-11-29 ENCOUNTER — OFFICE VISIT (OUTPATIENT)
Dept: FAMILY MEDICINE | Facility: CLINIC | Age: 74
End: 2019-11-29
Payer: MEDICARE

## 2019-11-29 VITALS
HEIGHT: 63 IN | DIASTOLIC BLOOD PRESSURE: 80 MMHG | BODY MASS INDEX: 24.91 KG/M2 | WEIGHT: 140.56 LBS | SYSTOLIC BLOOD PRESSURE: 120 MMHG | OXYGEN SATURATION: 98 % | HEART RATE: 95 BPM | TEMPERATURE: 98 F

## 2019-11-29 DIAGNOSIS — I25.10 CORONARY ARTERY CALCIFICATION SEEN ON CAT SCAN: ICD-10-CM

## 2019-11-29 DIAGNOSIS — K29.00 ACUTE GASTRITIS WITHOUT HEMORRHAGE, UNSPECIFIED GASTRITIS TYPE: Primary | ICD-10-CM

## 2019-11-29 DIAGNOSIS — F32.A DEPRESSION, UNSPECIFIED DEPRESSION TYPE: ICD-10-CM

## 2019-11-29 DIAGNOSIS — Z23 NEED FOR DIPHTHERIA, TETANUS, PERTUSSIS, AND HIB VACCINATION: ICD-10-CM

## 2019-11-29 DIAGNOSIS — M85.89 OSTEOPENIA OF MULTIPLE SITES: Chronic | ICD-10-CM

## 2019-11-29 DIAGNOSIS — J43.1 PANLOBULAR EMPHYSEMA: Chronic | ICD-10-CM

## 2019-11-29 DIAGNOSIS — F41.9 ANXIETY: ICD-10-CM

## 2019-11-29 PROBLEM — S62.102A LEFT WRIST FRACTURE: Status: RESOLVED | Noted: 2017-02-14 | Resolved: 2019-11-29

## 2019-11-29 PROBLEM — J81.1 PULMONARY EDEMA: Status: RESOLVED | Noted: 2017-02-16 | Resolved: 2019-11-29

## 2019-11-29 PROBLEM — D62 ACUTE BLOOD LOSS ANEMIA: Status: RESOLVED | Noted: 2017-02-19 | Resolved: 2019-11-29

## 2019-11-29 PROBLEM — S72.002A CLOSED FRACTURE OF LEFT HIP: Status: RESOLVED | Noted: 2017-02-15 | Resolved: 2019-11-29

## 2019-11-29 PROBLEM — J44.9 CHRONIC OBSTRUCTIVE LUNG DISEASE: Status: ACTIVE | Noted: 2017-08-07

## 2019-11-29 PROBLEM — J96.01 ACUTE RESPIRATORY FAILURE WITH HYPOXIA: Status: RESOLVED | Noted: 2017-02-20 | Resolved: 2019-11-29

## 2019-11-29 PROBLEM — Z12.11 SCREENING FOR COLON CANCER: Status: RESOLVED | Noted: 2018-03-08 | Resolved: 2019-11-29

## 2019-11-29 PROCEDURE — 99999 PR PBB SHADOW E&M-EST. PATIENT-LVL III: ICD-10-PCS | Mod: PBBFAC,,, | Performed by: INTERNAL MEDICINE

## 2019-11-29 PROCEDURE — 1126F PR PAIN SEVERITY QUANTIFIED, NO PAIN PRESENT: ICD-10-PCS | Mod: S$GLB,,, | Performed by: INTERNAL MEDICINE

## 2019-11-29 PROCEDURE — 99999 PR PBB SHADOW E&M-EST. PATIENT-LVL III: CPT | Mod: PBBFAC,,, | Performed by: INTERNAL MEDICINE

## 2019-11-29 PROCEDURE — 1126F AMNT PAIN NOTED NONE PRSNT: CPT | Mod: S$GLB,,, | Performed by: INTERNAL MEDICINE

## 2019-11-29 PROCEDURE — 1101F PT FALLS ASSESS-DOCD LE1/YR: CPT | Mod: CPTII,S$GLB,, | Performed by: INTERNAL MEDICINE

## 2019-11-29 PROCEDURE — 1159F MED LIST DOCD IN RCRD: CPT | Mod: S$GLB,,, | Performed by: INTERNAL MEDICINE

## 2019-11-29 PROCEDURE — 99499 RISK ADDL DX/OHS AUDIT: ICD-10-PCS | Mod: S$GLB,,, | Performed by: INTERNAL MEDICINE

## 2019-11-29 PROCEDURE — 99214 OFFICE O/P EST MOD 30 MIN: CPT | Mod: S$GLB,,, | Performed by: INTERNAL MEDICINE

## 2019-11-29 PROCEDURE — 1101F PR PT FALLS ASSESS DOC 0-1 FALLS W/OUT INJ PAST YR: ICD-10-PCS | Mod: CPTII,S$GLB,, | Performed by: INTERNAL MEDICINE

## 2019-11-29 PROCEDURE — 99214 PR OFFICE/OUTPT VISIT, EST, LEVL IV, 30-39 MIN: ICD-10-PCS | Mod: S$GLB,,, | Performed by: INTERNAL MEDICINE

## 2019-11-29 PROCEDURE — 1159F PR MEDICATION LIST DOCUMENTED IN MEDICAL RECORD: ICD-10-PCS | Mod: S$GLB,,, | Performed by: INTERNAL MEDICINE

## 2019-11-29 PROCEDURE — 99499 UNLISTED E&M SERVICE: CPT | Mod: S$GLB,,, | Performed by: INTERNAL MEDICINE

## 2019-11-29 RX ORDER — VENLAFAXINE HYDROCHLORIDE 75 MG/1
CAPSULE, EXTENDED RELEASE ORAL
Qty: 90 CAPSULE | Refills: 1 | Status: SHIPPED | OUTPATIENT
Start: 2019-11-29 | End: 2019-12-03 | Stop reason: SDUPTHER

## 2019-11-29 RX ORDER — AA/PROT/LYSINE/METHIO/VIT C/B6 50-12.5 MG
10 TABLET ORAL DAILY
COMMUNITY
End: 2023-05-24

## 2019-11-29 RX ORDER — OMEPRAZOLE 40 MG/1
CAPSULE, DELAYED RELEASE ORAL
Qty: 90 CAPSULE | Refills: 0 | Status: SHIPPED | OUTPATIENT
Start: 2019-11-29 | End: 2020-02-21

## 2019-11-29 NOTE — ASSESSMENT & PLAN NOTE
The current medical regimen is effective;  continue present plan and medications. Denies ever having PFTs.  Will discuss this further at follow up with consideration of PFTs at that time vs continuation of medication.

## 2019-11-29 NOTE — PROGRESS NOTES
Assessment & Plan (all problems are new to me)  Problem List Items Addressed This Visit        Pulmonary    Panlobular emphysema (Chronic)    Current Assessment & Plan     The current medical regimen is effective;  continue present plan and medications. Denies ever having PFTs.  Will discuss this further at follow up with consideration of PFTs at that time vs continuation of medication.          Relevant Medications    umeclidinium-vilanterol (ANORO ELLIPTA) 62.5-25 mcg/actuation DsDv       Cardiac/Vascular    Coronary artery calcification seen on CAT scan (Chronic)    Current Assessment & Plan     Stable, asymptomatic chronic condition.  Will continue to maximize risk factor reduction and adjust medication as needed.             Orthopedic    Osteopenia of multiple sites (Chronic)    Overview     Recently improved from osteoporosis with Prolia.          Current Assessment & Plan     The current medical regimen is effective;  continue present plan and medications.            Other Visit Diagnoses     Acute gastritis without hemorrhage, unspecified gastritis type    -  Primary  -    Trial of PPI x 4 weeks.  Dietary log.      Relevant Medications    omeprazole (PRILOSEC) 40 MG capsule    Need for diphtheria, tetanus, pertussis, and Hib vaccination    -  Sent to pharmacy    Relevant Medications    diphth,pertus,acell,,tetanus (BOOSTRIX) 2.5-8-5 Lf-mcg-Lf/0.5mL Susp    Anxiety    -  The current medical regimen is effective;  continue present plan and medications.     Relevant Medications    venlafaxine (EFFEXOR-XR) 75 MG 24 hr capsule    Depression, unspecified depression type    -  The current medical regimen is effective;  continue present plan and medications.     Relevant Medications    venlafaxine (EFFEXOR-XR) 75 MG 24 hr capsule            Health Maintenance reviewed, as above.    Follow-up: Follow up for Routine Physical July 2020.  ______________________________________________________________________    Chief  Complaint  Chief Complaint   Patient presents with    Establish Care     new to pcp       MARIE  Marycarmen Louis is a 74 y.o. female with multiple medical diagnoses as listed in the medical history and problem list that presents for establish care.  Pt is new to me but is known to primary care with her last appointment being 03/2019.  She is requesting to change her PCP to me since I am her 's PCP.      The patient was previously followed by one of my partners that another clinic .  I have reviewed their most recent previous OV with their previous PCP, most recent labs and most recent imaging studies and interpreted them myself.     She has been having increased abdominal pain.  Has been taking [employer]'s colon health probiotic each morning which helps her pain.  Also taking more Tums now every other day.  Will take 1-3 tabs to control the pain.  Pain starts in the RUQ and then will travel throughout the rest of her abdomen.  Symptoms get worse in throughout the day.  No clear association with fatty or spicy foods.  She generally follows a healthier food lifestyle.  Now getting nausea but no emesis.  Has some constipation that she controls with OTC stool softener.  No diarrhea, BRBPR, melena, acholic stools.      PAST MEDICAL HISTORY:  Past Medical History:   Diagnosis Date    Anxiety     Arthritis     Cancer     salivary gland    Closed fracture of left hip 2/15/2017    COPD (chronic obstructive pulmonary disease)     Degenerative disc disease     Depression     Emphysema of lung     Fractured hip     General anesthetics causing adverse effect in therapeutic use     Left wrist fracture 2/14/2017    Meibomianitis 11/3/10    Memory loss     Nuclear sclerosis, bilateral 10/2/2017    Osteoporosis     Platelet disorder     PONV (postoperative nausea and vomiting)        PAST SURGICAL HISTORY:  Past Surgical History:   Procedure Laterality Date    APPENDECTOMY      arm surgery      left wrist  pinning    BREAST BIOPSY Left     CATARACT EXTRACTION W/  INTRAOCULAR LENS IMPLANT Right 10/02/2017    Dr. Liriano    CATARACT EXTRACTION W/  INTRAOCULAR LENS IMPLANT Left 10/30/2017    Dr. Liriano    COLONOSCOPY N/A 3/8/2018    Procedure: COLONOSCOPY;  Surgeon: Hari Dumont MD;  Location: [address]);  Service: Endoscopy;  Laterality: N/A;  5 year f/u-2/16/18 pt confirmed appt/MS    JOINT REPLACEMENT  2014    left TKA    salviary gland removed      TOTAL HIP ARTHROPLASTY         SOCIAL HISTORY:  Social History     Socioeconomic History    Marital status:      Spouse name: Not on file    Number of children: Not on file    Years of education: Not on file    Highest education level: Not on file   Occupational History    Not on file   Social Needs    Financial resource strain: Not on file    Food insecurity:     Worry: Not on file     Inability: Not on file    Transportation needs:     Medical: Not on file     Non-medical: Not on file   Tobacco Use    Smoking status: Former Smoker    Smokeless tobacco: Never Used    Tobacco comment: quit 30yrs ago, discussed importance of continued cessation   Substance and Sexual Activity    Alcohol use: Yes     Comment: occasionally    Drug use: No    Sexual activity: Not Currently     Partners: Male     Birth control/protection: Post-menopausal   Lifestyle    Physical activity:     Days per week: Not on file     Minutes per session: Not on file    Stress: Not on file   Relationships    Social connections:     Talks on phone: Not on file     Gets together: Not on file     Attends Voodoo service: Not on file     Active member of club or organization: Not on file     Attends meetings of clubs or organizations: Not on file     Relationship status: Not on file   Other Topics Concern    Not on file   Social History Narrative    Not on file       FAMILY HISTORY:  Family History   Problem Relation Age of Onset    Heart disease Mother     Cancer  Father         colon    Diabetes Father     No Known Problems Sister     Cancer Brother         liver kidney bone    Diabetes Brother     No Known Problems Maternal Aunt     No Known Problems Maternal Uncle     No Known Problems Paternal Aunt     No Known Problems Paternal Uncle     No Known Problems Maternal Grandmother     No Known Problems Maternal Grandfather     No Known Problems Paternal Grandmother     No Known Problems Paternal Grandfather     Hypertension Brother     Breast cancer Neg Hx     Ovarian cancer Neg Hx        ALLERGIES AND MEDICATIONS: updated and reviewed.  Review of patient's allergies indicates:   Allergen Reactions    Codeine Nausea And Vomiting     Other reaction(s): Vomiting  Other reaction(s): Headache    Fentanyl Nausea And Vomiting     Other reaction(s): Vomiting  Other reaction(s): Nausea    Phenytoin sodium extended      Other reaction(s): Vomiting  Other reaction(s): Nausea    Versed  [midazolam] Nausea And Vomiting    Vicodin  [hydrocodone-acetaminophen]      Other reaction(s): Vomiting     Current Outpatient Medications   Medication Sig Dispense Refill    coenzyme Q10 (CO Q-10) 10 mg capsule Take 10 mg by mouth once daily.      CYANOCOBALAMIN, VITAMIN B-12, (VITAMIN B-12 ORAL) Take by mouth.      denosumab (PROLIA) 60 mg/mL Syrg Inject 1 mL (60 mg total) into the skin every 6 (six) months. 1 Syringe 1    DOCUSATE CALCIUM (STOOL SOFTENER ORAL) Take by mouth.      LACTOBAC NO.41/BIFIDOBACT NO.7 (PROBIOTIC-10 ORAL) Take by mouth.      melatonin 5 mg Tab Take 5 mg by mouth nightly.       multivitamin capsule Take 1 capsule by mouth once daily.      RESTASIS 0.05 % ophthalmic emulsion Place 0.02 mLs (0.05 drops total) into both eyes 2 (two) times daily. 60 vial 11    umeclidinium-vilanterol (ANORO ELLIPTA) 62.5-25 mcg/actuation DsDv INHALE 1 PUFF INTO THE LUNGS ONCE DAILY 1 each 5    venlafaxine (EFFEXOR-XR) 75 MG 24 hr capsule TAKE 1 CAPSULE(75 MG) BY MOUTH  "EVERY DAY 90 capsule 1    cetirizine (ZYRTEC) 10 MG tablet Take 1 tablet (10 mg total) by mouth once daily. 30 tablet 5    diphth,pertus,acell,,tetanus (BOOSTRIX) 2.5-8-5 Lf-mcg-Lf/0.5mL Susp Inject 0.5 mLs into the muscle once. for 1 dose 0.5 mL 0    fluticasone propionate (FLONASE) 50 mcg/actuation nasal spray 1 spray (50 mcg total) by Each Nare route once daily. (Patient not taking: Reported on 11/29/2019) 1 Bottle 0    omeprazole (PRILOSEC) 40 MG capsule Take 1 cap PO daily x 4 weeks then 1 cap PO daily PRN upset stomach.  Take 30 minutes before breakfast or coffee 90 capsule 0     No current facility-administered medications for this visit.          ROS  Review of Systems   Constitutional: Negative for chills, fever and unexpected weight change.   HENT: Negative for congestion, ear pain, hearing loss, rhinorrhea, sore throat and trouble swallowing.    Eyes: Negative for discharge, redness and visual disturbance.   Respiratory: Negative for cough, chest tightness, shortness of breath and wheezing.    Cardiovascular: Negative for chest pain, palpitations and leg swelling.   Gastrointestinal: Positive for abdominal pain. Negative for constipation, diarrhea, nausea and vomiting.   Endocrine: Negative for polydipsia, polyphagia and polyuria.   Genitourinary: Negative for decreased urine volume, dysuria and hematuria.   Musculoskeletal: Negative for arthralgias, joint swelling and myalgias.   Skin: Negative for color change and rash.   Neurological: Negative for dizziness, weakness, light-headedness and headaches.   Psychiatric/Behavioral: Positive for dysphoric mood. Negative for decreased concentration, sleep disturbance and suicidal ideas. The patient is nervous/anxious.          Physical Exam  Vitals:    11/29/19 1003   BP: 120/80   Pulse: 95   Temp: 98.3 °F (36.8 °C)   SpO2: 98%   Weight: 63.7 kg (140 lb 8.7 oz)   Height: 5' 3" (1.6 m)    Body mass index is 24.9 kg/m².  Weight: 63.7 kg (140 lb 8.7 oz) " "  Height: 5' 3" (160 cm)   Physical Exam   Constitutional: She is oriented to person, place, and time. She appears well-developed and well-nourished. No distress.   HENT:   Head: Normocephalic and atraumatic.   Eyes: Pupils are equal, round, and reactive to light. Conjunctivae, EOM and lids are normal. No scleral icterus.   Neck: Full passive range of motion without pain. Neck supple. No JVD present. Carotid bruit is not present. No thyromegaly present.   Cardiovascular: Normal rate, regular rhythm, normal heart sounds, intact distal pulses and normal pulses. Exam reveals no S3, no S4 and no friction rub.   No murmur heard.  Pulmonary/Chest: Effort normal and breath sounds normal. She has no wheezes. She has no rhonchi. She has no rales.   Abdominal: Soft. Bowel sounds are normal. There is no tenderness.   Musculoskeletal: She exhibits no edema or tenderness.   Lymphadenopathy:        Head (right side): No submental and no submandibular adenopathy present.        Head (left side): No submental and no submandibular adenopathy present.     She has no cervical adenopathy.   Neurological: She is alert and oriented to person, place, and time.   Motor grossly intact.  Sensory grossly intact.  Symmetric facial movements palate elevated symmetrically tongue midline   Skin: Skin is warm and dry. No rash noted.   Psychiatric: She has a normal mood and affect. Her speech is normal and behavior is normal. Thought content normal.           Health Maintenance       Date Due Completion Date    TETANUS VACCINE 10/14/1965 ---    Aspirin/Antiplatelet Therapy 10/14/1965 ---    Mammogram 04/08/2021 4/8/2019    DEXA SCAN 04/08/2022 4/8/2019    Colonoscopy 03/08/2023 3/8/2018    Lipid Panel 03/26/2024 3/26/2019          "

## 2019-12-03 DIAGNOSIS — F32.A DEPRESSION, UNSPECIFIED DEPRESSION TYPE: ICD-10-CM

## 2019-12-03 DIAGNOSIS — J43.1 PANLOBULAR EMPHYSEMA: Chronic | ICD-10-CM

## 2019-12-03 DIAGNOSIS — F41.9 ANXIETY: ICD-10-CM

## 2019-12-03 RX ORDER — VENLAFAXINE HYDROCHLORIDE 75 MG/1
CAPSULE, EXTENDED RELEASE ORAL
Qty: 90 CAPSULE | Refills: 1 | Status: SHIPPED | OUTPATIENT
Start: 2019-12-03 | End: 2020-08-27

## 2019-12-11 ENCOUNTER — PATIENT MESSAGE (OUTPATIENT)
Dept: FAMILY MEDICINE | Facility: CLINIC | Age: 74
End: 2019-12-11

## 2019-12-12 ENCOUNTER — PATIENT MESSAGE (OUTPATIENT)
Dept: FAMILY MEDICINE | Facility: CLINIC | Age: 74
End: 2019-12-12

## 2020-01-08 ENCOUNTER — PATIENT MESSAGE (OUTPATIENT)
Dept: FAMILY MEDICINE | Facility: CLINIC | Age: 75
End: 2020-01-08

## 2020-01-08 DIAGNOSIS — R10.31 RLQ ABDOMINAL PAIN: Primary | ICD-10-CM

## 2020-01-09 ENCOUNTER — PATIENT MESSAGE (OUTPATIENT)
Dept: FAMILY MEDICINE | Facility: CLINIC | Age: 75
End: 2020-01-09

## 2020-01-22 ENCOUNTER — TELEPHONE (OUTPATIENT)
Dept: INFUSION THERAPY | Facility: HOSPITAL | Age: 75
End: 2020-01-22

## 2020-01-22 ENCOUNTER — HOSPITAL ENCOUNTER (OUTPATIENT)
Dept: RADIOLOGY | Facility: HOSPITAL | Age: 75
Discharge: HOME | End: 2020-01-22
Attending: NURSE PRACTITIONER
Payer: MEDICARE

## 2020-01-22 DIAGNOSIS — R10.31 RLQ ABDOMINAL PAIN: ICD-10-CM

## 2020-01-22 DIAGNOSIS — M81.0 OSTEOPOROSIS, UNSPECIFIED OSTEOPOROSIS TYPE, UNSPECIFIED PATHOLOGICAL FRACTURE PRESENCE: ICD-10-CM

## 2020-01-22 PROCEDURE — 74177 CT ABDOMEN PELVIS WITH CONTRAST: ICD-10-PCS | Mod: 26,,, | Performed by: RADIOLOGY

## 2020-01-22 PROCEDURE — 25500020 PHARM REV CODE 255: Performed by: NURSE PRACTITIONER

## 2020-01-22 PROCEDURE — 74177 CT ABD & PELVIS W/CONTRAST: CPT | Mod: TC

## 2020-01-22 PROCEDURE — 74177 CT ABD & PELVIS W/CONTRAST: CPT | Mod: 26,,, | Performed by: RADIOLOGY

## 2020-01-22 RX ADMIN — IOHEXOL 75 ML: 350 INJECTION, SOLUTION INTRAVENOUS at 03:01

## 2020-01-22 RX ADMIN — IOHEXOL 15 ML: 300 INJECTION, SOLUTION INTRAVENOUS at 02:01

## 2020-01-22 NOTE — TELEPHONE ENCOUNTER
Dr Moulton;  Mrs Conroy's due for her Prolia injection.  Can you please send me a rx for Prolia.  Her last DEXA was 2019.  Thanks,  Dorothy

## 2020-01-23 ENCOUNTER — PATIENT MESSAGE (OUTPATIENT)
Dept: FAMILY MEDICINE | Facility: CLINIC | Age: 75
End: 2020-01-23

## 2020-02-03 ENCOUNTER — INFUSION (OUTPATIENT)
Dept: INFUSION THERAPY | Facility: HOSPITAL | Age: 75
End: 2020-02-03
Attending: FAMILY MEDICINE
Payer: MEDICARE

## 2020-02-03 VITALS
TEMPERATURE: 97 F | SYSTOLIC BLOOD PRESSURE: 128 MMHG | DIASTOLIC BLOOD PRESSURE: 60 MMHG | RESPIRATION RATE: 18 BRPM | HEART RATE: 93 BPM

## 2020-02-03 DIAGNOSIS — M85.89 OSTEOPENIA OF MULTIPLE SITES: Primary | ICD-10-CM

## 2020-02-03 PROCEDURE — 96372 THER/PROPH/DIAG INJ SC/IM: CPT

## 2020-02-03 PROCEDURE — 63600175 PHARM REV CODE 636 W HCPCS: Performed by: FAMILY MEDICINE

## 2020-02-03 RX ADMIN — DENOSUMAB 60 MG: 60 INJECTION SUBCUTANEOUS at 01:02

## 2020-02-20 ENCOUNTER — PATIENT OUTREACH (OUTPATIENT)
Dept: ADMINISTRATIVE | Facility: OTHER | Age: 75
End: 2020-02-20

## 2020-02-21 ENCOUNTER — OFFICE VISIT (OUTPATIENT)
Dept: GASTROENTEROLOGY | Facility: CLINIC | Age: 75
End: 2020-02-21
Payer: MEDICARE

## 2020-02-21 VITALS
SYSTOLIC BLOOD PRESSURE: 140 MMHG | BODY MASS INDEX: 24.72 KG/M2 | DIASTOLIC BLOOD PRESSURE: 70 MMHG | WEIGHT: 139.56 LBS

## 2020-02-21 DIAGNOSIS — K21.9 GASTROESOPHAGEAL REFLUX DISEASE, ESOPHAGITIS PRESENCE NOT SPECIFIED: ICD-10-CM

## 2020-02-21 DIAGNOSIS — K59.00 CONSTIPATION, UNSPECIFIED CONSTIPATION TYPE: ICD-10-CM

## 2020-02-21 DIAGNOSIS — R10.31 RIGHT LOWER QUADRANT ABDOMINAL PAIN: Primary | ICD-10-CM

## 2020-02-21 DIAGNOSIS — Z80.0 FAMILY HISTORY OF COLON CANCER: ICD-10-CM

## 2020-02-21 PROCEDURE — 1101F PR PT FALLS ASSESS DOC 0-1 FALLS W/OUT INJ PAST YR: ICD-10-PCS | Mod: CPTII,S$GLB,, | Performed by: INTERNAL MEDICINE

## 2020-02-21 PROCEDURE — 99204 PR OFFICE/OUTPT VISIT, NEW, LEVL IV, 45-59 MIN: ICD-10-PCS | Mod: S$GLB,,, | Performed by: INTERNAL MEDICINE

## 2020-02-21 PROCEDURE — 99999 PR PBB SHADOW E&M-EST. PATIENT-LVL III: CPT | Mod: PBBFAC,,, | Performed by: INTERNAL MEDICINE

## 2020-02-21 PROCEDURE — 1101F PT FALLS ASSESS-DOCD LE1/YR: CPT | Mod: CPTII,S$GLB,, | Performed by: INTERNAL MEDICINE

## 2020-02-21 PROCEDURE — 1126F AMNT PAIN NOTED NONE PRSNT: CPT | Mod: S$GLB,,, | Performed by: INTERNAL MEDICINE

## 2020-02-21 PROCEDURE — 99204 OFFICE O/P NEW MOD 45 MIN: CPT | Mod: S$GLB,,, | Performed by: INTERNAL MEDICINE

## 2020-02-21 PROCEDURE — 1159F PR MEDICATION LIST DOCUMENTED IN MEDICAL RECORD: ICD-10-PCS | Mod: S$GLB,,, | Performed by: INTERNAL MEDICINE

## 2020-02-21 PROCEDURE — 99999 PR PBB SHADOW E&M-EST. PATIENT-LVL III: ICD-10-PCS | Mod: PBBFAC,,, | Performed by: INTERNAL MEDICINE

## 2020-02-21 PROCEDURE — 1159F MED LIST DOCD IN RCRD: CPT | Mod: S$GLB,,, | Performed by: INTERNAL MEDICINE

## 2020-02-21 PROCEDURE — 1126F PR PAIN SEVERITY QUANTIFIED, NO PAIN PRESENT: ICD-10-PCS | Mod: S$GLB,,, | Performed by: INTERNAL MEDICINE

## 2020-02-21 RX ORDER — PANTOPRAZOLE SODIUM 40 MG/1
40 TABLET, DELAYED RELEASE ORAL DAILY
Qty: 30 TABLET | Refills: 3 | Status: SHIPPED | OUTPATIENT
Start: 2020-02-21 | End: 2020-06-23 | Stop reason: SDUPTHER

## 2020-02-21 RX ORDER — HYOSCYAMINE SULFATE 0.125 MG
125 TABLET ORAL EVERY 6 HOURS PRN
Qty: 90 TABLET | Refills: 6 | Status: SHIPPED | OUTPATIENT
Start: 2020-02-21 | End: 2020-07-24 | Stop reason: SDUPTHER

## 2020-02-21 NOTE — LETTER
February 21, 2020      Heather Ross, [place]  [address]  [place] [address]           [facility]  [address]  [facility] [address]  Phone: [phone]          Patient: Marycarmen Louis   MR Number: [id number]   YOB: 1945   Date of Visit: 2/21/2020       Dear Heather Ross:    Thank you for referring Marycarmen Louis to me for evaluation. Attached you will find relevant portions of my assessment and plan of care.    If you have questions, please do not hesitate to call me. I look forward to following Marycarmen Louis along with you.    Sincerely,    Jeremy Thomas MD    Enclosure  CC:  No Recipients    If you would like to receive this communication electronically, please contact [email or web address] or [phone] to request more information on [employer] access.    For providers and/or their staff who would like to refer a patient to [facility], please contact us through our one-stop-shop provider referral line, [facility], at [phone].    If you feel you have received this communication in error or would no longer like to receive these types of communications, please e-mail [email or web address]

## 2020-02-21 NOTE — PROGRESS NOTES
Subjective:       Patient ID: Marycarmen Louis is a 74 y.o. female.    Chief Complaint: Abdominal Pain and Constipation    Patient here today to establish care with aforementioned complaints.  Patient reports new onset left lower quadrant/suprapubic abdominal pain that has been going on for the past few months.  Pain is intermittent and generally well occur once every other week or so.  It does not seem to be associated with eating or having a bowel movement.  She does report constipation that seems to be improved since starting MiraLax however she can have symptoms prior to, after, or not associated with bowel movements.    Alternatively patient reports history of epigastric pain/GERD that is reasonably well controlled on her omeprazole.  She was prescribed this of medication not long after onset of above complaints.  Both her GERD and her right lower quadrant pain has improved somewhat since beginning this medication however she is unsure if it is related.    Alternatively patient underwent a colonoscopy in 2018.  Exam was reportedly normal. Five year recall interval was recommended.      CT abdomen pelvis 01/2020  Impression      There is a large amount of stool seen throughout the colon.  No other abnormalities to explain this patient's pain are noted.      Patient is accompanied by her  who corroborates above history.    Past Medical History:   Diagnosis Date    Anxiety     Arthritis     Cancer     salivary gland    Closed fracture of left hip 2/15/2017    COPD (chronic obstructive pulmonary disease)     Degenerative disc disease     Depression     Emphysema of lung     Fractured hip     General anesthetics causing adverse effect in therapeutic use     Left wrist fracture 2/14/2017    Meibomianitis 11/3/10    Memory loss     Nuclear sclerosis, bilateral 10/2/2017    Osteoporosis     Platelet disorder     PONV (postoperative nausea and vomiting)        Past Surgical History:   Procedure  Laterality Date    APPENDECTOMY      arm surgery      left wrist pinning    BREAST BIOPSY Left     CATARACT EXTRACTION W/  INTRAOCULAR LENS IMPLANT Right 10/02/2017    Dr. Liriano    CATARACT EXTRACTION W/  INTRAOCULAR LENS IMPLANT Left 10/30/2017    Dr. Liriano    COLONOSCOPY N/A 3/8/2018    Procedure: COLONOSCOPY;  Surgeon: Hari Dumont MD;  Location: [facility] ([address] [place] [address]);  Service: Endoscopy;  Laterality: N/A;  5 year f/u-2/16/18 pt confirmed appt/MS    JOINT REPLACEMENT  2014    left TKA    salviary gland removed      TOTAL HIP ARTHROPLASTY         Social History  Social History     Tobacco Use    Smoking status: Former Smoker    Smokeless tobacco: Never Used    Tobacco comment: quit 30yrs ago, discussed importance of continued cessation   Substance Use Topics    Alcohol use: Yes     Comment: occasionally    Drug use: No       Family History   Problem Relation Age of Onset    Heart disease Mother     Cancer Father         colon    Diabetes Father     No Known Problems Sister     Cancer Brother         liver kidney bone    Diabetes Brother     No Known Problems Maternal Aunt     No Known Problems Maternal Uncle     No Known Problems Paternal Aunt     No Known Problems Paternal Uncle     No Known Problems Maternal Grandmother     No Known Problems Maternal Grandfather     No Known Problems Paternal Grandmother     No Known Problems Paternal Grandfather     Hypertension Brother     Breast cancer Neg Hx     Ovarian cancer Neg Hx        Review of Systems   Constitutional: Negative for chills, fever and unexpected weight change.   HENT: Negative for congestion and trouble swallowing.    Eyes: Negative for photophobia and visual disturbance.   Respiratory: Negative for cough and shortness of breath.    Cardiovascular: Positive for chest pain. Negative for leg swelling.   Gastrointestinal: Positive for abdominal pain and constipation. Negative for abdominal distention, blood in stool, diarrhea,  nausea and vomiting.   Genitourinary: Negative for dysuria and hematuria.   Musculoskeletal: Negative for arthralgias and myalgias.   Skin: Negative for color change and rash.   Neurological: Negative for dizziness, light-headedness and numbness.   Psychiatric/Behavioral: Negative for agitation and confusion.           Objective:      Physical Exam   Constitutional: She is oriented to person, place, and time. She appears well-developed and well-nourished.   HENT:   Head: Normocephalic and atraumatic.   Eyes: Pupils are equal, round, and reactive to light. EOM are normal. No scleral icterus.   Neck: Normal range of motion. Neck supple.   Cardiovascular: Normal rate, regular rhythm, normal heart sounds and intact distal pulses.   Pulmonary/Chest: Effort normal and breath sounds normal. No respiratory distress.   Abdominal: Soft. Bowel sounds are normal. She exhibits no distension. There is no tenderness.   Musculoskeletal: Normal range of motion. She exhibits no edema.   Neurological: She is alert and oriented to person, place, and time.   No asterixis   Skin: Skin is warm and dry. No rash noted. No erythema.   Psychiatric: She has a normal mood and affect. Her behavior is normal.   Nursing note and vitals reviewed.        Pertinent labs and imaging studies reviewed    Assessment:       1. Right lower quadrant abdominal pain    2. Gastroesophageal reflux disease, esophagitis presence not specified    3. Constipation, unspecified constipation type    4. Family history of colon cancer        Plan:       Etiology of pain uncertain: ?  Functional/IBS versus abnormal manifestation of GERD/PUD versus non GI  Continue MiraLax  Will attempt to class switch PPI  P.r.n. Levsin  Patient up to date with colon cancer screening.  Repeat colonoscopy due in 2023    (Portions of this note were dictated using voice recognition software and may contain dictation related errors in spelling/grammar/syntax not found on text review)

## 2020-02-22 DIAGNOSIS — K29.00 ACUTE GASTRITIS WITHOUT HEMORRHAGE, UNSPECIFIED GASTRITIS TYPE: ICD-10-CM

## 2020-02-22 RX ORDER — OMEPRAZOLE 40 MG/1
CAPSULE, DELAYED RELEASE ORAL
Qty: 90 CAPSULE | Refills: 0 | Status: SHIPPED | OUTPATIENT
Start: 2020-02-22 | End: 2020-05-25

## 2020-05-23 DIAGNOSIS — K29.00 ACUTE GASTRITIS WITHOUT HEMORRHAGE, UNSPECIFIED GASTRITIS TYPE: ICD-10-CM

## 2020-05-25 RX ORDER — OMEPRAZOLE 40 MG/1
CAPSULE, DELAYED RELEASE ORAL
Qty: 90 CAPSULE | Refills: 0 | Status: SHIPPED | OUTPATIENT
Start: 2020-05-25 | End: 2020-07-24

## 2020-07-09 ENCOUNTER — PATIENT OUTREACH (OUTPATIENT)
Dept: ADMINISTRATIVE | Facility: OTHER | Age: 75
End: 2020-07-09

## 2020-07-09 NOTE — PROGRESS NOTES
Requested updates within Care Everywhere.  Patient's chart was reviewed for overdue ILDEFONSO topics.  Immunizations reconciled.    Orders placed:  Tasked appts:  Labs Linked:

## 2020-07-13 ENCOUNTER — OFFICE VISIT (OUTPATIENT)
Dept: OPTOMETRY | Facility: CLINIC | Age: 75
End: 2020-07-13
Payer: MEDICARE

## 2020-07-13 DIAGNOSIS — G43.109 OCULAR MIGRAINE: Primary | ICD-10-CM

## 2020-07-13 PROCEDURE — 99999 PR PBB SHADOW E&M-EST. PATIENT-LVL III: ICD-10-PCS | Mod: PBBFAC,,, | Performed by: OPTOMETRIST

## 2020-07-13 PROCEDURE — 99999 PR PBB SHADOW E&M-EST. PATIENT-LVL III: CPT | Mod: PBBFAC,,, | Performed by: OPTOMETRIST

## 2020-07-13 PROCEDURE — 92014 PR EYE EXAM, EST PATIENT,COMPREHESV: ICD-10-PCS | Mod: S$GLB,,, | Performed by: OPTOMETRIST

## 2020-07-13 PROCEDURE — 92014 COMPRE OPH EXAM EST PT 1/>: CPT | Mod: S$GLB,,, | Performed by: OPTOMETRIST

## 2020-07-13 NOTE — PROGRESS NOTES
HPI     Last eye exam was 4/18/19 with   Pt here for halos in vision.    Pt states she had a halo in her outer vision OS. Pt states it lasted about   an hour and a half. Pt states that she has not had it since.   Pt uses restasis BID OU, and cataract sx and Lasik.       Last edited by Jasmine Valdez on 7/13/2020  2:43 PM. (History)            Assessment /Plan     For exam results, see Encounter Report.    Ocular migraine            1.  Reeducated pt on ocular migraines.   Retina flat and intact OU--no holes, tears, breaks, or RDs.  Eye health normal OU.  RTC as scheduled.

## 2020-07-22 ENCOUNTER — PATIENT OUTREACH (OUTPATIENT)
Dept: ADMINISTRATIVE | Facility: OTHER | Age: 75
End: 2020-07-22

## 2020-07-24 ENCOUNTER — OFFICE VISIT (OUTPATIENT)
Dept: GASTROENTEROLOGY | Facility: CLINIC | Age: 75
End: 2020-07-24
Payer: MEDICARE

## 2020-07-24 VITALS — BODY MASS INDEX: 26.72 KG/M2 | WEIGHT: 150.81 LBS | HEIGHT: 63 IN

## 2020-07-24 DIAGNOSIS — K59.00 CONSTIPATION, UNSPECIFIED CONSTIPATION TYPE: ICD-10-CM

## 2020-07-24 DIAGNOSIS — K21.9 GASTROESOPHAGEAL REFLUX DISEASE, ESOPHAGITIS PRESENCE NOT SPECIFIED: ICD-10-CM

## 2020-07-24 DIAGNOSIS — R14.0 BLOATING: Primary | ICD-10-CM

## 2020-07-24 PROCEDURE — 99214 OFFICE O/P EST MOD 30 MIN: CPT | Mod: S$GLB,,, | Performed by: INTERNAL MEDICINE

## 2020-07-24 PROCEDURE — 1159F MED LIST DOCD IN RCRD: CPT | Mod: S$GLB,,, | Performed by: INTERNAL MEDICINE

## 2020-07-24 PROCEDURE — 3008F BODY MASS INDEX DOCD: CPT | Mod: CPTII,S$GLB,, | Performed by: INTERNAL MEDICINE

## 2020-07-24 PROCEDURE — 99999 PR PBB SHADOW E&M-EST. PATIENT-LVL III: ICD-10-PCS | Mod: PBBFAC,,, | Performed by: INTERNAL MEDICINE

## 2020-07-24 PROCEDURE — 99999 PR PBB SHADOW E&M-EST. PATIENT-LVL III: CPT | Mod: PBBFAC,,, | Performed by: INTERNAL MEDICINE

## 2020-07-24 PROCEDURE — 99214 PR OFFICE/OUTPT VISIT, EST, LEVL IV, 30-39 MIN: ICD-10-PCS | Mod: S$GLB,,, | Performed by: INTERNAL MEDICINE

## 2020-07-24 PROCEDURE — 1101F PT FALLS ASSESS-DOCD LE1/YR: CPT | Mod: CPTII,S$GLB,, | Performed by: INTERNAL MEDICINE

## 2020-07-24 PROCEDURE — 3008F PR BODY MASS INDEX (BMI) DOCUMENTED: ICD-10-PCS | Mod: CPTII,S$GLB,, | Performed by: INTERNAL MEDICINE

## 2020-07-24 PROCEDURE — 1101F PR PT FALLS ASSESS DOC 0-1 FALLS W/OUT INJ PAST YR: ICD-10-PCS | Mod: CPTII,S$GLB,, | Performed by: INTERNAL MEDICINE

## 2020-07-24 PROCEDURE — 1126F PR PAIN SEVERITY QUANTIFIED, NO PAIN PRESENT: ICD-10-PCS | Mod: S$GLB,,, | Performed by: INTERNAL MEDICINE

## 2020-07-24 PROCEDURE — 1159F PR MEDICATION LIST DOCUMENTED IN MEDICAL RECORD: ICD-10-PCS | Mod: S$GLB,,, | Performed by: INTERNAL MEDICINE

## 2020-07-24 PROCEDURE — 1126F AMNT PAIN NOTED NONE PRSNT: CPT | Mod: S$GLB,,, | Performed by: INTERNAL MEDICINE

## 2020-07-24 RX ORDER — PANTOPRAZOLE SODIUM 40 MG/1
40 TABLET, DELAYED RELEASE ORAL DAILY
Qty: 30 TABLET | Refills: 3 | Status: SHIPPED | OUTPATIENT
Start: 2020-07-24 | End: 2021-01-22 | Stop reason: SDUPTHER

## 2020-07-24 RX ORDER — HYOSCYAMINE SULFATE 0.125 MG
125 TABLET ORAL EVERY 6 HOURS PRN
Qty: 90 TABLET | Refills: 6 | Status: SHIPPED | OUTPATIENT
Start: 2020-07-24 | End: 2023-04-25

## 2020-07-24 NOTE — PROGRESS NOTES
"Subjective:       Patient ID: Marycarmen Louis is a 74 y.o. female.    Chief Complaint: Abdominal Pain    Patient here today to follow-up with aforementioned complaints.  Patient was previously seen by me in February with chief complaint abdominal pain and constipation.  She was started on MiraLax and her PPI was changed.  She was also prescribed p.r.n. Levsin.  She was instructed to follow up in 2 months which was likely delayed due to pandemic.    Today patient reports doing well.  She reports her constipation has significantly improved since beginning MiraLax.  She also reports her abdominal pain is improved with normalization of bowel habits.  When she does occasionally experience pain she takes her Levsin and is almost immediately resolved.  She does report continue gas/bloating.  She has been trying to "eat better" by increasing her vegetable intake.  This has caused worsening of her gas.  She denies significant use of artificial sweeteners.  Does report some dairy.  Denies significant use of alcohol.  She does chew gum daily in addition to drinking out of straws    She denies significant heartburn on Prilosec.    Review of Systems   Constitutional: Negative for chills, fever and unexpected weight change.   HENT: Negative for congestion and trouble swallowing.    Respiratory: Negative for cough and shortness of breath.    Cardiovascular: Negative for chest pain and palpitations.   Gastrointestinal: Positive for abdominal distention and abdominal pain. Negative for blood in stool and constipation.         The following portions of the patient's history were reviewed and updated as appropriate: allergies, current medications, past family history, past medical history, past social history, past surgical history and problem list.    Objective:      Physical Exam  Vitals signs and nursing note reviewed.   Constitutional:       Appearance: She is well-developed.   HENT:      Head: Normocephalic and atraumatic. "   Eyes:      General: No scleral icterus.     Pupils: Pupils are equal, round, and reactive to light.   Cardiovascular:      Rate and Rhythm: Normal rate and regular rhythm.      Heart sounds: Normal heart sounds.   Pulmonary:      Effort: Pulmonary effort is normal. No respiratory distress.      Breath sounds: Normal breath sounds.   Abdominal:      General: Bowel sounds are normal. There is no distension.      Palpations: Abdomen is soft.      Tenderness: There is no abdominal tenderness.   Musculoskeletal: Normal range of motion.   Neurological:      Mental Status: She is alert and oriented to person, place, and time.      Comments: No asterixis   Psychiatric:         Behavior: Behavior normal.           Pertinent labs and imaging studies reviewed    Assessment:       1. Bloating    2. Gastroesophageal reflux disease, esophagitis presence not specified    3. Constipation, unspecified constipation type        Plan:       Continue p.r.n. Levsin, refilled  Continue PPI, refilled  Counseled on low FODMAP diet  Follow-up as needed    25 minutes were spent in coordination of patient's care, record review and counseling.  More than 50% of the time was face-to-face.    (Portions of this note were dictated using voice recognition software and may contain dictation related errors in spelling/grammar/syntax not found on text review)

## 2020-08-05 ENCOUNTER — INFUSION (OUTPATIENT)
Dept: INFUSION THERAPY | Facility: HOSPITAL | Age: 75
End: 2020-08-05
Attending: FAMILY MEDICINE
Payer: MEDICARE

## 2020-08-05 VITALS
TEMPERATURE: 97 F | RESPIRATION RATE: 18 BRPM | SYSTOLIC BLOOD PRESSURE: 127 MMHG | HEART RATE: 83 BPM | DIASTOLIC BLOOD PRESSURE: 69 MMHG

## 2020-08-05 DIAGNOSIS — M85.89 OSTEOPENIA OF MULTIPLE SITES: Primary | ICD-10-CM

## 2020-08-05 PROCEDURE — 63600175 PHARM REV CODE 636 W HCPCS: Performed by: FAMILY MEDICINE

## 2020-08-05 PROCEDURE — 96372 THER/PROPH/DIAG INJ SC/IM: CPT

## 2020-08-05 RX ADMIN — DENOSUMAB 60 MG: 60 INJECTION SUBCUTANEOUS at 10:08

## 2020-08-19 DIAGNOSIS — M85.9 DISORDER OF BONE DENSITY AND STRUCTURE, UNSPECIFIED: ICD-10-CM

## 2020-08-19 DIAGNOSIS — M85.89 OSTEOPENIA OF MULTIPLE SITES: Chronic | ICD-10-CM

## 2020-08-19 DIAGNOSIS — J43.1 PANLOBULAR EMPHYSEMA: Chronic | ICD-10-CM

## 2020-08-19 DIAGNOSIS — I25.10 CORONARY ARTERY CALCIFICATION SEEN ON CAT SCAN: Primary | Chronic | ICD-10-CM

## 2020-08-19 RX ORDER — UMECLIDINIUM BROMIDE AND VILANTEROL TRIFENATATE 62.5; 25 UG/1; UG/1
POWDER RESPIRATORY (INHALATION)
Qty: 60 EACH | Refills: 2 | Status: SHIPPED | OUTPATIENT
Start: 2020-08-19 | End: 2020-12-08 | Stop reason: SDUPTHER

## 2020-08-25 DIAGNOSIS — H04.123 DRY EYE SYNDROME OF BOTH EYES: ICD-10-CM

## 2020-08-25 RX ORDER — CYCLOSPORINE 0.5 MG/ML
1 EMULSION OPHTHALMIC 2 TIMES DAILY
Qty: 60 VIAL | Refills: 11 | Status: SHIPPED | OUTPATIENT
Start: 2020-08-25 | End: 2022-11-07 | Stop reason: SDUPTHER

## 2020-08-25 NOTE — PROGRESS NOTES
Assessment /Plan     For exam results, see Encounter Report.    Dry eye syndrome of both eyes  -     RESTASIS 0.05 % ophthalmic emulsion; Place 1 drop into both eyes 2 (two) times daily.  Dispense: 60 vial; Refill: 11

## 2020-10-09 ENCOUNTER — LAB VISIT (OUTPATIENT)
Dept: LAB | Facility: HOSPITAL | Age: 75
End: 2020-10-09
Attending: INTERNAL MEDICINE
Payer: MEDICARE

## 2020-10-09 DIAGNOSIS — M85.89 OSTEOPENIA OF MULTIPLE SITES: Chronic | ICD-10-CM

## 2020-10-09 DIAGNOSIS — M85.9 DISORDER OF BONE DENSITY AND STRUCTURE, UNSPECIFIED: ICD-10-CM

## 2020-10-09 DIAGNOSIS — I25.10 CORONARY ARTERY CALCIFICATION SEEN ON CAT SCAN: Chronic | ICD-10-CM

## 2020-10-09 LAB
25(OH)D3+25(OH)D2 SERPL-MCNC: 43 NG/ML (ref 30–96)
ALBUMIN SERPL BCP-MCNC: 4 G/DL (ref 3.5–5.2)
ALP SERPL-CCNC: 32 U/L (ref 55–135)
ALT SERPL W/O P-5'-P-CCNC: 25 U/L (ref 10–44)
ANION GAP SERPL CALC-SCNC: 9 MMOL/L (ref 8–16)
AST SERPL-CCNC: 26 U/L (ref 10–40)
BASOPHILS # BLD AUTO: 0.02 K/UL (ref 0–0.2)
BASOPHILS NFR BLD: 0.5 % (ref 0–1.9)
BILIRUB SERPL-MCNC: 0.8 MG/DL (ref 0.1–1)
BUN SERPL-MCNC: 16 MG/DL (ref 8–23)
CALCIUM SERPL-MCNC: 8.8 MG/DL (ref 8.7–10.5)
CHLORIDE SERPL-SCNC: 104 MMOL/L (ref 95–110)
CHOLEST SERPL-MCNC: 188 MG/DL (ref 120–199)
CHOLEST/HDLC SERPL: 3.4 {RATIO} (ref 2–5)
CO2 SERPL-SCNC: 29 MMOL/L (ref 23–29)
CREAT SERPL-MCNC: 0.7 MG/DL (ref 0.5–1.4)
DIFFERENTIAL METHOD: ABNORMAL
EOSINOPHIL # BLD AUTO: 0.2 K/UL (ref 0–0.5)
EOSINOPHIL NFR BLD: 5.8 % (ref 0–8)
ERYTHROCYTE [DISTWIDTH] IN BLOOD BY AUTOMATED COUNT: 12.6 % (ref 11.5–14.5)
EST. GFR  (AFRICAN AMERICAN): >60 ML/MIN/1.73 M^2
EST. GFR  (NON AFRICAN AMERICAN): >60 ML/MIN/1.73 M^2
GLUCOSE SERPL-MCNC: 91 MG/DL (ref 70–110)
HCT VFR BLD AUTO: 41.6 % (ref 37–48.5)
HDLC SERPL-MCNC: 56 MG/DL (ref 40–75)
HDLC SERPL: 29.8 % (ref 20–50)
HGB BLD-MCNC: 13.8 G/DL (ref 12–16)
IMM GRANULOCYTES # BLD AUTO: 0.02 K/UL (ref 0–0.04)
IMM GRANULOCYTES NFR BLD AUTO: 0.5 % (ref 0–0.5)
LDLC SERPL CALC-MCNC: 120 MG/DL (ref 63–159)
LYMPHOCYTES # BLD AUTO: 1 K/UL (ref 1–4.8)
LYMPHOCYTES NFR BLD: 23.6 % (ref 18–48)
MCH RBC QN AUTO: 32 PG (ref 27–31)
MCHC RBC AUTO-ENTMCNC: 33.2 G/DL (ref 32–36)
MCV RBC AUTO: 97 FL (ref 82–98)
MONOCYTES # BLD AUTO: 0.5 K/UL (ref 0.3–1)
MONOCYTES NFR BLD: 13 % (ref 4–15)
NEUTROPHILS # BLD AUTO: 2.4 K/UL (ref 1.8–7.7)
NEUTROPHILS NFR BLD: 56.6 % (ref 38–73)
NONHDLC SERPL-MCNC: 132 MG/DL
NRBC BLD-RTO: 0 /100 WBC
PLATELET # BLD AUTO: 293 K/UL (ref 150–350)
PMV BLD AUTO: 8.2 FL (ref 9.2–12.9)
POTASSIUM SERPL-SCNC: 3.8 MMOL/L (ref 3.5–5.1)
PROT SERPL-MCNC: 6.6 G/DL (ref 6–8.4)
RBC # BLD AUTO: 4.31 M/UL (ref 4–5.4)
SODIUM SERPL-SCNC: 142 MMOL/L (ref 136–145)
TRIGL SERPL-MCNC: 60 MG/DL (ref 30–150)
WBC # BLD AUTO: 4.15 K/UL (ref 3.9–12.7)

## 2020-10-09 PROCEDURE — 80061 LIPID PANEL: CPT

## 2020-10-09 PROCEDURE — 36415 COLL VENOUS BLD VENIPUNCTURE: CPT

## 2020-10-09 PROCEDURE — 85025 COMPLETE CBC W/AUTO DIFF WBC: CPT

## 2020-10-09 PROCEDURE — 82306 VITAMIN D 25 HYDROXY: CPT

## 2020-10-09 PROCEDURE — 80053 COMPREHEN METABOLIC PANEL: CPT

## 2020-10-19 ENCOUNTER — OFFICE VISIT (OUTPATIENT)
Dept: FAMILY MEDICINE | Facility: CLINIC | Age: 75
End: 2020-10-19
Payer: MEDICARE

## 2020-10-19 VITALS
WEIGHT: 137.69 LBS | SYSTOLIC BLOOD PRESSURE: 120 MMHG | TEMPERATURE: 98 F | OXYGEN SATURATION: 96 % | BODY MASS INDEX: 24.4 KG/M2 | HEIGHT: 63 IN | HEART RATE: 93 BPM | DIASTOLIC BLOOD PRESSURE: 70 MMHG

## 2020-10-19 DIAGNOSIS — J43.1 PANLOBULAR EMPHYSEMA: ICD-10-CM

## 2020-10-19 DIAGNOSIS — M51.37 DEGENERATION OF LUMBAR OR LUMBOSACRAL INTERVERTEBRAL DISC: ICD-10-CM

## 2020-10-19 DIAGNOSIS — Z00.00 ROUTINE MEDICAL EXAM: Primary | ICD-10-CM

## 2020-10-19 DIAGNOSIS — I25.10 CORONARY ARTERY CALCIFICATION SEEN ON CAT SCAN: Chronic | ICD-10-CM

## 2020-10-19 DIAGNOSIS — M85.89 OSTEOPENIA OF MULTIPLE SITES: Chronic | ICD-10-CM

## 2020-10-19 PROCEDURE — 99214 OFFICE O/P EST MOD 30 MIN: CPT | Mod: S$GLB,,, | Performed by: INTERNAL MEDICINE

## 2020-10-19 PROCEDURE — 99499 UNLISTED E&M SERVICE: CPT | Mod: S$GLB,,, | Performed by: INTERNAL MEDICINE

## 2020-10-19 PROCEDURE — 99214 PR OFFICE/OUTPT VISIT, EST, LEVL IV, 30-39 MIN: ICD-10-PCS | Mod: S$GLB,,, | Performed by: INTERNAL MEDICINE

## 2020-10-19 PROCEDURE — 99499 RISK ADDL DX/OHS AUDIT: ICD-10-PCS | Mod: S$GLB,,, | Performed by: INTERNAL MEDICINE

## 2020-10-19 PROCEDURE — 99999 PR PBB SHADOW E&M-EST. PATIENT-LVL IV: CPT | Mod: PBBFAC,,, | Performed by: INTERNAL MEDICINE

## 2020-10-19 PROCEDURE — 99999 PR PBB SHADOW E&M-EST. PATIENT-LVL IV: ICD-10-PCS | Mod: PBBFAC,,, | Performed by: INTERNAL MEDICINE

## 2020-10-19 RX ORDER — TIZANIDINE 2 MG/1
TABLET ORAL
Qty: 60 TABLET | Refills: 2 | Status: SHIPPED | OUTPATIENT
Start: 2020-10-19 | End: 2021-08-24

## 2020-10-19 RX ORDER — ATORVASTATIN CALCIUM 40 MG/1
40 TABLET, FILM COATED ORAL DAILY
Qty: 90 TABLET | Refills: 3 | Status: SHIPPED | OUTPATIENT
Start: 2020-10-19 | End: 2021-10-11 | Stop reason: SDUPTHER

## 2020-10-19 NOTE — ASSESSMENT & PLAN NOTE
The current medical regimen is effective;  continue present plan and medications. Well controlled symptoms.  Will forego PFTs at this time

## 2020-10-19 NOTE — PROGRESS NOTES
Assessment & Plan  Problem List Items Addressed This Visit        Neuro    Degeneration of lumbar or lumbosacral intervertebral disc (Chronic)    Current Assessment & Plan     Slight flare of pain symptoms.  Continue naproxen.  Add tizanidine at night.          Relevant Medications    tiZANidine (ZANAFLEX) 2 MG tablet       Pulmonary    Panlobular emphysema (Chronic)    Current Assessment & Plan     The current medical regimen is effective;  continue present plan and medications. Well controlled symptoms.  Will forego PFTs at this time            Cardiac/Vascular    Coronary artery calcification seen on CAT scan (Chronic)    Current Assessment & Plan     Start statin for CVD risk reduction.  Holding ASA due to h/o platelet dysfunction documented         Relevant Medications    atorvastatin (LIPITOR) 40 MG tablet    Other Relevant Orders    CBC auto differential    Comprehensive Metabolic Panel    Lipid Panel       Orthopedic    Osteopenia of multiple sites (Chronic)    Overview     Recently improved from osteoporosis with Prolia.          Current Assessment & Plan     The current medical regimen is effective;  continue present plan and medications. Recheck DEXA next year.            Other Visit Diagnoses     Routine medical exam    -  Primary  -  Discussed healthy diet, regular exercise, necessary labs, age appropriate cancer screening, and routine vaccinations.  She would like to wait until next year for her MMG              Health Maintenance reviewed, as above.  tdap at pharmacy.    Follow-up: Follow up in about 1 year (around 10/19/2021) for Routine Physical.  ______________________________________________________________________    Chief Complaint  Chief Complaint   Patient presents with    Annual Exam       HPI  Marycarmen MONTES Missy is a 75 y.o. female with medical diagnoses as listed in the medical history and problem list that presents for routine physical.  Pt is known to me with her last appointment Visit  date not found.  She had labs prior to this OV that showed reassuring CBC, CMP, lipids, Vit D.      Having low back pain x 2 weeks with sciatica down both legs to the knees.  It is causing her sleep to be disrupted.  Pain is the worst in the AM.  Better with movement and naproxen.      The 10-year ASCVD risk score (aYnick AC Jr., et al., 2013) is: 11.4%    Values used to calculate the score:      Age: 75 years      Sex: Female      Is Non- : No      Diabetic: No      Tobacco smoker: No      Systolic Blood Pressure: 120 mmHg      Is BP treated: No      HDL Cholesterol: 56 mg/dL      Total Cholesterol: 188 mg/dL    PAST MEDICAL HISTORY:  Past Medical History:   Diagnosis Date    Anxiety     Arthritis     Cancer     salivary gland    Closed fracture of left hip 2/15/2017    COPD (chronic obstructive pulmonary disease)     Degenerative disc disease     Depression     Emphysema of lung     Fractured hip     General anesthetics causing adverse effect in therapeutic use     Left wrist fracture 2/14/2017    Meibomianitis 11/3/10    Memory loss     Nuclear sclerosis, bilateral 10/2/2017    Osteoporosis     Platelet disorder     PONV (postoperative nausea and vomiting)     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 Liriano    CATARACT EXTRACTION W/  INTRAOCULAR LENS IMPLANT Left 10/30/2017    Dr. Liriano    COLONOSCOPY N/A 3/8/2018    Procedure: COLONOSCOPY;  Surgeon: Hari Dumont MD;  Location: [address]);  Service: Endoscopy;  Laterality: N/A;  5 year f/u-2/16/18 pt confirmed appt/MS    JOINT REPLACEMENT  2014    left TKA    salviary gland removed      TOTAL HIP ARTHROPLASTY         SOCIAL HISTORY:  Social History      Socioeconomic History    Marital status:      Spouse name: Not on file    Number of children: Not on file    Years of education: Not on file    Highest education level: Not on file   Occupational History    Not on file   Social Needs    Financial resource strain: Not on file    Food insecurity     Worry: Not on file     Inability: Not on file    Transportation needs     Medical: Not on file     Non-medical: Not on file   Tobacco Use    Smoking status: Former Smoker    Smokeless tobacco: Never Used    Tobacco comment: quit 30yrs ago, discussed importance of continued cessation   Substance and Sexual Activity    Alcohol use: Yes     Comment: occasionally    Drug use: No    Sexual activity: Not Currently     Partners: Male     Birth control/protection: Post-menopausal   Lifestyle    Physical activity     Days per week: Not on file     Minutes per session: Not on file    Stress: Not on file   Relationships    Social connections     Talks on phone: Not on file     Gets together: Not on file     Attends Sikhism service: Not on file     Active member of club or organization: Not on file     Attends meetings of clubs or organizations: Not on file     Relationship status: Not on file   Other Topics Concern    Not on file   Social History Narrative    Not on file       FAMILY HISTORY:  Family History   Problem Relation Age of Onset    Heart disease Mother     Cancer Father         colon    Diabetes Father     No Known Problems Sister     Cancer Brother         liver kidney bone    Diabetes Brother     No Known Problems Maternal Aunt     No Known Problems Maternal Uncle     No Known Problems Paternal Aunt     No Known Problems Paternal Uncle     No Known Problems Maternal Grandmother     No Known Problems Maternal Grandfather     No Known Problems Paternal Grandmother     No Known Problems Paternal Grandfather     Hypertension Brother     Breast cancer Neg Hx     Ovarian cancer  Neg Hx        ALLERGIES AND MEDICATIONS: updated and reviewed.  Review of patient's allergies indicates:   Allergen Reactions    Codeine Nausea And Vomiting     Other reaction(s): Vomiting  Other reaction(s): Headache    Fentanyl Nausea And Vomiting     Other reaction(s): Vomiting  Other reaction(s): Nausea    Phenytoin sodium extended      Other reaction(s): Vomiting  Other reaction(s): Nausea    Versed  [midazolam] Nausea And Vomiting    Vicodin  [hydrocodone-acetaminophen]      Other reaction(s): Vomiting     Current Outpatient Medications   Medication Sig Dispense Refill    ANORO ELLIPTA 62.5-25 mcg/actuation DsDv INHALE 1 PUFF INTO THE LUNGS EVERY DAY 60 each 2    CYANOCOBALAMIN, VITAMIN B-12, (VITAMIN B-12 ORAL) Take by mouth.      denosumab (PROLIA) 60 mg/mL Syrg Inject 1 mL (60 mg total) into the skin every 6 (six) months. 1 Syringe 1    melatonin 5 mg Tab Take 5 mg by mouth nightly.       multivitamin capsule Take 1 capsule by mouth once daily.      RESTASIS 0.05 % ophthalmic emulsion Place 1 drop into both eyes 2 (two) times daily. 60 vial 11    venlafaxine (EFFEXOR-XR) 75 MG 24 hr capsule TAKE 1 CAPSULE(75 MG) BY MOUTH EVERY DAY 90 capsule 1    atorvastatin (LIPITOR) 40 MG tablet Take 1 tablet (40 mg total) by mouth once daily. 90 tablet 3    cetirizine (ZYRTEC) 10 MG tablet Take 1 tablet (10 mg total) by mouth once daily. 30 tablet 5    coenzyme Q10 (CO Q-10) 10 mg capsule Take 10 mg by mouth once daily.      DOCUSATE CALCIUM (STOOL SOFTENER ORAL) Take by mouth.      fluticasone propionate (FLONASE) 50 mcg/actuation nasal spray 1 spray (50 mcg total) by Each Nare route once daily. (Patient not taking: Reported on 10/19/2020) 1 Bottle 0    hyoscyamine (ANASPAZ,LEVSIN) 0.125 mg Tab Take 1 tablet (125 mcg total) by mouth every 6 (six) hours as needed. (Patient not taking: Reported on 10/19/2020) 90 tablet 6    LACTOBAC NO.41/BIFIDOBACT NO.7 (PROBIOTIC-10 ORAL) Take by mouth.       "pantoprazole (PROTONIX) 40 MG tablet Take 1 tablet (40 mg total) by mouth once daily. 30 tablet 3    tiZANidine (ZANAFLEX) 2 MG tablet Take 1-2 tabs PO QHS PRN muscle pain/spasms 60 tablet 2     No current facility-administered medications for this visit.          ROS  Review of Systems   Constitutional: Negative for chills, fever and unexpected weight change.   HENT: Negative for congestion, ear pain, hearing loss, rhinorrhea, sore throat and trouble swallowing.    Eyes: Negative for discharge, redness and visual disturbance.   Respiratory: Negative for cough, chest tightness, shortness of breath and wheezing.    Cardiovascular: Negative for chest pain, palpitations and leg swelling.   Gastrointestinal: Negative for abdominal pain, constipation, diarrhea, nausea and vomiting.   Endocrine: Negative for polydipsia, polyphagia and polyuria.   Genitourinary: Negative for decreased urine volume, dysuria and hematuria.   Musculoskeletal: Positive for back pain. Negative for arthralgias, joint swelling and myalgias.   Skin: Negative for color change and rash.   Neurological: Negative for dizziness, weakness, light-headedness and headaches.   Psychiatric/Behavioral: Negative for decreased concentration, dysphoric mood, sleep disturbance and suicidal ideas.           Physical Exam  Vitals:    10/19/20 1032   BP: 120/70   Pulse: 93   Temp: 97.9 °F (36.6 °C)   SpO2: 96%   Weight: 62.5 kg (137 lb 10.8 oz)   Height: 5' 3" (1.6 m)    Body mass index is 24.39 kg/m².  Weight: 62.5 kg (137 lb 10.8 oz)   Height: 5' 3" (160 cm)   Physical Exam  Constitutional:       General: She is not in acute distress.     Appearance: She is well-developed.   HENT:      Head: Normocephalic and atraumatic.   Eyes:      General: Lids are normal. No scleral icterus.     Conjunctiva/sclera: Conjunctivae normal.      Pupils: Pupils are equal, round, and reactive to light.   Neck:      Musculoskeletal: Full passive range of motion without pain and neck " supple.      Thyroid: No thyromegaly.      Vascular: No carotid bruit or JVD.   Cardiovascular:      Rate and Rhythm: Normal rate and regular rhythm.      Pulses: Normal pulses.      Heart sounds: Normal heart sounds. No murmur. No friction rub. No S3 or S4 sounds.    Pulmonary:      Effort: Pulmonary effort is normal.      Breath sounds: Normal breath sounds. No wheezing, rhonchi or rales.   Abdominal:      General: Bowel sounds are normal.      Palpations: Abdomen is soft.      Tenderness: There is no abdominal tenderness.   Musculoskeletal:      Cervical back: She exhibits no tenderness and no bony tenderness.      Thoracic back: She exhibits no tenderness and no bony tenderness.      Lumbar back: She exhibits tenderness and spasm. She exhibits no bony tenderness.        Back:       Comments: SLR negative bilaterally   Skin:     General: Skin is warm and dry.      Findings: No rash.   Neurological:      Mental Status: She is alert and oriented to person, place, and time.      Comments: Motor grossly intact.  Sensory grossly intact.  Symmetric facial movements palate elevated symmetrically tongue midline   Psychiatric:         Speech: Speech normal.         Behavior: Behavior normal.         Thought Content: Thought content normal.             Health Maintenance       Date Due Completion Date    TETANUS VACCINE 10/14/1965 ---    Aspirin/Antiplatelet Therapy 10/14/1965 ---    High Dose Statin 10/14/1968 ---    Mammogram 04/08/2021 4/8/2019    DEXA SCAN 04/08/2022 4/8/2019    Colorectal Cancer Screening 03/08/2023 3/8/2018    Lipid Panel 10/09/2025 10/9/2020

## 2020-10-19 NOTE — ASSESSMENT & PLAN NOTE
The current medical regimen is effective;  continue present plan and medications. Recheck DEXA next year.

## 2020-10-20 ENCOUNTER — TELEPHONE (OUTPATIENT)
Dept: FAMILY MEDICINE | Facility: CLINIC | Age: 75
End: 2020-10-20

## 2020-12-08 DIAGNOSIS — J43.1 PANLOBULAR EMPHYSEMA: Chronic | ICD-10-CM

## 2020-12-08 DIAGNOSIS — M85.89 OSTEOPENIA OF MULTIPLE SITES: Chronic | ICD-10-CM

## 2020-12-08 RX ORDER — UMECLIDINIUM BROMIDE AND VILANTEROL TRIFENATATE 62.5; 25 UG/1; UG/1
1 POWDER RESPIRATORY (INHALATION) DAILY
Qty: 60 EACH | Refills: 2 | Status: SHIPPED | OUTPATIENT
Start: 2020-12-08 | End: 2021-03-05 | Stop reason: SDUPTHER

## 2020-12-08 NOTE — TELEPHONE ENCOUNTER
----- Message from Ldsara Araiza sent at 12/8/2020 10:15 AM CST -----  Regarding: Self  Type: Patient Call Back    Who called: self    What is the request in detail: please call to discuss denosumab (PROLIA) 60 mg/mL Syrg also she would like a refill on her ANORO ELLIPTA 62.5-25 mcg/actuation DsDv    Can the clinic reply by [facility]? no    Would the patient rather a call back or a response via [facility]? Call     Best call back number: [phone]

## 2020-12-08 NOTE — TELEPHONE ENCOUNTER
Pt also states that she is having some issues with the prolira injection states that she is having pain in her bones and wants to know if something can be done about it before she is due to get her next injection

## 2020-12-08 NOTE — TELEPHONE ENCOUNTER
----- Message from Ldsara Araiza sent at 12/8/2020 10:15 AM CST -----  Regarding: Self  Type: Patient Call Back    Who called: self    What is the request in detail: please call to discuss denosumab (PROLIA) 60 mg/mL Syrg also she would like a refill on her ANORO ELLIPTA 62.5-25 mcg/actuation DsDv    Can the clinic reply by [facility]? no    Would the patient rather a call back or a response via [facility]? Call     Best call back number: [phone]

## 2020-12-09 NOTE — TELEPHONE ENCOUNTER
This may be a known side effect of the prolia.  If it doesn't resolve quickly we may need to decide if we should get a specialist to weight in on other treatment options.    Thank you,  Arvind

## 2020-12-09 NOTE — TELEPHONE ENCOUNTER
Spoke with pt and she states that she would like other options for her treatment, states that she is now having jaw issues.

## 2020-12-14 NOTE — TELEPHONE ENCOUNTER
Prolia discontinued in chart.  Would recommend discussion with Endocrine about treatment options.     Thank you,  Arvind

## 2020-12-16 ENCOUNTER — PATIENT OUTREACH (OUTPATIENT)
Dept: ADMINISTRATIVE | Facility: OTHER | Age: 75
End: 2020-12-16

## 2020-12-17 ENCOUNTER — OFFICE VISIT (OUTPATIENT)
Dept: ENDOCRINOLOGY | Facility: CLINIC | Age: 75
End: 2020-12-17
Payer: MEDICARE

## 2020-12-17 VITALS
SYSTOLIC BLOOD PRESSURE: 119 MMHG | BODY MASS INDEX: 24.75 KG/M2 | WEIGHT: 139.69 LBS | HEART RATE: 82 BPM | DIASTOLIC BLOOD PRESSURE: 73 MMHG | TEMPERATURE: 97 F

## 2020-12-17 DIAGNOSIS — M25.552 HIP PAIN, BILATERAL: ICD-10-CM

## 2020-12-17 DIAGNOSIS — M81.0 AGE-RELATED OSTEOPOROSIS WITHOUT CURRENT PATHOLOGICAL FRACTURE: ICD-10-CM

## 2020-12-17 DIAGNOSIS — M85.89 OSTEOPENIA OF MULTIPLE SITES: Chronic | ICD-10-CM

## 2020-12-17 DIAGNOSIS — M51.37 DEGENERATION OF LUMBAR OR LUMBOSACRAL INTERVERTEBRAL DISC: Chronic | ICD-10-CM

## 2020-12-17 DIAGNOSIS — M25.551 HIP PAIN, BILATERAL: ICD-10-CM

## 2020-12-17 DIAGNOSIS — M54.50 MIDLINE LOW BACK PAIN WITHOUT SCIATICA, UNSPECIFIED CHRONICITY: ICD-10-CM

## 2020-12-17 DIAGNOSIS — M81.0 OSTEOPOROSIS, UNSPECIFIED OSTEOPOROSIS TYPE, UNSPECIFIED PATHOLOGICAL FRACTURE PRESENCE: Primary | ICD-10-CM

## 2020-12-17 DIAGNOSIS — M54.9 DORSALGIA, UNSPECIFIED: ICD-10-CM

## 2020-12-17 PROCEDURE — 99204 OFFICE O/P NEW MOD 45 MIN: CPT | Mod: S$GLB,,, | Performed by: HOSPITALIST

## 2020-12-17 PROCEDURE — 1159F MED LIST DOCD IN RCRD: CPT | Mod: S$GLB,,, | Performed by: HOSPITALIST

## 2020-12-17 PROCEDURE — 1126F AMNT PAIN NOTED NONE PRSNT: CPT | Mod: S$GLB,,, | Performed by: HOSPITALIST

## 2020-12-17 PROCEDURE — 3008F PR BODY MASS INDEX (BMI) DOCUMENTED: ICD-10-PCS | Mod: CPTII,S$GLB,, | Performed by: HOSPITALIST

## 2020-12-17 PROCEDURE — 99999 PR PBB SHADOW E&M-EST. PATIENT-LVL V: ICD-10-PCS | Mod: PBBFAC,,, | Performed by: HOSPITALIST

## 2020-12-17 PROCEDURE — 1159F PR MEDICATION LIST DOCUMENTED IN MEDICAL RECORD: ICD-10-PCS | Mod: S$GLB,,, | Performed by: HOSPITALIST

## 2020-12-17 PROCEDURE — 3008F BODY MASS INDEX DOCD: CPT | Mod: CPTII,S$GLB,, | Performed by: HOSPITALIST

## 2020-12-17 PROCEDURE — 99999 PR PBB SHADOW E&M-EST. PATIENT-LVL V: CPT | Mod: PBBFAC,,, | Performed by: HOSPITALIST

## 2020-12-17 PROCEDURE — 1126F PR PAIN SEVERITY QUANTIFIED, NO PAIN PRESENT: ICD-10-PCS | Mod: S$GLB,,, | Performed by: HOSPITALIST

## 2020-12-17 PROCEDURE — 99204 PR OFFICE/OUTPT VISIT, NEW, LEVL IV, 45-59 MIN: ICD-10-PCS | Mod: S$GLB,,, | Performed by: HOSPITALIST

## 2020-12-17 NOTE — LETTER
December 17, 2020      Cullen Timmons MD  [address] [place] [address]           [facility]  [address], [place] [address]  Phone: [phone]  Fax: [phone]          Patient: Marycarmen Louis   MR Number: [id number]   YOB: 1945   Date of Visit: 12/17/2020       Dear Dr. Cullen Timmons:    Thank you for referring Marycarmen Louis to me for evaluation. Attached you will find relevant portions of my assessment and plan of care.    If you have questions, please do not hesitate to call me. I look forward to following Marycarmen Louis along with you.    Sincerely,    Nilo Delong MD    Enclosure  CC:  No Recipients    If you would like to receive this communication electronically, please contact [email or web address] or [phone] to request more information on [facility] access.    For providers and/or their staff who would like to refer a patient to [facility], please contact us through our one-stop-shop provider referral line, [facility], at [phone].    If you feel you have received this communication in error or would no longer like to receive these types of communications, please e-mail [email or web address]

## 2020-12-17 NOTE — ASSESSMENT & PLAN NOTE
Patient reports history of this disease, has had MRI, has had steroid injection in her back  Patient with recent 3 months back pain, hip pain, with radiation down her legs.  This might be the etiology of her pain rather than Prolia  May need to see pain management specialist

## 2020-12-17 NOTE — ASSESSMENT & PLAN NOTE
Patient with osteoporosis, on Prolia therapy for the last 2 years, prior was on Forteo after hip fracture, was on Fosamax prior to that  DXA personal reviewed:  Unable to compare data given different machine  Vitamin-D normal, PTH not elevated in the past.  Patient reports 3 months of lower back pain with radiation down her leg, bilateral hip pain>> for which she attributes that to Prolia  Symptoms not present in previous injection of prolia    Plan:  Review side effect of Prolia with patient, unclear if this is the etiology of her back pain and hip pain.  Often Prolia side effect would be transient, should not be chronic  We will get formal x-ray of hips and lumbar spine to evaluate for any compression/atypical fracture  Patient's next Prolia dose will be in February, pending workup of x-ray, will decide if patient would proceed further with Prolia versus 1 time dose of Reclast  Next bone density due in 04/2021, patient is to have it done at our location were better comparison ([facility])  Dental health: Advised dental work  Continue exercise, continue vitamin-D supplement

## 2020-12-17 NOTE — PROGRESS NOTES
"Subjective:      Patient ID: Marycarmen Louis is a 75 y.o. female presented to Endocrinology clinic on 12/17/2020.    Chief Complaint:  Osteoporosis    History of Present Illness: Marycarmen Louis is a 75 y.o. female with history of tobacco use, high cholesterol, reported back pain, osteoporosis with history of left hip fracture  Dx with osteoporosis before possibly 2014 per patient, she was started on Fosamax duration unclear, patient had a left knee replacement in 2014    In 2/2017 patient had a fall, leading to L hip fracture and L wrist fracture  Post surgery she was started forteo from 2/2017 to 1/2018, unable to continue further due to cough  Subsequently was switched to Prolia: got 4 total dose,  last dose 8/2020, next due 2/2021    Patient reports of chronic lower back pain with radiation down her leg, bilateral hip pain, report feeling "lock jaw" >>> for the last 3 month, did not notice it prior. Saw a commercial for Prolia>> attributes her symptoms to Prolia.     Patient reports history of degenerative disc disease, has had injections in her lower back for pain    Patient gets routine dental cleaning, no plan for major dental work at this time.  No jaw pain    No recent falls  Walk 30-45 min every few days    Family hx of Osteoporosis: dad, brother    Postmenopausal at age 40, denies hysterectomy or oophorectomy  Patient takes vitamin-D and calcium supplement  Tobacco use ?  Yes 35 years  Current diarrhea or h/o malabsorption? 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  It was 2.4 on the prior examination..  The T score associated with the right femoral neck is -1.9 on the current examination.  It was not examined on the " prior examination.     IMPRESSION: Osteopenia  The probability of a major osteoporotic fracture is 20.3% within the next 10 years.  The probability of a hip fracture is 4.4% within the next 10 years.    DXA 11/2017:   Lumbar Spine: Lumbar bone mineral density L1-L4 is 1.307g/cm2, which is a t-score of 2.4. The z-score is 4.4.  Total Hip: The total hip bone mineral density is 0.603g/cm2.  The t-score is -2.8, and the z-score is -1.3.  Femoral neck BMD is 0.519g/cm2 and the t-score is -3.0.     IMPRESSION:    Osteoporosis of total hip, and femoral neck.  Decrease in hip BMD (-9.6%) and increase in lumbar spine BMD (31.5%) since prior study.       Reviewed past surgical, medical, family, social history and updated as appropriate.    Review of Systems   Constitutional: Negative for activity change and unexpected weight change.   HENT: Negative for sore throat and voice change.    Eyes: Negative for visual disturbance.   Respiratory: Negative for shortness of breath.    Cardiovascular: Negative for chest pain.   Gastrointestinal: Negative for abdominal pain, constipation, diarrhea, nausea and vomiting.   Genitourinary: Negative for urgency.   Musculoskeletal: Positive for arthralgias (Bilateral hips) and back pain.   Skin: Negative for wound.   Neurological: Negative for headaches.   Psychiatric/Behavioral: Negative for confusion and sleep disturbance.       Objective:   /73 (BP Location: Left arm, Patient Position: Sitting, BP Method: Large (Automatic))   Pulse 82   Temp 96.9 °F (36.1 °C) (Temporal)   Wt 63.4 kg (139 lb 11.2 oz)   BMI 24.75 kg/m²     Body mass index is 24.75 kg/m².    Physical Exam  Vitals signs and nursing note reviewed.   Constitutional:       General: She is not in acute distress.     Appearance: She is well-developed.      Comments: Normal well-developed, skinny female   HENT:      Head: Normocephalic and atraumatic.      Right Ear: External ear normal.      Left Ear: External ear normal.       Nose: Nose normal.   Eyes:      General: No scleral icterus.     Conjunctiva/sclera: Conjunctivae normal.   Neck:      Musculoskeletal: Normal range of motion and neck supple. No neck rigidity.      Thyroid: No thyromegaly.      Trachea: No tracheal deviation.      Comments: No thyroid enlargement noted on exam  Cardiovascular:      Rate and Rhythm: Normal rate.      Heart sounds: Normal heart sounds. No murmur.   Pulmonary:      Effort: Pulmonary effort is normal.      Breath sounds: Normal breath sounds.   Abdominal:      Palpations: Abdomen is soft. There is no mass.      Tenderness: There is no abdominal tenderness.   Musculoskeletal: Normal range of motion.         General: No swelling or deformity.      Comments: Unable to elicit back or hip pain on exam   Lymphadenopathy:      Cervical: No cervical adenopathy.   Skin:     General: Skin is warm.      Findings: No rash.   Neurological:      Mental Status: She is alert and oriented to person, place, and time. Mental status is at baseline.      Sensory: No sensory deficit.      Motor: No weakness.      Coordination: Coordination normal.      Comments: Normal get up and go test   Psychiatric:         Judgment: Judgment normal.       Lab Review:   No results found for: HGBA1C    Lab Results   Component Value Date    CHOL 188 10/09/2020    HDL 56 10/09/2020    LDLCALC 120.0 10/09/2020    TRIG 60 10/09/2020    CHOLHDL 29.8 10/09/2020       Lab Results   Component Value Date     10/09/2020    K 3.8 10/09/2020     10/09/2020    CO2 29 10/09/2020    GLU 91 10/09/2020    BUN 16 10/09/2020    CREATININE 0.7 10/09/2020    CALCIUM 8.8 10/09/2020    PROT 6.6 10/09/2020    ALBUMIN 4.0 10/09/2020    BILITOT 0.8 10/09/2020    ALKPHOS 32 (L) 10/09/2020    AST 26 10/09/2020    ALT 25 10/09/2020    ANIONGAP 9 10/09/2020    ESTGFRAFRICA >60 10/09/2020    EGFRNONAA >60 10/09/2020    TSH 1.383 11/29/2013        Lab Results   Component Value Date    PTH 29 10/21/2014     PTH 52 04/07/2011    YBLKJBJY29OP 43 10/09/2020    AWCAAXAS83ED 31 03/17/2017    XCYITHBK47AW 37 10/21/2014    CALCIUM 8.8 10/09/2020    CALCIUM 9.2 03/26/2019    CALCIUM 9.2 01/18/2018    ALKPHOS 32 (L) 10/09/2020    ALKPHOS 33 (L) 03/26/2019    ALKPHOS 45 (L) 02/15/2017    TSH 1.383 11/29/2013       Assessment and Plan     Marycarmen Louis is a 75 y.o. female here for management of the follow endocrinology disorder:  Osteoporosis    Age-related osteoporosis without current pathological fracture  Patient with osteoporosis, on Prolia therapy for the last 2 years, prior was on Forteo after hip fracture, was on Fosamax prior to that  DXA personal reviewed:  Unable to compare data given different machine  Vitamin-D normal, PTH not elevated in the past.  Patient reports 3 months of lower back pain with radiation down her leg, bilateral hip pain>> for which she attributes that to Prolia  Symptoms not present in previous injection of prolia    Plan:  Review side effect of Prolia with patient, unclear if this is the etiology of her back pain and hip pain.  Often Prolia side effect would be transient, should not be chronic  We will get formal x-ray of hips and lumbar spine to evaluate for any compression/atypical fracture  Patient's next Prolia dose will be in February, pending workup of x-ray, will decide if patient would proceed further with Prolia versus 1 time dose of Reclast  Next bone density due in 04/2021, patient is to have it done at our location were better comparison ([facility])  Dental health: Advised dental work  Continue exercise, continue vitamin-D supplement        Degeneration of lumbar or lumbosacral intervertebral disc  Patient reports history of this disease, has had MRI, has had steroid injection in her back  Patient with recent 3 months back pain, hip pain, with radiation down her legs.  This might be the etiology of her pain rather than Prolia  May need to see pain management specialist      Hip  pain, bilateral  Midline low back pain without sciatica, unspecified chronicity  Dorsalgia, unspecified  - pursue x-ray    RTC in 4 months to review bone density    Nilo Delong MD  Endocrinology- [facility]  12/17/2020      Disclaimer: This note has been generated using voice-recognition software. There may be typographical errors that have been missed during proof-reading.

## 2020-12-17 NOTE — PATIENT INSTRUCTIONS
We will schedule hip and back x-ray to evaluate your pain.    Continue taking your vitamin-D and calcium.    We will do lab work to evaluate bone turnover, kidney function, electrolytes    I will contact you once I get the results, we can discuss if we need to continue to Prolia versus stopping it in doing alternative medication called Reclast    Plan for bone density in 4/9/2021    I will see you back after your bone density is done.

## 2020-12-18 ENCOUNTER — APPOINTMENT (OUTPATIENT)
Dept: RADIOLOGY | Facility: HOSPITAL | Age: 75
End: 2020-12-18
Attending: HOSPITALIST
Payer: MEDICARE

## 2020-12-18 DIAGNOSIS — M25.552 HIP PAIN, BILATERAL: ICD-10-CM

## 2020-12-18 DIAGNOSIS — M54.9 DORSALGIA, UNSPECIFIED: ICD-10-CM

## 2020-12-18 DIAGNOSIS — M54.50 MIDLINE LOW BACK PAIN WITHOUT SCIATICA, UNSPECIFIED CHRONICITY: ICD-10-CM

## 2020-12-18 DIAGNOSIS — M25.551 HIP PAIN, BILATERAL: ICD-10-CM

## 2020-12-18 DIAGNOSIS — M81.0 OSTEOPOROSIS, UNSPECIFIED OSTEOPOROSIS TYPE, UNSPECIFIED PATHOLOGICAL FRACTURE PRESENCE: ICD-10-CM

## 2020-12-18 PROCEDURE — 72100 XR LUMBAR SPINE AP AND LATERAL: ICD-10-PCS | Mod: 26,,, | Performed by: RADIOLOGY

## 2020-12-18 PROCEDURE — 73521 X-RAY EXAM HIPS BI 2 VIEWS: CPT | Mod: 26,,, | Performed by: RADIOLOGY

## 2020-12-18 PROCEDURE — 73521 X-RAY EXAM HIPS BI 2 VIEWS: CPT | Mod: TC,FY,PN

## 2020-12-18 PROCEDURE — 73521 XR HIPS BILATERAL 2 VIEW INCL AP PELVIS: ICD-10-PCS | Mod: 26,,, | Performed by: RADIOLOGY

## 2020-12-18 PROCEDURE — 72100 X-RAY EXAM L-S SPINE 2/3 VWS: CPT | Mod: 26,,, | Performed by: RADIOLOGY

## 2020-12-18 PROCEDURE — 72100 X-RAY EXAM L-S SPINE 2/3 VWS: CPT | Mod: TC,FY,PN

## 2020-12-21 ENCOUNTER — TELEPHONE (OUTPATIENT)
Dept: ENDOCRINOLOGY | Facility: CLINIC | Age: 75
End: 2020-12-21

## 2020-12-21 NOTE — TELEPHONE ENCOUNTER
Called patient on on 12/21/2020 4:28 PM to review recent result.   I discussed the recent results of normal spinal and hip x-ray, no fracture noted by radiology  Off for pain management it referral for evaluation of chronic back pain, patient declined at this time.    Will follow up on lab work to be done tomorrow      Nilo Delong MD  Endocrinology  12/21/2020

## 2020-12-22 ENCOUNTER — LAB VISIT (OUTPATIENT)
Dept: LAB | Facility: HOSPITAL | Age: 75
End: 2020-12-22
Attending: HOSPITALIST
Payer: MEDICARE

## 2020-12-22 DIAGNOSIS — M81.0 OSTEOPOROSIS, UNSPECIFIED OSTEOPOROSIS TYPE, UNSPECIFIED PATHOLOGICAL FRACTURE PRESENCE: ICD-10-CM

## 2020-12-22 DIAGNOSIS — M25.552 HIP PAIN, BILATERAL: ICD-10-CM

## 2020-12-22 DIAGNOSIS — M25.551 HIP PAIN, BILATERAL: ICD-10-CM

## 2020-12-22 DIAGNOSIS — M54.50 MIDLINE LOW BACK PAIN WITHOUT SCIATICA, UNSPECIFIED CHRONICITY: ICD-10-CM

## 2020-12-22 LAB
ALBUMIN SERPL BCP-MCNC: 4 G/DL (ref 3.5–5.2)
ALP SERPL-CCNC: 32 U/L (ref 55–135)
ALT SERPL W/O P-5'-P-CCNC: 30 U/L (ref 10–44)
ANION GAP SERPL CALC-SCNC: 9 MMOL/L (ref 8–16)
AST SERPL-CCNC: 28 U/L (ref 10–40)
BILIRUB SERPL-MCNC: 0.5 MG/DL (ref 0.1–1)
BUN SERPL-MCNC: 17 MG/DL (ref 8–23)
CALCIUM SERPL-MCNC: 8.8 MG/DL (ref 8.7–10.5)
CHLORIDE SERPL-SCNC: 104 MMOL/L (ref 95–110)
CO2 SERPL-SCNC: 30 MMOL/L (ref 23–29)
CREAT SERPL-MCNC: 0.7 MG/DL (ref 0.5–1.4)
EST. GFR  (AFRICAN AMERICAN): >60 ML/MIN/1.73 M^2
EST. GFR  (NON AFRICAN AMERICAN): >60 ML/MIN/1.73 M^2
GLUCOSE SERPL-MCNC: 92 MG/DL (ref 70–110)
MAGNESIUM SERPL-MCNC: 1.9 MG/DL (ref 1.6–2.6)
PHOSPHATE SERPL-MCNC: 3.5 MG/DL (ref 2.7–4.5)
POTASSIUM SERPL-SCNC: 4.4 MMOL/L (ref 3.5–5.1)
PROT SERPL-MCNC: 6.5 G/DL (ref 6–8.4)
PTH-INTACT SERPL-MCNC: 48 PG/ML (ref 9–77)
SODIUM SERPL-SCNC: 143 MMOL/L (ref 136–145)
TSH SERPL DL<=0.005 MIU/L-ACNC: 1.94 UIU/ML (ref 0.4–4)

## 2020-12-22 PROCEDURE — 83735 ASSAY OF MAGNESIUM: CPT

## 2020-12-22 PROCEDURE — 36415 COLL VENOUS BLD VENIPUNCTURE: CPT

## 2020-12-22 PROCEDURE — 84100 ASSAY OF PHOSPHORUS: CPT

## 2020-12-22 PROCEDURE — 82523 COLLAGEN CROSSLINKS: CPT

## 2020-12-22 PROCEDURE — 84443 ASSAY THYROID STIM HORMONE: CPT

## 2020-12-22 PROCEDURE — 80053 COMPREHEN METABOLIC PANEL: CPT

## 2020-12-22 PROCEDURE — 83970 ASSAY OF PARATHORMONE: CPT

## 2020-12-23 ENCOUNTER — PATIENT MESSAGE (OUTPATIENT)
Dept: ENDOCRINOLOGY | Facility: CLINIC | Age: 75
End: 2020-12-23

## 2020-12-23 LAB — COLLAGEN CTX SERPL-MCNC: 56 PG/ML

## 2021-01-08 ENCOUNTER — OFFICE VISIT (OUTPATIENT)
Dept: OBSTETRICS AND GYNECOLOGY | Facility: CLINIC | Age: 76
End: 2021-01-08
Payer: MEDICARE

## 2021-01-08 VITALS
BODY MASS INDEX: 24.53 KG/M2 | SYSTOLIC BLOOD PRESSURE: 130 MMHG | DIASTOLIC BLOOD PRESSURE: 80 MMHG | HEIGHT: 63 IN | WEIGHT: 138.44 LBS

## 2021-01-08 DIAGNOSIS — K40.90 RIGHT INGUINAL HERNIA: Primary | ICD-10-CM

## 2021-01-08 PROCEDURE — 1101F PR PT FALLS ASSESS DOC 0-1 FALLS W/OUT INJ PAST YR: ICD-10-PCS | Mod: CPTII,S$GLB,, | Performed by: OBSTETRICS & GYNECOLOGY

## 2021-01-08 PROCEDURE — 99999 PR PBB SHADOW E&M-EST. PATIENT-LVL III: ICD-10-PCS | Mod: PBBFAC,,, | Performed by: OBSTETRICS & GYNECOLOGY

## 2021-01-08 PROCEDURE — 3008F BODY MASS INDEX DOCD: CPT | Mod: CPTII,S$GLB,, | Performed by: OBSTETRICS & GYNECOLOGY

## 2021-01-08 PROCEDURE — 1159F MED LIST DOCD IN RCRD: CPT | Mod: S$GLB,,, | Performed by: OBSTETRICS & GYNECOLOGY

## 2021-01-08 PROCEDURE — 1126F PR PAIN SEVERITY QUANTIFIED, NO PAIN PRESENT: ICD-10-PCS | Mod: S$GLB,,, | Performed by: OBSTETRICS & GYNECOLOGY

## 2021-01-08 PROCEDURE — 3288F PR FALLS RISK ASSESSMENT DOCUMENTED: ICD-10-PCS | Mod: CPTII,S$GLB,, | Performed by: OBSTETRICS & GYNECOLOGY

## 2021-01-08 PROCEDURE — 1101F PT FALLS ASSESS-DOCD LE1/YR: CPT | Mod: CPTII,S$GLB,, | Performed by: OBSTETRICS & GYNECOLOGY

## 2021-01-08 PROCEDURE — 1126F AMNT PAIN NOTED NONE PRSNT: CPT | Mod: S$GLB,,, | Performed by: OBSTETRICS & GYNECOLOGY

## 2021-01-08 PROCEDURE — 99213 PR OFFICE/OUTPT VISIT, EST, LEVL III, 20-29 MIN: ICD-10-PCS | Mod: S$GLB,,, | Performed by: OBSTETRICS & GYNECOLOGY

## 2021-01-08 PROCEDURE — 3008F PR BODY MASS INDEX (BMI) DOCUMENTED: ICD-10-PCS | Mod: CPTII,S$GLB,, | Performed by: OBSTETRICS & GYNECOLOGY

## 2021-01-08 PROCEDURE — 99213 OFFICE O/P EST LOW 20 MIN: CPT | Mod: S$GLB,,, | Performed by: OBSTETRICS & GYNECOLOGY

## 2021-01-08 PROCEDURE — 99999 PR PBB SHADOW E&M-EST. PATIENT-LVL III: CPT | Mod: PBBFAC,,, | Performed by: OBSTETRICS & GYNECOLOGY

## 2021-01-08 PROCEDURE — 3288F FALL RISK ASSESSMENT DOCD: CPT | Mod: CPTII,S$GLB,, | Performed by: OBSTETRICS & GYNECOLOGY

## 2021-01-08 PROCEDURE — 1159F PR MEDICATION LIST DOCUMENTED IN MEDICAL RECORD: ICD-10-PCS | Mod: S$GLB,,, | Performed by: OBSTETRICS & GYNECOLOGY

## 2021-01-22 RX ORDER — PANTOPRAZOLE SODIUM 40 MG/1
40 TABLET, DELAYED RELEASE ORAL DAILY
Qty: 30 TABLET | Refills: 3 | Status: SHIPPED | OUTPATIENT
Start: 2021-01-22 | End: 2021-05-20 | Stop reason: SDUPTHER

## 2021-01-25 ENCOUNTER — PATIENT OUTREACH (OUTPATIENT)
Dept: ADMINISTRATIVE | Facility: OTHER | Age: 76
End: 2021-01-25

## 2021-01-26 ENCOUNTER — OFFICE VISIT (OUTPATIENT)
Dept: GASTROENTEROLOGY | Facility: CLINIC | Age: 76
End: 2021-01-26
Payer: MEDICARE

## 2021-01-26 VITALS — HEIGHT: 63 IN | WEIGHT: 139.75 LBS | BODY MASS INDEX: 24.76 KG/M2

## 2021-01-26 DIAGNOSIS — Z80.0 FAMILY HISTORY OF COLON CANCER: ICD-10-CM

## 2021-01-26 DIAGNOSIS — R10.9 ABDOMINAL PAIN, UNSPECIFIED ABDOMINAL LOCATION: Primary | ICD-10-CM

## 2021-01-26 PROCEDURE — 3288F FALL RISK ASSESSMENT DOCD: CPT | Mod: CPTII,S$GLB,, | Performed by: INTERNAL MEDICINE

## 2021-01-26 PROCEDURE — 99999 PR PBB SHADOW E&M-EST. PATIENT-LVL III: CPT | Mod: PBBFAC,,, | Performed by: INTERNAL MEDICINE

## 2021-01-26 PROCEDURE — 1101F PR PT FALLS ASSESS DOC 0-1 FALLS W/OUT INJ PAST YR: ICD-10-PCS | Mod: CPTII,S$GLB,, | Performed by: INTERNAL MEDICINE

## 2021-01-26 PROCEDURE — 1159F MED LIST DOCD IN RCRD: CPT | Mod: S$GLB,,, | Performed by: INTERNAL MEDICINE

## 2021-01-26 PROCEDURE — 1101F PT FALLS ASSESS-DOCD LE1/YR: CPT | Mod: CPTII,S$GLB,, | Performed by: INTERNAL MEDICINE

## 2021-01-26 PROCEDURE — 1159F PR MEDICATION LIST DOCUMENTED IN MEDICAL RECORD: ICD-10-PCS | Mod: S$GLB,,, | Performed by: INTERNAL MEDICINE

## 2021-01-26 PROCEDURE — 99214 OFFICE O/P EST MOD 30 MIN: CPT | Mod: S$GLB,,, | Performed by: INTERNAL MEDICINE

## 2021-01-26 PROCEDURE — 99999 PR PBB SHADOW E&M-EST. PATIENT-LVL III: ICD-10-PCS | Mod: PBBFAC,,, | Performed by: INTERNAL MEDICINE

## 2021-01-26 PROCEDURE — 1126F AMNT PAIN NOTED NONE PRSNT: CPT | Mod: S$GLB,,, | Performed by: INTERNAL MEDICINE

## 2021-01-26 PROCEDURE — 3008F BODY MASS INDEX DOCD: CPT | Mod: CPTII,S$GLB,, | Performed by: INTERNAL MEDICINE

## 2021-01-26 PROCEDURE — 1126F PR PAIN SEVERITY QUANTIFIED, NO PAIN PRESENT: ICD-10-PCS | Mod: S$GLB,,, | Performed by: INTERNAL MEDICINE

## 2021-01-26 PROCEDURE — 3288F PR FALLS RISK ASSESSMENT DOCUMENTED: ICD-10-PCS | Mod: CPTII,S$GLB,, | Performed by: INTERNAL MEDICINE

## 2021-01-26 PROCEDURE — 3008F PR BODY MASS INDEX (BMI) DOCUMENTED: ICD-10-PCS | Mod: CPTII,S$GLB,, | Performed by: INTERNAL MEDICINE

## 2021-01-26 PROCEDURE — 99214 PR OFFICE/OUTPT VISIT, EST, LEVL IV, 30-39 MIN: ICD-10-PCS | Mod: S$GLB,,, | Performed by: INTERNAL MEDICINE

## 2021-01-27 ENCOUNTER — TELEPHONE (OUTPATIENT)
Dept: INFUSION THERAPY | Facility: HOSPITAL | Age: 76
End: 2021-01-27

## 2021-01-27 DIAGNOSIS — M81.0 OSTEOPOROSIS, POSTMENOPAUSAL: Primary | ICD-10-CM

## 2021-01-27 RX ORDER — DENOSUMAB 60 MG/ML
60 INJECTION SUBCUTANEOUS
Qty: 1 SYRINGE | Refills: 1 | Status: SHIPPED | OUTPATIENT
Start: 2021-01-27 | End: 2021-10-21

## 2021-02-05 ENCOUNTER — INFUSION (OUTPATIENT)
Dept: INFUSION THERAPY | Facility: HOSPITAL | Age: 76
End: 2021-02-05
Attending: FAMILY MEDICINE
Payer: MEDICARE

## 2021-02-05 VITALS
OXYGEN SATURATION: 99 % | TEMPERATURE: 98 F | SYSTOLIC BLOOD PRESSURE: 133 MMHG | RESPIRATION RATE: 18 BRPM | DIASTOLIC BLOOD PRESSURE: 74 MMHG | HEART RATE: 87 BPM

## 2021-02-05 DIAGNOSIS — M85.89 OSTEOPENIA OF MULTIPLE SITES: Primary | ICD-10-CM

## 2021-02-05 PROCEDURE — 96372 THER/PROPH/DIAG INJ SC/IM: CPT

## 2021-02-05 PROCEDURE — 63600175 PHARM REV CODE 636 W HCPCS: Performed by: FAMILY MEDICINE

## 2021-02-05 RX ADMIN — DENOSUMAB 60 MG: 60 INJECTION SUBCUTANEOUS at 10:02

## 2021-03-05 DIAGNOSIS — J43.1 PANLOBULAR EMPHYSEMA: Chronic | ICD-10-CM

## 2021-03-05 RX ORDER — UMECLIDINIUM BROMIDE AND VILANTEROL TRIFENATATE 62.5; 25 UG/1; UG/1
1 POWDER RESPIRATORY (INHALATION) DAILY
Qty: 60 EACH | Refills: 3 | Status: SHIPPED | OUTPATIENT
Start: 2021-03-05 | End: 2021-07-11

## 2021-04-12 ENCOUNTER — PATIENT OUTREACH (OUTPATIENT)
Dept: ADMINISTRATIVE | Facility: OTHER | Age: 76
End: 2021-04-12

## 2021-04-12 DIAGNOSIS — Z12.31 BREAST CANCER SCREENING BY MAMMOGRAM: Primary | ICD-10-CM

## 2021-05-20 RX ORDER — PANTOPRAZOLE SODIUM 40 MG/1
40 TABLET, DELAYED RELEASE ORAL DAILY
Qty: 30 TABLET | Refills: 3 | Status: SHIPPED | OUTPATIENT
Start: 2021-05-20 | End: 2021-11-09 | Stop reason: SDUPTHER

## 2021-07-10 DIAGNOSIS — J43.1 PANLOBULAR EMPHYSEMA: Chronic | ICD-10-CM

## 2021-07-11 RX ORDER — UMECLIDINIUM BROMIDE AND VILANTEROL TRIFENATATE 62.5; 25 UG/1; UG/1
1 POWDER RESPIRATORY (INHALATION) DAILY
Qty: 60 EACH | Refills: 3 | Status: SHIPPED | OUTPATIENT
Start: 2021-07-11 | End: 2021-11-15

## 2021-08-23 ENCOUNTER — PATIENT OUTREACH (OUTPATIENT)
Dept: ADMINISTRATIVE | Facility: OTHER | Age: 76
End: 2021-08-23

## 2021-08-24 ENCOUNTER — OFFICE VISIT (OUTPATIENT)
Dept: OPTOMETRY | Facility: CLINIC | Age: 76
End: 2021-08-24
Payer: MEDICARE

## 2021-08-24 DIAGNOSIS — H52.13 MYOPIA WITH PRESBYOPIA OF BOTH EYES: ICD-10-CM

## 2021-08-24 DIAGNOSIS — H04.123 DRY EYE SYNDROME OF BOTH EYES: Primary | ICD-10-CM

## 2021-08-24 DIAGNOSIS — H52.4 MYOPIA WITH PRESBYOPIA OF BOTH EYES: ICD-10-CM

## 2021-08-24 PROCEDURE — 92014 PR EYE EXAM, EST PATIENT,COMPREHESV: ICD-10-PCS | Mod: S$GLB,,, | Performed by: OPTOMETRIST

## 2021-08-24 PROCEDURE — 1101F PR PT FALLS ASSESS DOC 0-1 FALLS W/OUT INJ PAST YR: ICD-10-PCS | Mod: CPTII,S$GLB,, | Performed by: OPTOMETRIST

## 2021-08-24 PROCEDURE — 99999 PR PBB SHADOW E&M-EST. PATIENT-LVL III: ICD-10-PCS | Mod: PBBFAC,,, | Performed by: OPTOMETRIST

## 2021-08-24 PROCEDURE — 1126F PR PAIN SEVERITY QUANTIFIED, NO PAIN PRESENT: ICD-10-PCS | Mod: CPTII,S$GLB,, | Performed by: OPTOMETRIST

## 2021-08-24 PROCEDURE — 1159F MED LIST DOCD IN RCRD: CPT | Mod: CPTII,S$GLB,, | Performed by: OPTOMETRIST

## 2021-08-24 PROCEDURE — 1101F PT FALLS ASSESS-DOCD LE1/YR: CPT | Mod: CPTII,S$GLB,, | Performed by: OPTOMETRIST

## 2021-08-24 PROCEDURE — 1126F AMNT PAIN NOTED NONE PRSNT: CPT | Mod: CPTII,S$GLB,, | Performed by: OPTOMETRIST

## 2021-08-24 PROCEDURE — 3288F PR FALLS RISK ASSESSMENT DOCUMENTED: ICD-10-PCS | Mod: CPTII,S$GLB,, | Performed by: OPTOMETRIST

## 2021-08-24 PROCEDURE — 1159F PR MEDICATION LIST DOCUMENTED IN MEDICAL RECORD: ICD-10-PCS | Mod: CPTII,S$GLB,, | Performed by: OPTOMETRIST

## 2021-08-24 PROCEDURE — 92015 DETERMINE REFRACTIVE STATE: CPT | Mod: S$GLB,,, | Performed by: OPTOMETRIST

## 2021-08-24 PROCEDURE — 3288F FALL RISK ASSESSMENT DOCD: CPT | Mod: CPTII,S$GLB,, | Performed by: OPTOMETRIST

## 2021-08-24 PROCEDURE — 92014 COMPRE OPH EXAM EST PT 1/>: CPT | Mod: S$GLB,,, | Performed by: OPTOMETRIST

## 2021-08-24 PROCEDURE — 92015 PR REFRACTION: ICD-10-PCS | Mod: S$GLB,,, | Performed by: OPTOMETRIST

## 2021-08-24 PROCEDURE — 99999 PR PBB SHADOW E&M-EST. PATIENT-LVL III: CPT | Mod: PBBFAC,,, | Performed by: OPTOMETRIST

## 2021-09-17 DIAGNOSIS — F32.A DEPRESSION, UNSPECIFIED DEPRESSION TYPE: ICD-10-CM

## 2021-09-17 DIAGNOSIS — F41.9 ANXIETY: ICD-10-CM

## 2021-09-19 RX ORDER — VENLAFAXINE HYDROCHLORIDE 75 MG/1
CAPSULE, EXTENDED RELEASE ORAL
Qty: 90 CAPSULE | Refills: 1 | Status: SHIPPED | OUTPATIENT
Start: 2021-09-19 | End: 2021-12-20

## 2021-09-20 ENCOUNTER — PATIENT OUTREACH (OUTPATIENT)
Dept: ADMINISTRATIVE | Facility: HOSPITAL | Age: 76
End: 2021-09-20

## 2021-09-28 ENCOUNTER — HOSPITAL ENCOUNTER (OUTPATIENT)
Dept: RADIOLOGY | Facility: HOSPITAL | Age: 76
Discharge: HOME | End: 2021-09-28
Attending: HOSPITALIST
Payer: MEDICARE

## 2021-09-28 ENCOUNTER — HOSPITAL ENCOUNTER (OUTPATIENT)
Dept: RADIOLOGY | Facility: CLINIC | Age: 76
Discharge: HOME | End: 2021-09-28
Attending: HOSPITALIST
Payer: MEDICARE

## 2021-09-28 DIAGNOSIS — M81.0 OSTEOPOROSIS, UNSPECIFIED OSTEOPOROSIS TYPE, UNSPECIFIED PATHOLOGICAL FRACTURE PRESENCE: ICD-10-CM

## 2021-09-28 DIAGNOSIS — Z12.31 BREAST CANCER SCREENING BY MAMMOGRAM: ICD-10-CM

## 2021-09-28 PROCEDURE — 77063 BREAST TOMOSYNTHESIS BI: CPT | Mod: 26,,, | Performed by: RADIOLOGY

## 2021-09-28 PROCEDURE — 77067 MAMMO DIGITAL SCREENING BILAT WITH TOMO: ICD-10-PCS | Mod: 26,,, | Performed by: RADIOLOGY

## 2021-09-28 PROCEDURE — 77063 MAMMO DIGITAL SCREENING BILAT WITH TOMO: ICD-10-PCS | Mod: 26,,, | Performed by: RADIOLOGY

## 2021-09-28 PROCEDURE — 77067 SCR MAMMO BI INCL CAD: CPT | Mod: TC,PO

## 2021-09-28 PROCEDURE — 77080 DEXA BONE DENSITY SPINE HIP: ICD-10-PCS | Mod: 26,,, | Performed by: INTERNAL MEDICINE

## 2021-09-28 PROCEDURE — 77080 DXA BONE DENSITY AXIAL: CPT | Mod: TC,PO

## 2021-09-28 PROCEDURE — 77067 SCR MAMMO BI INCL CAD: CPT | Mod: 26,,, | Performed by: RADIOLOGY

## 2021-09-28 PROCEDURE — 77080 DXA BONE DENSITY AXIAL: CPT | Mod: 26,,, | Performed by: INTERNAL MEDICINE

## 2021-10-06 ENCOUNTER — TELEPHONE (OUTPATIENT)
Dept: FAMILY MEDICINE | Facility: CLINIC | Age: 76
End: 2021-10-06

## 2021-10-11 DIAGNOSIS — I25.10 CORONARY ARTERY CALCIFICATION SEEN ON CAT SCAN: Chronic | ICD-10-CM

## 2021-10-11 RX ORDER — ATORVASTATIN CALCIUM 40 MG/1
40 TABLET, FILM COATED ORAL DAILY
Qty: 90 TABLET | Refills: 1 | Status: SHIPPED | OUTPATIENT
Start: 2021-10-11 | End: 2021-10-21 | Stop reason: ALTCHOICE

## 2021-10-12 ENCOUNTER — PATIENT MESSAGE (OUTPATIENT)
Dept: FAMILY MEDICINE | Facility: CLINIC | Age: 76
End: 2021-10-12

## 2021-10-18 ENCOUNTER — LAB VISIT (OUTPATIENT)
Dept: LAB | Facility: HOSPITAL | Age: 76
End: 2021-10-18
Attending: INTERNAL MEDICINE
Payer: MEDICARE

## 2021-10-18 DIAGNOSIS — I25.10 CORONARY ARTERY CALCIFICATION SEEN ON CAT SCAN: Chronic | ICD-10-CM

## 2021-10-18 LAB
ALBUMIN SERPL BCP-MCNC: 3.8 G/DL (ref 3.5–5.2)
ALP SERPL-CCNC: 53 U/L (ref 55–135)
ALT SERPL W/O P-5'-P-CCNC: 34 U/L (ref 10–44)
ANION GAP SERPL CALC-SCNC: 7 MMOL/L (ref 8–16)
AST SERPL-CCNC: 34 U/L (ref 10–40)
BASOPHILS # BLD AUTO: 0.02 K/UL (ref 0–0.2)
BASOPHILS NFR BLD: 0.5 % (ref 0–1.9)
BILIRUB SERPL-MCNC: 0.7 MG/DL (ref 0.1–1)
BUN SERPL-MCNC: 14 MG/DL (ref 8–23)
CALCIUM SERPL-MCNC: 9.6 MG/DL (ref 8.7–10.5)
CHLORIDE SERPL-SCNC: 103 MMOL/L (ref 95–110)
CHOLEST SERPL-MCNC: 139 MG/DL (ref 120–199)
CHOLEST/HDLC SERPL: 2.7 {RATIO} (ref 2–5)
CO2 SERPL-SCNC: 32 MMOL/L (ref 23–29)
CREAT SERPL-MCNC: 0.7 MG/DL (ref 0.5–1.4)
DIFFERENTIAL METHOD: ABNORMAL
EOSINOPHIL # BLD AUTO: 0.2 K/UL (ref 0–0.5)
EOSINOPHIL NFR BLD: 5.3 % (ref 0–8)
ERYTHROCYTE [DISTWIDTH] IN BLOOD BY AUTOMATED COUNT: 12.6 % (ref 11.5–14.5)
EST. GFR  (AFRICAN AMERICAN): >60 ML/MIN/1.73 M^2
EST. GFR  (NON AFRICAN AMERICAN): >60 ML/MIN/1.73 M^2
GLUCOSE SERPL-MCNC: 93 MG/DL (ref 70–110)
HCT VFR BLD AUTO: 40.8 % (ref 37–48.5)
HDLC SERPL-MCNC: 51 MG/DL (ref 40–75)
HDLC SERPL: 36.7 % (ref 20–50)
HGB BLD-MCNC: 13.5 G/DL (ref 12–16)
IMM GRANULOCYTES # BLD AUTO: 0.01 K/UL (ref 0–0.04)
IMM GRANULOCYTES NFR BLD AUTO: 0.2 % (ref 0–0.5)
LDLC SERPL CALC-MCNC: 78.8 MG/DL (ref 63–159)
LYMPHOCYTES # BLD AUTO: 0.9 K/UL (ref 1–4.8)
LYMPHOCYTES NFR BLD: 21.9 % (ref 18–48)
MCH RBC QN AUTO: 32.2 PG (ref 27–31)
MCHC RBC AUTO-ENTMCNC: 33.1 G/DL (ref 32–36)
MCV RBC AUTO: 97 FL (ref 82–98)
MONOCYTES # BLD AUTO: 0.6 K/UL (ref 0.3–1)
MONOCYTES NFR BLD: 13.2 % (ref 4–15)
NEUTROPHILS # BLD AUTO: 2.5 K/UL (ref 1.8–7.7)
NEUTROPHILS NFR BLD: 58.9 % (ref 38–73)
NONHDLC SERPL-MCNC: 88 MG/DL
NRBC BLD-RTO: 0 /100 WBC
PLATELET # BLD AUTO: 290 K/UL (ref 150–450)
PMV BLD AUTO: 8.3 FL (ref 9.2–12.9)
POTASSIUM SERPL-SCNC: 4.1 MMOL/L (ref 3.5–5.1)
PROT SERPL-MCNC: 6.6 G/DL (ref 6–8.4)
RBC # BLD AUTO: 4.19 M/UL (ref 4–5.4)
SODIUM SERPL-SCNC: 142 MMOL/L (ref 136–145)
TRIGL SERPL-MCNC: 46 MG/DL (ref 30–150)
WBC # BLD AUTO: 4.16 K/UL (ref 3.9–12.7)

## 2021-10-18 PROCEDURE — 36415 COLL VENOUS BLD VENIPUNCTURE: CPT | Performed by: INTERNAL MEDICINE

## 2021-10-18 PROCEDURE — 80061 LIPID PANEL: CPT | Performed by: INTERNAL MEDICINE

## 2021-10-18 PROCEDURE — 85025 COMPLETE CBC W/AUTO DIFF WBC: CPT | Performed by: INTERNAL MEDICINE

## 2021-10-18 PROCEDURE — 80053 COMPREHEN METABOLIC PANEL: CPT | Performed by: INTERNAL MEDICINE

## 2021-10-21 ENCOUNTER — OFFICE VISIT (OUTPATIENT)
Dept: FAMILY MEDICINE | Facility: CLINIC | Age: 76
End: 2021-10-21
Payer: MEDICARE

## 2021-10-21 VITALS
DIASTOLIC BLOOD PRESSURE: 60 MMHG | SYSTOLIC BLOOD PRESSURE: 122 MMHG | BODY MASS INDEX: 24.34 KG/M2 | WEIGHT: 137.38 LBS | HEART RATE: 101 BPM | TEMPERATURE: 98 F | HEIGHT: 63 IN | OXYGEN SATURATION: 98 %

## 2021-10-21 DIAGNOSIS — J43.1 PANLOBULAR EMPHYSEMA: Chronic | ICD-10-CM

## 2021-10-21 DIAGNOSIS — F41.9 ANXIETY AND DEPRESSION: Chronic | ICD-10-CM

## 2021-10-21 DIAGNOSIS — M81.0 AGE-RELATED OSTEOPOROSIS WITHOUT CURRENT PATHOLOGICAL FRACTURE: Chronic | ICD-10-CM

## 2021-10-21 DIAGNOSIS — I25.10 CORONARY ARTERY CALCIFICATION SEEN ON CAT SCAN: Chronic | ICD-10-CM

## 2021-10-21 DIAGNOSIS — Z23 NEEDS FLU SHOT: ICD-10-CM

## 2021-10-21 DIAGNOSIS — F32.A ANXIETY AND DEPRESSION: Chronic | ICD-10-CM

## 2021-10-21 DIAGNOSIS — Z00.00 ROUTINE MEDICAL EXAM: Primary | ICD-10-CM

## 2021-10-21 PROCEDURE — 1160F PR REVIEW ALL MEDS BY PRESCRIBER/CLIN PHARMACIST DOCUMENTED: ICD-10-PCS | Mod: CPTII,S$GLB,, | Performed by: INTERNAL MEDICINE

## 2021-10-21 PROCEDURE — 3008F BODY MASS INDEX DOCD: CPT | Mod: CPTII,S$GLB,, | Performed by: INTERNAL MEDICINE

## 2021-10-21 PROCEDURE — 1159F PR MEDICATION LIST DOCUMENTED IN MEDICAL RECORD: ICD-10-PCS | Mod: CPTII,S$GLB,, | Performed by: INTERNAL MEDICINE

## 2021-10-21 PROCEDURE — G0008 ADMIN INFLUENZA VIRUS VAC: HCPCS | Mod: S$GLB,,, | Performed by: INTERNAL MEDICINE

## 2021-10-21 PROCEDURE — 1101F PT FALLS ASSESS-DOCD LE1/YR: CPT | Mod: CPTII,S$GLB,, | Performed by: INTERNAL MEDICINE

## 2021-10-21 PROCEDURE — 1125F AMNT PAIN NOTED PAIN PRSNT: CPT | Mod: CPTII,S$GLB,, | Performed by: INTERNAL MEDICINE

## 2021-10-21 PROCEDURE — 1101F PR PT FALLS ASSESS DOC 0-1 FALLS W/OUT INJ PAST YR: ICD-10-PCS | Mod: CPTII,S$GLB,, | Performed by: INTERNAL MEDICINE

## 2021-10-21 PROCEDURE — 99999 PR PBB SHADOW E&M-EST. PATIENT-LVL V: CPT | Mod: PBBFAC,,, | Performed by: INTERNAL MEDICINE

## 2021-10-21 PROCEDURE — 99499 UNLISTED E&M SERVICE: CPT | Mod: S$GLB,,, | Performed by: INTERNAL MEDICINE

## 2021-10-21 PROCEDURE — 3078F DIAST BP <80 MM HG: CPT | Mod: CPTII,S$GLB,, | Performed by: INTERNAL MEDICINE

## 2021-10-21 PROCEDURE — 90694 VACC AIIV4 NO PRSRV 0.5ML IM: CPT | Mod: S$GLB,,, | Performed by: INTERNAL MEDICINE

## 2021-10-21 PROCEDURE — 3008F PR BODY MASS INDEX (BMI) DOCUMENTED: ICD-10-PCS | Mod: CPTII,S$GLB,, | Performed by: INTERNAL MEDICINE

## 2021-10-21 PROCEDURE — G0008 FLU VACCINE - QUADRIVALENT - ADJUVANTED: ICD-10-PCS | Mod: S$GLB,,, | Performed by: INTERNAL MEDICINE

## 2021-10-21 PROCEDURE — 90694 FLU VACCINE - QUADRIVALENT - ADJUVANTED: ICD-10-PCS | Mod: S$GLB,,, | Performed by: INTERNAL MEDICINE

## 2021-10-21 PROCEDURE — 1159F MED LIST DOCD IN RCRD: CPT | Mod: CPTII,S$GLB,, | Performed by: INTERNAL MEDICINE

## 2021-10-21 PROCEDURE — 3288F PR FALLS RISK ASSESSMENT DOCUMENTED: ICD-10-PCS | Mod: CPTII,S$GLB,, | Performed by: INTERNAL MEDICINE

## 2021-10-21 PROCEDURE — 1160F RVW MEDS BY RX/DR IN RCRD: CPT | Mod: CPTII,S$GLB,, | Performed by: INTERNAL MEDICINE

## 2021-10-21 PROCEDURE — 3078F PR MOST RECENT DIASTOLIC BLOOD PRESSURE < 80 MM HG: ICD-10-PCS | Mod: CPTII,S$GLB,, | Performed by: INTERNAL MEDICINE

## 2021-10-21 PROCEDURE — 99499 RISK ADDL DX/OHS AUDIT: ICD-10-PCS | Mod: S$GLB,,, | Performed by: INTERNAL MEDICINE

## 2021-10-21 PROCEDURE — 99214 PR OFFICE/OUTPT VISIT, EST, LEVL IV, 30-39 MIN: ICD-10-PCS | Mod: 25,S$GLB,, | Performed by: INTERNAL MEDICINE

## 2021-10-21 PROCEDURE — 3074F SYST BP LT 130 MM HG: CPT | Mod: CPTII,S$GLB,, | Performed by: INTERNAL MEDICINE

## 2021-10-21 PROCEDURE — 3288F FALL RISK ASSESSMENT DOCD: CPT | Mod: CPTII,S$GLB,, | Performed by: INTERNAL MEDICINE

## 2021-10-21 PROCEDURE — 99214 OFFICE O/P EST MOD 30 MIN: CPT | Mod: 25,S$GLB,, | Performed by: INTERNAL MEDICINE

## 2021-10-21 PROCEDURE — 3074F PR MOST RECENT SYSTOLIC BLOOD PRESSURE < 130 MM HG: ICD-10-PCS | Mod: CPTII,S$GLB,, | Performed by: INTERNAL MEDICINE

## 2021-10-21 PROCEDURE — 99999 PR PBB SHADOW E&M-EST. PATIENT-LVL V: ICD-10-PCS | Mod: PBBFAC,,, | Performed by: INTERNAL MEDICINE

## 2021-10-21 PROCEDURE — 1125F PR PAIN SEVERITY QUANTIFIED, PAIN PRESENT: ICD-10-PCS | Mod: CPTII,S$GLB,, | Performed by: INTERNAL MEDICINE

## 2021-10-21 RX ORDER — ASPIRIN 81 MG/1
81 TABLET ORAL DAILY
Refills: 0 | COMMUNITY
Start: 2021-10-21 | End: 2022-11-08 | Stop reason: SDUPTHER

## 2021-10-21 RX ORDER — ROSUVASTATIN CALCIUM 10 MG/1
10 TABLET, COATED ORAL DAILY
Qty: 90 TABLET | Refills: 3 | Status: SHIPPED | OUTPATIENT
Start: 2021-10-21 | End: 2022-10-04

## 2021-10-21 RX ORDER — VENLAFAXINE HYDROCHLORIDE 37.5 MG/1
37.5 CAPSULE, EXTENDED RELEASE ORAL DAILY
Qty: 90 CAPSULE | Refills: 1 | Status: SHIPPED | OUTPATIENT
Start: 2021-10-21 | End: 2022-04-11

## 2021-11-08 ENCOUNTER — PATIENT OUTREACH (OUTPATIENT)
Dept: ADMINISTRATIVE | Facility: OTHER | Age: 76
End: 2021-11-08
Payer: MEDICARE

## 2021-11-09 ENCOUNTER — OFFICE VISIT (OUTPATIENT)
Dept: GASTROENTEROLOGY | Facility: CLINIC | Age: 76
End: 2021-11-09
Payer: MEDICARE

## 2021-11-09 VITALS — BODY MASS INDEX: 24.34 KG/M2 | HEIGHT: 63 IN | WEIGHT: 137.38 LBS

## 2021-11-09 DIAGNOSIS — K21.9 GASTROESOPHAGEAL REFLUX DISEASE, UNSPECIFIED WHETHER ESOPHAGITIS PRESENT: Primary | ICD-10-CM

## 2021-11-09 PROCEDURE — 1101F PR PT FALLS ASSESS DOC 0-1 FALLS W/OUT INJ PAST YR: ICD-10-PCS | Mod: CPTII,S$GLB,, | Performed by: INTERNAL MEDICINE

## 2021-11-09 PROCEDURE — 1126F AMNT PAIN NOTED NONE PRSNT: CPT | Mod: CPTII,S$GLB,, | Performed by: INTERNAL MEDICINE

## 2021-11-09 PROCEDURE — 99214 PR OFFICE/OUTPT VISIT, EST, LEVL IV, 30-39 MIN: ICD-10-PCS | Mod: S$GLB,,, | Performed by: INTERNAL MEDICINE

## 2021-11-09 PROCEDURE — 3288F PR FALLS RISK ASSESSMENT DOCUMENTED: ICD-10-PCS | Mod: CPTII,S$GLB,, | Performed by: INTERNAL MEDICINE

## 2021-11-09 PROCEDURE — 1126F PR PAIN SEVERITY QUANTIFIED, NO PAIN PRESENT: ICD-10-PCS | Mod: CPTII,S$GLB,, | Performed by: INTERNAL MEDICINE

## 2021-11-09 PROCEDURE — 1101F PT FALLS ASSESS-DOCD LE1/YR: CPT | Mod: CPTII,S$GLB,, | Performed by: INTERNAL MEDICINE

## 2021-11-09 PROCEDURE — 3008F BODY MASS INDEX DOCD: CPT | Mod: CPTII,S$GLB,, | Performed by: INTERNAL MEDICINE

## 2021-11-09 PROCEDURE — 99214 OFFICE O/P EST MOD 30 MIN: CPT | Mod: S$GLB,,, | Performed by: INTERNAL MEDICINE

## 2021-11-09 PROCEDURE — 99999 PR PBB SHADOW E&M-EST. PATIENT-LVL III: CPT | Mod: PBBFAC,,, | Performed by: INTERNAL MEDICINE

## 2021-11-09 PROCEDURE — 1159F MED LIST DOCD IN RCRD: CPT | Mod: CPTII,S$GLB,, | Performed by: INTERNAL MEDICINE

## 2021-11-09 PROCEDURE — 99999 PR PBB SHADOW E&M-EST. PATIENT-LVL III: ICD-10-PCS | Mod: PBBFAC,,, | Performed by: INTERNAL MEDICINE

## 2021-11-09 PROCEDURE — 1159F PR MEDICATION LIST DOCUMENTED IN MEDICAL RECORD: ICD-10-PCS | Mod: CPTII,S$GLB,, | Performed by: INTERNAL MEDICINE

## 2021-11-09 PROCEDURE — 3288F FALL RISK ASSESSMENT DOCD: CPT | Mod: CPTII,S$GLB,, | Performed by: INTERNAL MEDICINE

## 2021-11-09 PROCEDURE — 3008F PR BODY MASS INDEX (BMI) DOCUMENTED: ICD-10-PCS | Mod: CPTII,S$GLB,, | Performed by: INTERNAL MEDICINE

## 2021-11-09 RX ORDER — PANTOPRAZOLE SODIUM 40 MG/1
40 TABLET, DELAYED RELEASE ORAL DAILY
Qty: 30 TABLET | Refills: 5 | Status: SHIPPED | OUTPATIENT
Start: 2021-11-09 | End: 2022-08-30 | Stop reason: SDUPTHER

## 2021-11-09 RX ORDER — PANTOPRAZOLE SODIUM 40 MG/1
40 TABLET, DELAYED RELEASE ORAL DAILY
Qty: 30 TABLET | Refills: 3 | Status: SHIPPED | OUTPATIENT
Start: 2021-11-09 | End: 2021-11-09

## 2021-11-22 ENCOUNTER — OFFICE VISIT (OUTPATIENT)
Dept: ENDOCRINOLOGY | Facility: CLINIC | Age: 76
End: 2021-11-22
Payer: MEDICARE

## 2021-11-22 VITALS
WEIGHT: 136 LBS | DIASTOLIC BLOOD PRESSURE: 63 MMHG | HEIGHT: 64 IN | BODY MASS INDEX: 23.22 KG/M2 | TEMPERATURE: 99 F | HEART RATE: 83 BPM | SYSTOLIC BLOOD PRESSURE: 102 MMHG

## 2021-11-22 DIAGNOSIS — M81.0 AGE-RELATED OSTEOPOROSIS WITHOUT CURRENT PATHOLOGICAL FRACTURE: Primary | Chronic | ICD-10-CM

## 2021-11-22 PROCEDURE — 99214 PR OFFICE/OUTPT VISIT, EST, LEVL IV, 30-39 MIN: ICD-10-PCS | Mod: S$GLB,,, | Performed by: HOSPITALIST

## 2021-11-22 PROCEDURE — 99999 PR PBB SHADOW E&M-EST. PATIENT-LVL IV: ICD-10-PCS | Mod: PBBFAC,,, | Performed by: HOSPITALIST

## 2021-11-22 PROCEDURE — 99499 RISK ADDL DX/OHS AUDIT: ICD-10-PCS | Mod: S$GLB,,, | Performed by: HOSPITALIST

## 2021-11-22 PROCEDURE — 99499 UNLISTED E&M SERVICE: CPT | Mod: S$GLB,,, | Performed by: HOSPITALIST

## 2021-11-22 PROCEDURE — 99214 OFFICE O/P EST MOD 30 MIN: CPT | Mod: S$GLB,,, | Performed by: HOSPITALIST

## 2021-11-22 PROCEDURE — 99999 PR PBB SHADOW E&M-EST. PATIENT-LVL IV: CPT | Mod: PBBFAC,,, | Performed by: HOSPITALIST

## 2021-12-02 ENCOUNTER — LAB VISIT (OUTPATIENT)
Dept: LAB | Facility: HOSPITAL | Age: 76
End: 2021-12-02
Attending: INTERNAL MEDICINE
Payer: MEDICARE

## 2021-12-02 DIAGNOSIS — M81.0 AGE-RELATED OSTEOPOROSIS WITHOUT CURRENT PATHOLOGICAL FRACTURE: Chronic | ICD-10-CM

## 2021-12-02 LAB
25(OH)D3+25(OH)D2 SERPL-MCNC: 49 NG/ML (ref 30–96)
ALBUMIN SERPL BCP-MCNC: 4.1 G/DL (ref 3.5–5.2)
ALP SERPL-CCNC: 53 U/L (ref 55–135)
ALT SERPL W/O P-5'-P-CCNC: 53 U/L (ref 10–44)
ANION GAP SERPL CALC-SCNC: 7 MMOL/L (ref 8–16)
AST SERPL-CCNC: 35 U/L (ref 10–40)
BILIRUB SERPL-MCNC: 0.7 MG/DL (ref 0.1–1)
BUN SERPL-MCNC: 15 MG/DL (ref 8–23)
CALCIUM SERPL-MCNC: 9.4 MG/DL (ref 8.7–10.5)
CHLORIDE SERPL-SCNC: 104 MMOL/L (ref 95–110)
CO2 SERPL-SCNC: 31 MMOL/L (ref 23–29)
CREAT SERPL-MCNC: 0.7 MG/DL (ref 0.5–1.4)
EST. GFR  (AFRICAN AMERICAN): >60 ML/MIN/1.73 M^2
EST. GFR  (NON AFRICAN AMERICAN): >60 ML/MIN/1.73 M^2
GLUCOSE SERPL-MCNC: 95 MG/DL (ref 70–110)
POTASSIUM SERPL-SCNC: 4.5 MMOL/L (ref 3.5–5.1)
PROT SERPL-MCNC: 6.8 G/DL (ref 6–8.4)
SODIUM SERPL-SCNC: 142 MMOL/L (ref 136–145)

## 2021-12-02 PROCEDURE — 82306 VITAMIN D 25 HYDROXY: CPT | Performed by: HOSPITALIST

## 2021-12-02 PROCEDURE — 36415 COLL VENOUS BLD VENIPUNCTURE: CPT | Performed by: HOSPITALIST

## 2021-12-02 PROCEDURE — 80053 COMPREHEN METABOLIC PANEL: CPT | Performed by: HOSPITALIST

## 2021-12-14 DIAGNOSIS — F41.9 ANXIETY: ICD-10-CM

## 2021-12-14 DIAGNOSIS — F32.A DEPRESSION, UNSPECIFIED DEPRESSION TYPE: ICD-10-CM

## 2021-12-20 RX ORDER — VENLAFAXINE HYDROCHLORIDE 75 MG/1
CAPSULE, EXTENDED RELEASE ORAL
Qty: 90 CAPSULE | Refills: 3 | Status: SHIPPED | OUTPATIENT
Start: 2021-12-20 | End: 2022-09-18

## 2022-01-10 ENCOUNTER — TELEPHONE (OUTPATIENT)
Dept: FAMILY MEDICINE | Facility: CLINIC | Age: 77
End: 2022-01-10
Payer: MEDICARE

## 2022-01-10 ENCOUNTER — HOSPITAL ENCOUNTER (EMERGENCY)
Facility: HOSPITAL | Age: 77
Discharge: HOME | End: 2022-01-10
Attending: STUDENT IN AN ORGANIZED HEALTH CARE EDUCATION/TRAINING PROGRAM
Payer: MEDICARE

## 2022-01-10 VITALS
BODY MASS INDEX: 23.59 KG/M2 | WEIGHT: 136.44 LBS | RESPIRATION RATE: 16 BRPM | HEART RATE: 100 BPM | TEMPERATURE: 99 F | SYSTOLIC BLOOD PRESSURE: 123 MMHG | DIASTOLIC BLOOD PRESSURE: 58 MMHG | OXYGEN SATURATION: 96 %

## 2022-01-10 DIAGNOSIS — R23.3 BRUISING, SPONTANEOUS: Primary | ICD-10-CM

## 2022-01-10 LAB
ALBUMIN SERPL BCP-MCNC: 3.6 G/DL (ref 3.5–5.2)
ALP SERPL-CCNC: 51 U/L (ref 55–135)
ALT SERPL W/O P-5'-P-CCNC: 30 U/L (ref 10–44)
ANION GAP SERPL CALC-SCNC: 7 MMOL/L (ref 8–16)
APTT BLDCRRT: 28.7 SEC (ref 21–32)
AST SERPL-CCNC: 26 U/L (ref 10–40)
BASOPHILS # BLD AUTO: 0.06 K/UL (ref 0–0.2)
BASOPHILS NFR BLD: 0.6 % (ref 0–1.9)
BILIRUB SERPL-MCNC: 0.6 MG/DL (ref 0.1–1)
BUN SERPL-MCNC: 15 MG/DL (ref 8–23)
CALCIUM SERPL-MCNC: 9.2 MG/DL (ref 8.7–10.5)
CHLORIDE SERPL-SCNC: 100 MMOL/L (ref 95–110)
CO2 SERPL-SCNC: 32 MMOL/L (ref 23–29)
CREAT SERPL-MCNC: 0.7 MG/DL (ref 0.5–1.4)
DIFFERENTIAL METHOD: ABNORMAL
EOSINOPHIL # BLD AUTO: 0.3 K/UL (ref 0–0.5)
EOSINOPHIL NFR BLD: 3.2 % (ref 0–8)
ERYTHROCYTE [DISTWIDTH] IN BLOOD BY AUTOMATED COUNT: 13.2 % (ref 11.5–14.5)
EST. GFR  (AFRICAN AMERICAN): >60 ML/MIN/1.73 M^2
EST. GFR  (NON AFRICAN AMERICAN): >60 ML/MIN/1.73 M^2
GLUCOSE SERPL-MCNC: 116 MG/DL (ref 70–110)
HCT VFR BLD AUTO: 38 % (ref 37–48.5)
HGB BLD-MCNC: 12.2 G/DL (ref 12–16)
IMM GRANULOCYTES # BLD AUTO: 0.39 K/UL (ref 0–0.04)
IMM GRANULOCYTES NFR BLD AUTO: 4.1 % (ref 0–0.5)
INR PPP: 1 (ref 0.8–1.2)
LIPASE SERPL-CCNC: 27 U/L (ref 4–60)
LYMPHOCYTES # BLD AUTO: 1.1 K/UL (ref 1–4.8)
LYMPHOCYTES NFR BLD: 11.7 % (ref 18–48)
MCH RBC QN AUTO: 31.5 PG (ref 27–31)
MCHC RBC AUTO-ENTMCNC: 32.1 G/DL (ref 32–36)
MCV RBC AUTO: 98 FL (ref 82–98)
MONOCYTES # BLD AUTO: 0.8 K/UL (ref 0.3–1)
MONOCYTES NFR BLD: 8.8 % (ref 4–15)
NEUTROPHILS # BLD AUTO: 6.8 K/UL (ref 1.8–7.7)
NEUTROPHILS NFR BLD: 71.6 % (ref 38–73)
NRBC BLD-RTO: 0 /100 WBC
PLATELET # BLD AUTO: 433 K/UL (ref 150–450)
PMV BLD AUTO: 7.9 FL (ref 9.2–12.9)
POTASSIUM SERPL-SCNC: 3.6 MMOL/L (ref 3.5–5.1)
PROT SERPL-MCNC: 6.7 G/DL (ref 6–8.4)
PROTHROMBIN TIME: 10.3 SEC (ref 9–12.5)
RBC # BLD AUTO: 3.87 M/UL (ref 4–5.4)
SODIUM SERPL-SCNC: 139 MMOL/L (ref 136–145)
WBC # BLD AUTO: 9.44 K/UL (ref 3.9–12.7)

## 2022-01-10 PROCEDURE — 99285 EMERGENCY DEPT VISIT HI MDM: CPT | Mod: 25

## 2022-01-10 PROCEDURE — 85730 THROMBOPLASTIN TIME PARTIAL: CPT | Performed by: NURSE PRACTITIONER

## 2022-01-10 PROCEDURE — 85025 COMPLETE CBC W/AUTO DIFF WBC: CPT | Performed by: NURSE PRACTITIONER

## 2022-01-10 PROCEDURE — 96360 HYDRATION IV INFUSION INIT: CPT | Mod: 59

## 2022-01-10 PROCEDURE — 25000003 PHARM REV CODE 250: Performed by: NURSE PRACTITIONER

## 2022-01-10 PROCEDURE — 36415 COLL VENOUS BLD VENIPUNCTURE: CPT | Performed by: NURSE PRACTITIONER

## 2022-01-10 PROCEDURE — 96361 HYDRATE IV INFUSION ADD-ON: CPT

## 2022-01-10 PROCEDURE — 80053 COMPREHEN METABOLIC PANEL: CPT | Performed by: NURSE PRACTITIONER

## 2022-01-10 PROCEDURE — 85610 PROTHROMBIN TIME: CPT | Performed by: NURSE PRACTITIONER

## 2022-01-10 PROCEDURE — 25500020 PHARM REV CODE 255: Performed by: STUDENT IN AN ORGANIZED HEALTH CARE EDUCATION/TRAINING PROGRAM

## 2022-01-10 PROCEDURE — 83690 ASSAY OF LIPASE: CPT | Performed by: NURSE PRACTITIONER

## 2022-01-10 RX ORDER — SODIUM CHLORIDE 9 MG/ML
INJECTION, SOLUTION INTRAVENOUS
Status: COMPLETED | OUTPATIENT
Start: 2022-01-10 | End: 2022-01-10

## 2022-01-10 RX ADMIN — SODIUM CHLORIDE: 0.9 INJECTION, SOLUTION INTRAVENOUS at 04:01

## 2022-01-10 RX ADMIN — IOHEXOL 75 ML: 350 INJECTION, SOLUTION INTRAVENOUS at 06:01

## 2022-01-10 NOTE — TELEPHONE ENCOUNTER
----- Message from Justus Walker MA sent at 1/10/2022 10:52 AM CST -----    ----- Message -----  From: Nerissa Dixon  Sent: 1/10/2022   8:40 AM CST  To: Iain Berman Staff     Name of Who is Calling:     What is the request in detail:  patient request call back in reference to cough / patient state she has cough and now have bruising on stomach starting at belly button   on down to private area Please contact to further discuss and advise      Can the clinic reply by [facility]: no     What Number [facility] Back [facility]:  [phone]

## 2022-01-10 NOTE — ED TRIAGE NOTES
76 y.o. female presents to ER Room/bed info not found   Chief Complaint   Patient presents with    Bleeding/Bruising     C/o bruise to abdomen that she noticed last night after coughing; also c/o abdominal pain from coughing so much; patient recently being treated at urgent care for COPD exacerbation;  hx of platelet disorder   . No acute distress noted.

## 2022-01-10 NOTE — ED PROVIDER NOTES
Encounter Date: 1/10/2022       History     Chief Complaint   Patient presents with    Bleeding/Bruising     C/o bruise to abdomen that she noticed last night after coughing; also c/o abdominal pain from coughing so much; patient recently being treated at urgent care for COPD exacerbation;  hx of platelet disorder     Marycarmen Louis is a 76 y.o. female with PMH of anxiety, arthritis, COPD, depression, emphysema, osteoporosis, platelet disorder who presents to the ED for evaluation of abdominal bruising.  Abdominal bruising after coughing for the last 2 weeks. Noticed bruising yesterday PM.   + abdominal tenderness to lower abdomen; pain increases with movement and coughing. Denies nausea, vomiting or diarrhea. Denies black soy bloody stools. Denies hematuria.   Takes baby ASA daily.    The history is provided by the patient.     Review of patient's allergies indicates:   Allergen Reactions    Codeine Nausea And Vomiting     Other reaction(s): Vomiting  Other reaction(s): Headache    Fentanyl Nausea And Vomiting     Other reaction(s): Vomiting  Other reaction(s): Nausea    Phenytoin sodium extended      Other reaction(s): Vomiting  Other reaction(s): Nausea    Versed  [midazolam] Nausea And Vomiting    Vicodin  [hydrocodone-acetaminophen]      Other reaction(s): Vomiting     Past Medical History:   Diagnosis Date    Anxiety     Arthritis     Cancer     salivary gland    Closed fracture of left hip 2/15/2017    COPD (chronic obstructive pulmonary disease)     Degenerative disc disease     Depression     Emphysema of lung     Fractured hip     General anesthetics causing adverse effect in therapeutic use     Left wrist fracture 2/14/2017    Meibomianitis 11/3/10    Memory loss     Nuclear sclerosis, bilateral 10/2/2017    Osteoporosis     Platelet disorder     PONV (postoperative nausea and vomiting)     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 Liriano    CATARACT EXTRACTION W/  INTRAOCULAR LENS IMPLANT Left 10/30/2017    Dr. Liriano    COLONOSCOPY N/A 3/8/2018    Procedure: COLONOSCOPY;  Surgeon: Hari Dumont MD;  Location: [facility] ([address] [place] [address]);  Service: Endoscopy;  Laterality: N/A;  5 year f/u-2/16/18 pt confirmed appt/MS    JOINT REPLACEMENT  2014    left TKA    salviary gland removed      TOTAL HIP ARTHROPLASTY       Family History   Problem Relation Age of Onset    Heart disease Mother     Cancer Father         colon    Diabetes Father     No Known Problems Sister     Cancer Brother         liver kidney bone    Diabetes Brother     No Known Problems Maternal Aunt     No Known Problems Maternal Uncle     No Known Problems Paternal Aunt     No Known Problems Paternal Uncle     No Known Problems Maternal Grandmother     No Known Problems Maternal Grandfather     No Known Problems Paternal Grandmother     No Known Problems Paternal Grandfather     Hypertension Brother     Breast cancer Neg Hx     Ovarian cancer Neg Hx      Social History     Tobacco Use    Smoking status: Former Smoker    Smokeless tobacco: Never Used    Tobacco comment: quit 30yrs ago, discussed importance of continued cessation   Substance Use Topics    Alcohol use: Yes     Comment: occasionally    Drug use: No     Review of Systems   Constitutional: Negative for activity change, chills and fever.   HENT: Negative for congestion, ear discharge, ear pain, postnasal drip, sinus pressure, sinus pain and sore throat.    Respiratory: Positive for cough. Negative for chest tightness and shortness of breath.    Cardiovascular: Negative for chest pain.   Gastrointestinal: Negative for abdominal distention, abdominal pain and nausea.   Genitourinary: Negative for dysuria, frequency and  urgency.   Musculoskeletal: Negative for back pain.   Skin: Positive for color change (bruising to lower abdomen ). Negative for rash.   Neurological: Negative for dizziness, weakness, light-headedness and numbness.   Hematological: Does not bruise/bleed easily.       Physical Exam     Initial Vitals [01/10/22 1359]   BP Pulse Resp Temp SpO2   (!) 123/58 100 16 99.1 °F (37.3 °C) 96 %      MAP       --         Physical Exam    Nursing note and vitals reviewed.  Constitutional: She appears well-developed and well-nourished.   HENT:   Head: Normocephalic and atraumatic.   Right Ear: Tympanic membrane, external ear and ear canal normal. Tympanic membrane is not erythematous. No middle ear effusion.   Left Ear: Tympanic membrane, external ear and ear canal normal. Tympanic membrane is not erythematous.  No middle ear effusion.   Nose: Nose normal.   Mouth/Throat: Uvula is midline, oropharynx is clear and moist and mucous membranes are normal. Mucous membranes are not pale and not dry.   Eyes: Conjunctivae and EOM are normal. Pupils are equal, round, and reactive to light.   Neck: Neck supple.   Normal range of motion.  Cardiovascular: Normal rate, regular rhythm, normal heart sounds and intact distal pulses.   Pulmonary/Chest: Effort normal and breath sounds normal. She has no decreased breath sounds. She has no wheezes. She has no rhonchi. She has no rales.   Abdominal: Abdomen is soft. Bowel sounds are normal. There is abdominal tenderness in the right lower quadrant, suprapubic area and left lower quadrant.   Bruising to lower abdomen.        Musculoskeletal:         General: Normal range of motion.      Cervical back: Normal range of motion and neck supple.     Neurological: She is alert and oriented to person, place, and time. She has normal strength. She displays normal reflexes. No cranial nerve deficit or sensory deficit.   Skin: Skin is warm and dry. Capillary refill takes less than 2 seconds. No rash noted.    Psychiatric: She has a normal mood and affect. Her behavior is normal. Judgment and thought content normal.         ED Course   Procedures  Labs Reviewed   CBC W/ AUTO DIFFERENTIAL - Abnormal; Notable for the following components:       Result Value    RBC 3.87 (*)     MCH 31.5 (*)     MPV 7.9 (*)     Immature Granulocytes 4.1 (*)     Immature Grans (Abs) 0.39 (*)     Lymph % 11.7 (*)     All other components within normal limits   COMPREHENSIVE METABOLIC PANEL - Abnormal; Notable for the following components:    CO2 32 (*)     Glucose 116 (*)     Alkaline Phosphatase 51 (*)     Anion Gap 7 (*)     All other components within normal limits   LIPASE   APTT   PROTIME-INR          Imaging Results          CT Abdomen Pelvis With Contrast (Final result)  Result time 01/10/22 18:21:22    Final result by Hari Olivas Jr., MD (01/10/22 18:21:22)                 Impression:      Constipation.  Prior left hip arthroplasty with metal artifact through the pelvis.  Heavy atherosclerotic calcification of the abdominal aorta.  Stable 1 cm cyst of the left lobe of the liver.  Chronic degenerative disc disease at every level from L1-L5.      Electronically signed by: Hari Olivas MD  Date:    01/10/2022  Time:    18:21             Narrative:    EXAMINATION:  CT ABDOMEN PELVIS WITH CONTRAST    CLINICAL HISTORY:  Abdominal pain, acute, nonlocalized;lower abdominal pain with bruising;    TECHNIQUE:  Low dose axial images, sagittal and coronal reformations were obtained from the lung bases to the pubic symphysis following the IV administration of 75 mL of Omnipaque 350    COMPARISON:  Prior CT of January 22, 2020    FINDINGS:  The liver is of normal size contour and general CT density.  In the left lobe is 1 cm hypodense mass consistent with a small cyst unchanged from prior studies.  Other focal lesions are not seen.  The gallbladder is nearly empty without CT evidence of stone.  The pancreas is of normal contour and CT  density without edema or mass.  The spleen is of normal size and CT density.    The adrenal glands are not enlarged.  The kidneys are of normal size contour and contrast enhancement without mass stone or hydronephrosis.  There is heavy atherosclerotic calcification of the infrarenal abdominal aorta without aneurysm or occlusion seen.  Retroperitoneal adenopathy or mass is not noted.  The patient is seen to have chronic degenerative disc disease at every level of the lumbar spine from L1-L5.    The stomach is of normal configuration.  Small bowel dilatation or air-fluid levels are not seen.  A prominent amount of stool is noted in the colon.  Distention or mass is not seen.  Free fluid or free air is not seen.  The patient has had prior left hip arthroplasty with metal artifact through the pelvis.  The bladder appears of normal contour without asymmetry.  The uterus is not enlarged.  No free fluid in the pelvis is seen.                                 Medications   0.9%  NaCl infusion ( Intravenous Stopped 1/10/22 1831)   iohexoL (OMNIPAQUE 350) injection 75 mL (75 mLs Intravenous Given 1/10/22 1808)     Medical Decision Making:   Differential Diagnosis:   Platelet disorder, thrombocytopenia, coagulopathy, internal bleeding.   Clinical Tests:   Lab Tests: Ordered and Reviewed  Radiological Study: Ordered and Reviewed  ED Management:  Evaluation of a 76-year-old female with spontaneous lower abdominal bruising and pain after coughing for the last 2 weeks.  She presents with pain, bruising to lower abdomen that she noticed yesterday p.m..  She reports that she only takes aspirin daily.  Lab workup, normal platelet count.  Patient is not anemic, stable H&H.  PT PTT within normal limits.  CT abdomen pelvis unrevealing.  Patient will be discharged home with close follow-up, Patient/caregiver voices understanding and feels comfortable with discharge plan.      The patient acknowledges that close follow up with medical  provider is required. Instructed to follow up with PCP within 2 days. Patient was given specific return precautions. The patient agrees to comply with all instruction and directions given in the ER.                       Clinical Impression:   Final diagnoses:  [R23.3] Bruising, spontaneous (Primary)          ED Disposition Condition    Discharge Stable        ED Prescriptions     None        Follow-up Information     Follow up With Specialties Details Why Contact Info    Cullen Timmons MD Internal Medicine Go in 2 days  [address] [facility] [place] [address]  [phone]             Abby Singer NP  01/11/22 0913

## 2022-01-11 ENCOUNTER — TELEPHONE (OUTPATIENT)
Dept: FAMILY MEDICINE | Facility: CLINIC | Age: 77
End: 2022-01-11
Payer: MEDICARE

## 2022-01-12 ENCOUNTER — TELEPHONE (OUTPATIENT)
Dept: FAMILY MEDICINE | Facility: CLINIC | Age: 77
End: 2022-01-12
Payer: MEDICARE

## 2022-01-12 NOTE — TELEPHONE ENCOUNTER
Spoke with pt she states that she was seen in the ED was told everything was good with her labs and to f/u with pcp, pt wants to know if it necessary to f/u if everything is ok? Pt wants to know what can be done to reduce the bruising?

## 2022-01-12 NOTE — TELEPHONE ENCOUNTER
Can she schedule a virtual visit for this?  Next week is ok if she is feeling ok otherwise.     Thank you,  Arvind

## 2022-01-12 NOTE — TELEPHONE ENCOUNTER
----- Message from Tono Cunningham sent at 1/12/2022 10:02 AM CST -----  Regarding: call back  Type: Patient Call Back    Who called:Marycarmen     What is the request in detail: the patient is returning a call back to the staff from yesterday. Patient was seen in the [facility] ER in [place] for a bruised belly. Patient had coughed so much she hurt her stomach and had to do additional testing. Please return call at earliest convenience.    Can the clinic reply by [facility]?no    Would the patient rather a call back or a response via [facility]? Call back     Best call back number:[phone]         17-Nov-2020 08:29

## 2022-04-05 ENCOUNTER — TELEPHONE (OUTPATIENT)
Dept: FAMILY MEDICINE | Facility: CLINIC | Age: 77
End: 2022-04-05
Payer: MEDICARE

## 2022-04-06 ENCOUNTER — TELEPHONE (OUTPATIENT)
Dept: ADMINISTRATIVE | Facility: CLINIC | Age: 77
End: 2022-04-06
Payer: MEDICARE

## 2022-04-09 DIAGNOSIS — F32.A ANXIETY AND DEPRESSION: Chronic | ICD-10-CM

## 2022-04-09 DIAGNOSIS — F41.9 ANXIETY AND DEPRESSION: Chronic | ICD-10-CM

## 2022-04-09 NOTE — TELEPHONE ENCOUNTER
No new care gaps identified.  Powered by [employer]. Reference number: [id number].   4/09/2022 1:05:31 PM CDT

## 2022-04-10 NOTE — TELEPHONE ENCOUNTER
Refill Routing Note   Medication(s) are not appropriate for processing by [facility] for the following reason(s):      - Patient has been seen in the ED/Hospital since the last PCP visit    [facility] action(s):  Defer          Medication reconciliation completed: No     Appointments  past 12m or future 3m with PCP    Date Provider   Last Visit   10/21/2021 Cullen Timmons MD   Next Visit   Visit date not found Cullen Timmons MD   ED visits in past 90 days: 1        Note composed:12:52 PM 04/10/2022

## 2022-04-11 RX ORDER — VENLAFAXINE HYDROCHLORIDE 37.5 MG/1
CAPSULE, EXTENDED RELEASE ORAL
Qty: 90 CAPSULE | Refills: 1 | Status: SHIPPED | OUTPATIENT
Start: 2022-04-11 | End: 2022-10-12 | Stop reason: SDUPTHER

## 2022-04-21 ENCOUNTER — TELEPHONE (OUTPATIENT)
Dept: ADMINISTRATIVE | Facility: CLINIC | Age: 77
End: 2022-04-21
Payer: MEDICARE

## 2022-04-27 ENCOUNTER — TELEPHONE (OUTPATIENT)
Dept: ADMINISTRATIVE | Facility: CLINIC | Age: 77
End: 2022-04-27
Payer: MEDICARE

## 2022-04-28 ENCOUNTER — OFFICE VISIT (OUTPATIENT)
Dept: FAMILY MEDICINE | Facility: CLINIC | Age: 77
End: 2022-04-28
Payer: MEDICARE

## 2022-04-28 VITALS
HEART RATE: 87 BPM | SYSTOLIC BLOOD PRESSURE: 126 MMHG | DIASTOLIC BLOOD PRESSURE: 70 MMHG | OXYGEN SATURATION: 96 % | TEMPERATURE: 97 F | WEIGHT: 138.44 LBS | HEIGHT: 63 IN | BODY MASS INDEX: 24.53 KG/M2

## 2022-04-28 DIAGNOSIS — F33.0 MAJOR DEPRESSIVE DISORDER, RECURRENT, MILD: ICD-10-CM

## 2022-04-28 DIAGNOSIS — F41.9 ANXIETY AND DEPRESSION: Chronic | ICD-10-CM

## 2022-04-28 DIAGNOSIS — M81.0 AGE-RELATED OSTEOPOROSIS WITHOUT CURRENT PATHOLOGICAL FRACTURE: Chronic | ICD-10-CM

## 2022-04-28 DIAGNOSIS — I25.10 CORONARY ARTERY CALCIFICATION SEEN ON CAT SCAN: Chronic | ICD-10-CM

## 2022-04-28 DIAGNOSIS — M51.37 DEGENERATION OF LUMBAR OR LUMBOSACRAL INTERVERTEBRAL DISC: Chronic | ICD-10-CM

## 2022-04-28 DIAGNOSIS — Z00.00 ENCOUNTER FOR PREVENTIVE HEALTH EXAMINATION: Primary | ICD-10-CM

## 2022-04-28 DIAGNOSIS — F32.A ANXIETY AND DEPRESSION: Chronic | ICD-10-CM

## 2022-04-28 DIAGNOSIS — J43.1 PANLOBULAR EMPHYSEMA: Chronic | ICD-10-CM

## 2022-04-28 DIAGNOSIS — H25.13 NUCLEAR SCLEROSIS, BILATERAL: ICD-10-CM

## 2022-04-28 PROCEDURE — 3288F FALL RISK ASSESSMENT DOCD: CPT | Mod: CPTII,S$GLB,, | Performed by: NURSE PRACTITIONER

## 2022-04-28 PROCEDURE — 1101F PT FALLS ASSESS-DOCD LE1/YR: CPT | Mod: CPTII,S$GLB,, | Performed by: NURSE PRACTITIONER

## 2022-04-28 PROCEDURE — G0439 PR MEDICARE ANNUAL WELLNESS SUBSEQUENT VISIT: ICD-10-PCS | Mod: S$GLB,,, | Performed by: NURSE PRACTITIONER

## 2022-04-28 PROCEDURE — 1170F FXNL STATUS ASSESSED: CPT | Mod: CPTII,S$GLB,, | Performed by: NURSE PRACTITIONER

## 2022-04-28 PROCEDURE — 1159F MED LIST DOCD IN RCRD: CPT | Mod: CPTII,S$GLB,, | Performed by: NURSE PRACTITIONER

## 2022-04-28 PROCEDURE — 99499 RISK ADDL DX/OHS AUDIT: ICD-10-PCS | Mod: S$GLB,,, | Performed by: NURSE PRACTITIONER

## 2022-04-28 PROCEDURE — 1125F AMNT PAIN NOTED PAIN PRSNT: CPT | Mod: CPTII,S$GLB,, | Performed by: NURSE PRACTITIONER

## 2022-04-28 PROCEDURE — G0439 PPPS, SUBSEQ VISIT: HCPCS | Mod: S$GLB,,, | Performed by: NURSE PRACTITIONER

## 2022-04-28 PROCEDURE — 1160F RVW MEDS BY RX/DR IN RCRD: CPT | Mod: CPTII,S$GLB,, | Performed by: NURSE PRACTITIONER

## 2022-04-28 PROCEDURE — 99999 PR PBB SHADOW E&M-EST. PATIENT-LVL V: CPT | Mod: PBBFAC,,, | Performed by: NURSE PRACTITIONER

## 2022-04-28 PROCEDURE — 1125F PR PAIN SEVERITY QUANTIFIED, PAIN PRESENT: ICD-10-PCS | Mod: CPTII,S$GLB,, | Performed by: NURSE PRACTITIONER

## 2022-04-28 PROCEDURE — 3288F PR FALLS RISK ASSESSMENT DOCUMENTED: ICD-10-PCS | Mod: CPTII,S$GLB,, | Performed by: NURSE PRACTITIONER

## 2022-04-28 PROCEDURE — 99999 PR PBB SHADOW E&M-EST. PATIENT-LVL V: ICD-10-PCS | Mod: PBBFAC,,, | Performed by: NURSE PRACTITIONER

## 2022-04-28 PROCEDURE — 1170F PR FUNCTIONAL STATUS ASSESSED: ICD-10-PCS | Mod: CPTII,S$GLB,, | Performed by: NURSE PRACTITIONER

## 2022-04-28 PROCEDURE — 1160F PR REVIEW ALL MEDS BY PRESCRIBER/CLIN PHARMACIST DOCUMENTED: ICD-10-PCS | Mod: CPTII,S$GLB,, | Performed by: NURSE PRACTITIONER

## 2022-04-28 PROCEDURE — 3008F PR BODY MASS INDEX (BMI) DOCUMENTED: ICD-10-PCS | Mod: CPTII,S$GLB,, | Performed by: NURSE PRACTITIONER

## 2022-04-28 PROCEDURE — 1159F PR MEDICATION LIST DOCUMENTED IN MEDICAL RECORD: ICD-10-PCS | Mod: CPTII,S$GLB,, | Performed by: NURSE PRACTITIONER

## 2022-04-28 PROCEDURE — 3008F BODY MASS INDEX DOCD: CPT | Mod: CPTII,S$GLB,, | Performed by: NURSE PRACTITIONER

## 2022-04-28 PROCEDURE — 99499 UNLISTED E&M SERVICE: CPT | Mod: S$GLB,,, | Performed by: NURSE PRACTITIONER

## 2022-04-28 PROCEDURE — 1101F PR PT FALLS ASSESS DOC 0-1 FALLS W/OUT INJ PAST YR: ICD-10-PCS | Mod: CPTII,S$GLB,, | Performed by: NURSE PRACTITIONER

## 2022-04-28 NOTE — PROGRESS NOTES
"  Marycarmen Louis presented for a  Medicare AWV and comprehensive Health Risk Assessment today. The following components were reviewed and updated:    · Medical history  · Family History  · Social history  · Allergies and Current Medications  · Health Risk Assessment  · Health Maintenance  · Care Team       ** See Completed Assessments for Annual Wellness Visit within the encounter summary.**       The following assessments were completed:  · Living Situation  · CAGE  · Depression Screening  · Timed Get Up and Go  · Whisper Test  · Cognitive Function Screening  · Nutrition Screening  · ADL Screening  · PAQ Screening            Vitals:    04/28/22 1312   BP: 126/70   Pulse: 87   Temp: 97.3 °F (36.3 °C)   TempSrc: Oral   SpO2: 96%   Weight: 62.8 kg (138 lb 7.2 oz)   Height: 5' 3" (1.6 m)     Body mass index is 24.53 kg/m².  Physical Exam  Vitals and nursing note reviewed.   Constitutional:       Appearance: Normal appearance.   Cardiovascular:      Rate and Rhythm: Normal rate.      Pulses: Normal pulses.      Heart sounds: Normal heart sounds.   Pulmonary:      Effort: Pulmonary effort is normal.      Breath sounds: Normal breath sounds.   Abdominal:      General: Bowel sounds are normal.      Palpations: Abdomen is soft.   Musculoskeletal:         General: Normal range of motion.   Neurological:      Mental Status: She is alert and oriented to person, place, and time.   Psychiatric:         Mood and Affect: Mood normal.         Behavior: Behavior normal.             Diagnoses and health risks identified today and associated recommendations/orders:    1. Encounter for preventive health examination  Pt was seen today for an Annual Wellness visit. Healthcare maintenance and screening recommendations were discussed and updated as indicated. Return in one year for AWV.    Review current opioid prescriptions: n/a  Screen for potential Substance Use Disorders: n/a    2. Panlobular emphysema  The current medical regimen is " effective;  continue present plan and medications.    3. Major depressive disorder, recurrent, mild  The current medical regimen is effective;  continue present plan and medications.    4. Coronary artery calcification seen on CAT scan  The current medical regimen is effective;  continue present plan and medications.    5. Age-related osteoporosis without current pathological fracture  The current medical regimen is effective;  continue present plan and medications.    6. Degeneration of lumbar or lumbosacral intervertebral disc  The current medical regimen is effective;  continue present plan and medications.    7. Nuclear sclerosis, bilateral  The current medical regimen is effective;  continue present plan and medications.    8. Anxiety and depression  The current medical regimen is effective;  continue present plan and medications.        Provided Marycarmen with a 5-10 year written screening schedule and personal prevention plan. Recommendations were developed using the USPSTF age appropriate recommendations. Education, counseling, and referrals were provided as needed. After Visit Summary printed and given to patient which includes a list of additional screenings\tests needed.    Follow up in about 6 months (around 10/24/2022) with provider.    JOSEF Zuniga  I offered to discuss advanced care planning, including how to pick a person who would make decisions for you if you were unable to make them for yourself, called a health care power of , and what kind of decisions you might make such as use of life sustaining treatments such as ventilators and tube feeding when faced with a life limiting illness recorded on a living will that they will need to know. (How you want to be cared for as you near the end of your natural life)     X Patient is interested in learning more about how to make advanced directives.  I provided them paperwork and offered to discuss this with them.

## 2022-04-28 NOTE — PATIENT INSTRUCTIONS
Counseling and Referral of Other Preventative  (Italic type indicates deductible and co-insurance are waived)    Patient Name: Marycarmen Louis  Today's Date: 4/28/2022    Health Maintenance       Date Due Completion Date    TETANUS VACCINE Never done ---    Colorectal Cancer Screening 03/08/2023 3/8/2018    High Dose Statin 04/28/2023 4/28/2022    Aspirin/Antiplatelet Therapy 04/28/2023 4/28/2022    Mammogram 09/28/2023 9/28/2021    DEXA Scan 09/28/2023 9/28/2021    Lipid Panel 10/18/2026 10/18/2021        No orders of the defined types were placed in this encounter.    The following information is provided to all patients.  This information is to help you find resources for any of the problems found today that may be affecting your health:                Living healthy guide: www.[facility].[place].[facility]      Understanding Diabetes: www.diabetes.org      Eating healthy: www.cdc.gov/healthyweight      CDC home safety checklist: www.cdc.gov/steadi/patient.html      Agency on Aging: www.goea.[place].[facility]      Alcoholics anonymous (AA): www.aa.org      Physical Activity: www.eulalio.nih.gov/pl6pylk      Tobacco use: www.quitwithusla.org

## 2022-05-03 ENCOUNTER — HOSPITAL ENCOUNTER (OUTPATIENT)
Dept: RADIOLOGY | Facility: HOSPITAL | Age: 77
Discharge: HOME | End: 2022-05-03
Attending: PODIATRIST
Payer: MEDICARE

## 2022-05-03 ENCOUNTER — OFFICE VISIT (OUTPATIENT)
Dept: PODIATRY | Facility: CLINIC | Age: 77
End: 2022-05-03
Payer: MEDICARE

## 2022-05-03 VITALS — HEIGHT: 63 IN | BODY MASS INDEX: 24.53 KG/M2 | WEIGHT: 138.44 LBS

## 2022-05-03 DIAGNOSIS — M76.60 ACHILLES TENDINITIS, UNSPECIFIED LATERALITY: Primary | ICD-10-CM

## 2022-05-03 DIAGNOSIS — M76.60 ACHILLES TENDINITIS, UNSPECIFIED LATERALITY: ICD-10-CM

## 2022-05-03 DIAGNOSIS — L84 CORN OR CALLUS: ICD-10-CM

## 2022-05-03 PROCEDURE — 3008F PR BODY MASS INDEX (BMI) DOCUMENTED: ICD-10-PCS | Mod: CPTII,S$GLB,, | Performed by: PODIATRIST

## 2022-05-03 PROCEDURE — 1125F PR PAIN SEVERITY QUANTIFIED, PAIN PRESENT: ICD-10-PCS | Mod: CPTII,S$GLB,, | Performed by: PODIATRIST

## 2022-05-03 PROCEDURE — 3288F PR FALLS RISK ASSESSMENT DOCUMENTED: ICD-10-PCS | Mod: CPTII,S$GLB,, | Performed by: PODIATRIST

## 2022-05-03 PROCEDURE — 73650 XR CALCANEUS 2 VIEW RIGHT: ICD-10-PCS | Mod: 26,RT,, | Performed by: RADIOLOGY

## 2022-05-03 PROCEDURE — 99203 PR OFFICE/OUTPT VISIT, NEW, LEVL III, 30-44 MIN: ICD-10-PCS | Mod: S$GLB,,, | Performed by: PODIATRIST

## 2022-05-03 PROCEDURE — 1159F MED LIST DOCD IN RCRD: CPT | Mod: CPTII,S$GLB,, | Performed by: PODIATRIST

## 2022-05-03 PROCEDURE — 99203 OFFICE O/P NEW LOW 30 MIN: CPT | Mod: S$GLB,,, | Performed by: PODIATRIST

## 2022-05-03 PROCEDURE — 1101F PR PT FALLS ASSESS DOC 0-1 FALLS W/OUT INJ PAST YR: ICD-10-PCS | Mod: CPTII,S$GLB,, | Performed by: PODIATRIST

## 2022-05-03 PROCEDURE — 3288F FALL RISK ASSESSMENT DOCD: CPT | Mod: CPTII,S$GLB,, | Performed by: PODIATRIST

## 2022-05-03 PROCEDURE — 99999 PR PBB SHADOW E&M-EST. PATIENT-LVL IV: ICD-10-PCS | Mod: PBBFAC,,, | Performed by: PODIATRIST

## 2022-05-03 PROCEDURE — 99999 PR PBB SHADOW E&M-EST. PATIENT-LVL IV: CPT | Mod: PBBFAC,,, | Performed by: PODIATRIST

## 2022-05-03 PROCEDURE — 73650 X-RAY EXAM OF HEEL: CPT | Mod: TC,RT

## 2022-05-03 PROCEDURE — 73650 X-RAY EXAM OF HEEL: CPT | Mod: 26,RT,, | Performed by: RADIOLOGY

## 2022-05-03 PROCEDURE — 3008F BODY MASS INDEX DOCD: CPT | Mod: CPTII,S$GLB,, | Performed by: PODIATRIST

## 2022-05-03 PROCEDURE — 1125F AMNT PAIN NOTED PAIN PRSNT: CPT | Mod: CPTII,S$GLB,, | Performed by: PODIATRIST

## 2022-05-03 PROCEDURE — 1101F PT FALLS ASSESS-DOCD LE1/YR: CPT | Mod: CPTII,S$GLB,, | Performed by: PODIATRIST

## 2022-05-03 PROCEDURE — 1159F PR MEDICATION LIST DOCUMENTED IN MEDICAL RECORD: ICD-10-PCS | Mod: CPTII,S$GLB,, | Performed by: PODIATRIST

## 2022-05-03 RX ORDER — DICLOFENAC SODIUM 10 MG/G
2 GEL TOPICAL DAILY
Qty: 100 G | Refills: 3 | Status: SHIPPED | OUTPATIENT
Start: 2022-05-03 | End: 2023-04-25

## 2022-05-03 NOTE — PROGRESS NOTES
Subjective:      Patient ID: Marycarmen Louis is a 76 y.o. female.    Chief Complaint: Heel Pain    Marycarmen is a 76 y.o. female who presents to the podiatry clinic  with complaint of  right foot pain. Reports right heel pain worse after long periods of walking.  Onset of the symptoms was several weeks ago. Precipitating event: none known. Current symptoms include: ability to bear weight, but with some pain. Aggravating factors: any weight bearing. Symptoms have progressed to a point and plateaued. Patient has had no prior foot problems. Evaluation to date: none. Treatment to date: none. Patients rates pain 4/10 on pain scale.        Review of Systems   Constitutional: Negative for chills.   Cardiovascular: Negative for chest pain and claudication.   Respiratory: Negative for cough.    Skin: Positive for color change, dry skin and nail changes.   Musculoskeletal: Positive for joint pain.   Gastrointestinal: Negative for nausea.   Neurological: Positive for paresthesias. Negative for numbness.   Psychiatric/Behavioral: The patient is not nervous/anxious.            Objective:      Physical Exam  Constitutional:       Appearance: She is well-developed.      Comments: Oriented to time, place, and person.   Cardiovascular:      Comments: DP and PT pulses are palpable bilaterally. 3 sec capillary refill time and toes and feet are warm to touch proximally .  There is  hair growth on the feet and toes b/l. There is no edema b/l. No spider veins or varicosities present b/l.     Musculoskeletal:      Comments: Equinus noted b/l ankles with < 10 deg DF noted. MMT 5/5 in DF/PF/Inv/Ev resistance with no reproduction of pain in any direction. Passive range of motion of ankle and pedal joints is painless b/l.    Decreased right  ankle joint ROM, pain with palpation of posterior 1/3 calcaneus at region of Achilles tendon insertion. No  pain with ankle joint ROM      Feet:      Right foot:      Skin integrity: No callus or dry  skin.      Left foot:      Skin integrity: No callus or dry skin.   Lymphadenopathy:      Comments: Negative lymphadenopathy bilateral popliteal fossa and tarsal tunnel.   Skin:     Comments: No open lesions, lacerations or wounds noted.Interdigital spaces clean, dry and intact b/l. No erythema noted to b/l foot.  Nails normal color and trophic qualities.    Focal hyperkeratotic lesion consisting entirely of hyperkeratotic tissue without open skin, drainage, pus, fluctuance, malodor, or signs of infection: Left lateral hallux, left medial 2nd toe.      Neurological:      Mental Status: She is alert.      Comments: Light touch, proprioception, and sharp/dull sensation are all intact bilaterally. Protective threshold with the Hessel-Wienstein monofilament is intact bilaterally.    Psychiatric:         Behavior: Behavior is cooperative.               Assessment:       Encounter Diagnoses   Name Primary?    Achilles tendinitis, unspecified laterality Yes    Corn or callus          Plan:       Marycarmen was seen today for heel pain.    Diagnoses and all orders for this visit:    Achilles tendinitis, unspecified laterality  -     X-Ray Calcaneus 2 View Right; Future  -     Ambulatory referral/consult to Physical/Occupational Therapy; Future    Corn or callus    Other orders  -     diclofenac sodium (VOLTAREN) 1 % Gel; Apply 2 g topically once daily.      I counseled the patient on her conditions, their implications and medical management.        - Patient will stretch the tendo achilles complex three times daily as demonstrated in the office to lengthen heel cord and increase motion at ankle offloading the load on the forefoot and MTPJ..  Literature was dispensed illustrating proper stretching technique.  - Patient will obtain over the counter arch supports and wear them in shoes whenever possible to support the arches and decrease motion at the MTPJ.      Rx Voltaren gel to be applied to affected area up to 3-4 x daily  as needed for pain    Heel lifts dispensed.     Referral placed to PT.     Right  foot xray to assess underlying deformity and for underlying osseous pathology.     With the patient's verbal consent a sterile #15 scalpel was used to trim the hyperkeratotic lesion described above. She tolerated the procedure well without complication.  Toe spacers dispensed     RTC PRN

## 2022-06-13 DIAGNOSIS — J43.1 PANLOBULAR EMPHYSEMA: Chronic | ICD-10-CM

## 2022-06-13 NOTE — TELEPHONE ENCOUNTER
No new care gaps identified.  [facility] Embedded Care Gaps. Reference number: [id number]. 6/13/2022   10:11:33 AM AZAMT

## 2022-06-15 RX ORDER — UMECLIDINIUM BROMIDE AND VILANTEROL TRIFENATATE 62.5; 25 UG/1; UG/1
POWDER RESPIRATORY (INHALATION)
Qty: 60 EACH | Refills: 6 | Status: SHIPPED | OUTPATIENT
Start: 2022-06-15 | End: 2023-01-10

## 2022-06-15 NOTE — TELEPHONE ENCOUNTER
Refill Routing Note   Medication(s) are not appropriate for processing by [facility] for the following reason(s):      - Patient has been seen in the ED/Hospital since the last PCP visit    [facility] action(s):  Defer          Medication reconciliation completed: No     Appointments  past 12m or future 3m with PCP    Date Provider   Last Visit   10/21/2021 Cullen Timmons MD   Next Visit   10/24/2022 Cullen Timmons MD   ED visits in past 90 days: 0        Note composed:9:39 AM 06/15/2022

## 2022-07-13 DIAGNOSIS — F32.A ANXIETY AND DEPRESSION: Chronic | ICD-10-CM

## 2022-07-13 DIAGNOSIS — F41.9 ANXIETY AND DEPRESSION: Chronic | ICD-10-CM

## 2022-07-13 RX ORDER — VENLAFAXINE HYDROCHLORIDE 37.5 MG/1
CAPSULE, EXTENDED RELEASE ORAL
Qty: 90 CAPSULE | Refills: 1 | OUTPATIENT
Start: 2022-07-13

## 2022-07-13 NOTE — TELEPHONE ENCOUNTER
Refill Routing Note   Medication(s) are not appropriate for processing by [facility] for the following reason(s):      - Patient has been seen in the ED/Hospital since the last PCP visit    [facility] action(s):  Route          Medication reconciliation completed: No     Appointments  past 12m or future 3m with PCP    Date Provider   Last Visit   10/21/2021 Cullen Timmons MD   Next Visit   10/24/2022 Cullen Timmons MD   ED visits in past 90 days: 0        Note composed:3:34 PM 07/13/2022

## 2022-07-13 NOTE — TELEPHONE ENCOUNTER
No new care gaps identified.  [facility] Embedded Care Gaps. Reference number: [id number]. 7/13/2022   1:03:15 PM CDT

## 2022-08-31 RX ORDER — PANTOPRAZOLE SODIUM 40 MG/1
40 TABLET, DELAYED RELEASE ORAL DAILY
Qty: 30 TABLET | Refills: 5 | Status: SHIPPED | OUTPATIENT
Start: 2022-08-31 | End: 2023-01-12 | Stop reason: SDUPTHER

## 2022-10-07 ENCOUNTER — TELEPHONE (OUTPATIENT)
Dept: FAMILY MEDICINE | Facility: CLINIC | Age: 77
End: 2022-10-07

## 2022-10-07 ENCOUNTER — CLINICAL SUPPORT (OUTPATIENT)
Dept: FAMILY MEDICINE | Facility: CLINIC | Age: 77
End: 2022-10-07
Payer: MEDICARE

## 2022-10-07 ENCOUNTER — TELEPHONE (OUTPATIENT)
Dept: FAMILY MEDICINE | Facility: CLINIC | Age: 77
End: 2022-10-07
Payer: MEDICARE

## 2022-10-07 DIAGNOSIS — Z23 NEEDS FLU SHOT: Primary | ICD-10-CM

## 2022-10-07 PROCEDURE — G0008 FLU VACCINE - QUADRIVALENT - ADJUVANTED: ICD-10-PCS | Mod: S$GLB,,, | Performed by: INTERNAL MEDICINE

## 2022-10-07 PROCEDURE — 90694 FLU VACCINE - QUADRIVALENT - ADJUVANTED: ICD-10-PCS | Mod: S$GLB,,, | Performed by: INTERNAL MEDICINE

## 2022-10-07 PROCEDURE — G0008 ADMIN INFLUENZA VIRUS VAC: HCPCS | Mod: S$GLB,,, | Performed by: INTERNAL MEDICINE

## 2022-10-07 PROCEDURE — 90694 VACC AIIV4 NO PRSRV 0.5ML IM: CPT | Mod: S$GLB,,, | Performed by: INTERNAL MEDICINE

## 2022-10-21 ENCOUNTER — LAB VISIT (OUTPATIENT)
Dept: LAB | Facility: HOSPITAL | Age: 77
End: 2022-10-21
Attending: INTERNAL MEDICINE
Payer: MEDICARE

## 2022-10-21 DIAGNOSIS — I25.10 CORONARY ARTERY CALCIFICATION SEEN ON CAT SCAN: Chronic | ICD-10-CM

## 2022-10-21 LAB
ALBUMIN SERPL BCP-MCNC: 3.9 G/DL (ref 3.5–5.2)
ALP SERPL-CCNC: 47 U/L (ref 55–135)
ALT SERPL W/O P-5'-P-CCNC: 35 U/L (ref 10–44)
ANION GAP SERPL CALC-SCNC: 7 MMOL/L (ref 8–16)
AST SERPL-CCNC: 34 U/L (ref 10–40)
BASOPHILS # BLD AUTO: 0.03 K/UL (ref 0–0.2)
BASOPHILS NFR BLD: 0.5 % (ref 0–1.9)
BILIRUB SERPL-MCNC: 0.6 MG/DL (ref 0.1–1)
BUN SERPL-MCNC: 20 MG/DL (ref 8–23)
CALCIUM SERPL-MCNC: 9.1 MG/DL (ref 8.7–10.5)
CHLORIDE SERPL-SCNC: 103 MMOL/L (ref 95–110)
CHOLEST SERPL-MCNC: 99 MG/DL (ref 120–199)
CHOLEST/HDLC SERPL: 2.7 {RATIO} (ref 2–5)
CO2 SERPL-SCNC: 30 MMOL/L (ref 23–29)
CREAT SERPL-MCNC: 0.7 MG/DL (ref 0.5–1.4)
DIFFERENTIAL METHOD: ABNORMAL
EOSINOPHIL # BLD AUTO: 0.2 K/UL (ref 0–0.5)
EOSINOPHIL NFR BLD: 3.6 % (ref 0–8)
ERYTHROCYTE [DISTWIDTH] IN BLOOD BY AUTOMATED COUNT: 12.2 % (ref 11.5–14.5)
EST. GFR  (NO RACE VARIABLE): >60 ML/MIN/1.73 M^2
GLUCOSE SERPL-MCNC: 87 MG/DL (ref 70–110)
HCT VFR BLD AUTO: 40.5 % (ref 37–48.5)
HDLC SERPL-MCNC: 37 MG/DL (ref 40–75)
HDLC SERPL: 37.4 % (ref 20–50)
HGB BLD-MCNC: 13.3 G/DL (ref 12–16)
IMM GRANULOCYTES # BLD AUTO: 0.01 K/UL (ref 0–0.04)
IMM GRANULOCYTES NFR BLD AUTO: 0.2 % (ref 0–0.5)
LDLC SERPL CALC-MCNC: 54 MG/DL (ref 63–159)
LYMPHOCYTES # BLD AUTO: 0.9 K/UL (ref 1–4.8)
LYMPHOCYTES NFR BLD: 17 % (ref 18–48)
MCH RBC QN AUTO: 31.2 PG (ref 27–31)
MCHC RBC AUTO-ENTMCNC: 32.8 G/DL (ref 32–36)
MCV RBC AUTO: 95 FL (ref 82–98)
MONOCYTES # BLD AUTO: 0.7 K/UL (ref 0.3–1)
MONOCYTES NFR BLD: 12.8 % (ref 4–15)
NEUTROPHILS # BLD AUTO: 3.6 K/UL (ref 1.8–7.7)
NEUTROPHILS NFR BLD: 65.9 % (ref 38–73)
NONHDLC SERPL-MCNC: 62 MG/DL
NRBC BLD-RTO: 0 /100 WBC
PLATELET # BLD AUTO: 314 K/UL (ref 150–450)
PMV BLD AUTO: 8.9 FL (ref 9.2–12.9)
POTASSIUM SERPL-SCNC: 4.2 MMOL/L (ref 3.5–5.1)
PROT SERPL-MCNC: 6.6 G/DL (ref 6–8.4)
RBC # BLD AUTO: 4.26 M/UL (ref 4–5.4)
SODIUM SERPL-SCNC: 140 MMOL/L (ref 136–145)
TRIGL SERPL-MCNC: 40 MG/DL (ref 30–150)
WBC # BLD AUTO: 5.53 K/UL (ref 3.9–12.7)

## 2022-10-21 PROCEDURE — 80053 COMPREHEN METABOLIC PANEL: CPT | Performed by: INTERNAL MEDICINE

## 2022-10-21 PROCEDURE — 36415 COLL VENOUS BLD VENIPUNCTURE: CPT | Performed by: INTERNAL MEDICINE

## 2022-10-21 PROCEDURE — 85025 COMPLETE CBC W/AUTO DIFF WBC: CPT | Performed by: INTERNAL MEDICINE

## 2022-10-21 PROCEDURE — 80061 LIPID PANEL: CPT | Performed by: INTERNAL MEDICINE

## 2022-11-08 ENCOUNTER — OFFICE VISIT (OUTPATIENT)
Dept: FAMILY MEDICINE | Facility: CLINIC | Age: 77
End: 2022-11-08
Payer: MEDICARE

## 2022-11-08 VITALS
HEART RATE: 95 BPM | TEMPERATURE: 98 F | BODY MASS INDEX: 24.75 KG/M2 | SYSTOLIC BLOOD PRESSURE: 100 MMHG | OXYGEN SATURATION: 96 % | WEIGHT: 139.69 LBS | DIASTOLIC BLOOD PRESSURE: 60 MMHG | HEIGHT: 63 IN

## 2022-11-08 DIAGNOSIS — I25.10 CORONARY ARTERY CALCIFICATION SEEN ON CAT SCAN: Chronic | ICD-10-CM

## 2022-11-08 DIAGNOSIS — Z00.00 ROUTINE MEDICAL EXAM: Primary | ICD-10-CM

## 2022-11-08 DIAGNOSIS — M81.0 AGE-RELATED OSTEOPOROSIS WITHOUT CURRENT PATHOLOGICAL FRACTURE: Chronic | ICD-10-CM

## 2022-11-08 DIAGNOSIS — J43.1 PANLOBULAR EMPHYSEMA: Chronic | ICD-10-CM

## 2022-11-08 DIAGNOSIS — M76.60 PAIN IN ACHILLES TENDON: ICD-10-CM

## 2022-11-08 DIAGNOSIS — M79.671 PAIN OF RIGHT HEEL: Primary | ICD-10-CM

## 2022-11-08 PROCEDURE — 3078F PR MOST RECENT DIASTOLIC BLOOD PRESSURE < 80 MM HG: ICD-10-PCS | Mod: CPTII,S$GLB,, | Performed by: INTERNAL MEDICINE

## 2022-11-08 PROCEDURE — 3074F SYST BP LT 130 MM HG: CPT | Mod: CPTII,S$GLB,, | Performed by: INTERNAL MEDICINE

## 2022-11-08 PROCEDURE — 99397 PR PREVENTIVE VISIT,EST,65 & OVER: ICD-10-PCS | Mod: GZ,S$GLB,, | Performed by: INTERNAL MEDICINE

## 2022-11-08 PROCEDURE — 99999 PR PBB SHADOW E&M-EST. PATIENT-LVL IV: CPT | Mod: PBBFAC,,, | Performed by: INTERNAL MEDICINE

## 2022-11-08 PROCEDURE — 99397 PER PM REEVAL EST PAT 65+ YR: CPT | Mod: GZ,S$GLB,, | Performed by: INTERNAL MEDICINE

## 2022-11-08 PROCEDURE — 1125F AMNT PAIN NOTED PAIN PRSNT: CPT | Mod: CPTII,S$GLB,, | Performed by: INTERNAL MEDICINE

## 2022-11-08 PROCEDURE — 1160F RVW MEDS BY RX/DR IN RCRD: CPT | Mod: CPTII,S$GLB,, | Performed by: INTERNAL MEDICINE

## 2022-11-08 PROCEDURE — 1160F PR REVIEW ALL MEDS BY PRESCRIBER/CLIN PHARMACIST DOCUMENTED: ICD-10-PCS | Mod: CPTII,S$GLB,, | Performed by: INTERNAL MEDICINE

## 2022-11-08 PROCEDURE — 1159F PR MEDICATION LIST DOCUMENTED IN MEDICAL RECORD: ICD-10-PCS | Mod: CPTII,S$GLB,, | Performed by: INTERNAL MEDICINE

## 2022-11-08 PROCEDURE — 3288F PR FALLS RISK ASSESSMENT DOCUMENTED: ICD-10-PCS | Mod: CPTII,S$GLB,, | Performed by: INTERNAL MEDICINE

## 2022-11-08 PROCEDURE — 1125F PR PAIN SEVERITY QUANTIFIED, PAIN PRESENT: ICD-10-PCS | Mod: CPTII,S$GLB,, | Performed by: INTERNAL MEDICINE

## 2022-11-08 PROCEDURE — 3288F FALL RISK ASSESSMENT DOCD: CPT | Mod: CPTII,S$GLB,, | Performed by: INTERNAL MEDICINE

## 2022-11-08 PROCEDURE — 99999 PR PBB SHADOW E&M-EST. PATIENT-LVL IV: ICD-10-PCS | Mod: PBBFAC,,, | Performed by: INTERNAL MEDICINE

## 2022-11-08 PROCEDURE — 3074F PR MOST RECENT SYSTOLIC BLOOD PRESSURE < 130 MM HG: ICD-10-PCS | Mod: CPTII,S$GLB,, | Performed by: INTERNAL MEDICINE

## 2022-11-08 PROCEDURE — 3078F DIAST BP <80 MM HG: CPT | Mod: CPTII,S$GLB,, | Performed by: INTERNAL MEDICINE

## 2022-11-08 PROCEDURE — 1101F PT FALLS ASSESS-DOCD LE1/YR: CPT | Mod: CPTII,S$GLB,, | Performed by: INTERNAL MEDICINE

## 2022-11-08 PROCEDURE — 1101F PR PT FALLS ASSESS DOC 0-1 FALLS W/OUT INJ PAST YR: ICD-10-PCS | Mod: CPTII,S$GLB,, | Performed by: INTERNAL MEDICINE

## 2022-11-08 PROCEDURE — 1159F MED LIST DOCD IN RCRD: CPT | Mod: CPTII,S$GLB,, | Performed by: INTERNAL MEDICINE

## 2022-11-08 RX ORDER — ASPIRIN 81 MG/1
81 TABLET ORAL DAILY
Refills: 0 | COMMUNITY
Start: 2022-11-08 | End: 2024-02-28

## 2022-11-08 NOTE — PROGRESS NOTES
Assessment & Plan  Problem List Items Addressed This Visit          Pulmonary    Panlobular emphysema (Chronic)    Current Assessment & Plan     Check PFTs as she is now having some dyspnea with household activities.          Relevant Orders    Complete PFT with bronchodilator       Cardiac/Vascular    Coronary artery calcification seen on CAT scan (Chronic)    Current Assessment & Plan     The current medical regimen is effective;  continue present plan and medications.          Relevant Medications    aspirin (ECOTRIN) 81 MG EC tablet    Other Relevant Orders    CBC Auto Differential    Comprehensive Metabolic Panel    Lipid Panel       Orthopedic    Age-related osteoporosis without current pathological fracture (Chronic)    Current Assessment & Plan     Known to Endo.  Monitor           Other Visit Diagnoses       Routine medical exam    -  Primary  -   Discussed healthy diet, regular exercise, necessary labs, age appropriate cancer screening, and routine vaccinations.       Pain in Achilles tendon    -  Refer to Ortho.  Previously seen by podiatry and PT    Relevant Orders    Ambulatory referral/consult to Orthopedics              Health Maintenance reviewed, up to date.  Counseled on vaccines    Follow-up: Follow up in about 1 year (around 11/8/2023) for Routine Physical.  ______________________________________________________________________    Chief Complaint  Chief Complaint   Patient presents with    Annual Exam       HPI  Marycarmen Louis is a 77 y.o. female with medical diagnoses as listed in the medical history and problem list that presents for routine physical.  Pt is known to me with their last appointment 10/2021.  She had labs prior to this OV that showed generally reassuring CBC CMP lipids.    Some worsening of her dyspnea.  Now with some household activities.      Right heel still painful.  Has seen podiatry and PT.  Would like to see Ortho.       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 Liriano    CATARACT EXTRACTION W/  INTRAOCULAR LENS IMPLANT Left 10/30/2017    Dr. Liriano    COLONOSCOPY N/A 3/8/2018    Procedure: COLONOSCOPY;  Surgeon: Hari Dumont MD;  [facility] ([address] [place] [address]);  Service: Endoscopy;  Laterality: N/A;  5 year f/u-2/16/18 pt confirmed appt/MS    JOINT REPLACEMENT  2014    left TKA    salviary gland removed      TOTAL HIP ARTHROPLASTY         SOCIAL HISTORY:  Social History     Socioeconomic History    Marital status:    Tobacco Use    Smoking status: Former    Smokeless tobacco: Never    Tobacco comments:     quit 30yrs ago, discussed importance of continued cessation   Substance and Sexual Activity    Alcohol use: Yes     Comment: occasionally    Drug use: No    Sexual activity: Not Currently     Partners: Male     Birth control/protection: Post-menopausal       FAMILY HISTORY:  Family History   Problem Relation Age of Onset    Heart disease Mother     Cancer Father         colon    Diabetes Father     No Known Problems Sister     Cancer Brother         liver kidney bone    Diabetes Brother     No Known Problems Maternal Aunt     No Known Problems Maternal Uncle     No  Known Problems Paternal Aunt     No Known Problems Paternal Uncle     No Known Problems Maternal Grandmother     No Known Problems Maternal Grandfather     No Known Problems Paternal Grandmother     No Known Problems Paternal Grandfather     Breast cancer Neg Hx     Ovarian cancer Neg Hx        ALLERGIES AND MEDICATIONS: updated and reviewed.  Review of patient's allergies indicates:   Allergen Reactions    Codeine Nausea And Vomiting     Other reaction(s): Vomiting  Other reaction(s): Headache    Fentanyl Nausea And Vomiting     Other reaction(s): Vomiting  Other reaction(s): Nausea    Phenytoin sodium extended      Other reaction(s): Vomiting  Other reaction(s): Nausea    Versed  [midazolam] Nausea And Vomiting    Vicodin  [hydrocodone-acetaminophen]      Other reaction(s): Vomiting     Current Outpatient Medications   Medication Sig Dispense Refill    ANORO ELLIPTA 62.5-25 mcg/actuation DsDv INHALE 1 PUFF INTO THE LUNGS EVERY DAY 60 each 6    CYANOCOBALAMIN, VITAMIN B-12, (VITAMIN B-12 ORAL) Take by mouth.      diclofenac sodium (VOLTAREN) 1 % Gel Apply 2 g topically once daily. 100 g 3    LACTOBAC NO.41/BIFIDOBACT NO.7 (PROBIOTIC-10 ORAL) Take by mouth.      melatonin 5 mg Tab Take 5 mg by mouth nightly.       multivitamin capsule Take 1 capsule by mouth once daily.      RESTASIS 0.05 % ophthalmic emulsion Place 1 drop into both eyes 2 (two) times daily. 60 each 11    rosuvastatin (CRESTOR) 10 MG tablet Take 1 tablet (10 mg total) by mouth once daily. 90 tablet 3    venlafaxine (EFFEXOR-XR) 37.5 MG 24 hr capsule Take 1 capsule (37.5 mg total) by mouth once daily. 90 capsule 3    venlafaxine (EFFEXOR-XR) 75 MG 24 hr capsule TAKE 1 CAPSULE(75 MG) BY MOUTH EVERY DAY 90 capsule 3    aspirin (ECOTRIN) 81 MG EC tablet Take 1 tablet (81 mg total) by mouth once daily.  0    cetirizine (ZYRTEC) 10 MG tablet Take 1 tablet (10 mg total) by mouth once daily. 30 tablet 5    coenzyme Q10 10 mg capsule Take 10 mg by mouth once  "daily.      DOCUSATE CALCIUM (STOOL SOFTENER ORAL) Take by mouth.      hyoscyamine (ANASPAZ,LEVSIN) 0.125 mg Tab Take 1 tablet (125 mcg total) by mouth every 6 (six) hours as needed. (Patient not taking: Reported on 11/8/2022) 90 tablet 6    pantoprazole (PROTONIX) 40 MG tablet Take 1 tablet (40 mg total) by mouth once daily. 30 tablet 5     No current facility-administered medications for this visit.         ROS  Review of Systems   Constitutional:  Negative for chills, fever and unexpected weight change.   HENT:  Negative for congestion, ear pain, hearing loss, rhinorrhea, sore throat and trouble swallowing.    Eyes:  Negative for discharge, redness and visual disturbance.   Respiratory:  Negative for cough, chest tightness, shortness of breath and wheezing.    Cardiovascular:  Negative for chest pain, palpitations and leg swelling.   Gastrointestinal:  Negative for abdominal pain, constipation, diarrhea, nausea and vomiting.   Endocrine: Negative for polydipsia, polyphagia and polyuria.   Genitourinary:  Negative for decreased urine volume, dysuria and hematuria.   Musculoskeletal:  Negative for arthralgias, joint swelling and myalgias.   Skin:  Negative for color change and rash.   Neurological:  Negative for dizziness, weakness, light-headedness and headaches.   Psychiatric/Behavioral:  Negative for decreased concentration, dysphoric mood, sleep disturbance and suicidal ideas.          Physical Exam  Vitals:    11/08/22 1451   BP: 100/60   Pulse: 95   Temp: 98 °F (36.7 °C)   SpO2: 96%   Weight: 63.3 kg (139 lb 10.6 oz)   Height: 5' 3" (1.6 m)    Body mass index is 24.74 kg/m².  Weight: 63.3 kg (139 lb 10.6 oz)   Height: 5' 3" (160 cm)   Physical Exam  Constitutional:       General: She is not in acute distress.     Appearance: She is well-developed.   HENT:      Head: Normocephalic and atraumatic.   Eyes:      General: Lids are normal. No scleral icterus.     Conjunctiva/sclera: Conjunctivae normal.      " Pupils: Pupils are equal, round, and reactive to light.   Neck:      Thyroid: No thyromegaly.      Vascular: No carotid bruit or JVD.   Cardiovascular:      Rate and Rhythm: Normal rate and regular rhythm.      Pulses: Normal pulses.      Heart sounds: Normal heart sounds. No murmur heard.    No friction rub. No S3 or S4 sounds.   Pulmonary:      Effort: Pulmonary effort is normal.      Breath sounds: Normal breath sounds. No wheezing, rhonchi or rales.   Abdominal:      General: Bowel sounds are normal.      Palpations: Abdomen is soft.      Tenderness: There is no abdominal tenderness.   Musculoskeletal:         General: No tenderness.      Cervical back: Full passive range of motion without pain and neck supple.      Right lower leg: No edema.      Left lower leg: No edema.   Skin:     General: Skin is warm and dry.      Findings: No rash.   Neurological:      Mental Status: She is alert and oriented to person, place, and time.   Psychiatric:         Speech: Speech normal.         Behavior: Behavior normal.         Thought Content: Thought content normal.           Health Maintenance         Date Due Completion Date    TETANUS VACCINE Never done ---    Aspirin/Antiplatelet Therapy Never done ---    COVID-19 Vaccine (5 - Booster for Pfizer series) 09/15/2022 7/21/2022    Colorectal Cancer Screening 03/08/2023 3/8/2018    DEXA Scan 09/28/2023 9/28/2021    High Dose Statin 10/13/2023 10/13/2022    Lipid Panel 10/21/2027 10/21/2022                 no

## 2022-12-02 ENCOUNTER — PATIENT MESSAGE (OUTPATIENT)
Dept: ORTHOPEDICS | Facility: CLINIC | Age: 77
End: 2022-12-02
Payer: MEDICARE

## 2022-12-02 DIAGNOSIS — R52 PAIN: Primary | ICD-10-CM

## 2022-12-08 ENCOUNTER — HOSPITAL ENCOUNTER (OUTPATIENT)
Dept: RADIOLOGY | Facility: HOSPITAL | Age: 77
Discharge: HOME | End: 2022-12-08
Attending: ORTHOPAEDIC SURGERY
Payer: MEDICARE

## 2022-12-08 ENCOUNTER — OFFICE VISIT (OUTPATIENT)
Dept: ORTHOPEDICS | Facility: CLINIC | Age: 77
End: 2022-12-08
Payer: MEDICARE

## 2022-12-08 VITALS — BODY MASS INDEX: 24.25 KG/M2 | HEIGHT: 63 IN | WEIGHT: 136.88 LBS

## 2022-12-08 DIAGNOSIS — M79.671 PAIN OF RIGHT HEEL: ICD-10-CM

## 2022-12-08 DIAGNOSIS — M77.31 BONE SPUR OF POSTERIOR PORTION OF RIGHT CALCANEUS: ICD-10-CM

## 2022-12-08 DIAGNOSIS — M92.60 HAGLUND'S DEFORMITY: ICD-10-CM

## 2022-12-08 DIAGNOSIS — M65.28 CALCIFIC ACHILLES TENDINITIS OF RIGHT LOWER EXTREMITY: Primary | ICD-10-CM

## 2022-12-08 DIAGNOSIS — R52 PAIN: ICD-10-CM

## 2022-12-08 PROCEDURE — 1125F PR PAIN SEVERITY QUANTIFIED, PAIN PRESENT: ICD-10-PCS | Mod: CPTII,S$GLB,, | Performed by: ORTHOPAEDIC SURGERY

## 2022-12-08 PROCEDURE — 99999 PR PBB SHADOW E&M-EST. PATIENT-LVL IV: CPT | Mod: PBBFAC,,, | Performed by: ORTHOPAEDIC SURGERY

## 2022-12-08 PROCEDURE — 73610 X-RAY EXAM OF ANKLE: CPT | Mod: TC,RT

## 2022-12-08 PROCEDURE — 73610 X-RAY EXAM OF ANKLE: CPT | Mod: 26,RT,, | Performed by: RADIOLOGY

## 2022-12-08 PROCEDURE — 1159F MED LIST DOCD IN RCRD: CPT | Mod: CPTII,S$GLB,, | Performed by: ORTHOPAEDIC SURGERY

## 2022-12-08 PROCEDURE — 99204 PR OFFICE/OUTPT VISIT, NEW, LEVL IV, 45-59 MIN: ICD-10-PCS | Mod: S$GLB,,, | Performed by: ORTHOPAEDIC SURGERY

## 2022-12-08 PROCEDURE — 73650 X-RAY EXAM OF HEEL: CPT | Mod: TC,RT

## 2022-12-08 PROCEDURE — 1159F PR MEDICATION LIST DOCUMENTED IN MEDICAL RECORD: ICD-10-PCS | Mod: CPTII,S$GLB,, | Performed by: ORTHOPAEDIC SURGERY

## 2022-12-08 PROCEDURE — 99999 PR PBB SHADOW E&M-EST. PATIENT-LVL IV: ICD-10-PCS | Mod: PBBFAC,,, | Performed by: ORTHOPAEDIC SURGERY

## 2022-12-08 PROCEDURE — 73650 XR CALCANEUS 2 VIEW RIGHT: ICD-10-PCS | Mod: 26,RT,, | Performed by: RADIOLOGY

## 2022-12-08 PROCEDURE — 1125F AMNT PAIN NOTED PAIN PRSNT: CPT | Mod: CPTII,S$GLB,, | Performed by: ORTHOPAEDIC SURGERY

## 2022-12-08 PROCEDURE — 73610 XR ANKLE COMPLETE 3 VIEW RIGHT: ICD-10-PCS | Mod: 26,RT,, | Performed by: RADIOLOGY

## 2022-12-08 PROCEDURE — 99204 OFFICE O/P NEW MOD 45 MIN: CPT | Mod: S$GLB,,, | Performed by: ORTHOPAEDIC SURGERY

## 2022-12-08 PROCEDURE — 73650 X-RAY EXAM OF HEEL: CPT | Mod: 26,RT,, | Performed by: RADIOLOGY

## 2022-12-08 NOTE — PATIENT INSTRUCTIONS
Today, you saw Dr. Jett and were seen for insertional achilles tendinopathy     We decided the next step(s) in in treatment is/are  RICE guidelines (see below)  Anti-inflammatory medications (see below)  Therapy: Achilles stretching exercises provided (see below)  Activity: Backless shoes, Heel lifts, and Consider going up a 1/2-1 shoesize    Thank you for allowing me to participate in your care.  We will see you back as needed to discuss next steps in treatment if current treatment plan does not provide relief.    If continued pain and able to figure out recovery surgery is an option to treat your bone spur with achilles tendinitis.  The surgery is called achilles debridement, bone spur removal, and repairing your Achilles back down.  The surgery would be Outpatient (home same day).      I strongly advise you read more about this surgery - a great resource for learning about foot and ankle problems and their treatment options is www.footcaremd.org.      We went over the following regarding surgical treatment:    Activity after surgery  You will be Non-weight bearing for 4 weeks on the right lower extremity  You be in splint for 2 weeks, then cast for 2 weeks, and then boot until 10 weeks postop  Surgery is on your driving foot.  You will not be able to drive until off narcotic pain medications and until fully weight bearing on operative extremity.  Ultimately the decision about when to drive is a personal choice based on your assessment of your own ability/safety for operating a motor vehicle.  Anticipated time out of work: n/a but would need to make plans for self and  and transportation  Next steps  At this point, you have decided to think about it      How to treat inflammation?  1.) What does your doctor mean when they tell you to follow R.I.C.E. Guidelines?    Rest your ankle/foot by limiting activities that cause pain.  A good indicator of activity level is your pain the night following the activity  "and the day after.  If you have pain that lasts until the next day, you did too much.  Ice it to keep the swelling down. Don't put ice directly on the skin (use a thin piece of cloth between the ice bag and skin) and don't ice more than 20 minutes at a time to avoid frostbite.  Compressive bandages immobilize and support your injury.  Make sure it isn't too tight - your toes should not be turning purple and the wrap should not hurt.  Elevate your ankle above your heart level - "toes above your nose". This applies to acute injuries and should be followed for first 48 hours as well as afterward when you have increased pain and swelling after activity or therapy.    2.) Another common way of treating pain and inflammation from an injury is anti-inflammatory medications.  Dr. Jett may prescribe you a medication for inflammation or you can take an over-the-counter anti-inflammatory (also known as NSAIDs - nonsteroidal anti-inflammatory drugs).  These include such medications as aleve, motrin, ibuprofen, naproxen, etc.  They should be taken as prescribed or according to the over-the-counter packaging instructions.  These medications can upset the stomach or rare cases causes ulcer disease or kidney injury.  If you are concerned about using these medications long-term, you should discuss it with you primary doctor.  You can also combine or substitute an NSAID with acetaminophen (commonly known as Tylenol).  Take according to bottle instructions, and you can take up to 3000 mg daily (important to keep in mind if you take other medications that contain acetaminophen).  Again long term use should be discussed with your primary doctor.    WHAT IS INSERTIONAL ACHILLES TENDINITIS?  Insertional Achilles tendinitis is a degeneration of the fibers of the Achilles tendon directly at its insertion into the heel bone. It may be associated with inflammation of a bursa or tendon sheath in the same area.     Symptoms  Most patients " report the gradual onset of pain and swelling at the Achilles tendon insertion into the back of the heel bone without specific injury. At first, the pain is noted after activity alone, but becomes more constant over time. The pain is made worse by jumping or running and especially with sports requiring short bursts of these activities. There is tenderness directly over the back of the heel bone and often there is a bone prominence at this area. Positioning the ankle above a 90 degree position is limited by pain.     Causes  Insertional Achilles tendinitis primarily is caused by degeneration of the tendon. The average patient is in their 40s. Conditions associated with increased risk include psoriasis and Hongs syndrome, spondyloarthropathy (generalized inflammation of joints), gout, familial hyperlipidemia, sarcoidosis and diffuse idiopathic skeletal hyperostosis as well as the use of medications such as steroids and fluoroquinolone antibiotics.     Anatomy  The Achilles tendon is the largest tendon in the body. It is formed by the merging the upper calf muscles and functions to bend the knee, point the toes down, and slightly roll the heel to the big toe side of the foot. It inserts into the back of the heel bone. There may be a shelf extending off the back of the heel bone at the insertion site as well as a prominence of the heel bone in this area referred to as a Tierney's deformity, which can cause mechanical irritation of the Achilles tendon. Just as nose sizes and shapes vary, there can be variations in the size and shape of these bones.     Diagnosis  This remains primarily a clinical diagnosis. X-rays show calcification (bone) deposits within the tendon at its insertion into the heel approximately 60 percent of the time. Their presence is associated with a more guarded success rate for non-surgical treatment and a much longer recovery time if surgery is performed. X-rays also may reveal a Tierney's  deformity. MRIs are the imaging option of choice because they can determine the extent of tendon degeneration as well as other factors such as bursitis, which may contribute to posterior heel pain.    Treatments  Non-surgical treatment is effective in the majority of patients with liberal use of nonsteroidal anti-inflammatory drugs, heel lifts, stretching, and shoes that do not provide pressure over this area. If symptoms persist, then night splints, arch supports, and physical therapy may be of benefit. If this fails, use of a cast or brace with gradual return to activity is indicated.  If symptoms have been present for longer than 6 months and/or are associated with a large bump, non-surgical treatment is less likely to be effective.     Surgical treatment is indicated if there is failure after several months of non-surgical treatment. During this surgery, an incision is made over the back of the heel. The diseased portion of the tendon is removed. A small amount of bone is removed from the back of the heel bone to create a healthy area for the tendon to attach. The tendon is reattached using special bone anchors that allow the tendon to be fixed to the bone.    Several different approaches and techniques, including endoscopy, are used to achieve these goals. There is no clear consensus regarding which is best in terms of both success and avoiding complications. In older patients or those in whom more than 50 percent of the tendon is removed, one of the other tendons at the back of the ankle usually is transferred to the heel bone to assist the Achilles tendon with strength as well as provide a better blood supply to this area.    Recovery  With non-surgical treatment, think of this like an injury to your tendon, it can take 2-3 months to heal and settle down.  There is a small risk of rupture while the tendon is inflamed and you should limit impact (running/jumping) activities until it resolves.  Slowly return to  your normal activities after this point.    With surgery, your tendon has to be lifted off the bone to remove the bone spur and bursitis.  After surgery, you need to heel the repaired achilles tendon, which takes time.  Your surgeon will provide you information about what to expect after surgery, but it can take more than 6 months before you are back to your usual activities and longer for sporting activities.  It can take up to a year for complete recovery.    Risks and Complications  All surgeries come with possible complications, including the risks associated with anesthesia, infection, damage to nerves and blood vessels, and bleeding or blood clots.Complications with this surgery can include residual pain, infections, weakness or tightness, or rupture of the repair.     FAQs  Would a cortisone injection help?   Cortisone injections are not recommended to treat these types of problems of the Achilles tendon because they can weaken the tendon, leading to possible rupture.    After surgery, can I return to all the activities I want to do?  Yes. After the appropriate repair, physical therapy and healing time, the goal is for you to be able to return to activities you want to do. Residual pain may limit but not prevent you from doing some activities.    https://www.footcaremd.org/conditions-treatments/ankle/uarxpeytlac-cpithqwj-ezzqbushmw  The American [employer] Foot & [employer] Society (AOFAS) offers information on this site as an educational service. The content of FootCareMD, including text, images, and graphics, is for informational purposes only. The content is not intended to substitute for professional medical advice, diagnoses or treatments.      ACHILLES STRETCHING PROGRAM    The stretch is called an eccentric step stretch.    Find a suitable step where you can stand upright (you should have no bend at your waist) with heels over the edge of the step (you should be on the balls of your feet) and lower  yourself down over the step (you are allowed to use your hands to balance).    Each stretch should last 15 to 20 seconds. Tightness should be felt in calf not Achilles tendon. As a general rule I would encourage you to stretch irrespective of the pain that occurs DURING the stretch.    Number of Stretches    You do 6 stretches with knees straight and then 3 stretches with knees slightly bent.    This is 1 set. Weight will either be on both legs (Figures A and B) or on one leg (Figures C and D).        Program    Level 1: 1 Set with weight on both legs once per day    Level 2: 1 Set with weight on both legs twice per day    Level 3: 1 Set with weight on both legs three times per day    Level 4: 1 Set with weight on one leg (the affected leg) once per day    Level 5: 1 Set with weight on one leg twice per day    Level 6: 1 Set with weight on one leg three times per day    How to progress to the next level    You should not progress to the next level unless    You have done three days in that level    You have not had any increase in morning after pain or stiffness     You have not had increase in pain measured two hours after completing the last repetition for that day.    When to rest or stop?    If you are unable to complete the level's 6 stretches, stop the program rest for 2 days and then resume at Level 1     How long do I do the program for?    6 weeks and then you return to running irrespective whether you consider whether you have improved or not.    What results can you expect?  This program is based on a study done on [place] patients.  It showed this stretching program was successful at alleviating pain and getting patients back to their preferred activity in 86% of cases.  It took an average of 10 weeks to get back to activity level and often symptoms took 6 months to resolve.  Only 1 patient ever required surgery.  This works better when symptoms are in middle of the tendon instead of where it attaches  to bone.  Even when symptoms are at the bone, it was still effective in 50% of cases.

## 2022-12-08 NOTE — LETTER
[facility] 5th Fl  [address], [address]  [facility] [address]  Phone: [phone]       Dear Dr. Cullen Timmons,    Thank you for your referral of Marycarmen Louis for evaluation of their right achilles.  Please see attached consultation note for details regarding our visit.    We discussed surgery but she is very hesitant given she is caring for her .    I appreciate the opportunity to participate in the care of our mutual patient.  Please do not hesitate to reach out with questions/concerns.    Sincerely,    Seema Jett MD  Foot & Ankle Orthopedic Surgery  12/13/2022

## 2022-12-08 NOTE — PROGRESS NOTES
Subjective:   Chief complaint: right heel pain  Referring provider: Dr. Cullen Timmons     HPI:   Marycarmen Louis is a 77 y.o. female who presents today for evaluation of right heel pain.  Rates pain as 5/10.  Pain has been ongoing for a few months.  Inciting event: golf ball on heel.  Treatments tried: PT.    Pain localized to the Achilles insertio and posterior heel.  It is worse with activity and alleviated by rest.  It continues to come and go and interferes with ADLs, walking and QOL.    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 No  If so, what and how often? N/A    ROS:  Musculoskeletal: per HPI  Neurological: Negative for burning, tingling and numbness  Heme: Negative for blood thinners; Negative for history of blood clot  Endocrine: Negative for diabetes    Objective:   Exam:  There were no vitals filed for this visit.  General: No acute distress, well-appearing  Neurologic: Alert and oriented x3  Psychiatric: Appropriate mood and affect, cooperative  Cardiovascular: Regular pulse  Respiratory: Breathing on room air  Skin: No rashes or ulcers  Vascular: Palpable DP/PT  Musculoskeletal: Standing examination demonstrates neutral alignment.  There is no swelling or ecchymosis.  Medial longitudinal arch is neutral.  Calf atrophy is not present.    Focused exam of the right lower extremity demonstrates non-irritable ankle range of motion.  Hindfoot is flexible.  Ankle dorsiflexion is within normal limits.  Achilles palpates in continuity.  Patient is TTP over Achilles tendon insertion, medial calcaneal tuberosity, and lateral calcaneal tuberosity.  Patient in NT over Achilles tendon mid-substance.  There is significant enlargement of the calcaneal tuberosity.  There is no thickening of the Achilles tendon.  Calf squeeze creates plantar flexion.    Fires TA/GSC/PTT/peroneals.  SILT SP/DP/PT and able to localize.     Imaging:  Radiographs were ordered and  independently interpreted by me.    Standing 3v ankle demonstrates enlarged posterior superior calcaneal tuberosity and insertional calcifications of the Achilles.    Additional records/labs reviewed:  None       Assessment:     1. 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 Bone spur of posterior portion of right calcaneus    3. Tierney's deformity         Patient is seen for multiple problems (not at treatment goal) with treatment complicated by inability to participate in nonweight bearing period .    Data:  1 results independently interpreted    Treatment plan: Counseling regarding non-operative treatment and possibility of surgery in future if persistent morbidity from symptoms     I had a lengthy discussion with the patient today regarding clinical history, imaging findings, and physical exam.  I discussed insertional versus non-insertional Achilles tendon pathology. I emphasized role for nonsurgical treatment modalities, notably eccentric stretching, anti-inflammatory medications, ice and other modalities.  I strongly advised against any injections around the achilles tendon or its insertion due to risk of rupture.  We discussed  propensity for symptoms to resolve with treatment as above, but given refractory symptoms as well as extensive insertional pathology seen on radiographs, surgery would be an option to consider.    I discussed surgery involves addressing both the tendon and the bone problem.  We discussed the tendon would be debrided of any unhealthy/scarred tissue and the prominance on the heel bone would be removed (tierney resection/calcaneal exostecomy).  We then have to repair the achilles tendon back down.  Sometimes a tendon transfer (FHL) is needed to supplement the repair if a lot of tendon (>50%) is resected.  We also discussed reasonable expectations and recovery time for this surgery which can take 6 months to a year for maximal benefit and requires 1 month of limited  "activity/non-weight bearing.    At this point, we decided next step is to discussion or surgery but when we started to discuss the recovery, she paused and said "cant" - discussed strategies for managing symptoms but I did  the patient that based on duration and size of spur, it is unlikely to go away.  It may however come and go and discussed eccentric exercises.      Plan:       See AVS    No orders of the defined types were placed in this encounter.      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 Liriano    CATARACT EXTRACTION W/  INTRAOCULAR LENS IMPLANT Left 10/30/2017    Dr. Liriano    COLONOSCOPY N/A 3/8/2018    Procedure: COLONOSCOPY;  Surgeon: Hari Dumont MD;  Location: [facility] ([address] [place] [address]);  Service: Endoscopy;  Laterality: N/A;  5 year f/u-2/16/18 pt confirmed appt/MS    JOINT REPLACEMENT  2014    left TKA    salviary gland removed      TOTAL HIP ARTHROPLASTY         Family History   Problem Relation Age of Onset    Heart disease Mother     Cancer Father         colon    Diabetes Father     No Known Problems Sister     Cancer Brother         liver kidney bone    Diabetes Brother     No Known Problems Maternal Aunt     No Known Problems Maternal " Uncle     No Known Problems Paternal Aunt     No Known Problems Paternal Uncle     No Known Problems Maternal Grandmother     No Known Problems Maternal Grandfather     No Known Problems Paternal Grandmother     No Known Problems Paternal Grandfather     Breast cancer Neg Hx     Ovarian cancer Neg Hx        Social History     Socioeconomic History    Marital status:    Tobacco Use    Smoking status: Former    Smokeless tobacco: Never    Tobacco comments:     quit 30yrs ago, discussed importance of continued cessation   Substance and Sexual Activity    Alcohol use: Yes     Comment: occasionally    Drug use: No    Sexual activity: Not Currently     Partners: Male     Birth control/protection: Post-menopausal

## 2022-12-27 ENCOUNTER — TELEPHONE (OUTPATIENT)
Dept: FAMILY MEDICINE | Facility: CLINIC | Age: 77
End: 2022-12-27
Payer: MEDICARE

## 2023-01-12 RX ORDER — PANTOPRAZOLE SODIUM 40 MG/1
40 TABLET, DELAYED RELEASE ORAL DAILY
Qty: 30 TABLET | Refills: 5 | Status: SHIPPED | OUTPATIENT
Start: 2023-01-12 | End: 2023-04-18 | Stop reason: SDUPTHER

## 2023-01-17 ENCOUNTER — TELEPHONE (OUTPATIENT)
Dept: GASTROENTEROLOGY | Facility: CLINIC | Age: 78
End: 2023-01-17
Payer: MEDICARE

## 2023-01-17 NOTE — TELEPHONE ENCOUNTER
Patient said that she found a bottle of the medication and she does not need a refill. Decline to schedule a follow up.

## 2023-01-20 ENCOUNTER — PATIENT MESSAGE (OUTPATIENT)
Dept: ORTHOPEDICS | Facility: CLINIC | Age: 78
End: 2023-01-20
Payer: MEDICARE

## 2023-01-25 ENCOUNTER — TELEPHONE (OUTPATIENT)
Dept: ORTHOPEDICS | Facility: CLINIC | Age: 78
End: 2023-01-25
Payer: MEDICARE

## 2023-01-25 NOTE — TELEPHONE ENCOUNTER
Patient discussion.     Answered patient's questions regarding the surgery and possible shoe recommendations to help in interim.

## 2023-02-09 ENCOUNTER — PES CALL (OUTPATIENT)
Dept: ADMINISTRATIVE | Facility: CLINIC | Age: 78
End: 2023-02-09
Payer: MEDICARE

## 2023-03-01 ENCOUNTER — TELEPHONE (OUTPATIENT)
Dept: ADMINISTRATIVE | Facility: CLINIC | Age: 78
End: 2023-03-01
Payer: MEDICARE

## 2023-03-01 NOTE — TELEPHONE ENCOUNTER
Called pt's  to confirm his awv appt scheduled for 3/2/23; pt's wife stated she had an appt scheduled for 3/3/23 and would like to get appts rescheduled for the same day if possible; appt rescheduled to 3/22/23 per pt's request

## 2023-03-20 ENCOUNTER — TELEPHONE (OUTPATIENT)
Dept: ADMINISTRATIVE | Facility: CLINIC | Age: 78
End: 2023-03-20
Payer: MEDICARE

## 2023-03-28 ENCOUNTER — OFFICE VISIT (OUTPATIENT)
Dept: OPTOMETRY | Facility: CLINIC | Age: 78
End: 2023-03-28
Payer: MEDICARE

## 2023-03-28 DIAGNOSIS — H35.372 EPIRETINAL MEMBRANE (ERM) OF LEFT EYE: ICD-10-CM

## 2023-03-28 DIAGNOSIS — H52.4 MYOPIA WITH PRESBYOPIA OF BOTH EYES: ICD-10-CM

## 2023-03-28 DIAGNOSIS — H52.13 MYOPIA WITH PRESBYOPIA OF BOTH EYES: ICD-10-CM

## 2023-03-28 DIAGNOSIS — H04.123 DRY EYE SYNDROME OF BOTH EYES: Primary | ICD-10-CM

## 2023-03-28 PROCEDURE — 92015 PR REFRACTION: ICD-10-PCS | Mod: S$GLB,,, | Performed by: OPTOMETRIST

## 2023-03-28 PROCEDURE — 92134 CPTRZ OPH DX IMG PST SGM RTA: CPT | Mod: S$GLB,,, | Performed by: OPTOMETRIST

## 2023-03-28 PROCEDURE — 1126F PR PAIN SEVERITY QUANTIFIED, NO PAIN PRESENT: ICD-10-PCS | Mod: CPTII,S$GLB,, | Performed by: OPTOMETRIST

## 2023-03-28 PROCEDURE — 99999 PR PBB SHADOW E&M-EST. PATIENT-LVL III: CPT | Mod: PBBFAC,,, | Performed by: OPTOMETRIST

## 2023-03-28 PROCEDURE — 3288F PR FALLS RISK ASSESSMENT DOCUMENTED: ICD-10-PCS | Mod: CPTII,S$GLB,, | Performed by: OPTOMETRIST

## 2023-03-28 PROCEDURE — 92014 COMPRE OPH EXAM EST PT 1/>: CPT | Mod: S$GLB,,, | Performed by: OPTOMETRIST

## 2023-03-28 PROCEDURE — 92015 DETERMINE REFRACTIVE STATE: CPT | Mod: S$GLB,,, | Performed by: OPTOMETRIST

## 2023-03-28 PROCEDURE — 92014 PR EYE EXAM, EST PATIENT,COMPREHESV: ICD-10-PCS | Mod: S$GLB,,, | Performed by: OPTOMETRIST

## 2023-03-28 PROCEDURE — 99999 PR PBB SHADOW E&M-EST. PATIENT-LVL III: ICD-10-PCS | Mod: PBBFAC,,, | Performed by: OPTOMETRIST

## 2023-03-28 PROCEDURE — 1159F PR MEDICATION LIST DOCUMENTED IN MEDICAL RECORD: ICD-10-PCS | Mod: CPTII,S$GLB,, | Performed by: OPTOMETRIST

## 2023-03-28 PROCEDURE — 3288F FALL RISK ASSESSMENT DOCD: CPT | Mod: CPTII,S$GLB,, | Performed by: OPTOMETRIST

## 2023-03-28 PROCEDURE — 1101F PR PT FALLS ASSESS DOC 0-1 FALLS W/OUT INJ PAST YR: ICD-10-PCS | Mod: CPTII,S$GLB,, | Performed by: OPTOMETRIST

## 2023-03-28 PROCEDURE — 1159F MED LIST DOCD IN RCRD: CPT | Mod: CPTII,S$GLB,, | Performed by: OPTOMETRIST

## 2023-03-28 PROCEDURE — 1101F PT FALLS ASSESS-DOCD LE1/YR: CPT | Mod: CPTII,S$GLB,, | Performed by: OPTOMETRIST

## 2023-03-28 PROCEDURE — 1126F AMNT PAIN NOTED NONE PRSNT: CPT | Mod: CPTII,S$GLB,, | Performed by: OPTOMETRIST

## 2023-03-28 PROCEDURE — 92134 POSTERIOR SEGMENT OCT RETINA (OCULAR COHERENCE TOMOGRAPHY)-BOTH EYES: ICD-10-PCS | Mod: S$GLB,,, | Performed by: OPTOMETRIST

## 2023-03-28 RX ORDER — AZITHROMYCIN 250 MG/1
TABLET, FILM COATED ORAL
COMMUNITY
End: 2023-04-25

## 2023-03-29 NOTE — PROGRESS NOTES
HPI    Last eye exam was 8/24/21 with Dr. Dey.  Patient states current glasses are very scratched. Overdue for eye exam.  Patient denies diplopia, headaches, flashes/floaters, and pain.    Restasis BID OU (ok on refills)  Last edited by Martha Jones MA on 3/28/2023  2:51 PM.            Assessment /Plan     For exam results, see Encounter Report.    Dry eye syndrome of both eyes    Epiretinal membrane (ERM) of left eye  -     OCT- Retina    Myopia with presbyopia of both eyes             Continue Restasis bid OU.   Longstanding-stable.   Retina flat and intact OU--no holes, tears, breaks, or RDs.    3.    Bifocal rx given. Eye health normal OU.        RTC 1 year for routine exam.

## 2023-04-11 ENCOUNTER — TELEPHONE (OUTPATIENT)
Dept: FAMILY MEDICINE | Facility: CLINIC | Age: 78
End: 2023-04-11
Payer: MEDICARE

## 2023-04-11 DIAGNOSIS — R53.83 FATIGUE, UNSPECIFIED TYPE: Primary | ICD-10-CM

## 2023-04-11 NOTE — TELEPHONE ENCOUNTER
With Provider: Cullen Timmons MD [[facility]]      Preferred Date Range: Any      Preferred Times: Any Time      Reason for visit: Im so tired all the time      Comments:   Blood work  & what ever test you think I might need..  I would like to go to [facility] if possible.   Patient has lipid, cbc, and cmp already ordered would patient need other labs?  Please advise

## 2023-04-18 ENCOUNTER — LAB VISIT (OUTPATIENT)
Dept: LAB | Facility: HOSPITAL | Age: 78
End: 2023-04-18
Attending: INTERNAL MEDICINE
Payer: MEDICARE

## 2023-04-18 ENCOUNTER — TELEPHONE (OUTPATIENT)
Dept: ADMINISTRATIVE | Facility: CLINIC | Age: 78
End: 2023-04-18
Payer: MEDICARE

## 2023-04-18 ENCOUNTER — PES CALL (OUTPATIENT)
Dept: ADMINISTRATIVE | Facility: CLINIC | Age: 78
End: 2023-04-18
Payer: MEDICARE

## 2023-04-18 DIAGNOSIS — I25.10 CORONARY ARTERY CALCIFICATION SEEN ON CAT SCAN: Chronic | ICD-10-CM

## 2023-04-18 DIAGNOSIS — R53.83 FATIGUE, UNSPECIFIED TYPE: ICD-10-CM

## 2023-04-18 LAB
ALBUMIN SERPL BCP-MCNC: 3.9 G/DL (ref 3.5–5.2)
ALP SERPL-CCNC: 43 U/L (ref 55–135)
ALT SERPL W/O P-5'-P-CCNC: 24 U/L (ref 10–44)
ANION GAP SERPL CALC-SCNC: 9 MMOL/L (ref 8–16)
AST SERPL-CCNC: 27 U/L (ref 10–40)
BASOPHILS # BLD AUTO: 0.03 K/UL (ref 0–0.2)
BASOPHILS NFR BLD: 0.6 % (ref 0–1.9)
BILIRUB SERPL-MCNC: 0.5 MG/DL (ref 0.1–1)
BUN SERPL-MCNC: 17 MG/DL (ref 8–23)
CALCIUM SERPL-MCNC: 9.3 MG/DL (ref 8.7–10.5)
CHLORIDE SERPL-SCNC: 103 MMOL/L (ref 95–110)
CHOLEST SERPL-MCNC: 131 MG/DL (ref 120–199)
CHOLEST/HDLC SERPL: 2.6 {RATIO} (ref 2–5)
CO2 SERPL-SCNC: 30 MMOL/L (ref 23–29)
CREAT SERPL-MCNC: 0.7 MG/DL (ref 0.5–1.4)
DIFFERENTIAL METHOD: ABNORMAL
EOSINOPHIL # BLD AUTO: 0.2 K/UL (ref 0–0.5)
EOSINOPHIL NFR BLD: 4.6 % (ref 0–8)
ERYTHROCYTE [DISTWIDTH] IN BLOOD BY AUTOMATED COUNT: 12.6 % (ref 11.5–14.5)
EST. GFR  (NO RACE VARIABLE): >60 ML/MIN/1.73 M^2
GLUCOSE SERPL-MCNC: 97 MG/DL (ref 70–110)
HCT VFR BLD AUTO: 41.9 % (ref 37–48.5)
HDLC SERPL-MCNC: 51 MG/DL (ref 40–75)
HDLC SERPL: 38.9 % (ref 20–50)
HGB BLD-MCNC: 13.6 G/DL (ref 12–16)
IMM GRANULOCYTES # BLD AUTO: 0.02 K/UL (ref 0–0.04)
IMM GRANULOCYTES NFR BLD AUTO: 0.4 % (ref 0–0.5)
LDLC SERPL CALC-MCNC: 69.8 MG/DL (ref 63–159)
LYMPHOCYTES # BLD AUTO: 1 K/UL (ref 1–4.8)
LYMPHOCYTES NFR BLD: 21 % (ref 18–48)
MCH RBC QN AUTO: 31.8 PG (ref 27–31)
MCHC RBC AUTO-ENTMCNC: 32.5 G/DL (ref 32–36)
MCV RBC AUTO: 98 FL (ref 82–98)
MONOCYTES # BLD AUTO: 0.6 K/UL (ref 0.3–1)
MONOCYTES NFR BLD: 13.1 % (ref 4–15)
NEUTROPHILS # BLD AUTO: 2.9 K/UL (ref 1.8–7.7)
NEUTROPHILS NFR BLD: 60.3 % (ref 38–73)
NONHDLC SERPL-MCNC: 80 MG/DL
NRBC BLD-RTO: 0 /100 WBC
PLATELET # BLD AUTO: 280 K/UL (ref 150–450)
PMV BLD AUTO: 8.4 FL (ref 9.2–12.9)
POTASSIUM SERPL-SCNC: 4.3 MMOL/L (ref 3.5–5.1)
PROT SERPL-MCNC: 6.7 G/DL (ref 6–8.4)
RBC # BLD AUTO: 4.28 M/UL (ref 4–5.4)
SODIUM SERPL-SCNC: 142 MMOL/L (ref 136–145)
TRIGL SERPL-MCNC: 51 MG/DL (ref 30–150)
TSH SERPL DL<=0.005 MIU/L-ACNC: 2.07 UIU/ML (ref 0.4–4)
WBC # BLD AUTO: 4.8 K/UL (ref 3.9–12.7)

## 2023-04-18 PROCEDURE — 36415 COLL VENOUS BLD VENIPUNCTURE: CPT | Performed by: INTERNAL MEDICINE

## 2023-04-18 PROCEDURE — 80053 COMPREHEN METABOLIC PANEL: CPT | Performed by: INTERNAL MEDICINE

## 2023-04-18 PROCEDURE — 84443 ASSAY THYROID STIM HORMONE: CPT | Performed by: INTERNAL MEDICINE

## 2023-04-18 PROCEDURE — 80061 LIPID PANEL: CPT | Performed by: INTERNAL MEDICINE

## 2023-04-18 PROCEDURE — 85025 COMPLETE CBC W/AUTO DIFF WBC: CPT | Performed by: INTERNAL MEDICINE

## 2023-04-19 ENCOUNTER — TELEPHONE (OUTPATIENT)
Dept: FAMILY MEDICINE | Facility: CLINIC | Age: 78
End: 2023-04-19
Payer: MEDICARE

## 2023-04-19 NOTE — TELEPHONE ENCOUNTER
----- Message from Tanja Garza sent at 4/19/2023 11:27 AM CDT -----  Regarding: [id number]  Type: Patient Call Back    Who called:self    What is the request in detail: pt states she has been having trouble with the virtual and she will prefer to do a virtual audio only.    Can the clinic reply by [facility]?no    Would the patient rather a call back or a response via [facility]? Call back    Best call back number:[phone]      Additional Information: pt states she is in need to speak with the doctor today.

## 2023-04-25 ENCOUNTER — OFFICE VISIT (OUTPATIENT)
Dept: FAMILY MEDICINE | Facility: CLINIC | Age: 78
End: 2023-04-25
Payer: MEDICARE

## 2023-04-25 VITALS
WEIGHT: 139.31 LBS | HEART RATE: 63 BPM | DIASTOLIC BLOOD PRESSURE: 60 MMHG | HEIGHT: 63 IN | TEMPERATURE: 98 F | BODY MASS INDEX: 24.68 KG/M2 | OXYGEN SATURATION: 97 % | SYSTOLIC BLOOD PRESSURE: 100 MMHG

## 2023-04-25 DIAGNOSIS — J43.1 PANLOBULAR EMPHYSEMA: Chronic | ICD-10-CM

## 2023-04-25 DIAGNOSIS — N39.46 MIXED STRESS AND URGE URINARY INCONTINENCE: Chronic | ICD-10-CM

## 2023-04-25 DIAGNOSIS — R53.83 FATIGUE, UNSPECIFIED TYPE: Primary | ICD-10-CM

## 2023-04-25 DIAGNOSIS — R53.83 CAREGIVER WITH FATIGUE: ICD-10-CM

## 2023-04-25 PROCEDURE — 3078F PR MOST RECENT DIASTOLIC BLOOD PRESSURE < 80 MM HG: ICD-10-PCS | Mod: CPTII,S$GLB,, | Performed by: INTERNAL MEDICINE

## 2023-04-25 PROCEDURE — 99999 PR PBB SHADOW E&M-EST. PATIENT-LVL IV: ICD-10-PCS | Mod: PBBFAC,,, | Performed by: INTERNAL MEDICINE

## 2023-04-25 PROCEDURE — 1160F PR REVIEW ALL MEDS BY PRESCRIBER/CLIN PHARMACIST DOCUMENTED: ICD-10-PCS | Mod: CPTII,S$GLB,, | Performed by: INTERNAL MEDICINE

## 2023-04-25 PROCEDURE — 3074F PR MOST RECENT SYSTOLIC BLOOD PRESSURE < 130 MM HG: ICD-10-PCS | Mod: CPTII,S$GLB,, | Performed by: INTERNAL MEDICINE

## 2023-04-25 PROCEDURE — 99999 PR PBB SHADOW E&M-EST. PATIENT-LVL IV: CPT | Mod: PBBFAC,,, | Performed by: INTERNAL MEDICINE

## 2023-04-25 PROCEDURE — 1125F AMNT PAIN NOTED PAIN PRSNT: CPT | Mod: CPTII,S$GLB,, | Performed by: INTERNAL MEDICINE

## 2023-04-25 PROCEDURE — 1101F PR PT FALLS ASSESS DOC 0-1 FALLS W/OUT INJ PAST YR: ICD-10-PCS | Mod: CPTII,S$GLB,, | Performed by: INTERNAL MEDICINE

## 2023-04-25 PROCEDURE — 1159F MED LIST DOCD IN RCRD: CPT | Mod: CPTII,S$GLB,, | Performed by: INTERNAL MEDICINE

## 2023-04-25 PROCEDURE — 99215 OFFICE O/P EST HI 40 MIN: CPT | Mod: S$GLB,,, | Performed by: INTERNAL MEDICINE

## 2023-04-25 PROCEDURE — 3288F PR FALLS RISK ASSESSMENT DOCUMENTED: ICD-10-PCS | Mod: CPTII,S$GLB,, | Performed by: INTERNAL MEDICINE

## 2023-04-25 PROCEDURE — 3078F DIAST BP <80 MM HG: CPT | Mod: CPTII,S$GLB,, | Performed by: INTERNAL MEDICINE

## 2023-04-25 PROCEDURE — 3288F FALL RISK ASSESSMENT DOCD: CPT | Mod: CPTII,S$GLB,, | Performed by: INTERNAL MEDICINE

## 2023-04-25 PROCEDURE — 1125F PR PAIN SEVERITY QUANTIFIED, PAIN PRESENT: ICD-10-PCS | Mod: CPTII,S$GLB,, | Performed by: INTERNAL MEDICINE

## 2023-04-25 PROCEDURE — 99215 PR OFFICE/OUTPT VISIT, EST, LEVL V, 40-54 MIN: ICD-10-PCS | Mod: S$GLB,,, | Performed by: INTERNAL MEDICINE

## 2023-04-25 PROCEDURE — 3074F SYST BP LT 130 MM HG: CPT | Mod: CPTII,S$GLB,, | Performed by: INTERNAL MEDICINE

## 2023-04-25 PROCEDURE — 1101F PT FALLS ASSESS-DOCD LE1/YR: CPT | Mod: CPTII,S$GLB,, | Performed by: INTERNAL MEDICINE

## 2023-04-25 PROCEDURE — 1159F PR MEDICATION LIST DOCUMENTED IN MEDICAL RECORD: ICD-10-PCS | Mod: CPTII,S$GLB,, | Performed by: INTERNAL MEDICINE

## 2023-04-25 PROCEDURE — 1160F RVW MEDS BY RX/DR IN RCRD: CPT | Mod: CPTII,S$GLB,, | Performed by: INTERNAL MEDICINE

## 2023-04-25 RX ORDER — OXYBUTYNIN CHLORIDE 5 MG/1
5 TABLET, EXTENDED RELEASE ORAL DAILY
Qty: 90 TABLET | Refills: 0 | Status: ON HOLD | OUTPATIENT
Start: 2023-04-25 | End: 2023-07-19 | Stop reason: CLARIF

## 2023-04-25 NOTE — PROGRESS NOTES
Assessment & Plan  Problem List Items Addressed This Visit          Pulmonary    Panlobular emphysema (Chronic)    Current Assessment & Plan     Refer to pulm to eval for worsening SHAIKH, eval for desat's particularly overnight.  We discussed that she does not seem particularly at risk for obstructive sleep apnea.  At this time I am a bit more more concern for potential desat overnight           Relevant Orders    Ambulatory referral/consult to Pulmonology       Renal/    Mixed stress and urge urinary incontinence (Chronic)    Current Assessment & Plan     This certainly could be part of her disrupted sleep patterns.  She has both Daytime and nighttime symptoms.  Discussed lifestyle mod.  Start ditropan.  I have discussed the common side effects of this(these) medication(s) and black box warnings (if applicable) with the patient and answered all of the questions they had at the time of this visit regarding this(these) medication(s).            Relevant Medications    oxybutynin (DITROPAN-XL) 5 MG TR24     Other Visit Diagnoses       Fatigue, unspecified type    -  Primary  -  We discussed that her laboratory evaluation did not find any organic changes to suggest a cause for her fatigue.  We discussed my concerns that this may be more related to caregiver fatigue interrupted sleep.  We did discuss the potential to start medications to assist with sleep such as trazodone or mirtazapine.  Ultimately we elected to address her urinary complaints 1st    Caregiver with fatigue    -  We discussed the role of caregiver fatigue and overall energy levels.  Her  does have an upcoming appointment with Neurology.  As his condition seems to be worsening.  He is also my patient        I spent >45 minute in both pre-visit evaluation and during this encounter counseling the patient on diagnoses, risk factors, and treatment.      Health Maintenance reviewed, deferred.    Follow-up: Follow up in about 6 months (around  10/25/2023) for Routine Physical.  ______________________________________________________________________    Chief Complaint  Chief Complaint   Patient presents with    Fatigue       HPI  Marycarmen Louis is a 77 y.o. female with medical diagnoses as listed in the medical history and problem list that presents for fatigue.  Pt is known to me with their last appointment 11/8/2022.      Patient has been reporting worsening fatigue overall.  She did complete laboratory evaluation prior to this assessment which showed generally reassuring CBC CMP TSH.  She is had no signs or symptoms of hypothyroidism.  She does complain of some worsening dyspnea on exertion.  She states that her sleep is often interrupted and she has generally been a lifelong light sleeper.  She wakes at least 1-2 times at night to urinate.  She does endorse daytime symptoms of both stress and urge incontinence.    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  He is had some worsening of his condition overall with regard to his memory and is now involving more behavioral outbursts.  This does include recently becoming very upset with the patient and storming out of the house.  She reports that her son was ultimately able to convince him to coming back into the house.  The next day he seemed to remember that he had some sort of an outburst and felt that at that time he may not have recognized her as his wife.      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 Liriano    CATARACT EXTRACTION W/  INTRAOCULAR LENS IMPLANT Left 10/30/2017    Dr. Liriano    COLONOSCOPY N/A 3/8/2018    Procedure: COLONOSCOPY;  Surgeon: Hari Dumont MD;  Location: [address]);  Service: Endoscopy;  Laterality: N/A;  5 year f/u-2/16/18 pt confirmed appt/MS    JOINT REPLACEMENT  2014    left TKA    salviary gland removed      TOTAL HIP ARTHROPLASTY         SOCIAL HISTORY:  Social History     Socioeconomic History    Marital status:    Tobacco Use    Smoking status: Former     Passive exposure: Past    Smokeless tobacco: Never    Tobacco comments:     quit 30yrs ago, discussed importance of continued cessation   Substance and Sexual Activity    Alcohol use: Yes     Comment: occasionally    Drug use: No    Sexual activity: Not Currently     Partners: Male     Birth control/protection: Post-menopausal       FAMILY HISTORY:  Family History   Problem Relation Age of Onset    Heart disease Mother     Cancer Father         colon    Diabetes Father     No Known Problems Sister     Cancer Brother         liver kidney bone    Diabetes Brother     No Known Problems Maternal Aunt     No Known Problems Maternal Uncle     No Known Problems Paternal Aunt     No Known Problems Paternal Uncle     No Known Problems Maternal Grandmother     No Known Problems Maternal Grandfather     No Known Problems Paternal Grandmother     No Known Problems Paternal Grandfather     Breast cancer Neg Hx     Ovarian cancer Neg Hx        ALLERGIES AND MEDICATIONS: updated and reviewed.  Review of patient's allergies indicates:   Allergen Reactions    Codeine Nausea And Vomiting     Other  reaction(s): Vomiting  Other reaction(s): Headache    Fentanyl Nausea And Vomiting     Other reaction(s): Vomiting  Other reaction(s): Nausea    Phenytoin sodium extended      Other reaction(s): Vomiting  Other reaction(s): Nausea    Versed  [midazolam] Nausea And Vomiting    Vicodin  [hydrocodone-acetaminophen]      Other reaction(s): Vomiting     Current Outpatient Medications   Medication Sig Dispense Refill    ANORO ELLIPTA 62.5-25 mcg/actuation DsDv INHALE 1 PUFF INTO THE LUNGS EVERY  each 3    aspirin (ECOTRIN) 81 MG EC tablet Take 1 tablet (81 mg total) by mouth once daily.  0    coenzyme Q10 10 mg capsule Take 10 mg by mouth once daily.      CYANOCOBALAMIN, VITAMIN B-12, (VITAMIN B-12 ORAL) Take by mouth.      melatonin 5 mg Tab Take 5 mg by mouth nightly.       multivitamin capsule Take 1 capsule by mouth once daily.      pantoprazole (PROTONIX) 40 MG tablet Take 1 tablet (40 mg total) by mouth once daily. 30 tablet 5    RESTASIS 0.05 % ophthalmic emulsion Place 1 drop into both eyes 2 (two) times daily. 60 each 11    rosuvastatin (CRESTOR) 10 MG tablet Take 1 tablet (10 mg total) by mouth once daily. 90 tablet 3    venlafaxine (EFFEXOR-XR) 37.5 MG 24 hr capsule Take 1 capsule (37.5 mg total) by mouth once daily. 90 capsule 3    venlafaxine (EFFEXOR-XR) 75 MG 24 hr capsule Take 1 capsule (75 mg total) by mouth once daily. 90 capsule 3    cetirizine (ZYRTEC) 10 MG tablet Take 1 tablet (10 mg total) by mouth once daily. 30 tablet 5    DOCUSATE CALCIUM (STOOL SOFTENER ORAL) Take by mouth.      LACTOBAC NO.41/BIFIDOBACT NO.7 (PROBIOTIC-10 ORAL) Take by mouth.      oxybutynin (DITROPAN-XL) 5 MG TR24 Take 1 tablet (5 mg total) by mouth once daily. 90 tablet 0     No current facility-administered medications for this visit.         ROS  Review of Systems   Constitutional:  Positive for fatigue. Negative for chills, fever and unexpected weight change.   Eyes:  Negative for discharge.   Respiratory:  Positive  "for shortness of breath. Negative for cough, chest tightness and wheezing.    Cardiovascular:  Negative for chest pain, palpitations and leg swelling.   Gastrointestinal:  Negative for abdominal pain and blood in stool.   Genitourinary:  Negative for decreased urine volume, dysuria and hematuria.   Musculoskeletal:  Negative for arthralgias, joint swelling and myalgias.   Neurological:  Negative for dizziness, light-headedness and headaches.   Psychiatric/Behavioral:  Positive for dysphoric mood and sleep disturbance. Negative for decreased concentration and suicidal ideas.          Physical Exam  Vitals:    04/25/23 1444   BP: 100/60   Pulse: 63   Temp: 97.8 °F (36.6 °C)   SpO2: 97%   Weight: 63.2 kg (139 lb 5.3 oz)   Height: 5' 3" (1.6 m)    Body mass index is 24.68 kg/m².  Weight: 63.2 kg (139 lb 5.3 oz)   Height: 5' 3" (160 cm)   Physical Exam  Constitutional:       General: She is not in acute distress.     Appearance: She is well-developed.   HENT:      Head: Normocephalic and atraumatic.   Eyes:      Conjunctiva/sclera: Conjunctivae normal.   Pulmonary:      Effort: Pulmonary effort is normal. No respiratory distress.   Neurological:      Mental Status: She is alert and oriented to person, place, and time.      Comments: Symmetric facial movements   Psychiatric:         Behavior: Behavior normal.         Thought Content: Thought content normal.           Health Maintenance         Date Due Completion Date    TETANUS VACCINE Never done ---    COVID-19 Vaccine (5 - Booster for Pfizer series) 09/15/2022 7/21/2022    Colorectal Cancer Screening 03/08/2023 3/8/2018    DEXA Scan 09/28/2023 9/28/2021    High Dose Statin 04/25/2024 4/25/2023    Aspirin/Antiplatelet Therapy 04/25/2024 4/25/2023    Lipid Panel 04/18/2028 4/18/2023              "

## 2023-04-25 NOTE — ASSESSMENT & PLAN NOTE
Refer to pulm to eval for worsening SHAIKH, eval for desat's particularly overnight.  We discussed that she does not seem particularly at risk for obstructive sleep apnea.  At this time I am a bit more more concern for potential desat overnight

## 2023-04-25 NOTE — ASSESSMENT & PLAN NOTE
This certainly could be part of her disrupted sleep patterns.  She has both Daytime and nighttime symptoms.  Discussed lifestyle mod.  Start ditropan.  I have discussed the common side effects of this(these) medication(s) and black box warnings (if applicable) with the patient and answered all of the questions they had at the time of this visit regarding this(these) medication(s).

## 2023-04-26 ENCOUNTER — TELEPHONE (OUTPATIENT)
Dept: GASTROENTEROLOGY | Facility: CLINIC | Age: 78
End: 2023-04-26
Payer: MEDICARE

## 2023-04-26 RX ORDER — SODIUM, POTASSIUM,MAG SULFATES 17.5-3.13G
1 SOLUTION, RECONSTITUTED, ORAL ORAL DAILY
Qty: 1 KIT | Refills: 0 | Status: CANCELLED | OUTPATIENT
Start: 2023-04-26 | End: 2023-04-28

## 2023-05-24 ENCOUNTER — OFFICE VISIT (OUTPATIENT)
Dept: FAMILY MEDICINE | Facility: CLINIC | Age: 78
End: 2023-05-24
Payer: MEDICARE

## 2023-05-24 VITALS
WEIGHT: 140.13 LBS | TEMPERATURE: 98 F | DIASTOLIC BLOOD PRESSURE: 62 MMHG | SYSTOLIC BLOOD PRESSURE: 114 MMHG | HEART RATE: 84 BPM | HEIGHT: 63 IN | OXYGEN SATURATION: 95 % | BODY MASS INDEX: 24.83 KG/M2

## 2023-05-24 DIAGNOSIS — J43.1 PANLOBULAR EMPHYSEMA: Chronic | ICD-10-CM

## 2023-05-24 DIAGNOSIS — F33.0 MAJOR DEPRESSIVE DISORDER, RECURRENT, MILD: ICD-10-CM

## 2023-05-24 DIAGNOSIS — M51.37 DEGENERATION OF LUMBAR OR LUMBOSACRAL INTERVERTEBRAL DISC: Chronic | ICD-10-CM

## 2023-05-24 DIAGNOSIS — M81.0 AGE-RELATED OSTEOPOROSIS WITHOUT CURRENT PATHOLOGICAL FRACTURE: Chronic | ICD-10-CM

## 2023-05-24 DIAGNOSIS — I25.10 CORONARY ARTERY CALCIFICATION SEEN ON CAT SCAN: Chronic | ICD-10-CM

## 2023-05-24 DIAGNOSIS — Z00.00 ENCOUNTER FOR PREVENTIVE HEALTH EXAMINATION: Primary | ICD-10-CM

## 2023-05-24 PROCEDURE — 1170F FXNL STATUS ASSESSED: CPT | Mod: CPTII,S$GLB,, | Performed by: NURSE PRACTITIONER

## 2023-05-24 PROCEDURE — 3288F PR FALLS RISK ASSESSMENT DOCUMENTED: ICD-10-PCS | Mod: CPTII,S$GLB,, | Performed by: NURSE PRACTITIONER

## 2023-05-24 PROCEDURE — 3078F DIAST BP <80 MM HG: CPT | Mod: CPTII,S$GLB,, | Performed by: NURSE PRACTITIONER

## 2023-05-24 PROCEDURE — 1160F PR REVIEW ALL MEDS BY PRESCRIBER/CLIN PHARMACIST DOCUMENTED: ICD-10-PCS | Mod: CPTII,S$GLB,, | Performed by: NURSE PRACTITIONER

## 2023-05-24 PROCEDURE — 3078F PR MOST RECENT DIASTOLIC BLOOD PRESSURE < 80 MM HG: ICD-10-PCS | Mod: CPTII,S$GLB,, | Performed by: NURSE PRACTITIONER

## 2023-05-24 PROCEDURE — 1170F PR FUNCTIONAL STATUS ASSESSED: ICD-10-PCS | Mod: CPTII,S$GLB,, | Performed by: NURSE PRACTITIONER

## 2023-05-24 PROCEDURE — 3288F FALL RISK ASSESSMENT DOCD: CPT | Mod: CPTII,S$GLB,, | Performed by: NURSE PRACTITIONER

## 2023-05-24 PROCEDURE — 1126F AMNT PAIN NOTED NONE PRSNT: CPT | Mod: CPTII,S$GLB,, | Performed by: NURSE PRACTITIONER

## 2023-05-24 PROCEDURE — 1160F RVW MEDS BY RX/DR IN RCRD: CPT | Mod: CPTII,S$GLB,, | Performed by: NURSE PRACTITIONER

## 2023-05-24 PROCEDURE — G0439 PPPS, SUBSEQ VISIT: HCPCS | Mod: S$GLB,,, | Performed by: NURSE PRACTITIONER

## 2023-05-24 PROCEDURE — 1159F MED LIST DOCD IN RCRD: CPT | Mod: CPTII,S$GLB,, | Performed by: NURSE PRACTITIONER

## 2023-05-24 PROCEDURE — 3074F PR MOST RECENT SYSTOLIC BLOOD PRESSURE < 130 MM HG: ICD-10-PCS | Mod: CPTII,S$GLB,, | Performed by: NURSE PRACTITIONER

## 2023-05-24 PROCEDURE — G0439 PR MEDICARE ANNUAL WELLNESS SUBSEQUENT VISIT: ICD-10-PCS | Mod: S$GLB,,, | Performed by: NURSE PRACTITIONER

## 2023-05-24 PROCEDURE — 3074F SYST BP LT 130 MM HG: CPT | Mod: CPTII,S$GLB,, | Performed by: NURSE PRACTITIONER

## 2023-05-24 PROCEDURE — 1126F PR PAIN SEVERITY QUANTIFIED, NO PAIN PRESENT: ICD-10-PCS | Mod: CPTII,S$GLB,, | Performed by: NURSE PRACTITIONER

## 2023-05-24 PROCEDURE — 1101F PT FALLS ASSESS-DOCD LE1/YR: CPT | Mod: CPTII,S$GLB,, | Performed by: NURSE PRACTITIONER

## 2023-05-24 PROCEDURE — 1101F PR PT FALLS ASSESS DOC 0-1 FALLS W/OUT INJ PAST YR: ICD-10-PCS | Mod: CPTII,S$GLB,, | Performed by: NURSE PRACTITIONER

## 2023-05-24 PROCEDURE — 99999 PR PBB SHADOW E&M-EST. PATIENT-LVL IV: ICD-10-PCS | Mod: PBBFAC,,, | Performed by: NURSE PRACTITIONER

## 2023-05-24 PROCEDURE — 99999 PR PBB SHADOW E&M-EST. PATIENT-LVL IV: CPT | Mod: PBBFAC,,, | Performed by: NURSE PRACTITIONER

## 2023-05-24 PROCEDURE — 1159F PR MEDICATION LIST DOCUMENTED IN MEDICAL RECORD: ICD-10-PCS | Mod: CPTII,S$GLB,, | Performed by: NURSE PRACTITIONER

## 2023-05-24 RX ORDER — POLYETHYLENE GLYCOL 3350 17 G/17G
17 POWDER, FOR SOLUTION ORAL DAILY
COMMUNITY

## 2023-05-24 NOTE — PROGRESS NOTES
"  Marycarmen Louis presented for a  Medicare AWV and comprehensive Health Risk Assessment today. The following components were reviewed and updated:    Medical history  Family History  Social history  Allergies and Current Medications  Health Risk Assessment  Health Maintenance  Care Team       ** See Completed Assessments for Annual Wellness Visit within the encounter summary.**       The following assessments were completed:  Living Situation  CAGE  Depression Screening  Timed Get Up and Go  Whisper Test  Cognitive Function Screening  Nutrition Screening  ADL Screening  PAQ Screening          Vitals:    05/24/23 1322   BP: 114/62   BP Location: Right arm   Patient Position: Sitting   BP Method: Large (Manual)   Pulse: 84   Temp: 98.1 °F (36.7 °C)   TempSrc: Oral   SpO2: 95%   Weight: 63.6 kg (140 lb 1.6 oz)   Height: 5' 3" (1.6 m)     Body mass index is 24.82 kg/m².  Physical Exam  Vitals and nursing note reviewed.   Constitutional:       Appearance: Normal appearance.   Cardiovascular:      Rate and Rhythm: Normal rate.      Pulses: Normal pulses.      Heart sounds: Normal heart sounds.   Pulmonary:      Effort: Pulmonary effort is normal.      Breath sounds: Normal breath sounds.   Musculoskeletal:         General: Normal range of motion.   Neurological:      Mental Status: She is alert and oriented to person, place, and time.   Psychiatric:         Mood and Affect: Mood normal.         Behavior: Behavior normal.             Diagnoses and health risks identified today and associated recommendations/orders:    1. Encounter for preventive health examination  Pt, accompanied by her , was seen today for an Annual Wellness visit. Healthcare maintenance and screening recommendations were discussed and updated as indicated. Return in one year for AWV.    Review current opioid prescriptions:n/a  Screen for potential Substance Use Disorders:n/a  Patient is aware of non-narcotic pain options    2. Panlobular " emphysema  The current medical regimen is effective;  continue present plan and medications.    3. Major depressive disorder, recurrent, mild  PHQ-2 total score two. The current medical regimen is effective;  continue present plan and medications.    4. Age-related osteoporosis without current pathological fracture  The current medical regimen is effective;  continue present plan and medications.    5. Coronary artery calcification seen on CAT scan  The current medical regimen is effective;  continue present plan and medications.    6. Degeneration of lumbar or lumbosacral intervertebral disc  The current medical regimen is effective;  continue present plan and medications.        Provided Marycarmen with a 5-10 year written screening schedule and personal prevention plan. Recommendations were developed using the USPSTF age appropriate recommendations. Education, counseling, and referrals were provided as needed. After Visit Summary printed and given to patient which includes a list of additional screenings\tests needed.    Follow up in about 1 year (around 5/24/2024).    JOSEF Zuniga  I offered to discuss advanced care planning, including how to pick a person who would make decisions for you if you were unable to make them for yourself, called a health care power of , and what kind of decisions you might make such as use of life sustaining treatments such as ventilators and tube feeding when faced with a life limiting illness recorded on a living will that they will need to know. (How you want to be cared for as you near the end of your natural life)     X Patient is interested in learning more about how to make advanced directives.  I provided them paperwork and offered to discuss this with them.

## 2023-05-24 NOTE — PATIENT INSTRUCTIONS
Counseling and Referral of Other Preventative  (Italic type indicates deductible and co-insurance are waived)    Patient Name: Marycarmen Louis  Today's Date: 5/24/2023    Health Maintenance         Date Due Completion Date    TETANUS VACCINE Never done ---    COVID-19 Vaccine (5 - Booster for Pfizer series) 09/15/2022 7/21/2022    Colorectal Cancer Screening 03/08/2023 3/8/2018    DEXA Scan 09/28/2023 9/28/2021    High Dose Statin 04/25/2024 4/25/2023    Aspirin/Antiplatelet Therapy 04/25/2024 4/25/2023    Lipid Panel 04/18/2028 4/18/2023          No orders of the defined types were placed in this encounter.    The following information is provided to all patients.  This information is to help you find resources for any of the problems found today that may be affecting your health:                Living healthy guide: www.[facility].[place].[facility]      Understanding Diabetes: www.diabetes.org      Eating healthy: www.cdc.gov/healthyweight      [facility] home safety checklist: www.cdc.gov/steadi/patient.html      Agency on Aging: www.goea.[place].[facility]      Alcoholics anonymous (AA): www.aa.org      Physical Activity: www.eulalio.nih.gov/ul8ekbh      Tobacco use: www.quitwithusla.org       Please call [employer] at [phone] to receive a rewards card for completing your Annual Wellness Visit. Thanks

## 2023-06-09 ENCOUNTER — OFFICE VISIT (OUTPATIENT)
Dept: GASTROENTEROLOGY | Facility: CLINIC | Age: 78
End: 2023-06-09
Payer: MEDICARE

## 2023-06-09 VITALS — BODY MASS INDEX: 24.5 KG/M2 | WEIGHT: 138.25 LBS | HEIGHT: 63 IN

## 2023-06-09 DIAGNOSIS — K21.9 GASTROESOPHAGEAL REFLUX DISEASE, UNSPECIFIED WHETHER ESOPHAGITIS PRESENT: Primary | ICD-10-CM

## 2023-06-09 DIAGNOSIS — Z80.0 FAMILY HISTORY OF COLON CANCER IN FATHER: ICD-10-CM

## 2023-06-09 PROCEDURE — 99214 PR OFFICE/OUTPT VISIT, EST, LEVL IV, 30-39 MIN: ICD-10-PCS | Mod: S$GLB,,, | Performed by: INTERNAL MEDICINE

## 2023-06-09 PROCEDURE — 1101F PR PT FALLS ASSESS DOC 0-1 FALLS W/OUT INJ PAST YR: ICD-10-PCS | Mod: CPTII,S$GLB,, | Performed by: INTERNAL MEDICINE

## 2023-06-09 PROCEDURE — 1159F MED LIST DOCD IN RCRD: CPT | Mod: CPTII,S$GLB,, | Performed by: INTERNAL MEDICINE

## 2023-06-09 PROCEDURE — 3288F PR FALLS RISK ASSESSMENT DOCUMENTED: ICD-10-PCS | Mod: CPTII,S$GLB,, | Performed by: INTERNAL MEDICINE

## 2023-06-09 PROCEDURE — 1126F AMNT PAIN NOTED NONE PRSNT: CPT | Mod: CPTII,S$GLB,, | Performed by: INTERNAL MEDICINE

## 2023-06-09 PROCEDURE — 99999 PR PBB SHADOW E&M-EST. PATIENT-LVL III: ICD-10-PCS | Mod: PBBFAC,,, | Performed by: INTERNAL MEDICINE

## 2023-06-09 PROCEDURE — 99214 OFFICE O/P EST MOD 30 MIN: CPT | Mod: S$GLB,,, | Performed by: INTERNAL MEDICINE

## 2023-06-09 PROCEDURE — 99999 PR PBB SHADOW E&M-EST. PATIENT-LVL III: CPT | Mod: PBBFAC,,, | Performed by: INTERNAL MEDICINE

## 2023-06-09 PROCEDURE — 1126F PR PAIN SEVERITY QUANTIFIED, NO PAIN PRESENT: ICD-10-PCS | Mod: CPTII,S$GLB,, | Performed by: INTERNAL MEDICINE

## 2023-06-09 PROCEDURE — 3288F FALL RISK ASSESSMENT DOCD: CPT | Mod: CPTII,S$GLB,, | Performed by: INTERNAL MEDICINE

## 2023-06-09 PROCEDURE — 1159F PR MEDICATION LIST DOCUMENTED IN MEDICAL RECORD: ICD-10-PCS | Mod: CPTII,S$GLB,, | Performed by: INTERNAL MEDICINE

## 2023-06-09 PROCEDURE — 1101F PT FALLS ASSESS-DOCD LE1/YR: CPT | Mod: CPTII,S$GLB,, | Performed by: INTERNAL MEDICINE

## 2023-06-09 RX ORDER — SOD SULF/POT CHLORIDE/MAG SULF 1.479 G
12 TABLET ORAL DAILY
Qty: 24 TABLET | Refills: 0 | Status: ON HOLD | OUTPATIENT
Start: 2023-06-09 | End: 2023-07-19 | Stop reason: CLARIF

## 2023-06-09 NOTE — PATIENT INSTRUCTIONS
SUTAB Instructions    [facility] [address]  Clinic Office [phone]  Endoscopy Lab [phone]    You are scheduled for a Colonoscopy with Dr. Thomas  on 7/18/23 at [facility] in [place].    Check in at the Hospital -1st floor, Information desk. A covid test will be required 3 days before your procedure.  Call [phone] to reschedule.    An adult friend/family member must come with you to drive you home.  You cannot drive, take a taxi, Uber/Lyft or bus to leave the Endoscopy Center alone.  If you do not have someone to drive you home, your test will be cancelled.     Please follow the directions of your doctor if you take any pills that thin your blood. If you take these meds: Aggrenox, Brilinta, Effient, Eliquis, Lovenox, Plavix, Pletal, Pradaxa, Ticilid, Xarelto or Coumadin, let the doctor's office know.    DON'T: On the morning of the test do not take insulin or pills for diabetes.     DO: On the morning of the test, do take any pills for blood pressure, heart, anti-rejection and or seizures with a small sip of water. Bring any inhalers with you.    To have a good prep, you must follow these instructions - please do not use the directions from the pharmacy.    The doctor will send a prescription for the SUTAB.    The Day Before the test:    You can only drink CLEAR LIQUIDS the whole day before your test.  You can't eat any food for the whole day.    You CAN have:  Water, Coffee or decaf coffee (no milk or cream)  Tea  Soft drinks - regular and sugar free  Jell-O (green or yellow)  Apple Juice, grape juice, white cranberry juice  Gatorade, Power Aid, Crystal Light, Tj Aid  Lemonade and Limeade  Bouillon, clear soup  Snowball, popsicles  YOU CAN'T DRINK ANYTHING RED, PURPLE ORANGE OR BLUE   YOU CAN'T DRINK ALCOHOL  ONLY DRINK WHAT IS ON THE LIST      At 5 pm the night before your test:    Open the bottle of 12 tablets. Fill the provided cup with water up to the line =  16 oz. Swallow each pill with water. Drink the rest of the water in the cup in 20 minutes.    At 6 pm:  Fill the cup with water up to the line = 16 oz. Drink the cup of water in 30 minutes.    At 7 pm:  Fill the cup with water up to the line = 16 oz. Drink the cup of water in 30 minutes.     Continue to drink water or clear liquids until you go to sleep.      The Day of the test - We will call you 2 days before your test to tell you what time to get to the hospital. We will also tell you when to do the next steps.    5 hours before you come to the hospital (this may be in the middle of the night): ___________________= time  Open the bottle of 12 tablets. Fill the cup with water up to the line = 16 oz. Swallow each pill with water. Drink the rest of the water in the cup in 20 minutes.    At 4 hours before you come to the hospital: ______________= time  Fill the cup with water up to the line = 16 oz. Drink the cup of water in 30 minutes.     At 3 hours before you come to the hospital: ______________= time    YOU CAN'T EAT OR DRINK ANYTHING ELSE ONCE YOU FINISH THE WATER AT _____________ = TIME    Leave all valuables and jewelry at home. You will be at the hospital for 2-4 hours.    Call the Endoscopy department at [phone] with any questions about your procedure.          Please give this Coupon Code to your pharmacist.    BIN: [id number]  PCN: [id number]  GROUP: [id number]  MEMBER ID: [id number]

## 2023-06-09 NOTE — PROGRESS NOTES
Subjective:       Patient ID: Marycarmen Louis is a 77 y.o. female.    Chief Complaint: Follow-up    Patient here today to reestablish care with aforementioned complaints.  She is previously seen by me 2021 with complaints of GERD.  At that time she was instructed to continue PPI follow-up as needed.  Today patient reports heartburn fairly well controlled on daily Protonix.  She does occasionally have breakthrough which typically treated with either Tums or Levsin.  Denies nausea or vomiting.  Denies dysphagia or odynophagia.  Denies weight loss.    Most recent colonoscopy was performed 2018 re family history colon cancer.  Exam unremarkable.  Five year recall interval recommended.  Review of Systems   Gastrointestinal:  Negative for abdominal distention, abdominal pain, diarrhea, nausea and vomiting.       The following portions of the patient's history were reviewed and updated as appropriate: allergies, current medications, past family history, past medical history, past social history, past surgical history and problem list.    Objective:      Physical Exam  Constitutional:       Appearance: She is well-developed.   HENT:      Head: Normocephalic and atraumatic.   Eyes:      Conjunctiva/sclera: Conjunctivae normal.   Pulmonary:      Effort: Pulmonary effort is normal. No respiratory distress.   Musculoskeletal:      Cervical back: Normal range of motion.   Neurological:      Mental Status: She is alert and oriented to person, place, and time.   Psychiatric:         Behavior: Behavior normal.         Thought Content: Thought content normal.         Judgment: Judgment normal.         Pertinent labs and imaging studies reviewed    Assessment:       1. Gastroesophageal reflux disease, unspecified whether esophagitis present    2. Family history of colon cancer in father        Plan:       Continue PPI   Patient denies prior EGD.  Will send for [place] screen  Patient due for colonoscopy.  Will be performed the same  date      (Portions of this note were dictated using voice recognition software and may contain dictation related errors in spelling/grammar/syntax not found on text review)

## 2023-06-26 ENCOUNTER — PATIENT MESSAGE (OUTPATIENT)
Dept: FAMILY MEDICINE | Facility: CLINIC | Age: 78
End: 2023-06-26
Payer: MEDICARE

## 2023-07-10 NOTE — PROGRESS NOTES
Physical Therapy Visit    Visit Type: Daily Treatment Note  Visit: 9  Referring Provider: Linwood Pryor PA-C  Medical Diagnosis (from order): Diagnosis Information    Diagnosis  724.5 (ICD-9-CM) - M54.9 (ICD-10-CM) - Acute bilateral back pain, unspecified back location         SUBJECTIVE                                                                                                               Patient reports that she is feeling the same as last visit. She reports that in the morning or getting up from a nap she feels discomfort but otherwise is getting better.    Pain / Symptoms  - Pain rating (out of 10): Current: 0 ; Worst: 3      OBJECTIVE                                                                                                                         Palpation  Left  - Thoracic Paraspinals: hypertonic, tenderness and trigger point  Right  - Thoracic Paraspinals: hypertonic and tenderness                 Treatment     Therapeutic Exercise  Education and instruction in:  - rows 5 pounds each with free motion machine progressing to     - row 7.5 pounds each  - lat pull down black resistance band  - goblet squat 10 pounds  - hooklying pelvic tilting  - seated thoracic rotation    Updated home exercise program as below.    Manual Therapy   Soft tissue mobilization bilateral thoracic paraspinals  Sustained hold left thoracic paraspinals    Skilled input: verbal instruction/cues, tactile instruction/cues, posture correction and demonstration    Writer verbally educated and received verbal consent for hand placement, positioning of patient, and techniques to be performed today from patient for clothing adjustments for techniques, hand placement and palpation for techniques and therapist position for techniques as described above and how they are pertinent to the patient's plan of care.  Home Exercise Program  Access Code: [email or web address]  URL: [email or web address]  Date: 07/07/2023  Prepared  Seen on rounds in ICU.  She claims that after I saw her yesterday, and during blood administration, she began to experience labored breathing. Her SATs were low enough to warrant a stay in ICU.  Dr. Melendez is on board, and his feelings are this is most likely a fat emboli/ARDS.  Despite ugly labs, she is feeling OK, and still anxious to get out of ICU, and get back to PT.  Fortunately her outlook is still optimistic.  She is moving her left hip well without complaints of pain.     by: Misty    Exercises  - Supine Ankle Pumps  - 1 x daily - 7 x weekly - 1-3 sets - 10 reps  - Supine Heel Slide  - 1 x daily - 7 x weekly - 1-3 sets - 10 reps  - Supine Double Knee to Chest  - 1 x daily - 7 x weekly - 1-3 sets - 10 reps  - Seated Gentle Upper Trapezius Stretch  - 1-2 x daily - 5-7 x weekly - 1-2 sets - 10 reps - 10 hold  - Modified Cat Cow on Counter  - 1 x daily - 5-7 x weekly - 2-3 sets - 10 reps  - Seated Lumbar Flexion Stretch  - 1 x daily - 5-7 x weekly - 3 sets - 10 reps  - Supine Posterior Pelvic Tilt  - 1 x daily - 7 x weekly - 3 sets - 10 reps  - Supine Transversus Abdominis Bracing - Hands on Stomach  - 1 x daily - 5-7 x weekly - 3 sets - 5 reps - 3-10 second hold  - Child's Pose Stretch  - 1 x daily - 5-7 x weekly - 1-2 sets - 3 reps - 30 second hold  - Standing Shoulder Row with Anchored Resistance  - 1 x daily - 4-5 x weekly - 3 sets - 10 reps  - Shoulder External Rotation and Scapular Retraction with Resistance  - 1 x daily - 4-5 x weekly - 3 sets - 10 reps  - Standing Low Trap Setting with Resistance at Wall  - 1 x daily - 4-5 x weekly - 3 sets - 10 reps  - Goblet Squat  - 1 x daily - 4-5 x weekly - 3 sets - 10 reps        ASSESSMENT                                                                                                            Soft tissue mobilization to bilateral thoracic paraspinals and sustained hold to left thoracic paraspinals were effective as indicated by decreased resting state and per patient report of relief. Improving strength is evidenced by progression from 5 pound rows to 7.5 pound rows. Deficits in thoracic paraspinal discomfort and tolerance for activity remain per patient report, however anticipate continued improvement with skilled physical therapy and adherence to home exercise program.    Pain/symptoms after session (out of 10): 4  Education:   - Results of above outlined education: Verbalizes understanding, Demonstrates understanding and Needs  reinforcement    PLAN                                                                                                                           Suggestions for next session as indicated: Progress per plan of care including as appropriate: spinal mobility, diaphragmatic breathing, soft tissue mobilization, core strength       Therapy procedure time and total treatment time can be found documented on the Time Entry flowsheet

## 2023-07-13 ENCOUNTER — TELEPHONE (OUTPATIENT)
Dept: GASTROENTEROLOGY | Facility: CLINIC | Age: 78
End: 2023-07-13
Payer: MEDICARE

## 2023-07-13 NOTE — TELEPHONE ENCOUNTER
Spoke with pt and let her know that she does not need a Covid test.       ----- Message from Liz Prieto sent at 7/13/2023  3:09 PM CDT -----  Regarding: procedure  Contact: [phone]  Patient is requesting a call to discuss if Covid test is needed before her procedure   Call back number: [phone]  Additional Information:

## 2023-07-17 ENCOUNTER — TELEPHONE (OUTPATIENT)
Dept: ENDOSCOPY | Facility: HOSPITAL | Age: 78
End: 2023-07-17
Payer: MEDICARE

## 2023-07-17 NOTE — TELEPHONE ENCOUNTER
Spoke with patient about arrival time @ 0815.   EGD/Colon    Prep instructions reviewed: the day before the procedure, follow a clear liquid diet all day, then start the first 1/2 of prep at 5pm and take 2nd 1/2 of prep @ 0300.  Pt must be completely NPO when prep completed @ 0515.              Medications: Do not take Insulin or oral diabetic medications the day of the procedure.  Take as prescribed: heart, seizure and blood pressure medication in the morning with a sip of water (less than an ounce).  Take any breathing medications and bring inhalers to hospital with you Leave all valuables and jewelry at home.     Wear comfortable clothes to procedure to change into hospital gown You cannot drive for 24 hours after your procedure because you will receive sedation for your procedure to make you comfortable.  A ride must be provided at discharge.

## 2023-07-18 ENCOUNTER — ANESTHESIA EVENT (OUTPATIENT)
Dept: ENDOSCOPY | Facility: HOSPITAL | Age: 78
End: 2023-07-18
Payer: MEDICARE

## 2023-07-18 NOTE — ANESTHESIA PREPROCEDURE EVALUATION
[facility]  Anesthesia Pre-Operative Evaluation         Patient Name: Marycarmen Louis  YOB: 1945  MRN: [id number]    SUBJECTIVE:     07/18/2023    Procedure(s) (LRB):  EGD (ESOPHAGOGASTRODUODENOSCOPY) (N/A)  COLONOSCOPY (N/A)    Marycarmen Louis is a 77 y.o. female here for Procedure(s) (LRB):  EGD (ESOPHAGOGASTRODUODENOSCOPY) (N/A)  COLONOSCOPY (N/A)    Drips:     Patient Active Problem List   Diagnosis    Degeneration of lumbar or lumbosacral intervertebral disc    Coronary artery calcification seen on CAT scan    Osteopenia of multiple sites    Panlobular emphysema    Nuclear sclerosis, bilateral    Age-related osteoporosis without current pathological fracture    Anxiety and depression    Major depressive disorder, recurrent, mild    Mixed stress and urge urinary incontinence       Review of patient's allergies indicates:   Allergen Reactions    Codeine Nausea And Vomiting     Other reaction(s): Vomiting  Other reaction(s): Headache    Fentanyl Nausea And Vomiting     Other reaction(s): Vomiting  Other reaction(s): Nausea    Phenytoin sodium extended      Other reaction(s): Vomiting  Other reaction(s): Nausea    Versed  [midazolam] Nausea And Vomiting    Vicodin  [hydrocodone-acetaminophen]      Other reaction(s): Vomiting       No current facility-administered medications on file prior to encounter.     Current Outpatient Medications on File Prior to Encounter   Medication Sig Dispense Refill    ANORO ELLIPTA 62.5-25 mcg/actuation DsDv INHALE 1 PUFF INTO THE LUNGS EVERY  each 3    aspirin (ECOTRIN) 81 MG EC tablet Take 1 tablet (81 mg total) by mouth once daily.  0    CYANOCOBALAMIN, VITAMIN B-12, (VITAMIN B-12 ORAL) Take by mouth.      melatonin 5 mg Tab Take 5 mg by mouth nightly.       multivitamin capsule Take 1 capsule by mouth once daily.      oxybutynin (DITROPAN-XL) 5 MG TR24 Take 1 tablet (5 mg total) by mouth once daily. 90 tablet 0     pantoprazole (PROTONIX) 40 MG tablet Take 1 tablet (40 mg total) by mouth once daily. 30 tablet 5    polyethylene glycol (GLYCOLAX) 17 gram/dose powder Take 17 g by mouth once daily.      RESTASIS 0.05 % ophthalmic emulsion Place 1 drop into both eyes 2 (two) times daily. 60 each 11    rosuvastatin (CRESTOR) 10 MG tablet Take 1 tablet (10 mg total) by mouth once daily. 90 tablet 3    sod sulf-pot chloride-mag sulf (SUTAB) 1.479-0.188- 0.225 gram tablet Take 12 tablets by mouth once daily. Take according to package instructions with indicated amount of water. 24 tablet 0    UNABLE TO FIND medication name: CBD gummie bid      venlafaxine (EFFEXOR-XR) 37.5 MG 24 hr capsule Take 1 capsule (37.5 mg total) by mouth once daily. 90 capsule 3    venlafaxine (EFFEXOR-XR) 75 MG 24 hr capsule Take 1 capsule (75 mg total) by mouth once daily. 90 capsule 3       Past Surgical History:   Procedure Laterality Date    APPENDECTOMY      arm surgery      left wrist pinning    BREAST BIOPSY Left     CATARACT EXTRACTION W/  INTRAOCULAR LENS IMPLANT Right 10/02/2017    Dr. Liriano    CATARACT EXTRACTION W/  INTRAOCULAR LENS IMPLANT Left 10/30/2017    Dr. Liriano    COLONOSCOPY N/A 3/8/2018    Procedure: COLONOSCOPY;  Surgeon: Hari Dumont MD;  Location: [address];  Service: Endoscopy;  Laterality: N/A;  5 year f/u-2/16/18 pt confirmed appt/MS    JOINT REPLACEMENT  2014    left TKA    salviary gland removed      TOTAL HIP ARTHROPLASTY         Social History     Socioeconomic History    Marital status:    Tobacco Use    Smoking status: Former     Passive exposure: Past    Smokeless tobacco: Never    Tobacco comments:     quit 30yrs ago, discussed importance of continued cessation   Substance and Sexual Activity    Alcohol use: Yes     Alcohol/week: 5.0 standard drinks     Types: 5 Glasses of wine per week     Comment: occasionally    Drug use: No    Sexual activity: Not Currently     Partners: Male      Birth control/protection: Post-menopausal         OBJECTIVE:     Vital Signs Range (Last 24H):       Significant Labs:  Lab Results   Component Value Date    WBC 4.80 04/18/2023    HGB 13.6 04/18/2023    HCT 41.9 04/18/2023     04/18/2023    CHOL 131 04/18/2023    TRIG 51 04/18/2023    HDL 51 04/18/2023    ALT 24 04/18/2023    AST 27 04/18/2023     04/18/2023    K 4.3 04/18/2023     04/18/2023    CREATININE 0.7 04/18/2023    BUN 17 04/18/2023    CO2 30 (H) 04/18/2023    TSH 2.066 04/18/2023    INR 1.0 01/10/2022       Diagnostic Studies:    EKG:   Results for orders placed or performed during the hospital encounter of 02/14/17   EKG 12-lead    Collection Time: 02/19/17  3:43 PM    Narrative    Test Reason : R00.0  Vent. Rate : 110 BPM     Atrial Rate : 110 BPM     P-R Int : 152 ms          QRS Dur : 080 ms      QT Int : 332 ms       P-R-T Axes : 066 083 058 degrees     QTc Int : 449 ms    Sinus tachycardia  Possible Left atrial enlargement  Borderline Abnormal ECG  When compared with ECG of 18-FEB-2017 02:54,  No significant change was found  Confirmed by COLIN LOZADA MD ([id number]) on 2/20/2017 1:17:35 PM    Referred By: SELF REFERRAL           Confirmed By:COLIN LOZADA MD       Pre-op Assessment    I have reviewed the Patient Summary Reports.     I have reviewed the Nursing Notes. I have reviewed the NPO Status.   I have reviewed the Medications.     Review of Systems  Anesthesia Hx:  History of prior surgery of interest to airway management or planning: Personal Hx of Anesthesia complications, Post-Operative Nausea/Vomiting   Social:  Former Smoker, Social Alcohol Use    Hematology/Oncology:         -- Cancer in past history: Other (see Oncology comments) Oncology Comments: L partial mucoepidermoid cyst      Cardiovascular:   Exercise tolerance: good CAD (per chart review)   hyperlipidemia    Pulmonary:   COPD (no rescue inhaler, controlled on daily maintenance), mild Denies Shortness of breath.     Renal/:   Denies Chronic Renal Disease.     Musculoskeletal:   Arthritis     Neurological:   Denies TIA. Denies CVA. Denies Seizures.    Endocrine:  Endocrine Normal Denies Diabetes. Denies Hypothyroidism.  Denies Hyperthyroidism.    Psych:   Psychiatric History          Physical Exam  General: Well nourished, Cooperative, Alert and Oriented    Airway:  Mallampati: III   Mouth Opening: < 3 cm  TM Distance: Normal  Tongue: Normal    Dental:  Intact        Anesthesia Plan  Type of Anesthesia, risks & benefits discussed:    Anesthesia Type: Gen Natural Airway  Intra-op Monitoring Plan: Standard ASA Monitors  Post Op Pain Control Plan: multimodal analgesia  Induction:  IV  Informed Consent: Informed consent signed with the Patient and all parties understand the risks and agree with anesthesia plan.  All questions answered.   ASA Score: 3  Day of Surgery Review of History & Physical: H&P Update referred to the surgeon/provider.    Ready For Surgery From Anesthesia Perspective.     .

## 2023-07-19 ENCOUNTER — HOSPITAL ENCOUNTER (OUTPATIENT)
Facility: HOSPITAL | Age: 78
Discharge: HOME | End: 2023-07-19
Attending: INTERNAL MEDICINE | Admitting: INTERNAL MEDICINE
Payer: MEDICARE

## 2023-07-19 ENCOUNTER — ANESTHESIA (OUTPATIENT)
Dept: ENDOSCOPY | Facility: HOSPITAL | Age: 78
End: 2023-07-19
Payer: MEDICARE

## 2023-07-19 VITALS
OXYGEN SATURATION: 98 % | HEART RATE: 91 BPM | TEMPERATURE: 98 F | DIASTOLIC BLOOD PRESSURE: 63 MMHG | HEIGHT: 63 IN | WEIGHT: 135 LBS | SYSTOLIC BLOOD PRESSURE: 124 MMHG | BODY MASS INDEX: 23.92 KG/M2 | RESPIRATION RATE: 21 BRPM

## 2023-07-19 DIAGNOSIS — Z80.0 FAMILY HX OF COLON CANCER REQUIRING SCREENING COLONOSCOPY: ICD-10-CM

## 2023-07-19 PROCEDURE — 43239 EGD BIOPSY SINGLE/MULTIPLE: CPT | Performed by: INTERNAL MEDICINE

## 2023-07-19 PROCEDURE — D9220A PRA ANESTHESIA: ICD-10-PCS | Mod: CRNA,,, | Performed by: NURSE ANESTHETIST, CERTIFIED REGISTERED

## 2023-07-19 PROCEDURE — 27201012 HC FORCEPS, HOT/COLD, DISP: Performed by: INTERNAL MEDICINE

## 2023-07-19 PROCEDURE — 88305 TISSUE EXAM BY PATHOLOGIST: ICD-10-PCS | Mod: 26,,, | Performed by: PATHOLOGY

## 2023-07-19 PROCEDURE — 37000009 HC ANESTHESIA EA ADD 15 MINS: Performed by: INTERNAL MEDICINE

## 2023-07-19 PROCEDURE — 45385 COLONOSCOPY W/LESION REMOVAL: CPT | Mod: PT | Performed by: INTERNAL MEDICINE

## 2023-07-19 PROCEDURE — 45385 COLONOSCOPY W/LESION REMOVAL: CPT | Mod: PT,,, | Performed by: INTERNAL MEDICINE

## 2023-07-19 PROCEDURE — 25000003 PHARM REV CODE 250: Performed by: NURSE ANESTHETIST, CERTIFIED REGISTERED

## 2023-07-19 PROCEDURE — D9220A PRA ANESTHESIA: ICD-10-PCS | Mod: ANES,,, | Performed by: STUDENT IN AN ORGANIZED HEALTH CARE EDUCATION/TRAINING PROGRAM

## 2023-07-19 PROCEDURE — 63600175 PHARM REV CODE 636 W HCPCS: Performed by: NURSE ANESTHETIST, CERTIFIED REGISTERED

## 2023-07-19 PROCEDURE — 88305 TISSUE EXAM BY PATHOLOGIST: CPT | Mod: 26,,, | Performed by: PATHOLOGY

## 2023-07-19 PROCEDURE — 45385 PR COLONOSCOPY,REMV LESN,SNARE: ICD-10-PCS | Mod: PT,,, | Performed by: INTERNAL MEDICINE

## 2023-07-19 PROCEDURE — D9220A PRA ANESTHESIA: Mod: ANES,,, | Performed by: STUDENT IN AN ORGANIZED HEALTH CARE EDUCATION/TRAINING PROGRAM

## 2023-07-19 PROCEDURE — 37000008 HC ANESTHESIA 1ST 15 MINUTES: Performed by: INTERNAL MEDICINE

## 2023-07-19 PROCEDURE — D9220A PRA ANESTHESIA: Mod: CRNA,,, | Performed by: NURSE ANESTHETIST, CERTIFIED REGISTERED

## 2023-07-19 PROCEDURE — 43239 EGD BIOPSY SINGLE/MULTIPLE: CPT | Mod: 51,,, | Performed by: INTERNAL MEDICINE

## 2023-07-19 PROCEDURE — 43239 PR EGD, FLEX, W/BIOPSY, SGL/MULTI: ICD-10-PCS | Mod: 51,,, | Performed by: INTERNAL MEDICINE

## 2023-07-19 PROCEDURE — 88305 TISSUE EXAM BY PATHOLOGIST: CPT | Mod: 59 | Performed by: PATHOLOGY

## 2023-07-19 PROCEDURE — 25000003 PHARM REV CODE 250: Performed by: INTERNAL MEDICINE

## 2023-07-19 RX ORDER — PROPOFOL 10 MG/ML
VIAL (ML) INTRAVENOUS
Status: DISCONTINUED | OUTPATIENT
Start: 2023-07-19 | End: 2023-07-19

## 2023-07-19 RX ORDER — SODIUM CHLORIDE 9 MG/ML
INJECTION, SOLUTION INTRAVENOUS CONTINUOUS PRN
Status: DISCONTINUED | OUTPATIENT
Start: 2023-07-19 | End: 2023-07-19

## 2023-07-19 RX ORDER — LIDOCAINE HYDROCHLORIDE 20 MG/ML
INJECTION INTRAVENOUS
Status: DISCONTINUED | OUTPATIENT
Start: 2023-07-19 | End: 2023-07-19

## 2023-07-19 RX ORDER — SODIUM CHLORIDE 9 MG/ML
INJECTION, SOLUTION INTRAVENOUS CONTINUOUS
Status: DISCONTINUED | OUTPATIENT
Start: 2023-07-19 | End: 2023-07-19 | Stop reason: HOSPADM

## 2023-07-19 RX ORDER — PROPOFOL 10 MG/ML
VIAL (ML) INTRAVENOUS CONTINUOUS PRN
Status: DISCONTINUED | OUTPATIENT
Start: 2023-07-19 | End: 2023-07-19

## 2023-07-19 RX ORDER — SODIUM CHLORIDE 0.9 % (FLUSH) 0.9 %
10 SYRINGE (ML) INJECTION
Status: DISCONTINUED | OUTPATIENT
Start: 2023-07-19 | End: 2023-07-19 | Stop reason: HOSPADM

## 2023-07-19 RX ADMIN — SODIUM CHLORIDE: 9 INJECTION, SOLUTION INTRAVENOUS at 08:07

## 2023-07-19 RX ADMIN — PROPOFOL 50 MG: 10 INJECTION, EMULSION INTRAVENOUS at 08:07

## 2023-07-19 RX ADMIN — PROPOFOL 20 MG: 10 INJECTION, EMULSION INTRAVENOUS at 09:07

## 2023-07-19 RX ADMIN — LIDOCAINE HYDROCHLORIDE 50 MG: 20 INJECTION, SOLUTION INTRAVENOUS at 08:07

## 2023-07-19 RX ADMIN — PROPOFOL 125 MCG/KG/MIN: 10 INJECTION, EMULSION INTRAVENOUS at 08:07

## 2023-07-19 RX ADMIN — TOPICAL ANESTHETIC 1 EACH: 200 SPRAY DENTAL; PERIODONTAL at 08:07

## 2023-07-19 NOTE — PLAN OF CARE
Pt AAO, ambulatory. Discharge instructions given to pt, verbalized understanding.  Pt seen by MD at bedside.  Discharged to family.

## 2023-07-19 NOTE — H&P
Short Stay Endoscopy History and Physical    PCP - Cullen Timmons MD    Procedure - EGD/colonoscopy  ASA - II  Mallampati - per anesthesia  History of Anesthesia problems - no  Family history Anesthesia problems - no     HPI:  This is a 77 y.o. female here for evaluation of : BE screen. FHx CRC    ROS:  Constitutional: No fevers, chills, No weight loss  ENT: No allergies  CV: No chest pain  Pulm: No shortness of breath  GI: see HPI  Derm: No rash    Medical History:  has a past medical history of Anxiety, Arthritis, Cancer, Closed fracture of left hip (2/15/2017), COPD (chronic obstructive pulmonary disease), Degenerative disc disease, Depression, Emphysema of lung, Fractured hip, General anesthetics causing adverse effect in therapeutic use, Left wrist fracture (2/14/2017), Meibomianitis (11/3/10), Memory loss, Nuclear sclerosis, bilateral (10/2/2017), Osteoporosis, Platelet disorder, PONV (postoperative nausea and vomiting), and 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    Surgical History:  has a past surgical history that includes Appendectomy; salviary gland removed; Cataract extraction w/  intraocular lens implant (Right, 10/02/2017); Cataract extraction w/  intraocular lens implant (Left, 10/30/2017); Total hip arthroplasty; arm surgery; Colonoscopy (N/A, 3/8/2018); Breast biopsy (Left); and Joint replacement (2014).    Family History: family history includes Cancer in her brother and father; Diabetes in her brother and father; Heart disease in her mother; No Known Problems in her maternal aunt, maternal grandfather, maternal grandmother, maternal uncle, paternal aunt, paternal grandfather, paternal grandmother, paternal uncle, and sister.. Otherwise no colon cancer, inflammatory bowel disease, or GI malignancies.    Social History:  reports that she has quit smoking. She has been exposed to tobacco smoke. She has never used smokeless tobacco. She reports current alcohol use of  about 5.0 standard drinks per week. She reports that she does not use drugs.    Review of patient's allergies indicates:   Allergen Reactions    Codeine Nausea And Vomiting     Other reaction(s): Vomiting  Other reaction(s): Headache    Fentanyl Nausea And Vomiting     Other reaction(s): Vomiting  Other reaction(s): Nausea    Phenytoin sodium extended      Other reaction(s): Vomiting  Other reaction(s): Nausea    Versed  [midazolam] Nausea And Vomiting    Vicodin  [hydrocodone-acetaminophen]      Other reaction(s): Vomiting       Medications:   Medications Prior to Admission   Medication Sig Dispense Refill Last Dose    ANORO ELLIPTA 62.5-25 mcg/actuation DsDv INHALE 1 PUFF INTO THE LUNGS EVERY  each 3     aspirin (ECOTRIN) 81 MG EC tablet Take 1 tablet (81 mg total) by mouth once daily.  0     CYANOCOBALAMIN, VITAMIN B-12, (VITAMIN B-12 ORAL) Take by mouth.       melatonin 5 mg Tab Take 5 mg by mouth nightly.        multivitamin capsule Take 1 capsule by mouth once daily.       oxybutynin (DITROPAN-XL) 5 MG TR24 Take 1 tablet (5 mg total) by mouth once daily. 90 tablet 0     pantoprazole (PROTONIX) 40 MG tablet Take 1 tablet (40 mg total) by mouth once daily. 30 tablet 5     polyethylene glycol (GLYCOLAX) 17 gram/dose powder Take 17 g by mouth once daily.       RESTASIS 0.05 % ophthalmic emulsion Place 1 drop into both eyes 2 (two) times daily. 60 each 11     rosuvastatin (CRESTOR) 10 MG tablet Take 1 tablet (10 mg total) by mouth once daily. 90 tablet 3     sod sulf-pot chloride-mag sulf (SUTAB) 1.479-0.188- 0.225 gram tablet Take 12 tablets by mouth once daily. Take according to package instructions with indicated amount of water. 24 tablet 0     UNABLE TO FIND medication name: CBD gummie bid       venlafaxine (EFFEXOR-XR) 37.5 MG 24 hr capsule Take 1 capsule (37.5 mg total) by mouth once daily. 90 capsule 3     venlafaxine (EFFEXOR-XR) 75 MG 24 hr capsule Take 1 capsule (75 mg total) by mouth once daily.  90 capsule 3          Objective Findings:    Vital Signs: see nursing notes  Physical Exam:  General Appearance: Well appearing in no acute distress  Eyes:    No scleral icterus  ENT: Neck supple  Lungs: CTA anteriorly  Heart:  S1, S2 normal, no murmurs heard  Abdomen: Soft, non tender, non distended with positive bowel sounds. No hepatosplenomegaly, ascites, or mass  Extremities: no edema  Skin: No rash      Labs:  Lab Results   Component Value Date    WBC 4.80 04/18/2023    HGB 13.6 04/18/2023    HCT 41.9 04/18/2023     04/18/2023    CHOL 131 04/18/2023    TRIG 51 04/18/2023    HDL 51 04/18/2023    ALT 24 04/18/2023    AST 27 04/18/2023     04/18/2023    K 4.3 04/18/2023     04/18/2023    CREATININE 0.7 04/18/2023    BUN 17 04/18/2023    CO2 30 (H) 04/18/2023    TSH 2.066 04/18/2023    INR 1.0 01/10/2022       I have explained the risks and benefits of endoscopy procedures to the patient including but not limited to bleeding, perforation, infection, and death.    Jeremy Thomas MD

## 2023-07-19 NOTE — ANESTHESIA POSTPROCEDURE EVALUATION
Anesthesia Post Evaluation    Patient: Marycarmen Louis    Procedure(s) Performed: Procedure(s) (LRB):  EGD (ESOPHAGOGASTRODUODENOSCOPY) (N/A)  COLONOSCOPY (N/A)    Final Anesthesia Type: general      Patient location during evaluation: PACU  Patient participation: Yes- Able to Participate  Level of consciousness: awake  Post-procedure vital signs: reviewed and stable  Pain management: adequate  Airway patency: patent    PONV status at discharge: No PONV  Anesthetic complications: no      Cardiovascular status: blood pressure returned to baseline  Respiratory status: unassisted  Hydration status: euvolemic  Follow-up not needed.          Vitals Value Taken Time   /63 07/19/23 0954   Temp 36.4 °C (97.5 °F) 07/19/23 0924   Pulse 91 07/19/23 0954   Resp 21 07/19/23 0954   SpO2 98 % 07/19/23 0954         Event Time   Out of Recovery 10:03:06         Pain/Chanda Score: Chanda Score: 10 (7/19/2023  9:54 AM)

## 2023-07-19 NOTE — TRANSFER OF CARE
"Anesthesia Transfer of Care Note    Patient: Marycarmen Louis    Procedure(s) Performed: Procedure(s) (LRB):  EGD (ESOPHAGOGASTRODUODENOSCOPY) (N/A)  COLONOSCOPY (N/A)    Patient location: GI    Anesthesia Type: general    Transport from OR: Transported from OR on room air with adequate spontaneous ventilation    Post pain: adequate analgesia    Post assessment: no apparent anesthetic complications and tolerated procedure well    Post vital signs: stable    Level of consciousness: awake and oriented    Nausea/Vomiting: no nausea/vomiting    Complications: none    Transfer of care protocol was followed      Last vitals:   Visit Vitals  /66 (BP Location: Left arm, Patient Position: Lying)   Pulse 90   Temp 37.2 °C (99 °F) (Skin)   Resp 18   Ht 5' 3" (1.6 m)   Wt 61.2 kg (135 lb)   SpO2 97%   Breastfeeding No   BMI 23.91 kg/m²     "

## 2023-07-19 NOTE — PROVATION PATIENT INSTRUCTIONS
Discharge Summary/Instructions after an Endoscopic Procedure  Patient Name: Marycarmen Louis  Patient MRN: [id number]  Patient YOB: 1945  Wednesday, July 19, 2023  Jeremy Thomas MD  Dear patient,  As a result of recent federal legislation (The Federal Cures Act), you may   receive lab or pathology results from your procedure in your [facility]   account before your physician is able to contact you. Your physician or   their representative will relay the results to you with their   recommendations at their soonest availability.  Thank you,  Your health is very important to us during the Covid Crisis. Following your   procedure today, you will receive a daily text for 2 weeks asking about   signs or symptoms of Covid 19.  Please respond to this text when you   receive it so we can follow up and keep you as safe as possible.   RESTRICTIONS:  During your procedure today, you received medications for sedation.  These   medications may affect your judgment, balance and coordination.  Therefore,   for 24 hours, you have the following restrictions:   - DO NOT drive a car, operate machinery, make legal/financial decisions,   sign important papers or drink alcohol.    ACTIVITY:  Today: no heavy lifting, straining or running due to procedural   sedation/anesthesia.  The following day: return to full activity including work.  DIET:  Eat and drink normally unless instructed otherwise.     TREATMENT FOR COMMON SIDE EFFECTS:  - Mild abdominal pain, nausea, belching, bloating or excessive gas:  rest,   eat lightly and use a heating pad.  - Sore Throat: treat with throat lozenges and/or gargle with warm salt   water.  - Because air was used during the procedure, expelling large amounts of air   from your rectum or belching is normal.  - If a bowel prep was taken, you may not have a bowel movement for 1-3 days.    This is normal.  SYMPTOMS TO WATCH FOR AND REPORT TO YOUR PHYSICIAN:  1. Abdominal pain or bloating,  other than gas cramps.  2. Chest pain.  3. Back pain.  4. Signs of infection such as: chills or fever occurring within 24 hours   after the procedure.  5. Rectal bleeding, which would show as bright red, maroon, or black stools.   (A tablespoon of blood from the rectum is not serious, especially if   hemorrhoids are present.)  6. Vomiting.  7. Weakness or dizziness.  GO DIRECTLY TO THE NEAREST EMERGENCY ROOM IF YOU HAVE ANY OF THE FOLLOWING:      Difficulty breathing              Chills and/or fever over 101 F   Persistent vomiting and/or vomiting blood   Severe abdominal pain   Severe chest pain   Black, tarry stools   Bleeding- more than one tablespoon   Any other symptom or condition that you feel may need urgent attention  Your doctor recommends these additional instructions:  If any biopsies were taken, your doctors clinic will contact you in 1 to 2   weeks with any results.  - Discharge patient to home.   - Resume previous diet.   - Continue present medications.   - Await pathology results.   - Repeat colonoscopy in 6 months because the bowel preparation was   suboptimal.   - Patient has a contact number available for emergencies.  The signs and   symptoms of potential delayed complications were discussed with the   patient.  Return to normal activities tomorrow.  Written discharge   instructions were provided to the patient.  For questions, problems or results please call your physician - Jeremy Thomas MD.  EMERGENCY PHONE NUMBER: [phone],  LAB RESULTS: [phone]  IF A COMPLICATION OR EMERGENCY SITUATION ARISES AND YOU ARE UNABLE TO REACH   YOUR PHYSICIAN - GO DIRECTLY TO THE EMERGENCY ROOM.  Jeremy Thomas MD  7/19/2023 9:23:26 AM  This report has been verified and signed electronically.  Dear patient,  As a result of recent federal legislation (The Federal Cures Act), you may   receive lab or pathology results from your procedure in your [facility]   account before your  physician is able to contact you. Your physician or   their representative will relay the results to you with their   recommendations at their soonest availability.  Thank you,  PROVATION

## 2023-07-19 NOTE — PROVATION PATIENT INSTRUCTIONS
Discharge Summary/Instructions after an Endoscopic Procedure  Patient Name: Marycarmen Louis  Patient MRN: [id number]  Patient YOB: 1945  Wednesday, July 19, 2023  Jeremy Thomas MD  Dear patient,  As a result of recent federal legislation (The Federal Cures Act), you may   receive lab or pathology results from your procedure in your [facility]   account before your physician is able to contact you. Your physician or   their representative will relay the results to you with their   recommendations at their soonest availability.  Thank you,  Your health is very important to us during the Covid Crisis. Following your   procedure today, you will receive a daily text for 2 weeks asking about   signs or symptoms of Covid 19.  Please respond to this text when you   receive it so we can follow up and keep you as safe as possible.   RESTRICTIONS:  During your procedure today, you received medications for sedation.  These   medications may affect your judgment, balance and coordination.  Therefore,   for 24 hours, you have the following restrictions:   - DO NOT drive a car, operate machinery, make legal/financial decisions,   sign important papers or drink alcohol.    ACTIVITY:  Today: no heavy lifting, straining or running due to procedural   sedation/anesthesia.  The following day: return to full activity including work.  DIET:  Eat and drink normally unless instructed otherwise.     TREATMENT FOR COMMON SIDE EFFECTS:  - Mild abdominal pain, nausea, belching, bloating or excessive gas:  rest,   eat lightly and use a heating pad.  - Sore Throat: treat with throat lozenges and/or gargle with warm salt   water.  - Because air was used during the procedure, expelling large amounts of air   from your rectum or belching is normal.  - If a bowel prep was taken, you may not have a bowel movement for 1-3 days.    This is normal.  SYMPTOMS TO WATCH FOR AND REPORT TO YOUR PHYSICIAN:  1. Abdominal pain or bloating,  other than gas cramps.  2. Chest pain.  3. Back pain.  4. Signs of infection such as: chills or fever occurring within 24 hours   after the procedure.  5. Rectal bleeding, which would show as bright red, maroon, or black stools.   (A tablespoon of blood from the rectum is not serious, especially if   hemorrhoids are present.)  6. Vomiting.  7. Weakness or dizziness.  GO DIRECTLY TO THE NEAREST EMERGENCY ROOM IF YOU HAVE ANY OF THE FOLLOWING:      Difficulty breathing              Chills and/or fever over 101 F   Persistent vomiting and/or vomiting blood   Severe abdominal pain   Severe chest pain   Black, tarry stools   Bleeding- more than one tablespoon   Any other symptom or condition that you feel may need urgent attention  Your doctor recommends these additional instructions:  If any biopsies were taken, your doctors clinic will contact you in 1 to 2   weeks with any results.  - Discharge patient to home.   - Resume previous diet.   - Continue present medications.   - Await pathology results.   - Perform a colonoscopy as previously scheduled.  For questions, problems or results please call your physician - Jeremy Thomas MD.  EMERGENCY PHONE NUMBER: [phone],  LAB RESULTS: [phone]  IF A COMPLICATION OR EMERGENCY SITUATION ARISES AND YOU ARE UNABLE TO REACH   YOUR PHYSICIAN - GO DIRECTLY TO THE EMERGENCY ROOM.  Jeremy Thomas MD  7/19/2023 9:06:40 AM  This report has been verified and signed electronically.  Dear patient,  As a result of recent federal legislation (The Federal Cures Act), you may   receive lab or pathology results from your procedure in your [facility]   account before your physician is able to contact you. Your physician or   their representative will relay the results to you with their   recommendations at their soonest availability.  Thank you,  PROVATION

## 2023-07-21 LAB
FINAL PATHOLOGIC DIAGNOSIS: NORMAL
GROSS: NORMAL
Lab: NORMAL

## 2023-07-27 NOTE — PROGRESS NOTES
Pathologic findings for recent EGD/colonoscopy reviewed:    - No evidence of H pylori infection identified   - Some mild inflammation was noted.    - Colon polyp(s) benign    Recommendations  - Continue current medications  - Repeat colonoscopy in 6 months d/t inadequate prep  - Follow up as needed

## 2023-07-28 ENCOUNTER — TELEPHONE (OUTPATIENT)
Dept: GASTROENTEROLOGY | Facility: CLINIC | Age: 78
End: 2023-07-28
Payer: MEDICARE

## 2023-08-30 NOTE — PLAN OF CARE
Problem: Patient Care Overview  Goal: Plan of Care Review  Outcome: Ongoing (interventions implemented as appropriate)  Patient to CCU 2/19 after resp distress on 3rd floor s/p blood transfusion. Patient currently on BiPAp 12/5 45% with O2 sats>95%; now tolerating venti-mask 50% for medications/meals and breaks. Dsg to L hip CDI w/o drainage and hard cast to LUE dry/intact with warm fingers noted; surgery following.  Pain controlled with Tramadol prn at this time; tolerating well. OOB to commode with minimal assistance. Instructed on POC with verbalized understanding.           Total Pulses (Will Not Render If 0): 0

## 2023-10-05 DIAGNOSIS — F41.9 ANXIETY AND DEPRESSION: Chronic | ICD-10-CM

## 2023-10-05 DIAGNOSIS — F32.A ANXIETY AND DEPRESSION: Chronic | ICD-10-CM

## 2023-10-05 DIAGNOSIS — F41.9 ANXIETY: ICD-10-CM

## 2023-10-05 DIAGNOSIS — F32.A DEPRESSION, UNSPECIFIED DEPRESSION TYPE: ICD-10-CM

## 2023-10-05 DIAGNOSIS — I25.10 CORONARY ARTERY CALCIFICATION SEEN ON CAT SCAN: Chronic | ICD-10-CM

## 2023-10-05 RX ORDER — ROSUVASTATIN CALCIUM 10 MG/1
10 TABLET, COATED ORAL DAILY
Qty: 90 TABLET | Refills: 1 | Status: SHIPPED | OUTPATIENT
Start: 2023-10-05 | End: 2024-02-28 | Stop reason: SDUPTHER

## 2023-10-05 RX ORDER — VENLAFAXINE HYDROCHLORIDE 37.5 MG/1
37.5 CAPSULE, EXTENDED RELEASE ORAL DAILY
Qty: 90 CAPSULE | Refills: 1 | Status: SHIPPED | OUTPATIENT
Start: 2023-10-05 | End: 2024-02-28 | Stop reason: DRUGHIGH

## 2023-10-05 RX ORDER — VENLAFAXINE HYDROCHLORIDE 75 MG/1
75 CAPSULE, EXTENDED RELEASE ORAL DAILY
Qty: 90 CAPSULE | Refills: 1 | Status: SHIPPED | OUTPATIENT
Start: 2023-10-05 | End: 2024-02-28 | Stop reason: DRUGHIGH

## 2023-10-05 NOTE — TELEPHONE ENCOUNTER
No care due was identified.  [facility] Embedded Care Due Messages. Reference number: [id number].   10/05/2023 9:18:59 AM CDT

## 2023-10-05 NOTE — TELEPHONE ENCOUNTER
Refill Decision Note   Marycarmen Louis  is requesting a refill authorization.  Brief Assessment and Rationale for Refill:  Approve     Medication Therapy Plan:       Medication Reconciliation Completed: No    Alert overridden per protocol: Yes   Comments:     No Care Gaps recommended.     Note composed:10:57 AM 10/05/2023

## 2023-10-27 ENCOUNTER — TELEPHONE (OUTPATIENT)
Dept: GASTROENTEROLOGY | Facility: CLINIC | Age: 78
End: 2023-10-27
Payer: MEDICARE

## 2023-10-30 RX ORDER — PANTOPRAZOLE SODIUM 40 MG/1
40 TABLET, DELAYED RELEASE ORAL DAILY
Qty: 30 TABLET | Refills: 5 | Status: SHIPPED | OUTPATIENT
Start: 2023-10-30 | End: 2024-02-28 | Stop reason: SDUPTHER

## 2023-11-01 DIAGNOSIS — Z78.0 MENOPAUSE: ICD-10-CM

## 2023-11-06 ENCOUNTER — HOSPITAL ENCOUNTER (OUTPATIENT)
Dept: RADIOLOGY | Facility: HOSPITAL | Age: 78
Discharge: HOME | End: 2023-11-06
Attending: NURSE PRACTITIONER
Payer: MEDICARE

## 2023-11-06 ENCOUNTER — TELEPHONE (OUTPATIENT)
Dept: FAMILY MEDICINE | Facility: CLINIC | Age: 78
End: 2023-11-06
Payer: MEDICARE

## 2023-11-06 ENCOUNTER — OFFICE VISIT (OUTPATIENT)
Dept: FAMILY MEDICINE | Facility: CLINIC | Age: 78
End: 2023-11-06
Payer: MEDICARE

## 2023-11-06 DIAGNOSIS — M79.601 RIGHT ARM PAIN: ICD-10-CM
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
## 2023-11-06 DIAGNOSIS — M25.521 RIGHT ELBOW PAIN: ICD-10-CM

## 2023-11-06 DIAGNOSIS — R93.7 ABNORMAL X-RAY OF BONE: Primary | ICD-10-CM

## 2023-11-06 PROCEDURE — 73030 X-RAY EXAM OF SHOULDER: CPT | Mod: TC,FY,PO,RT

## 2023-11-06 PROCEDURE — 73030 X-RAY EXAM OF SHOULDER: CPT | Mod: 26,RT,, | Performed by: RADIOLOGY

## 2023-11-06 PROCEDURE — 99442 PR PHYSICIAN TELEPHONE EVALUATION 11-20 MIN: ICD-10-PCS | Mod: FQ,,, | Performed by: NURSE PRACTITIONER

## 2023-11-06 PROCEDURE — 73080 XR ELBOW COMPLETE 3 VIEW RIGHT: ICD-10-PCS | Mod: 26,RT,, | Performed by: RADIOLOGY

## 2023-11-06 PROCEDURE — 73080 X-RAY EXAM OF ELBOW: CPT | Mod: 26,RT,, | Performed by: RADIOLOGY

## 2023-11-06 PROCEDURE — 99442 PR PHYSICIAN TELEPHONE EVALUATION 11-20 MIN: CPT | Mod: FQ,,, | Performed by: NURSE PRACTITIONER

## 2023-11-06 PROCEDURE — 73030 XR SHOULDER COMPLETE 2 OR MORE VIEWS RIGHT: ICD-10-PCS | Mod: 26,RT,, | Performed by: RADIOLOGY

## 2023-11-06 PROCEDURE — 73080 X-RAY EXAM OF ELBOW: CPT | Mod: TC,FY,PO,RT

## 2023-11-06 NOTE — TELEPHONE ENCOUNTER
Follow up call was placed to patient. Patient completed virtual visit with NP, X-ray ordered and voiced there is an appointment to orthopedic ([facility]) dept 11-7-23. Verbalized understanding and thank you for call.    [facility]  [address] [facility] [address]  [place], [address]  [phone] fax    Plan of Care/ Statement of Necessity for Physical Therapy Services    Patient name: Clare Cuba Start of Care: 2022   Referral source: Raine Pulido NP : 1967    Medical Diagnosis: Left knee pain [M25.562]  Right knee pain [M25.561]  Payor: [facility] MEDICAID / Plan: [facility] COMMUNITY PLAN [facility] / Product Type: Managed Care Medicaid /  Onset Date:    Treatment Diagnosis: B knee pain   Prior Hospitalization: see medical history Provider#: [id number]   Medications: Verified on Patient summary List    Comorbidities: arthritis, LBP, depression   Prior Level of Function: Pt with chronic knee pain, reports increased pain and mobility difficulty recently     The Plan of Care and following information is based on the information from the initial evaluation. Assessment/ key information: The pt is a 49 y/o F presenting with c/o right > left knee pain of chronic nature. She reports increased pain and mobility difficulty recently. Pt denies any treatment to this point though she did just get prescribed diclofenac. Pt reports pain near constantly including sitting, driving, standing and walking. She also reports some intermittent buckling that will lead to near falls, usually right leg involved. Pt reports last instance of this was April. Fairly good strength in open chain though painful bilaterally. PROM fairly good overall with more pain limitation. TTP noted at proximal patella and distal quad bilaterally, also noted in popliteal region as well. Signs and symptoms consistent with mechanical knee pain. Pt would benefit from PT to improve pain, strength and ROM to improve ADL ease.     Evaluation Complexity History HIGH Complexity :3+ comorbidities / personal factors will impact the outcome/ POC ; Examination MEDIUM Complexity : 3 Standardized tests and measures addressing body structure, function, activity limitation and / or participation in recreation  ;Presentation LOW Complexity : Stable, uncomplicated  ;Clinical Decision Making HIGH Complexity : FOTO score of 1- 25   Overall Complexity Rating: LOW   Problem List: pain affecting function, decrease ROM, decrease strength, impaired gait/ balance, decrease ADL/ functional abilitiies, decrease activity tolerance, and decrease flexibility/ joint mobility   Treatment Plan may include any combination of the following: Therapeutic exercise, Therapeutic activities, Neuromuscular re-education, Physical agent/modality, Gait/balance training, Manual therapy, Aquatic therapy, Patient education, Self Care training, and Functional mobility training  Patient / Family readiness to learn indicated by: trying to perform skills  Persons(s) to be included in education: patient (P)  Barriers to Learning/Limitations: None  Patient Goal (s): Relief  Patient Self Reported Health Status: fair  Rehabilitation Potential: fair    Short Term Goals: To be accomplished in 2 weeks:  Pt will demonstrate I and compliance with HEP to maximize therapeutic effect. Pt will demonstrate left knee flexion AROM to 100 deg for improved ease of dressing. Long Term Goals: To be accomplished in 4 weeks:  Pt will demonstrate B knee flexion AROM to 115 deg for improved ease of transfers. Pt will demonstrate 10 SLR bilaterally without pain increase to improve quad function with ADLs. Pt will report 50% improvement in pain levels to improve quality of life. Pt will improve FOTO score to 26 to demonstrate improved functional capacity. Frequency / Duration: Patient to be seen 2 times per week for 4 weeks.     Patient/ Caregiver education and instruction: Diagnosis, prognosis, self care, activity modification, and exercises   [x]  Plan of care has been reviewed with KOBE Velazquez DPT CMTPT 8/5/2022 12:29 PM    ________________________________________________________________________    I certify that the above Therapy Services are being furnished while the patient is under my care. I agree with the treatment plan and certify that this therapy is necessary.     [address] [place] [address] Signature:____________Date:_________TIME:________     Aicha Guardado NP  ** Signature, Date and Time must be completed for valid certification **    Please sign and return to [facility] [address]  [phone] fax

## 2023-11-06 NOTE — PROGRESS NOTES
The patient location is:  Patient Home  The chief complaint leading to consultation is: as below  Visit type: Virtual visit with audio only (telephone) - patient verbally consented to an audio visit today prior to this telephone encounter.  The reason for the audio only service rather than synchronous audio and video virtual visit was related to technical difficulties  patient necessity.  Total time spent with patient: 15 minutes  Each patient to whom he or she provides medical services by telemedicine is:  (1) informed of the relationship between the physician and patient and the respective role of any other health care provider with respect to management of the patient; and (2) notified that she may decline to receive medical services by telemedicine and may withdraw from such care at any time.      HPI     Chief Complaint:  fall      Marycarmen Louis is a 78 y.o. female with multiple medical diagnoses as listed in the medical history and problem list that presents for fall.  Pt is new to me but is known to this clinic with her last appointment being 5/24/2023.      Fall  Incident onset: this week. The fall occurred while walking. She landed on Hard floor. The point of impact was the right elbow. The pain is present in the right elbow. The pain is moderate. The symptoms are aggravated by flexion, extension and movement. Pertinent negatives include no abdominal pain, fever, tingling or visual change. She has tried rest for the symptoms. The treatment provided mild relief.       X-Ray Elbow Complete Right  Status: Final result     [facility] Results Release    [email or web address] Status: Pending  Results Release     PACS Images for [email or web address] Viewer     Show images for X-Ray Elbow Complete Right  All Reviewers List    Jeff Sosa NP on 11/6/2023 14:17     X-Ray Elbow Complete Right  Order: [id number]  Status: Final result     Visible to patient: No (inaccessible in Patient Portal)     Next appt: 11/07/2023 at 07:00  AM in Orthopedics (Conner Valdez PA-C)     Dx: Right arm pain; Fall, initial encounter     0 Result Notes    1 Follow-up Encounter  Details    Reading Physician Reading Date Result Priority   Shaun Reese MD  [phone] 11/6/2023 Routine     Narrative & Impression  EXAMINATION:  XR ELBOW COMPLETE 3 VIEW RIGHT     CLINICAL HISTORY:  . Unspecified fall, initial encounter     TECHNIQUE:  AP, lateral, and oblique views of the right elbow were performed.     COMPARISON:  None     FINDINGS:  Elevation of the anterior fat pad which is suggestive of a joint effusion.  No acute fracture is visualized, however given joint effusion, a nondisplaced un seen fracture is not entirely excluded.  Further evaluation/follow-up as clinically indicated.     Impression:     Elevation of the anterior fat pad suggestive of a joint effusion and may be seen with occult fracture.  Further evaluation/follow-up as clinically indicated.  No fracture is visualized.        Electronically signed by: Shaun Reese MD  Date:                                            11/06/2023  Time:                                           11:21           Exam Ended: 11/06/23 11:17 Last Resulted: 11/06/23 11:21           X-ray Shoulder 2 or More Views Right  Status: Final result     [employer] Results Release    [employer] Status: Pending  Results Release     PACS Images for [employer] Viewer     Show images for X-ray Shoulder 2 or More Views Right  All Reviewers List    Jeff Sosa NP on 11/6/2023 13:43     X-ray Shoulder 2 or More Views Right  Order: [id number]  Status: Final result     Visible to patient: No (inaccessible in Patient Portal)     Next appt: 11/07/2023 at 07:00 AM in Orthopedics (Conner Valdez PA-C)     Dx: Right arm pain; Fall, initial encounter     0 Result Notes  Details    Reading Physician Reading Date Result Priority   Nicola Nayak MD  [phone] 11/6/2023 Routine     Narrative &  Impression  EXAMINATION:  XR SHOULDER COMPLETE 2 OR MORE VIEWS RIGHT     CLINICAL HISTORY:  Unspecified fall, initial encounter     TECHNIQUE:  Two or three views of the right shoulder were performed.     COMPARISON:  None     FINDINGS:  Bony structures are normal.  Glenohumeral joint space is intact.  No soft tissue calcification is seen.     Impression:     See above        Electronically signed by: Nicola Nayak MD  Date:                                            11/06/2023  Time:                                           11:04           Exam Ended: 11/06/23 11:00 Last Resulted: 11/06/23 11:04           Assessment & Plan     Problem List Items Addressed This Visit    None  Visit Diagnoses       Fall, initial encounter    -  Primary    -education provided on fall prevention and fall risk strategies. Discussed removing hazards, improving lighting, removing safety devices.   -Handouts provided.     What to do if you fall  Above all, try to stay calm:  If you start to fall, try to relax your body. This will reduce the impact of the fall.  After you fall, press your monitor button, or phone for help.  Don't rush to get up. First, make sure you're not hurt.  Roll onto your side, then crawl to a chair. Pull yourself up onto the chair slowly.  You should call 911 if you struck your head, lost consciousness, were confused afterward, or have any other concerns for injury.  Be sure to tell your doctor that you fell.    Notify provider if you experience the following symptoms.   Feeling lightheaded or dizzy more than once a day  Losing your balance often or feeling unsteady on your feet  Feeling numbness in your legs or feet, or noticing a change in the way you walk  Having a steady decline in your memory or mental sharpness      Relevant Orders    X-Ray Elbow Complete Right (Completed)    Right arm pain        Xrays reviewed.   Due to abnormal xray appointment made with orthopedics for tomorrow morning.   Rest  Tylenol  prn    Discussed DDx, condition, and treatment.   Education sent to patient portal/included in after visit summary.  ED precautions given.   Notify provider if symptoms do not resolve or increase in severity.   Patient verbalizes understanding and agrees with plan of care.      Relevant Orders    X-Ray Elbow Complete Right (Completed)            --------------------------------------------      Health Maintenance:  Health Maintenance         Date Due Completion Date    TETANUS VACCINE Never done ---    RSV Vaccine (Age 60+) (1 - 1-dose 60+ series) Never done ---    DEXA Scan 09/28/2023 9/28/2021    COVID-19 Vaccine (6 - 2023-24 season) 10/09/2023 8/14/2023    Aspirin/Antiplatelet Therapy 06/09/2024 6/9/2023    Lipid Panel 04/18/2028 4/18/2023            Health maintenance reviewed    Follow Up:  Follow up in about 2 weeks (around 11/20/2023), or if symptoms worsen or fail to improve.    Exam     Review of Systems:  (as noted above)  Review of Systems   Constitutional:  Negative for fever.   HENT:  Negative for trouble swallowing.    Eyes:  Negative for visual disturbance.   Respiratory:  Negative for chest tightness and shortness of breath.    Cardiovascular:  Negative for chest pain.   Gastrointestinal:  Negative for abdominal pain and blood in stool.   Musculoskeletal:  Positive for arthralgias.   Neurological:  Negative for tingling.       Physical Exam:   Physical Exam  Vitals reviewed: audio only.       There were no vitals filed for this visit.   There is no height or weight on file to calculate BMI.        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 Liriano    CATARACT EXTRACTION W/  INTRAOCULAR LENS IMPLANT Left 10/30/2017    Dr. Liriano    COLONOSCOPY N/A 3/8/2018    Procedure: COLONOSCOPY;  Surgeon: Hari Dumont MD;  Location: [facility] ([address] [place] [address]);  Service: Endoscopy;  Laterality: N/A;  5 year f/u-2/16/18 pt confirmed appt/MS    COLONOSCOPY N/A 7/19/2023    Procedure: COLONOSCOPY;  Surgeon: Jeremy Thomas MD;  Location: [facility];  Service: Endoscopy;  Laterality: N/A;    ESOPHAGOGASTRODUODENOSCOPY N/A 7/19/2023    Procedure: EGD (ESOPHAGOGASTRODUODENOSCOPY);  Surgeon: Jeremy Thomas MD;  Location: [facility];  Service: Endoscopy;  Laterality: N/A;    JOINT REPLACEMENT  2014    left TKA    salviary gland removed      TOTAL HIP ARTHROPLASTY         Social History:  Social History     Socioeconomic History    Marital status:    Tobacco Use    Smoking status: Former     Passive exposure: Past    Smokeless tobacco: Never    Tobacco comments:     quit 30yrs ago, discussed importance of continued cessation   Substance and Sexual Activity    Alcohol use: Yes     Alcohol/week: 7.0 standard drinks of alcohol     Types: 7 Glasses of wine per week     Comment: occasionally    Drug use: No    Sexual activity: Not Currently     Partners: Male     Birth control/protection: Post-menopausal       Family History:  Family History   Problem Relation Age of Onset    Heart disease Mother     Cancer Father         colon    Diabetes Father     No Known Problems Sister     Cancer Brother         liver  kidney bone    Diabetes Brother     No Known Problems Maternal Aunt     No Known Problems Maternal Uncle     No Known Problems Paternal Aunt     No Known Problems Paternal Uncle     No Known Problems Maternal Grandmother     No Known Problems Maternal Grandfather     No Known Problems Paternal Grandmother     No Known Problems Paternal Grandfather     Breast cancer Neg Hx     Ovarian cancer Neg Hx        Allergies and Medications: (updated and reviewed)  Review of patient's allergies indicates:   Allergen Reactions    Codeine Nausea And Vomiting     Other reaction(s): Vomiting  Other reaction(s): Headache    Fentanyl Nausea And Vomiting     Other reaction(s): Vomiting  Other reaction(s): Nausea    Phenytoin sodium extended      Other reaction(s): Vomiting  Other reaction(s): Nausea    Versed  [midazolam] Nausea And Vomiting    Vicodin  [hydrocodone-acetaminophen]      Other reaction(s): Vomiting     Current Outpatient Medications   Medication Sig Dispense Refill    ANORO ELLIPTA 62.5-25 mcg/actuation DsDv INHALE 1 PUFF INTO THE LUNGS EVERY  each 3    aspirin (ECOTRIN) 81 MG EC tablet Take 1 tablet (81 mg total) by mouth once daily.  0    CYANOCOBALAMIN, VITAMIN B-12, (VITAMIN B-12 ORAL) Take by mouth.      melatonin 5 mg Tab Take 5 mg by mouth nightly.       multivitamin capsule Take 1 capsule by mouth once daily.      pantoprazole (PROTONIX) 40 MG tablet Take 1 tablet (40 mg total) by mouth once daily. 30 tablet 5    polyethylene glycol (GLYCOLAX) 17 gram/dose powder Take 17 g by mouth once daily.      RESTASIS 0.05 % ophthalmic emulsion Place 1 drop into both eyes 2 (two) times daily. 60 each 11    rosuvastatin (CRESTOR) 10 MG tablet Take 1 tablet (10 mg total) by mouth once daily. 90 tablet 1    UNABLE TO FIND medication name: CBD gummie bid      venlafaxine (EFFEXOR-XR) 37.5 MG 24 hr capsule Take 1 capsule (37.5 mg total) by mouth once daily. With 75mg for a total daily dose of 112.5mg 90 capsule 1     venlafaxine (EFFEXOR-XR) 75 MG 24 hr capsule Take 1 capsule (75 mg total) by mouth once daily. With 37.5mg for a total daily dose of 112.5mg 90 capsule 1     No current facility-administered medications for this visit.       Patient Care Team:  Cullen Timmons MD as PCP - General (Internal Medicine)  Helen Regan LPN (Inactive)  Helen Regan LPN (Inactive) as Care Coordinator       - The patient was sent an After Visit Summary virtually that lists all medications with directions, allergies, education, orders placed during this encounter and follow-up instructions.      - I have reviewed the patient's medical information including past medical, family, and social history sections including the medications and allergies.      - We discussed the patient's current medications.     This note was created by combination of typed  and MModal dictation.  Transcription errors may be present.  If there are any questions, please contact me.       Jeff Sosa NP

## 2023-11-06 NOTE — PATIENT INSTRUCTIONS
Medical Fitness--[phone]  Imaging, Xray, CT, MRI, Ultrasound---[phone]  Bariatrics---[phone]  Breast Surgery---[phone]  Case Management---[phone]  Colonoscopy---[phone]  DME---[phone]  Infectious Disease---[phone]  Interventional Radiology---[phone]  Medical Records---[phone]  [facility] On [facility]---[phone]  Optometry/Ophthalmology---[phone]  [facility]---[phone]  Physical Therapy---[phone]  Psychiatry---[phone] or [phone]  Plastic Surgery---[phone]  Recovery--[phone] option 2, or [phone].  Sleep Study---[phone]  Smoking Cessation---[phone]  Wound Care---[phone]  Referral Desk---[phone]

## 2023-11-07 ENCOUNTER — TELEPHONE (OUTPATIENT)
Dept: FAMILY MEDICINE | Facility: CLINIC | Age: 78
End: 2023-11-07
Payer: MEDICARE

## 2023-11-07 ENCOUNTER — OFFICE VISIT (OUTPATIENT)
Dept: ORTHOPEDICS | Facility: CLINIC | Age: 78
End: 2023-11-07
Payer: MEDICARE

## 2023-11-07 VITALS — BODY MASS INDEX: 24.96 KG/M2 | WEIGHT: 140.88 LBS | HEIGHT: 63 IN

## 2023-11-07 DIAGNOSIS — M25.521 RIGHT ELBOW PAIN: Primary | ICD-10-CM

## 2023-11-07 DIAGNOSIS — R93.7 ABNORMAL X-RAY OF BONE: ICD-10-CM
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
## 2023-11-07 PROCEDURE — 99214 OFFICE O/P EST MOD 30 MIN: CPT | Mod: S$GLB,,,

## 2023-11-07 PROCEDURE — 3288F FALL RISK ASSESSMENT DOCD: CPT | Mod: CPTII,S$GLB,,

## 2023-11-07 PROCEDURE — 3288F PR FALLS RISK ASSESSMENT DOCUMENTED: ICD-10-PCS | Mod: CPTII,S$GLB,,

## 2023-11-07 PROCEDURE — 1125F PR PAIN SEVERITY QUANTIFIED, PAIN PRESENT: ICD-10-PCS | Mod: CPTII,S$GLB,,

## 2023-11-07 PROCEDURE — 1125F AMNT PAIN NOTED PAIN PRSNT: CPT | Mod: CPTII,S$GLB,,

## 2023-11-07 PROCEDURE — 1101F PR PT FALLS ASSESS DOC 0-1 FALLS W/OUT INJ PAST YR: ICD-10-PCS | Mod: CPTII,S$GLB,,

## 2023-11-07 PROCEDURE — 99214 PR OFFICE/OUTPT VISIT, EST, LEVL IV, 30-39 MIN: ICD-10-PCS | Mod: S$GLB,,,

## 2023-11-07 PROCEDURE — 1101F PT FALLS ASSESS-DOCD LE1/YR: CPT | Mod: CPTII,S$GLB,,

## 2023-11-07 PROCEDURE — 99999 PR PBB SHADOW E&M-EST. PATIENT-LVL III: ICD-10-PCS | Mod: PBBFAC,,,

## 2023-11-07 PROCEDURE — 1159F MED LIST DOCD IN RCRD: CPT | Mod: CPTII,S$GLB,,

## 2023-11-07 PROCEDURE — 99999 PR PBB SHADOW E&M-EST. PATIENT-LVL III: CPT | Mod: PBBFAC,,,

## 2023-11-07 PROCEDURE — 1159F PR MEDICATION LIST DOCUMENTED IN MEDICAL RECORD: ICD-10-PCS | Mod: CPTII,S$GLB,,

## 2023-11-07 PROCEDURE — 1160F PR REVIEW ALL MEDS BY PRESCRIBER/CLIN PHARMACIST DOCUMENTED: ICD-10-PCS | Mod: CPTII,S$GLB,,

## 2023-11-07 PROCEDURE — 1160F RVW MEDS BY RX/DR IN RCRD: CPT | Mod: CPTII,S$GLB,,

## 2023-11-07 RX ORDER — MELOXICAM 15 MG/1
15 TABLET ORAL DAILY
Qty: 30 TABLET | Refills: 1 | Status: SHIPPED | OUTPATIENT
Start: 2023-11-07

## 2023-11-07 NOTE — TELEPHONE ENCOUNTER
----- Message from Carlos Khan sent at 11/7/2023 11:35 AM CST -----  Regarding: Mabel  Type:  Patient Returning Call     Who Called: Mabel     Who Left Message for Patient: Does not know     Does the patient know what this is regarding?:Does not know     Would the patient rather a call back or a response via [facility]? Callback     Best Call Back Number:.[phone]       Additional Information:

## 2023-11-07 NOTE — PROGRESS NOTES
Chief Complaint & History of Present Illness:  Marycarmen Louis is a 78 y.o. female who is seen here today with a complaint of right elbow pain. The pain has been present for 4 days following a fall onto her right arm. The patient describes the pain as a moderate aching located in the medial elbow. The pain is worse with  flexion and extension   and mildly improved by acetaminophen. The patient notes swelling and bruising  but no associated  numbness, tingling, or weakness .    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 Liriano    CATARACT EXTRACTION W/  INTRAOCULAR LENS IMPLANT Left 10/30/2017    Dr. Liriano    COLONOSCOPY N/A 3/8/2018    Procedure: COLONOSCOPY;  Surgeon: Hari Dumont MD;  Location: [address]);  Service: Endoscopy;  Laterality: N/A;  5 year f/u-2/16/18 pt confirmed appt/MS    COLONOSCOPY N/A 7/19/2023    Procedure: COLONOSCOPY;  Surgeon: Jeremy Thomas MD;  Location: [facility];  Service: Endoscopy;  Laterality: N/A;    ESOPHAGOGASTRODUODENOSCOPY N/A 7/19/2023    Procedure: EGD (ESOPHAGOGASTRODUODENOSCOPY);  Surgeon: Jeremy Thomas MD;  Location: [facility];  Service: Endoscopy;  Laterality: N/A;    JOINT REPLACEMENT   2014    left TKA    salviary gland removed      TOTAL HIP ARTHROPLASTY         Family History   Problem Relation Age of Onset    Heart disease Mother     Cancer Father         colon    Diabetes Father     No Known Problems Sister     Cancer Brother         liver kidney bone    Diabetes Brother     No Known Problems Maternal Aunt     No Known Problems Maternal Uncle     No Known Problems Paternal Aunt     No Known Problems Paternal Uncle     No Known Problems Maternal Grandmother     No Known Problems Maternal Grandfather     No Known Problems Paternal Grandmother     No Known Problems Paternal Grandfather     Breast cancer Neg Hx     Ovarian cancer Neg Hx        Review of patient's allergies indicates:   Allergen Reactions    Codeine Nausea And Vomiting     Other reaction(s): Vomiting  Other reaction(s): Headache    Fentanyl Nausea And Vomiting     Other reaction(s): Vomiting  Other reaction(s): Nausea    Phenytoin sodium extended      Other reaction(s): Vomiting  Other reaction(s): Nausea    Versed  [midazolam] Nausea And Vomiting    Vicodin  [hydrocodone-acetaminophen]      Other reaction(s): Vomiting           Current Outpatient Medications:     ANORO ELLIPTA 62.5-25 mcg/actuation DsDv, INHALE 1 PUFF INTO THE LUNGS EVERY DAY, Disp: 180 each, Rfl: 3    aspirin (ECOTRIN) 81 MG EC tablet, Take 1 tablet (81 mg total) by mouth once daily., Disp: , Rfl: 0    CYANOCOBALAMIN, VITAMIN B-12, (VITAMIN B-12 ORAL), Take by mouth., Disp: , Rfl:     melatonin 5 mg Tab, Take 5 mg by mouth nightly. , Disp: , Rfl:     multivitamin capsule, Take 1 capsule by mouth once daily., Disp: , Rfl:     pantoprazole (PROTONIX) 40 MG tablet, Take 1 tablet (40 mg total) by mouth once daily., Disp: 30 tablet, Rfl: 5    polyethylene glycol (GLYCOLAX) 17 gram/dose powder, Take 17 g by mouth once daily., Disp: , Rfl:     RESTASIS 0.05 % ophthalmic emulsion, Place 1 drop into both eyes 2 (two) times daily., Disp: 60 each, Rfl: 11    rosuvastatin  "(CRESTOR) 10 MG tablet, Take 1 tablet (10 mg total) by mouth once daily., Disp: 90 tablet, Rfl: 1    UNABLE TO FIND, medication name: CBD gummie bid, Disp: , Rfl:     venlafaxine (EFFEXOR-XR) 37.5 MG 24 hr capsule, Take 1 capsule (37.5 mg total) by mouth once daily. With 75mg for a total daily dose of 112.5mg, Disp: 90 capsule, Rfl: 1    venlafaxine (EFFEXOR-XR) 75 MG 24 hr capsule, Take 1 capsule (75 mg total) by mouth once daily. With 37.5mg for a total daily dose of 112.5mg, Disp: 90 capsule, Rfl: 1      Review of Systems:  ROS:  Constitutional: no fever or chills  Eyes: no visual changes  ENT: no nasal congestion or sore throat  Respiratory: no cough or shortness of breath  Cardiovascular: no chest pain or palpitations  Gastrointestinal: no nausea or vomiting, tolerating diet  Genitourinary: no hematuria or dysuria  Integument/Breast: no rash or pruritis  Hematologic/Lymphatic: no easy bruising or lymphadenopathy  Musculoskeletal: positive for right elbow pain  Neurological: no seizures or tremors  Behavioral/Psych: no auditory or visual hallucinations  Endocrine: no heat or cold intolerance      OBJECTIVE:     PHYSICAL EXAM:  Ht 5' 3.39" (1.61 m)   Wt 63.9 kg (140 lb 14 oz)   BMI 24.65 kg/m²   General: Pleasant, cooperative, NAD.  HEENT: NCAT, sclera nonicteric.  Lungs: Respirations are equal and unlabored.   Abdomen: Soft and non-tender.  CV: 2+ bilateral upper and lower extremity pulses.  Neuro: Sensation intact to light touch.  Skin: Intact throughout UE with no rashes, erythema, or lesions.  Extremities: No UE edema, NVI upper extremities.    right Elbow Exam:   Apperance: ecchymosis noted of the medial forearm  Tenderness: Medial humeral epicondyle  ROM:  with mild pain at terminal extension and flexion  Strength: normal  Stability: stable      RADIOGRAPHS:  X-rays of the right elbow taken yesterday personally reviewed. Imaging reveals mild effusion with elevated anterior fat pad. No fractures or " dislocations noted      ASSESSMENT:       ICD-10-CM ICD-9-CM   1. Right elbow pain  M25.521 719.42   2. Fall, initial encounter  W19.XXXA E888.9   3. Abnormal x-ray of bone  R93.7 793.7       PLAN:     We discussed with the patient at length all the different treatment options available including anti-inflammatories, acetaminophen, rest, ice, physical therapy to include strengthening, range of motion exercise, splinting, occasional cortisone injections for temporary relief, or possible surgical interventions.    Fracture not noted on right elbow x-ray.  Patient prescribed meloxicam 15mg QD for pain.  Instructed to practice active range of motion.  Follow-up in 2 weeks or sooner if pain worsens.  Follow-up

## 2023-11-20 RX ORDER — HYOSCYAMINE SULFATE 0.125 MG
125 TABLET ORAL EVERY 6 HOURS PRN
Qty: 90 TABLET | Refills: 6 | Status: SHIPPED | OUTPATIENT
Start: 2023-11-20

## 2023-12-05 DIAGNOSIS — M25.521 RIGHT ELBOW PAIN: Primary | ICD-10-CM

## 2023-12-06 ENCOUNTER — PATIENT MESSAGE (OUTPATIENT)
Dept: ORTHOPEDICS | Facility: CLINIC | Age: 78
End: 2023-12-06
Payer: MEDICARE

## 2023-12-06 ENCOUNTER — HOSPITAL ENCOUNTER (OUTPATIENT)
Dept: RADIOLOGY | Facility: HOSPITAL | Age: 78
Discharge: HOME | End: 2023-12-06
Attending: INTERNAL MEDICINE
Payer: MEDICARE

## 2023-12-06 DIAGNOSIS — Z78.0 MENOPAUSE: ICD-10-CM

## 2023-12-06 PROCEDURE — 77080 DXA BONE DENSITY AXIAL: CPT | Mod: 26,,, | Performed by: RADIOLOGY

## 2023-12-06 PROCEDURE — 77080 DXA BONE DENSITY AXIAL SKELETON 1 OR MORE SITES: ICD-10-PCS | Mod: 26,,, | Performed by: RADIOLOGY

## 2023-12-06 PROCEDURE — 77080 DXA BONE DENSITY AXIAL: CPT | Mod: TC

## 2023-12-06 NOTE — PROGRESS NOTES
Your DEXA showing some improvement of the bone density in your low back.  However there continues to be an increased risk of fracture of the right hip.  This is something that Prolia should help.  Please let me know if you want me to proceed with getting the refills of this medication moving.        Sincerely,  Cullen Timmons MD

## 2023-12-11 ENCOUNTER — HOSPITAL ENCOUNTER (OUTPATIENT)
Dept: RADIOLOGY | Facility: HOSPITAL | Age: 78
Discharge: HOME | End: 2023-12-11
Payer: MEDICARE

## 2023-12-11 DIAGNOSIS — M25.521 RIGHT ELBOW PAIN: ICD-10-CM

## 2023-12-11 PROCEDURE — 73200 CT ARM ELBOW WITHOUT CONTRAST RIGHT: ICD-10-PCS | Mod: 26,RT,, | Performed by: RADIOLOGY

## 2023-12-11 PROCEDURE — 73200 CT UPPER EXTREMITY W/O DYE: CPT | Mod: 26,RT,, | Performed by: RADIOLOGY

## 2023-12-11 PROCEDURE — 73200 CT UPPER EXTREMITY W/O DYE: CPT | Mod: TC,RT

## 2023-12-12 ENCOUNTER — HOSPITAL ENCOUNTER (OUTPATIENT)
Dept: RADIOLOGY | Facility: HOSPITAL | Age: 78
Discharge: HOME | End: 2023-12-12
Payer: MEDICARE

## 2023-12-12 ENCOUNTER — OFFICE VISIT (OUTPATIENT)
Dept: ORTHOPEDICS | Facility: CLINIC | Age: 78
End: 2023-12-12
Payer: MEDICARE

## 2023-12-12 VITALS — HEIGHT: 63 IN | BODY MASS INDEX: 25.12 KG/M2 | WEIGHT: 141.75 LBS

## 2023-12-12 DIAGNOSIS — M25.521 RIGHT ELBOW PAIN: ICD-10-CM

## 2023-12-12 DIAGNOSIS — S52.124D CLOSED NONDISPLACED FRACTURE OF HEAD OF RIGHT RADIUS WITH ROUTINE HEALING, SUBSEQUENT ENCOUNTER: Primary | ICD-10-CM

## 2023-12-12 PROCEDURE — 1100F PTFALLS ASSESS-DOCD GE2>/YR: CPT | Mod: CPTII,S$GLB,,

## 2023-12-12 PROCEDURE — 3288F FALL RISK ASSESSMENT DOCD: CPT | Mod: CPTII,S$GLB,,

## 2023-12-12 PROCEDURE — 99999 PR PBB SHADOW E&M-EST. PATIENT-LVL III: ICD-10-PCS | Mod: PBBFAC,,,

## 2023-12-12 PROCEDURE — 1159F PR MEDICATION LIST DOCUMENTED IN MEDICAL RECORD: ICD-10-PCS | Mod: CPTII,S$GLB,,

## 2023-12-12 PROCEDURE — 1100F PR PT FALLS ASSESS DOC 2+ FALLS/FALL W/INJURY/YR: ICD-10-PCS | Mod: CPTII,S$GLB,,

## 2023-12-12 PROCEDURE — 73080 X-RAY EXAM OF ELBOW: CPT | Mod: TC,RT

## 2023-12-12 PROCEDURE — 1125F PR PAIN SEVERITY QUANTIFIED, PAIN PRESENT: ICD-10-PCS | Mod: CPTII,S$GLB,,

## 2023-12-12 PROCEDURE — 73080 XR ELBOW COMPLETE 3 VIEW RIGHT: ICD-10-PCS | Mod: 26,RT,, | Performed by: RADIOLOGY

## 2023-12-12 PROCEDURE — 1159F MED LIST DOCD IN RCRD: CPT | Mod: CPTII,S$GLB,,

## 2023-12-12 PROCEDURE — 3288F PR FALLS RISK ASSESSMENT DOCUMENTED: ICD-10-PCS | Mod: CPTII,S$GLB,,

## 2023-12-12 PROCEDURE — 1125F AMNT PAIN NOTED PAIN PRSNT: CPT | Mod: CPTII,S$GLB,,

## 2023-12-12 PROCEDURE — 73080 X-RAY EXAM OF ELBOW: CPT | Mod: 26,RT,, | Performed by: RADIOLOGY

## 2023-12-12 PROCEDURE — 99213 OFFICE O/P EST LOW 20 MIN: CPT | Mod: S$GLB,,,

## 2023-12-12 PROCEDURE — 99213 PR OFFICE/OUTPT VISIT, EST, LEVL III, 20-29 MIN: ICD-10-PCS | Mod: S$GLB,,,

## 2023-12-12 PROCEDURE — 99999 PR PBB SHADOW E&M-EST. PATIENT-LVL III: CPT | Mod: PBBFAC,,,

## 2023-12-12 NOTE — PROGRESS NOTES
Chief Complaint & History of Present Illness:  Marycarmen Louis is a 78 y.o. female who is seen here today for a 1-month follow-up concerning right elbow pain. She notes moderate improvement of the pain and ROM following rx of meloxicam. She still endorse pain at extremes of flexion and extension but no limitation of movement.    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 Liriano    CATARACT EXTRACTION W/  INTRAOCULAR LENS IMPLANT Left 10/30/2017    Dr. Liriano    COLONOSCOPY N/A 3/8/2018    Procedure: COLONOSCOPY;  Surgeon: Hari Dumont MD;  Location: [address]);  Service: Endoscopy;  Laterality: N/A;  5 year f/u-2/16/18 pt confirmed appt/MS    COLONOSCOPY N/A 7/19/2023    Procedure: COLONOSCOPY;  Surgeon: Jeremy Thomas MD;  Location: [facility];  Service: Endoscopy;  Laterality: N/A;    ESOPHAGOGASTRODUODENOSCOPY N/A 7/19/2023    Procedure: EGD (ESOPHAGOGASTRODUODENOSCOPY);  Surgeon: Jeremy Thomas MD;  Location: [facility];  Service: Endoscopy;  Laterality: N/A;    JOINT REPLACEMENT  2014    left TKA    salviary gland removed      TOTAL HIP ARTHROPLASTY         Family History   Problem Relation Age of Onset    Heart disease Mother      Cancer Father         colon    Diabetes Father     No Known Problems Sister     Cancer Brother         liver kidney bone    Diabetes Brother     No Known Problems Maternal Aunt     No Known Problems Maternal Uncle     No Known Problems Paternal Aunt     No Known Problems Paternal Uncle     No Known Problems Maternal Grandmother     No Known Problems Maternal Grandfather     No Known Problems Paternal Grandmother     No Known Problems Paternal Grandfather     Breast cancer Neg Hx     Ovarian cancer Neg Hx        Review of patient's allergies indicates:   Allergen Reactions    Codeine Nausea And Vomiting     Other reaction(s): Vomiting  Other reaction(s): Headache    Fentanyl Nausea And Vomiting     Other reaction(s): Vomiting  Other reaction(s): Nausea    Phenytoin sodium extended      Other reaction(s): Vomiting  Other reaction(s): Nausea    Versed  [midazolam] Nausea And Vomiting    Vicodin  [hydrocodone-acetaminophen]      Other reaction(s): Vomiting           Current Outpatient Medications:     ANORO ELLIPTA 62.5-25 mcg/actuation DsDv, INHALE 1 PUFF INTO THE LUNGS EVERY DAY, Disp: 180 each, Rfl: 3    CYANOCOBALAMIN, VITAMIN B-12, (VITAMIN B-12 ORAL), Take by mouth., Disp: , Rfl:     hyoscyamine (ANASPAZ,LEVSIN) 0.125 mg Tab, Take 1 tablet (125 mcg total) by mouth every 6 (six) hours as needed., Disp: 90 tablet, Rfl: 6    melatonin 5 mg Tab, Take 5 mg by mouth nightly. , Disp: , Rfl:     meloxicam (MOBIC) 15 MG tablet, Take 1 tablet (15 mg total) by mouth once daily., Disp: 30 tablet, Rfl: 1    multivitamin capsule, Take 1 capsule by mouth once daily., Disp: , Rfl:     pantoprazole (PROTONIX) 40 MG tablet, Take 1 tablet (40 mg total) by mouth once daily., Disp: 30 tablet, Rfl: 5    polyethylene glycol (GLYCOLAX) 17 gram/dose powder, Take 17 g by mouth once daily., Disp: , Rfl:     RESTASIS 0.05 % ophthalmic emulsion, Place 1 drop into both eyes 2 (two) times daily., Disp: 60 each, Rfl: 11    rosuvastatin  "(CRESTOR) 10 MG tablet, Take 1 tablet (10 mg total) by mouth once daily., Disp: 90 tablet, Rfl: 1    UNABLE TO FIND, medication name: CBD gummie bid, Disp: , Rfl:     venlafaxine (EFFEXOR-XR) 37.5 MG 24 hr capsule, Take 1 capsule (37.5 mg total) by mouth once daily. With 75mg for a total daily dose of 112.5mg, Disp: 90 capsule, Rfl: 1    venlafaxine (EFFEXOR-XR) 75 MG 24 hr capsule, Take 1 capsule (75 mg total) by mouth once daily. With 37.5mg for a total daily dose of 112.5mg, Disp: 90 capsule, Rfl: 1    aspirin (ECOTRIN) 81 MG EC tablet, Take 1 tablet (81 mg total) by mouth once daily., Disp: , Rfl: 0      Review of Systems:  ROS:  Constitutional: no fever or chills  Eyes: no visual changes  ENT: no nasal congestion or sore throat  Respiratory: no cough or shortness of breath  Cardiovascular: no chest pain or palpitations  Gastrointestinal: no nausea or vomiting, tolerating diet  Genitourinary: no hematuria or dysuria  Integument/Breast: no rash or pruritis  Hematologic/Lymphatic: no easy bruising or lymphadenopathy  Musculoskeletal: positive for right elbow pain  Neurological: no seizures or tremors  Behavioral/Psych: no auditory or visual hallucinations  Endocrine: no heat or cold intolerance      OBJECTIVE:     PHYSICAL EXAM:  Ht 5' 3.39" (1.61 m)   Wt 64.3 kg (141 lb 12.1 oz)   BMI 24.80 kg/m²   General: Pleasant, cooperative, NAD.  HEENT: NCAT, sclera nonicteric.  Lungs: Respirations are equal and unlabored.   Abdomen: Soft and non-tender.  CV: 2+ bilateral upper and lower extremity pulses.  Neuro: Sensation intact to light touch.  Skin: Intact throughout UE with no rashes, erythema, or lesions.  Extremities: No UE edema, NVI upper extremities.    right Elbow Exam:   Apperance: no swelling or ecchymosis  Tenderness: Radial head  ROM: 3-140 with mild pain at terminal extension and flexion. Pronation 85, supination 90  Strength: normal  Stability: stable      RADIOGRAPHS:  CT of the right arm without contrast " taken yesterday and personally reviewed. Imaging reveals minimally displaced oblique fracture through the radial head with minimally comminuted avulsion fracture of the ulna.    X-rays of the right elbow taken today personally reviewed. Imaging reveals minimally displaced radial head fracture.      ASSESSMENT:       ICD-10-CM ICD-9-CM   1. Closed nondisplaced fracture of head of right radius with routine healing, subsequent encounter  S52.124D V54.12   2. Right elbow pain  M25.521 719.42       PLAN:     We discussed with the patient at length all the different treatment options available including anti-inflammatories, acetaminophen, rest, ice, physical therapy to include strengthening, range of motion exercise, splinting, occasional cortisone injections for temporary relief, or possible surgical interventions.    Patient recently refilled Meloxicam. Instructed to take as needed for pain.    OT referral sent to [facility].    1-month follow-up to reassess progress.

## 2023-12-15 ENCOUNTER — PATIENT MESSAGE (OUTPATIENT)
Dept: ORTHOPEDICS | Facility: CLINIC | Age: 78
End: 2023-12-15
Payer: MEDICARE

## 2023-12-19 DIAGNOSIS — S52.124D CLOSED NONDISPLACED FRACTURE OF HEAD OF RIGHT RADIUS WITH ROUTINE HEALING, SUBSEQUENT ENCOUNTER: Primary | ICD-10-CM

## 2024-01-12 DIAGNOSIS — J43.1 PANLOBULAR EMPHYSEMA: Chronic | ICD-10-CM

## 2024-01-12 RX ORDER — UMECLIDINIUM BROMIDE AND VILANTEROL TRIFENATATE 62.5; 25 UG/1; UG/1
POWDER RESPIRATORY (INHALATION)
Qty: 180 EACH | Refills: 1 | Status: SHIPPED | OUTPATIENT
Start: 2024-01-12

## 2024-01-12 NOTE — TELEPHONE ENCOUNTER
No care due was identified.  [facility] Embedded Care Due Messages. Reference number: [id number].   1/12/2024 1:04:27 PM CST

## 2024-01-12 NOTE — TELEPHONE ENCOUNTER
Refill Routing Note   Medication(s) are not appropriate for processing by [facility] for the following reason(s):        Drug-disease interaction    [facility] action(s):  Defer        Medication Therapy Plan: ANORO ELLIPTA and Coronary artery calcification seen on CAT scan    Pharmacist review requested: Yes     Appointments  past 12m or future 3m with PCP    Date Provider   Last Visit   4/25/2023 Cullen Timmons MD   Next Visit   2/28/2024 Cullen Timmons MD   ED visits in past 90 days: 0        Note composed:1:15 PM 01/12/2024

## 2024-01-13 NOTE — TELEPHONE ENCOUNTER
Refill Authorization Note     Refill Decision Note   Marycarmen Louis  is requesting a refill authorization.  Brief Assessment and Rationale for Refill:  Approve     Medication Therapy Plan:  ANORO ELLIPTA and Coronary artery calcification seen on CAT scan    Medication Reconciliation Completed: No   Comments:     No Care Gaps recommended.     Note composed:7:11 PM 01/12/2024

## 2024-01-17 ENCOUNTER — TELEPHONE (OUTPATIENT)
Dept: ORTHOPEDICS | Facility: CLINIC | Age: 79
End: 2024-01-17
Payer: MEDICARE

## 2024-01-17 NOTE — TELEPHONE ENCOUNTER
Spoke with patient regarding her visit tomorrow, and she notes that her pain is significantly decreased and doesn't feel the need to be seen in clinic tomorrow. She was instructed to continue with flexion/extension exercises and meloxicam as needed for pain. Patient instructed to call and return to clinic with any additional or unresolved pain.

## 2024-02-28 ENCOUNTER — OFFICE VISIT (OUTPATIENT)
Dept: INTERNAL MEDICINE | Facility: CLINIC | Age: 79
End: 2024-02-28
Payer: MEDICARE

## 2024-02-28 VITALS
SYSTOLIC BLOOD PRESSURE: 124 MMHG | HEIGHT: 63 IN | DIASTOLIC BLOOD PRESSURE: 66 MMHG | WEIGHT: 137.69 LBS | BODY MASS INDEX: 24.4 KG/M2

## 2024-02-28 DIAGNOSIS — R41.3 MEMORY LOSS: ICD-10-CM

## 2024-02-28 DIAGNOSIS — I25.10 CORONARY ARTERY CALCIFICATION SEEN ON CAT SCAN: Chronic | ICD-10-CM

## 2024-02-28 DIAGNOSIS — F32.A ANXIETY AND DEPRESSION: Chronic | ICD-10-CM

## 2024-02-28 DIAGNOSIS — F41.9 ANXIETY AND DEPRESSION: Chronic | ICD-10-CM

## 2024-02-28 DIAGNOSIS — K21.9 GERD WITHOUT ESOPHAGITIS: ICD-10-CM

## 2024-02-28 DIAGNOSIS — Z00.00 ROUTINE MEDICAL EXAM: Primary | ICD-10-CM

## 2024-02-28 DIAGNOSIS — M81.0 AGE-RELATED OSTEOPOROSIS WITHOUT CURRENT PATHOLOGICAL FRACTURE: Chronic | ICD-10-CM

## 2024-02-28 PROCEDURE — 99397 PER PM REEVAL EST PAT 65+ YR: CPT | Mod: S$GLB,,, | Performed by: INTERNAL MEDICINE

## 2024-02-28 PROCEDURE — 99214 OFFICE O/P EST MOD 30 MIN: CPT | Mod: 25,S$GLB,, | Performed by: INTERNAL MEDICINE

## 2024-02-28 PROCEDURE — 99999 PR PBB SHADOW E&M-EST. PATIENT-LVL III: CPT | Mod: PBBFAC,,, | Performed by: INTERNAL MEDICINE

## 2024-02-28 RX ORDER — ROSUVASTATIN CALCIUM 10 MG/1
10 TABLET, COATED ORAL DAILY
Qty: 90 TABLET | Refills: 3 | Status: SHIPPED | OUTPATIENT
Start: 2024-02-28

## 2024-02-28 RX ORDER — VENLAFAXINE HYDROCHLORIDE 150 MG/1
150 CAPSULE, EXTENDED RELEASE ORAL DAILY
Qty: 90 CAPSULE | Refills: 3 | Status: SHIPPED | OUTPATIENT
Start: 2024-02-28 | End: 2025-02-27

## 2024-02-28 RX ORDER — PANTOPRAZOLE SODIUM 40 MG/1
40 TABLET, DELAYED RELEASE ORAL DAILY PRN
Qty: 90 TABLET | Refills: 3 | Status: SHIPPED | OUTPATIENT
Start: 2024-02-28

## 2024-02-28 NOTE — PROGRESS NOTES
Assessment & Plan  Problem List Items Addressed This Visit          Psychiatric    Anxiety and depression (Chronic)    Current Assessment & Plan     Increase effexor to 150mg.  Stop THC or CBD gummies.  Follow up with E-visit in 4 weeks.           Relevant Medications    venlafaxine (EFFEXOR-XR) 150 MG Cp24    Other Relevant Orders    [facility] E-Visit    Ambulatory referral/consult to Psychology       Cardiac/Vascular    Coronary artery calcification seen on CAT scan (Chronic)    Current Assessment & Plan     Continue secondary prevention with aspirin and statin         Relevant Medications    rosuvastatin (CRESTOR) 10 MG tablet    Other Relevant Orders    CBC Auto Differential    Comprehensive Metabolic Panel    Lipid Panel       Endocrine    Age-related osteoporosis without current pathological fracture (Chronic)    Current Assessment & Plan     Restart Prolia         Relevant Orders    Prior authorization Order     Other Visit Diagnoses       Routine medical exam    -  Primary  -    Discussed healthy diet, regular exercise, necessary labs, age appropriate cancer screening, and routine vaccinations.       Memory loss    -  Check labs.  I am highly suspicious that this is secondary to her high levels of anxiety and depression.  We will refer her for counseling as well.  I do not feel that she needs to be referred to the neuropsychiatric/dementia Clinic at this time.  I will also defer brain imaging decisions until we follow up in 4 weeks    Relevant Orders    Vitamin B12    VITAMIN B1    RPR    [facility] E-Visit    GERD without esophagitis    -  Continue PPI on an as-needed basis    Relevant Medications    pantoprazole (PROTONIX) 40 MG tablet              Health Maintenance reviewed, COVID vaccine today.    Follow-up: Follow up in about 4 weeks (around 3/27/2024) for anxiety, memory , E-Visit.  ______________________________________________________________________    Chief Complaint  Chief Complaint   Patient presents with     Annual Exam       HPI  Marycarmen Louis is a 78 y.o. female with medical diagnoses as listed in the medical history and problem list that presents for routine physical.  Pt is known to me with their last appointment April 2023.      She is due for labs    In addition to routine physical she would like to discuss her anxiety depression as well as some memory concerns.    She reports that her increased dose of Effexor is not been adequately controlling her anxiety and depression symptoms.  Additionally she reports that she been having some memory concerns.  She is still able to carry out multi step tasks such as balancing the finances for the household and managing her and her 's medications.  She does forget things rather easily stating she will forget conversations that she had, noticed since she turned to same story more than once.  Patient is under an extraordinary amount of stress as her  has Alzheimer's dementia with some agitation behaviors.  She also states that her son is struggling with some substance or abuse    She is recently started taking some gummies.  She states these have THC in them but I am not able to confirm if it is THC your CBD.  This is relatively new but she has not able to clearly state where she feels her memory change since using these      ROS  Review of Systems   Constitutional:  Negative for chills, fever and unexpected weight change.   Eyes:  Negative for discharge.   Respiratory:  Negative for cough, chest tightness, shortness of breath and wheezing.    Cardiovascular:  Negative for chest pain, palpitations and leg swelling.   Gastrointestinal:  Negative for abdominal pain and blood in stool.   Genitourinary:  Negative for decreased urine volume, dysuria and hematuria.   Musculoskeletal:  Negative for arthralgias, joint swelling and myalgias.   Neurological:  Negative for dizziness, light-headedness and headaches.   Psychiatric/Behavioral:  Positive for confusion and  "dysphoric mood. Negative for decreased concentration, sleep disturbance and suicidal ideas. The patient is nervous/anxious.            Physical Exam  Vitals:    02/28/24 1340   BP: 124/66   BP Location: Left arm   Patient Position: Sitting   BP Method: Large (Manual)   Weight: 62.5 kg (137 lb 10.8 oz)   Height: 5' 3" (1.6 m)    Body mass index is 24.39 kg/m².  Weight: 62.5 kg (137 lb 10.8 oz)   Height: 5' 3" (160 cm)   Physical Exam  Constitutional:       General: She is not in acute distress.     Appearance: She is well-developed.   HENT:      Head: Normocephalic and atraumatic.   Eyes:      General: Lids are normal. No scleral icterus.     Conjunctiva/sclera: Conjunctivae normal.      Pupils: Pupils are equal, round, and reactive to light.   Neck:      Thyroid: No thyromegaly.      Vascular: No carotid bruit or JVD.   Cardiovascular:      Rate and Rhythm: Normal rate and regular rhythm.      Pulses: Normal pulses.      Heart sounds: Normal heart sounds. No murmur heard.     No friction rub. No S3 or S4 sounds.   Pulmonary:      Effort: Pulmonary effort is normal.      Breath sounds: Normal breath sounds. No wheezing, rhonchi or rales.   Abdominal:      General: Bowel sounds are normal.      Palpations: Abdomen is soft.      Tenderness: There is no abdominal tenderness.   Musculoskeletal:         General: No tenderness.      Cervical back: Full passive range of motion without pain and neck supple.      Right lower leg: No edema.      Left lower leg: No edema.   Skin:     General: Skin is warm and dry.      Findings: No rash.   Neurological:      Mental Status: She is alert and oriented to person, place, and time.   Psychiatric:         Speech: Speech normal.         Behavior: Behavior normal.         Thought Content: Thought content normal.               "

## 2024-03-12 ENCOUNTER — LAB VISIT (OUTPATIENT)
Dept: LAB | Facility: HOSPITAL | Age: 79
End: 2024-03-12
Attending: INTERNAL MEDICINE
Payer: MEDICARE

## 2024-03-12 DIAGNOSIS — I25.10 CORONARY ARTERY CALCIFICATION SEEN ON CAT SCAN: Chronic | ICD-10-CM

## 2024-03-12 DIAGNOSIS — R41.3 MEMORY LOSS: ICD-10-CM

## 2024-03-12 LAB
ALBUMIN SERPL BCP-MCNC: 3.9 G/DL (ref 3.5–5.2)
ALP SERPL-CCNC: 47 U/L (ref 55–135)
ALT SERPL W/O P-5'-P-CCNC: 26 U/L (ref 10–44)
ANION GAP SERPL CALC-SCNC: 7 MMOL/L (ref 8–16)
AST SERPL-CCNC: 26 U/L (ref 10–40)
BASOPHILS # BLD AUTO: 0.03 K/UL (ref 0–0.2)
BASOPHILS NFR BLD: 0.6 % (ref 0–1.9)
BILIRUB SERPL-MCNC: 0.5 MG/DL (ref 0.1–1)
BUN SERPL-MCNC: 17 MG/DL (ref 8–23)
CALCIUM SERPL-MCNC: 9.2 MG/DL (ref 8.7–10.5)
CHLORIDE SERPL-SCNC: 104 MMOL/L (ref 95–110)
CHOLEST SERPL-MCNC: 122 MG/DL (ref 120–199)
CHOLEST/HDLC SERPL: 2.3 {RATIO} (ref 2–5)
CO2 SERPL-SCNC: 31 MMOL/L (ref 23–29)
CREAT SERPL-MCNC: 0.7 MG/DL (ref 0.5–1.4)
DIFFERENTIAL METHOD BLD: ABNORMAL
EOSINOPHIL # BLD AUTO: 0.2 K/UL (ref 0–0.5)
EOSINOPHIL NFR BLD: 4.2 % (ref 0–8)
ERYTHROCYTE [DISTWIDTH] IN BLOOD BY AUTOMATED COUNT: 12.4 % (ref 11.5–14.5)
EST. GFR  (NO RACE VARIABLE): >60 ML/MIN/1.73 M^2
GLUCOSE SERPL-MCNC: 89 MG/DL (ref 70–110)
HCT VFR BLD AUTO: 41.4 % (ref 37–48.5)
HDLC SERPL-MCNC: 54 MG/DL (ref 40–75)
HDLC SERPL: 44.3 % (ref 20–50)
HGB BLD-MCNC: 14 G/DL (ref 12–16)
IMM GRANULOCYTES # BLD AUTO: 0.02 K/UL (ref 0–0.04)
IMM GRANULOCYTES NFR BLD AUTO: 0.4 % (ref 0–0.5)
LDLC SERPL CALC-MCNC: 60.6 MG/DL (ref 63–159)
LYMPHOCYTES # BLD AUTO: 0.8 K/UL (ref 1–4.8)
LYMPHOCYTES NFR BLD: 14.8 % (ref 18–48)
MCH RBC QN AUTO: 32.1 PG (ref 27–31)
MCHC RBC AUTO-ENTMCNC: 33.8 G/DL (ref 32–36)
MCV RBC AUTO: 95 FL (ref 82–98)
MONOCYTES # BLD AUTO: 0.6 K/UL (ref 0.3–1)
MONOCYTES NFR BLD: 11.8 % (ref 4–15)
NEUTROPHILS # BLD AUTO: 3.6 K/UL (ref 1.8–7.7)
NEUTROPHILS NFR BLD: 68.2 % (ref 38–73)
NONHDLC SERPL-MCNC: 68 MG/DL
NRBC BLD-RTO: 0 /100 WBC
PLATELET # BLD AUTO: 321 K/UL (ref 150–450)
PMV BLD AUTO: 8.5 FL (ref 9.2–12.9)
POTASSIUM SERPL-SCNC: 4.3 MMOL/L (ref 3.5–5.1)
PROT SERPL-MCNC: 6.6 G/DL (ref 6–8.4)
RBC # BLD AUTO: 4.36 M/UL (ref 4–5.4)
SODIUM SERPL-SCNC: 142 MMOL/L (ref 136–145)
TRIGL SERPL-MCNC: 37 MG/DL (ref 30–150)
VIT B12 SERPL-MCNC: 1249 PG/ML (ref 210–950)
WBC # BLD AUTO: 5.26 K/UL (ref 3.9–12.7)

## 2024-03-12 PROCEDURE — 86592 SYPHILIS TEST NON-TREP QUAL: CPT | Performed by: INTERNAL MEDICINE

## 2024-03-12 PROCEDURE — 36415 COLL VENOUS BLD VENIPUNCTURE: CPT | Performed by: INTERNAL MEDICINE

## 2024-03-12 PROCEDURE — 82607 VITAMIN B-12: CPT | Performed by: INTERNAL MEDICINE

## 2024-03-12 PROCEDURE — 84425 ASSAY OF VITAMIN B-1: CPT | Performed by: INTERNAL MEDICINE

## 2024-03-12 PROCEDURE — 85025 COMPLETE CBC W/AUTO DIFF WBC: CPT | Performed by: INTERNAL MEDICINE

## 2024-03-12 PROCEDURE — 80053 COMPREHEN METABOLIC PANEL: CPT | Performed by: INTERNAL MEDICINE

## 2024-03-12 PROCEDURE — 80061 LIPID PANEL: CPT | Performed by: INTERNAL MEDICINE

## 2024-03-13 LAB — RPR SER QL: NORMAL

## 2024-03-18 LAB — VIT B1 BLD-MCNC: 75 UG/L (ref 38–122)

## 2024-04-01 ENCOUNTER — OFFICE VISIT (OUTPATIENT)
Dept: INTERNAL MEDICINE | Facility: CLINIC | Age: 79
End: 2024-04-01
Payer: MEDICARE

## 2024-04-01 DIAGNOSIS — F41.9 ANXIETY AND DEPRESSION: Primary | Chronic | ICD-10-CM

## 2024-04-01 DIAGNOSIS — F32.A ANXIETY AND DEPRESSION: Primary | Chronic | ICD-10-CM

## 2024-04-01 PROCEDURE — 99214 OFFICE O/P EST MOD 30 MIN: CPT | Mod: 95,,, | Performed by: INTERNAL MEDICINE

## 2024-04-01 RX ORDER — BUSPIRONE HYDROCHLORIDE 5 MG/1
5-10 TABLET ORAL 2 TIMES DAILY
Qty: 120 TABLET | Refills: 0 | Status: SHIPPED | OUTPATIENT
Start: 2024-04-01 | End: 2024-05-06

## 2024-04-01 NOTE — PROGRESS NOTES
Assessment & Plan  Problem List Items Addressed This Visit          Psychiatric    Anxiety and depression - Primary (Chronic)    Current Assessment & Plan     Depressive symptoms generally doing well.  Anxiety and stress increasing significantly due to worsening condition of her husbands dementia.  Now leaving the house and needing to call police to help find him.  Continues to have behavioral outbursts/agitation.      Will start buspar for her as she does not want to take things that could affect her decision making capabilities or risk dependence.     Discussed that ultimately, treatment plan may be more around her 's treatment plan.          Relevant Medications    busPIRone (BUSPAR) 5 MG Tab    Other Relevant Orders    [facility] E-Visit         Health Maintenance reviewed, deferred.    The patient location is:  Patient home   The chief complaint leading to consultation is: noted below  Visit type: Virtual visit with synchronous audio and video  Total time spent with patient: 18  Each patient to whom he or she provides medical services by telemedicine is:  (1) informed of the relationship between the physician and patient and the respective role of any other health care provider with respect to management of the patient; and (2) notified that he or she may decline to receive medical services by telemedicine and may withdraw from such care at any time.    Follow-up: Follow up in about 2 weeks (around 4/15/2024) for E-Visit.    ______________________________________________________________________    Chief Complaint  Chief Complaint   Patient presents with    Follow-up       HPI  Marycarmennereida Louis is a 78 y.o. female with multiple medical diagnoses as listed in the medical history and problem list that presents for anxiety and depression follow up via virtual visit.  Their last appointment 2/28/2024.      Labs prior to this OV reassuring.  On higher dose of effexor.  Not much difference.  See above.     's  condition is progressing.  Now wondering off.         ROS  Review of Systems   Constitutional:  Negative for activity change and unexpected weight change.   HENT:  Negative for hearing loss, rhinorrhea and trouble swallowing.    Eyes:  Negative for discharge and visual disturbance.   Respiratory:  Negative for chest tightness and wheezing.    Cardiovascular:  Negative for chest pain and palpitations.   Gastrointestinal:  Negative for blood in stool, constipation, diarrhea and vomiting.   Endocrine: Negative for polydipsia and polyuria.   Genitourinary:  Negative for difficulty urinating, dysuria, hematuria and menstrual problem.   Musculoskeletal:  Negative for arthralgias, joint swelling and neck pain.   Neurological:  Negative for weakness and headaches.   Psychiatric/Behavioral:  Positive for dysphoric mood. Negative for confusion.            Physical Exam  Physical Exam  Constitutional:       General: She is not in acute distress.     Appearance: She is well-developed.   HENT:      Head: Normocephalic and atraumatic.   Eyes:      Conjunctiva/sclera: Conjunctivae normal.   Pulmonary:      Effort: Pulmonary effort is normal. No respiratory distress.   Neurological:      Mental Status: She is alert and oriented to person, place, and time.      Comments: Symmetric facial movements   Psychiatric:         Behavior: Behavior normal.         Thought Content: Thought content normal.

## 2024-04-01 NOTE — ASSESSMENT & PLAN NOTE
Depressive symptoms generally doing well.  Anxiety and stress increasing significantly due to worsening condition of her husbands dementia.  Now leaving the house and needing to call police to help find him.  Continues to have behavioral outbursts/agitation.      Will start buspar for her as she does not want to take things that could affect her decision making capabilities or risk dependence.     Discussed that ultimately, treatment plan may be more around her 's treatment plan.

## 2024-05-05 DIAGNOSIS — F41.9 ANXIETY AND DEPRESSION: Chronic | ICD-10-CM

## 2024-05-05 DIAGNOSIS — F32.A ANXIETY AND DEPRESSION: Chronic | ICD-10-CM

## 2024-05-05 NOTE — TELEPHONE ENCOUNTER
No care due was identified.  [facility] Embedded Care Due Messages. Reference number: [id number].   5/05/2024 12:30:06 PM CDT

## 2024-05-06 RX ORDER — BUSPIRONE HYDROCHLORIDE 5 MG/1
TABLET ORAL
Qty: 120 TABLET | Refills: 0 | Status: SHIPPED | OUTPATIENT
Start: 2024-05-06

## 2024-05-06 NOTE — TELEPHONE ENCOUNTER
Refill Routing Note   Medication(s) are not appropriate for processing by [facility] for the following reason(s):        Outside of protocol    [facility] action(s):  Route               Appointments  past 12m or future 3m with PCP    Date Provider   Last Visit   4/1/2024 Cullen Timmons MD   Next Visit   Visit date not found Cullen Timmons MD   ED visits in past 90 days: 0        Note composed:8:20 AM 05/06/2024

## 2024-05-28 DIAGNOSIS — Z00.00 ENCOUNTER FOR MEDICARE ANNUAL WELLNESS EXAM: ICD-10-CM

## 2024-06-10 ENCOUNTER — PATIENT MESSAGE (OUTPATIENT)
Dept: INTERNAL MEDICINE | Facility: CLINIC | Age: 79
End: 2024-06-10
Payer: MEDICARE

## 2024-07-19 DIAGNOSIS — J43.1 PANLOBULAR EMPHYSEMA: Chronic | ICD-10-CM

## 2024-07-19 NOTE — TELEPHONE ENCOUNTER
Refill Routing Note   Medication(s) are not appropriate for processing by [facility] for the following reason(s):        Required vitals outdated    [facility] action(s):  Defer               Appointments  past 12m or future 3m with PCP    Date Provider   Last Visit   4/1/2024 Cullen Timmons MD   Next Visit   Visit date not found Cullen Timmons MD   ED visits in past 90 days: 0        Note composed:11:06 AM 07/19/2024

## 2024-07-19 NOTE — TELEPHONE ENCOUNTER
No care due was identified.  [facility] Embedded Care Due Messages. Reference number: [id number].   7/19/2024 9:05:38 AM CDT

## 2024-07-22 RX ORDER — UMECLIDINIUM BROMIDE AND VILANTEROL TRIFENATATE 62.5; 25 UG/1; UG/1
POWDER RESPIRATORY (INHALATION)
Qty: 180 EACH | Refills: 1 | Status: SHIPPED | OUTPATIENT
Start: 2024-07-22

## 2024-08-27 ENCOUNTER — OFFICE VISIT (OUTPATIENT)
Dept: FAMILY MEDICINE | Facility: CLINIC | Age: 79
End: 2024-08-27
Payer: MEDICARE

## 2024-08-27 VITALS
DIASTOLIC BLOOD PRESSURE: 60 MMHG | HEIGHT: 63 IN | SYSTOLIC BLOOD PRESSURE: 126 MMHG | BODY MASS INDEX: 24.65 KG/M2 | TEMPERATURE: 98 F | RESPIRATION RATE: 19 BRPM | HEART RATE: 86 BPM | WEIGHT: 139.13 LBS | OXYGEN SATURATION: 96 %

## 2024-08-27 DIAGNOSIS — Z01.00 ROUTINE EYE EXAM: ICD-10-CM

## 2024-08-27 DIAGNOSIS — I70.0 AORTIC ATHEROSCLEROSIS: ICD-10-CM

## 2024-08-27 DIAGNOSIS — Z00.00 ENCOUNTER FOR PREVENTIVE HEALTH EXAMINATION: Primary | ICD-10-CM

## 2024-08-27 DIAGNOSIS — J43.1 PANLOBULAR EMPHYSEMA: ICD-10-CM

## 2024-08-27 DIAGNOSIS — F33.0 MAJOR DEPRESSIVE DISORDER, RECURRENT, MILD: ICD-10-CM

## 2024-08-27 PROCEDURE — 1158F ADVNC CARE PLAN TLK DOCD: CPT | Mod: CPTII,S$GLB,, | Performed by: NURSE PRACTITIONER

## 2024-08-27 PROCEDURE — 3078F DIAST BP <80 MM HG: CPT | Mod: CPTII,S$GLB,, | Performed by: NURSE PRACTITIONER

## 2024-08-27 PROCEDURE — 1126F AMNT PAIN NOTED NONE PRSNT: CPT | Mod: CPTII,S$GLB,, | Performed by: NURSE PRACTITIONER

## 2024-08-27 PROCEDURE — 1170F FXNL STATUS ASSESSED: CPT | Mod: CPTII,S$GLB,, | Performed by: NURSE PRACTITIONER

## 2024-08-27 PROCEDURE — 99999 PR PBB SHADOW E&M-EST. PATIENT-LVL V: CPT | Mod: PBBFAC,,, | Performed by: NURSE PRACTITIONER

## 2024-08-27 PROCEDURE — 3288F FALL RISK ASSESSMENT DOCD: CPT | Mod: CPTII,S$GLB,, | Performed by: NURSE PRACTITIONER

## 2024-08-27 PROCEDURE — 3074F SYST BP LT 130 MM HG: CPT | Mod: CPTII,S$GLB,, | Performed by: NURSE PRACTITIONER

## 2024-08-27 PROCEDURE — G0439 PPPS, SUBSEQ VISIT: HCPCS | Mod: S$GLB,,, | Performed by: NURSE PRACTITIONER

## 2024-08-27 PROCEDURE — 1101F PT FALLS ASSESS-DOCD LE1/YR: CPT | Mod: CPTII,S$GLB,, | Performed by: NURSE PRACTITIONER

## 2024-08-27 PROCEDURE — 1159F MED LIST DOCD IN RCRD: CPT | Mod: CPTII,S$GLB,, | Performed by: NURSE PRACTITIONER

## 2024-08-27 NOTE — PROGRESS NOTES
"  Marycarmen Louis presented for a  Medicare AWV and comprehensive Health Risk Assessment today. The following components were reviewed and updated:    Medical history  Family History  Social history  Allergies and Current Medications  Health Risk Assessment  Health Maintenance  Care Team         ** See Completed Assessments for Annual Wellness Visit within the encounter summary.**         The following assessments were completed:  Living Situation  CAGE  Depression Screening  Timed Get Up and Go  Whisper Test  Cognitive Function Screening  Nutrition Screening  ADL Screening  PAQ Screening      Opioid documentation:      Patient does not have a current opioid prescription.        Vitals:    08/27/24 1420   BP: 126/60   BP Location: Left arm   Patient Position: Sitting   BP Method: Medium (Manual)   Pulse: 86   Resp: 19   Temp: 97.8 °F (36.6 °C)   TempSrc: Oral   SpO2: 96%   Weight: 63.1 kg (139 lb 1.8 oz)   Height: 5' 3" (1.6 m)     Body mass index is 24.64 kg/m².  Physical Exam  Vitals reviewed.   Constitutional:       General: She is not in acute distress.     Appearance: Normal appearance. She is not ill-appearing, toxic-appearing or diaphoretic.   HENT:      Head: Normocephalic and atraumatic.   Pulmonary:      Effort: Pulmonary effort is normal. No respiratory distress.      Breath sounds: No wheezing.   Skin:     General: Skin is warm and dry.   Neurological:      Mental Status: She is alert and oriented to person, place, and time.   Psychiatric:         Mood and Affect: Mood normal.         Behavior: Behavior normal.               Diagnoses and health risks identified today and associated recommendations/orders:    1. Encounter for preventive health examination  The patient was seen today for an annual Medicare wellness exam.  Health maintenance and screening topics were discussed.  Proper diet and exercise recommendations were reviewed.    2. Aortic atherosclerosis  From CT 2022.  On statin.    3. Panlobular " emphysema  Stable symptoms on Anoro.  Offered referral to pulmonology but patient declined.    4. Major depressive disorder, recurrent, mild  Doing well on BuSpar and Effexor.  Continue.    Will refer to Optometry for routine eye exam.    Provided Marycarmen with a 5-10 year written screening schedule and personal prevention plan. Recommendations were developed using the USPSTF age appropriate recommendations. Education, counseling, and referrals were provided as needed. After Visit Summary printed and given to patient which includes a list of additional screenings\tests needed.    Follow up in about 1 year (around 8/27/2025) for AWV.    Jamie Reyes, KM  I offered to discuss advanced care planning, including how to pick a person who would make decisions for you if you were unable to make them for yourself, called a health care power of , and what kind of decisions you might make such as use of life sustaining treatments such as ventilators and tube feeding when faced with a life limiting illness recorded on a living will that they will need to know. (How you want to be cared for as you near the end of your natural life)     X  Patient has advanced directives written and agrees to provide copies to the institution.

## 2024-08-27 NOTE — PATIENT INSTRUCTIONS
Counseling and Referral of Other Preventative  (Italic type indicates deductible and co-insurance are waived)    Patient Name: Marycarmen Louis  Today's Date: 8/27/2024    Health Maintenance       Date Due Completion Date    TETANUS VACCINE Never done ---    Aspirin/Antiplatelet Therapy Never done ---    COVID-19 Vaccine (7 - 2023-24 season) 06/28/2024 2/28/2024    Influenza Vaccine (1) 09/01/2024 11/4/2023    DEXA Scan 12/06/2025 12/6/2023    Lipid Panel 03/12/2029 3/12/2024        No orders of the defined types were placed in this encounter.    The following information is provided to all patients.  This information is to help you find resources for any of the problems found today that may be affecting your health:                  Living healthy guide: www.[facility].[place].[facility]      Understanding Diabetes: www.diabetes.org      Eating healthy: www.cdc.gov/healthyweight      CDC home safety checklist: www.cdc.gov/steadi/patient.html      Agency on Aging: www.goea.[place].[facility]      Alcoholics anonymous (AA): www.aa.org      Physical Activity: www.eulalio.nih.gov/ar0cqeu      Tobacco use: www.quitwithusla.org

## 2024-09-19 DIAGNOSIS — F41.9 ANXIETY AND DEPRESSION: Chronic | ICD-10-CM

## 2024-09-19 DIAGNOSIS — F32.A ANXIETY AND DEPRESSION: Chronic | ICD-10-CM

## 2024-09-19 RX ORDER — BUSPIRONE HYDROCHLORIDE 5 MG/1
TABLET ORAL
Qty: 120 TABLET | Refills: 0 | Status: SHIPPED | OUTPATIENT
Start: 2024-09-19

## 2024-09-19 NOTE — TELEPHONE ENCOUNTER
No care due was identified.  [facility] Embedded Care Due Messages. Reference number: [id number].   9/19/2024 5:20:52 AM CDT

## 2024-09-19 NOTE — TELEPHONE ENCOUNTER
Refill Routing Note   Medication(s) are not appropriate for processing by [facility] for the following reason(s):        Outside of protocol    [facility] action(s):  Route               Appointments  past 12m or future 3m with PCP    Date Provider   Last Visit   4/1/2024 Cullen Timmons MD   Next Visit   10/22/2024 Cullen Timmons MD   ED visits in past 90 days: 0        Note composed:2:50 PM 09/19/2024

## 2024-09-26 ENCOUNTER — OFFICE VISIT (OUTPATIENT)
Dept: OPTOMETRY | Facility: CLINIC | Age: 79
End: 2024-09-26
Payer: COMMERCIAL

## 2024-09-26 DIAGNOSIS — H16.223 KERATOCONJUNCTIVITIS SICCA NOT SPECIFIED AS SJOGREN'S, BILATERAL: ICD-10-CM

## 2024-09-26 DIAGNOSIS — H04.123 DRY EYE SYNDROME OF BOTH EYES: ICD-10-CM

## 2024-09-26 DIAGNOSIS — H52.13 MYOPIA OF BOTH EYES WITH ASTIGMATISM AND PRESBYOPIA: ICD-10-CM

## 2024-09-26 DIAGNOSIS — H52.4 MYOPIA OF BOTH EYES WITH ASTIGMATISM AND PRESBYOPIA: ICD-10-CM

## 2024-09-26 DIAGNOSIS — H52.203 MYOPIA OF BOTH EYES WITH ASTIGMATISM AND PRESBYOPIA: ICD-10-CM

## 2024-09-26 DIAGNOSIS — Z01.00 ROUTINE EYE EXAM: Primary | ICD-10-CM

## 2024-09-26 PROCEDURE — 92014 COMPRE OPH EXAM EST PT 1/>: CPT | Mod: S$GLB,,, | Performed by: OPTOMETRIST

## 2024-09-26 PROCEDURE — 99999 PR PBB SHADOW E&M-EST. PATIENT-LVL III: CPT | Mod: PBBFAC,,, | Performed by: OPTOMETRIST

## 2024-09-26 PROCEDURE — 92015 DETERMINE REFRACTIVE STATE: CPT | Mod: S$GLB,,, | Performed by: OPTOMETRIST

## 2024-09-26 RX ORDER — CYCLOSPORINE 0.5 MG/ML
1 EMULSION OPHTHALMIC 2 TIMES DAILY
Qty: 60 EACH | Refills: 11 | Status: SHIPPED | OUTPATIENT
Start: 2024-09-26

## 2024-09-26 NOTE — PROGRESS NOTES
"HPI    KANIKA: 3/28/2023 Dr. Dey  Last DFE: 3/28/2023  Chief complaint (CC):  79 yo F here for annual eye exam. Pt denies any   other ocular or visual complaints today.   Glasses? Progressive  Contacts? -  H/o eye surgery, injections or laser: Cataract with IOL OU and YAG OU  H/o eye injury: -  Known eye conditions? -  Family h/o eye conditions? Brother: Glaucoma  Eye gtts? Restasis BID OU       (-) Flashes (+)  Floaters (-) Mucous   (-)  Tearing (+) Itching (+) Burning   (-) Headaches (-) Eye Pain/discomfort (+) Irritation   (-)  Redness (-) Double vision (-) Blurry vision    Diabetic? -  A1c? No results found for: "LABA1C", "HGBA1C"   Last edited by Edie Angela, OD on 9/27/2024  8:30 AM.            Assessment /Plan     For exam results, see Encounter Report.      Routine eye exam  - [facility]     Keratoconjunctivitis sicca not specified as Sjogren's, bilateral  Dry eye syndrome of both eyes  - Recommend artificial tears. 1 drop 4x per day Continue Restasis BID OU. Chronicity of disease and treatment discussed.    Myopia of both eyes with astigmatism and presbyopia   - New Spectacle Rx given, discussed different options for glasses.   - RTC 1 year for routine eye exam.               "

## 2024-10-15 ENCOUNTER — TELEPHONE (OUTPATIENT)
Dept: INTERNAL MEDICINE | Facility: CLINIC | Age: 79
End: 2024-10-15
Payer: MEDICARE

## 2024-10-15 NOTE — TELEPHONE ENCOUNTER
----- Message from Pepper sent at 10/15/2024 10:44 AM CDT -----  Contact: Self/ [phone]  .[email or web address]     Patient is calling for Medical Advice regarding:    Patient wants a call back or thru [facility]: call back     Comments: pt said that she is calling in regards to needing to know if she needs to have labs done before her 10/22 appointment. Please advise     Please advise patient replies from provider may take up to 48 hours.

## 2024-10-22 ENCOUNTER — OFFICE VISIT (OUTPATIENT)
Dept: INTERNAL MEDICINE | Facility: CLINIC | Age: 79
End: 2024-10-22
Payer: MEDICARE

## 2024-10-22 VITALS
OXYGEN SATURATION: 96 % | DIASTOLIC BLOOD PRESSURE: 74 MMHG | HEART RATE: 85 BPM | SYSTOLIC BLOOD PRESSURE: 122 MMHG | WEIGHT: 140.19 LBS | BODY MASS INDEX: 24.84 KG/M2 | HEIGHT: 63 IN

## 2024-10-22 DIAGNOSIS — I25.10 CORONARY ARTERY CALCIFICATION SEEN ON CAT SCAN: Primary | Chronic | ICD-10-CM

## 2024-10-22 DIAGNOSIS — A35 TETANUS: ICD-10-CM

## 2024-10-22 DIAGNOSIS — Z23 VACCINE FOR DIPHTHERIA-TETANUS-PERTUSSIS, COMBINED: ICD-10-CM

## 2024-10-22 DIAGNOSIS — F41.9 ANXIETY AND DEPRESSION: Chronic | ICD-10-CM

## 2024-10-22 DIAGNOSIS — F32.A ANXIETY AND DEPRESSION: Chronic | ICD-10-CM

## 2024-10-22 DIAGNOSIS — Z23 VACCINE FOR TETANUS TOXOID: ICD-10-CM

## 2024-10-22 PROCEDURE — 3074F SYST BP LT 130 MM HG: CPT | Mod: CPTII,S$GLB,, | Performed by: INTERNAL MEDICINE

## 2024-10-22 PROCEDURE — 1160F RVW MEDS BY RX/DR IN RCRD: CPT | Mod: CPTII,S$GLB,, | Performed by: INTERNAL MEDICINE

## 2024-10-22 PROCEDURE — 3288F FALL RISK ASSESSMENT DOCD: CPT | Mod: CPTII,S$GLB,, | Performed by: INTERNAL MEDICINE

## 2024-10-22 PROCEDURE — 1101F PT FALLS ASSESS-DOCD LE1/YR: CPT | Mod: CPTII,S$GLB,, | Performed by: INTERNAL MEDICINE

## 2024-10-22 PROCEDURE — G2211 COMPLEX E/M VISIT ADD ON: HCPCS | Mod: S$GLB,,, | Performed by: INTERNAL MEDICINE

## 2024-10-22 PROCEDURE — 3078F DIAST BP <80 MM HG: CPT | Mod: CPTII,S$GLB,, | Performed by: INTERNAL MEDICINE

## 2024-10-22 PROCEDURE — 99999 PR PBB SHADOW E&M-EST. PATIENT-LVL IV: CPT | Mod: PBBFAC,,, | Performed by: INTERNAL MEDICINE

## 2024-10-22 PROCEDURE — 99214 OFFICE O/P EST MOD 30 MIN: CPT | Mod: S$GLB,,, | Performed by: INTERNAL MEDICINE

## 2024-10-22 PROCEDURE — 1159F MED LIST DOCD IN RCRD: CPT | Mod: CPTII,S$GLB,, | Performed by: INTERNAL MEDICINE

## 2024-10-22 PROCEDURE — 1126F AMNT PAIN NOTED NONE PRSNT: CPT | Mod: CPTII,S$GLB,, | Performed by: INTERNAL MEDICINE

## 2024-10-22 RX ORDER — VENLAFAXINE HYDROCHLORIDE 150 MG/1
150 CAPSULE, EXTENDED RELEASE ORAL DAILY
Qty: 90 CAPSULE | Refills: 1 | Status: SHIPPED | OUTPATIENT
Start: 2024-10-22 | End: 2025-10-22

## 2024-10-22 RX ORDER — VENLAFAXINE HYDROCHLORIDE 37.5 MG/1
37.5 CAPSULE, EXTENDED RELEASE ORAL DAILY
Qty: 90 CAPSULE | Refills: 1 | Status: SHIPPED | OUTPATIENT
Start: 2024-10-22

## 2024-10-22 RX ORDER — ROSUVASTATIN CALCIUM 10 MG/1
10 TABLET, COATED ORAL DAILY
Qty: 90 TABLET | Refills: 3 | Status: SHIPPED | OUTPATIENT
Start: 2024-10-22

## 2024-10-22 RX ORDER — ALPRAZOLAM 0.25 MG/1
0.25 TABLET ORAL DAILY PRN
Qty: 15 TABLET | Refills: 0 | Status: SHIPPED | OUTPATIENT
Start: 2024-10-22

## 2024-10-22 NOTE — PROGRESS NOTES
Assessment & Plan  Problem List Items Addressed This Visit          Psychiatric    Anxiety and depression (Chronic)    Current Assessment & Plan     Poor response to BuSpar.  I will increase her Effexor by adding 37.5 mg capsule to her 150 mg capsule.    She was certainly under a very large amount of stress given the care needs of her .  She has a very rare use of benzodiazepine at a very low dose.  She typically takes around half of a 0.25 mg tablet once a week.  She is very well educated about the risks of this medication.  At this time I think it is reasonable to continue with this as we continue to work on larger solutions to help her in the care of her .         Relevant Medications    venlafaxine (EFFEXOR-XR) 150 MG Cp24    ALPRAZolam (XANAX) 0.25 MG tablet    venlafaxine (EFFEXOR-XR) 37.5 MG 24 hr capsule       Cardiac/Vascular    Coronary artery calcification seen on CAT scan - Primary (Chronic)    Current Assessment & Plan     On aspirin and rosuvastatin.  The current medical regimen is effective;  continue present plan and medications.          Relevant Medications    rosuvastatin (CRESTOR) 10 MG tablet    Other Relevant Orders    CBC Without Differential    Comprehensive Metabolic Panel    Lipid Panel     Other Visit Diagnoses       Tetanus        Vaccine for diphtheria-tetanus-pertussis, combined        Vaccine for tetanus toxoid              Visit today included increased complexity associated with the care of the episodic problem(s) listed below being addressed and managing the longitudinal care of the patient due to the serious and/or complex managed problem(s) coronary artery disease, anxiety depression.      Health Maintenance reviewed, tetanus booster today.  Follow up in 6 months with labs.    Follow-up: Follow up in about 6 months (around 4/22/2025) for Routine Physical.  ______________________________________________________________________    Chief Complaint  Chief Complaint  "  Patient presents with    Follow-up       HPI  Marycarmen Louis is a 79 y.o. female with medical diagnoses as listed in the medical history and problem list that presents for CAD, memory loss, anxiety and depression follow up.  Pt is known to me with their last appointment 2024.      At the last office visit we tried adding some BuSpar.  She reports that this actually made her feel whole lot worse.  She stopped taking her BuSpar.  She has been having ongoing high levels of stress.  Her depression is overall well-controlled.  She has not very old () alprazolam that was prescribed to her.  On very high stress days she has taken a quarter of a 0.5 mg tablet.  She states at its most frequent use she takes this once a week.    She needs some refills of her statin medication.  Still on her aspirin.    She has not had any ongoing concerns regarding her own memory loss.    ROS  Review of Systems        Physical Exam  Vitals:    10/22/24 1354   BP: 122/74   BP Location: Left arm   Patient Position: Sitting   Pulse: 85   SpO2: 96%   Weight: 63.6 kg (140 lb 3.4 oz)   Height: 5' 3" (1.6 m)    Body mass index is 24.84 kg/m².  Weight: 63.6 kg (140 lb 3.4 oz)   Height: 5' 3" (160 cm)   Physical Exam  Constitutional:       General: She is not in acute distress.     Appearance: She is well-developed.   HENT:      Head: Normocephalic and atraumatic.   Eyes:      General: Lids are normal. No scleral icterus.     Conjunctiva/sclera: Conjunctivae normal.      Pupils: Pupils are equal, round, and reactive to light.   Neck:      Thyroid: No thyromegaly.      Vascular: No carotid bruit or JVD.   Cardiovascular:      Rate and Rhythm: Normal rate and regular rhythm.      Pulses: Normal pulses.      Heart sounds: Normal heart sounds. No murmur heard.     No friction rub. No S3 or S4 sounds.   Pulmonary:      Effort: Pulmonary effort is normal.      Breath sounds: Normal breath sounds. No wheezing, rhonchi or rales.   Abdominal: "      General: Bowel sounds are normal.      Palpations: Abdomen is soft.      Tenderness: There is no abdominal tenderness.   Musculoskeletal:         General: No tenderness.      Cervical back: Full passive range of motion without pain and neck supple.      Right lower leg: No edema.      Left lower leg: No edema.   Skin:     General: Skin is warm and dry.      Findings: No rash.   Neurological:      Mental Status: She is alert and oriented to person, place, and time.   Psychiatric:         Speech: Speech normal.         Behavior: Behavior normal.         Thought Content: Thought content normal.

## 2024-10-22 NOTE — ASSESSMENT & PLAN NOTE
Poor response to BuSpar.  I will increase her Effexor by adding 37.5 mg capsule to her 150 mg capsule.    She was certainly under a very large amount of stress given the care needs of her .  She has a very rare use of benzodiazepine at a very low dose.  She typically takes around half of a 0.25 mg tablet once a week.  She is very well educated about the risks of this medication.  At this time I think it is reasonable to continue with this as we continue to work on larger solutions to help her in the care of her .

## 2024-10-22 NOTE — ASSESSMENT & PLAN NOTE
On aspirin and rosuvastatin.  The current medical regimen is effective;  continue present plan and medications.

## 2024-11-07 ENCOUNTER — PATIENT MESSAGE (OUTPATIENT)
Dept: INTERNAL MEDICINE | Facility: CLINIC | Age: 79
End: 2024-11-07
Payer: MEDICARE

## 2024-11-12 DIAGNOSIS — F41.9 ANXIETY AND DEPRESSION: Chronic | ICD-10-CM

## 2024-11-12 DIAGNOSIS — F32.A ANXIETY AND DEPRESSION: Chronic | ICD-10-CM

## 2024-11-12 RX ORDER — BUSPIRONE HYDROCHLORIDE 5 MG/1
TABLET ORAL
Qty: 120 TABLET | Refills: 0 | OUTPATIENT
Start: 2024-11-12

## 2024-11-12 NOTE — TELEPHONE ENCOUNTER
Refill Routing Note   Medication(s) are not appropriate for processing by [facility] for the following reason(s):     DDI not previously overridden by current provider--after initial override, the [facility]ill Center will be able to continue overrides           [facility] action(s):  Quick Discontinue  Route      Medication Therapy Plan: discontinued on 10/22/2024 by Cullen Timmons MD for the following reason: Side effects    Pharmacist review requested: Yes     Appointments  past 12m or future 3m with PCP    Date Provider   Last Visit   10/22/2024 Cullen Timmons MD   Next Visit   4/29/2025 Cullen Timmons MD   ED visits in past 90 days: 0        Note composed:10:20 AM 11/12/2024

## 2024-11-12 NOTE — TELEPHONE ENCOUNTER
Refill Decision Note   Marycarmen Louis  is requesting a refill authorization.  Brief Assessment and Rationale for Refill:  Quick Discontinue     Medication Therapy Plan:  discontinued on 10/22/2024 by Cullen Timmons MD for the following reason: Side effects      Pharmacist review requested: Yes   Extended chart review required: Yes   Comments:     Note composed:11:25 AM 11/12/2024             Appointments     Last Visit   10/22/2024 Cullen Timmons MD   Next Visit   4/29/2025 Cullen Timmons MD

## 2024-11-12 NOTE — TELEPHONE ENCOUNTER
No care due was identified.  [facility] Embedded Care Due Messages. Reference number: [id number].   11/12/2024 5:21:11 AM CST

## 2025-01-25 NOTE — TELEPHONE ENCOUNTER
----- Message from Tono Cunningham sent at 1/11/2022  8:39 AM CST -----  Regarding: call back from the nurse of Dr. Timmons/ patient was in [facility] for stomach bruising  Type: Patient Call Back    Who called:Marycarmen     What is the request in detail: the patient is requesting a call back from the nurse of Dr. Timmons. The patient was in the [place] ER on last night. Her belly , below her navel is completely bruised from coughing so much. She stated that she also did a CT scan/ ultrasound on her abdomen when she went there. She was not given any medication and told to follow up with Dr. Timmons in 2 days. Please call at your earliest convenience.    Can the clinic reply by [facility]?no     Would the patient rather a call back or a response via [facility]? Call back     Best call back number:[phone]         no diaphoresis/no edema/no fever/no hemoptysis

## 2025-02-12 DIAGNOSIS — J43.1 PANLOBULAR EMPHYSEMA: Chronic | ICD-10-CM

## 2025-02-12 RX ORDER — UMECLIDINIUM BROMIDE AND VILANTEROL TRIFENATATE 62.5; 25 UG/1; UG/1
POWDER RESPIRATORY (INHALATION)
Qty: 180 EACH | Refills: 2 | Status: SHIPPED | OUTPATIENT
Start: 2025-02-12

## 2025-02-12 NOTE — TELEPHONE ENCOUNTER
Refill Decision Note   Marycarmen Louis  is requesting a refill authorization.  Brief Assessment and Rationale for Refill:  Approve     Medication Therapy Plan:  FLOS 4/22/25      Comments:     Note composed:1:07 PM 02/12/2025

## 2025-02-12 NOTE — TELEPHONE ENCOUNTER
Care Due:                  Date            Visit Type   Department     Provider  --------------------------------------------------------------------------------                                EP -                              PRIMARY      [facility]  Last Visit: 10-      CARE ([facility])   AMBROSE Timmons                              EP -                              PRIMARY      [facility]  Next Visit: 04-      CARE ([facility])   MABROSE Timmons                                                            Last  Test          Frequency    Reason                     Performed    Due Date  --------------------------------------------------------------------------------    CMP.........  12 months..  rosuvastatin, venlafaxine  03- 03-    Lipid Panel.  12 months..  rosuvastatin.............  03- 03-    [employer] Embedded Care Due Messages. Reference number: [id number].   2/12/2025 10:55:47 AM CST

## 2025-03-05 DIAGNOSIS — F41.9 ANXIETY AND DEPRESSION: ICD-10-CM

## 2025-03-05 DIAGNOSIS — F32.A ANXIETY AND DEPRESSION: ICD-10-CM

## 2025-03-05 RX ORDER — VENLAFAXINE HYDROCHLORIDE 150 MG/1
150 CAPSULE, EXTENDED RELEASE ORAL DAILY
Qty: 90 CAPSULE | Refills: 1 | Status: SHIPPED | OUTPATIENT
Start: 2025-03-05 | End: 2026-03-05

## 2025-03-05 NOTE — TELEPHONE ENCOUNTER
----- Message from Elvia sent at 3/5/2025 12:57 PM CST -----  Contact: [phone] Patient  Requesting an RX refill or new RX.Is this a refill or new RX: newRX name and strength (copy/paste from chart):  venlafaxine (EFFEXOR-XR) 150 MG Cp24Is this a 30 day or 90 day RX: Pharmacy name and phone # (copy/paste from chart):  [id number] - [place] - [address] [facility] [place] [address]Pgveq: [phone] Fax: [phone] The doctors have asked that we provide their patients with the following 2 reminders -- prescription refills can take up to 72 hours, and a friendly reminder that in the future you can use your [facility] account to request refills: yes

## 2025-03-05 NOTE — TELEPHONE ENCOUNTER
No care due was identified.  [facility] Embedded Care Due Messages. Reference number: [id number].   3/05/2025 1:09:50 PM CST

## 2025-04-19 DIAGNOSIS — F41.9 ANXIETY AND DEPRESSION: ICD-10-CM

## 2025-04-19 DIAGNOSIS — F32.A ANXIETY AND DEPRESSION: ICD-10-CM

## 2025-04-19 NOTE — TELEPHONE ENCOUNTER
Care Due:                  Date            Visit Type   Department     Provider  --------------------------------------------------------------------------------                                EP -                              PRIMARY      [facility]  Last Visit: 10-      CARE ([facility])   AMBROSE Timmons                              EP -                              PRIMARY      [facility]  Next Visit: 04-      CARE ([facility])   AMBROSE Timmons                                                            Last  Test          Frequency    Reason                     Performed    Due Date  --------------------------------------------------------------------------------    CMP.........  12 months..  rosuvastatin, venlafaxine  03- 03-    Lipid Panel.  12 months..  rosuvastatin.............  03- 03-    [employer] Embedded Care Due Messages. Reference number: [id number].   4/19/2025 2:40:55 PM CDT

## 2025-04-21 RX ORDER — VENLAFAXINE HYDROCHLORIDE 37.5 MG/1
CAPSULE, EXTENDED RELEASE ORAL
Qty: 90 CAPSULE | Refills: 3 | Status: SHIPPED | OUTPATIENT
Start: 2025-04-21

## 2025-04-21 NOTE — TELEPHONE ENCOUNTER
Refill Routing Note   Medication(s) are not appropriate for processing by [facility] for the following reason(s):        Required labs outdated    [facility] action(s):  Defer      Medication Therapy Plan: FOVS, FLOS      Appointments  past 12m or future 3m with PCP    Date Provider   Last Visit   10/22/2024 Cullen Timmons MD   Next Visit   4/29/2025 Cullen Timmons MD   ED visits in past 90 days: 0        Note composed:7:38 AM 04/21/2025

## 2025-04-23 DIAGNOSIS — I25.10 CORONARY ARTERY CALCIFICATION SEEN ON CAT SCAN: ICD-10-CM

## 2025-04-23 RX ORDER — ROSUVASTATIN CALCIUM 10 MG/1
10 TABLET, COATED ORAL
Qty: 90 TABLET | Refills: 3 | Status: SHIPPED | OUTPATIENT
Start: 2025-04-23

## 2025-04-23 NOTE — TELEPHONE ENCOUNTER
No care due was identified.  [facility] Embedded Care Due Messages. Reference number: [id number].   4/23/2025 8:20:59 AM CDT

## 2025-04-23 NOTE — TELEPHONE ENCOUNTER
Refill Routing Note   Medication(s) are not appropriate for processing by [facility] for the following reason(s):        Required labs outdated    [facility] action(s):  Defer               Appointments  past 12m or future 3m with PCP    Date Provider   Last Visit   10/22/2024 Cullen Timmons MD   Next Visit   4/29/2025 Cullen Timmons MD   ED visits in past 90 days: 0        Note composed:10:55 AM 04/23/2025

## 2025-04-29 DIAGNOSIS — K21.9 GERD WITHOUT ESOPHAGITIS: ICD-10-CM

## 2025-04-29 RX ORDER — PANTOPRAZOLE SODIUM 40 MG/1
TABLET, DELAYED RELEASE ORAL
Qty: 90 TABLET | Refills: 3 | Status: SHIPPED | OUTPATIENT
Start: 2025-04-29

## 2025-04-29 NOTE — TELEPHONE ENCOUNTER
No care due was identified.  [facility] Embedded Care Due Messages. Reference number: [id number].   4/29/2025 9:29:40 AM CDT

## 2025-04-29 NOTE — TELEPHONE ENCOUNTER
Refill Routing Note   Medication(s) are not appropriate for processing by [facility] for the following reason(s):        Outside of protocol: PRN USE    [facility] action(s):  Route      Medication Therapy Plan: PRN USE FOR PANTOPRAZOLE IS OUTSIDE OF PROTOCOL      Appointments  past 12m or future 3m with PCP    Date Provider   Last Visit   10/22/2024 Cullen Timmons MD   Next Visit   4/29/2025 Cullen Timmons MD   ED visits in past 90 days: 0        Note composed:10:49 AM 04/29/2025

## 2025-08-13 ENCOUNTER — HOSPITAL ENCOUNTER (OUTPATIENT)
Dept: RADIOLOGY | Facility: HOSPITAL | Age: 80
Discharge: HOME | End: 2025-08-13
Attending: INTERNAL MEDICINE
Payer: MEDICARE

## 2025-08-13 ENCOUNTER — OFFICE VISIT (OUTPATIENT)
Dept: INTERNAL MEDICINE | Facility: CLINIC | Age: 80
End: 2025-08-13
Payer: MEDICARE

## 2025-08-13 VITALS
BODY MASS INDEX: 25.01 KG/M2 | DIASTOLIC BLOOD PRESSURE: 64 MMHG | SYSTOLIC BLOOD PRESSURE: 132 MMHG | OXYGEN SATURATION: 97 % | WEIGHT: 141.13 LBS | HEART RATE: 83 BPM | HEIGHT: 63 IN

## 2025-08-13 DIAGNOSIS — G89.29 CHRONIC PAIN OF RIGHT ELBOW: ICD-10-CM

## 2025-08-13 DIAGNOSIS — M54.2 CHRONIC NECK PAIN: ICD-10-CM

## 2025-08-13 DIAGNOSIS — G89.29 CHRONIC NECK PAIN: Primary | ICD-10-CM

## 2025-08-13 DIAGNOSIS — G89.29 CHRONIC NECK PAIN: ICD-10-CM

## 2025-08-13 DIAGNOSIS — M54.2 CHRONIC NECK PAIN: Primary | ICD-10-CM

## 2025-08-13 DIAGNOSIS — M25.521 CHRONIC PAIN OF RIGHT ELBOW: ICD-10-CM

## 2025-08-13 DIAGNOSIS — I25.10 CORONARY ARTERY CALCIFICATION SEEN ON CAT SCAN: Chronic | ICD-10-CM

## 2025-08-13 DIAGNOSIS — J43.1 PANLOBULAR EMPHYSEMA: ICD-10-CM

## 2025-08-13 DIAGNOSIS — R60.0 PERIPHERAL EDEMA: ICD-10-CM

## 2025-08-13 PROCEDURE — 72040 X-RAY EXAM NECK SPINE 2-3 VW: CPT | Mod: TC

## 2025-08-13 PROCEDURE — 1125F AMNT PAIN NOTED PAIN PRSNT: CPT | Mod: CPTII,S$GLB,, | Performed by: INTERNAL MEDICINE

## 2025-08-13 PROCEDURE — 73080 X-RAY EXAM OF ELBOW: CPT | Mod: 26,RT,, | Performed by: RADIOLOGY

## 2025-08-13 PROCEDURE — 73080 X-RAY EXAM OF ELBOW: CPT | Mod: TC,RT

## 2025-08-13 PROCEDURE — 1159F MED LIST DOCD IN RCRD: CPT | Mod: CPTII,S$GLB,, | Performed by: INTERNAL MEDICINE

## 2025-08-13 PROCEDURE — 3078F DIAST BP <80 MM HG: CPT | Mod: CPTII,S$GLB,, | Performed by: INTERNAL MEDICINE

## 2025-08-13 PROCEDURE — 99214 OFFICE O/P EST MOD 30 MIN: CPT | Mod: S$GLB,,, | Performed by: INTERNAL MEDICINE

## 2025-08-13 PROCEDURE — 72040 X-RAY EXAM NECK SPINE 2-3 VW: CPT | Mod: 26,,, | Performed by: RADIOLOGY

## 2025-08-13 PROCEDURE — 1101F PT FALLS ASSESS-DOCD LE1/YR: CPT | Mod: CPTII,S$GLB,, | Performed by: INTERNAL MEDICINE

## 2025-08-13 PROCEDURE — 3075F SYST BP GE 130 - 139MM HG: CPT | Mod: CPTII,S$GLB,, | Performed by: INTERNAL MEDICINE

## 2025-08-13 PROCEDURE — 3288F FALL RISK ASSESSMENT DOCD: CPT | Mod: CPTII,S$GLB,, | Performed by: INTERNAL MEDICINE

## 2025-08-13 PROCEDURE — G2211 COMPLEX E/M VISIT ADD ON: HCPCS | Mod: S$GLB,,, | Performed by: INTERNAL MEDICINE

## 2025-08-13 PROCEDURE — 1160F RVW MEDS BY RX/DR IN RCRD: CPT | Mod: CPTII,S$GLB,, | Performed by: INTERNAL MEDICINE

## 2025-08-13 PROCEDURE — 99999 PR PBB SHADOW E&M-EST. PATIENT-LVL IV: CPT | Mod: PBBFAC,,, | Performed by: INTERNAL MEDICINE

## 2025-08-13 RX ORDER — MELOXICAM 15 MG/1
15 TABLET ORAL DAILY PRN
Qty: 90 TABLET | Refills: 0 | Status: SHIPPED | OUTPATIENT
Start: 2025-08-13

## 2025-08-13 RX ORDER — ASPIRIN 81 MG/1
81 TABLET ORAL DAILY
COMMUNITY
Start: 2025-08-13

## 2025-08-25 DIAGNOSIS — Z00.00 ENCOUNTER FOR MEDICARE ANNUAL WELLNESS EXAM: ICD-10-CM

## 2025-08-30 DIAGNOSIS — F32.A ANXIETY AND DEPRESSION: ICD-10-CM

## 2025-08-30 DIAGNOSIS — F41.9 ANXIETY AND DEPRESSION: ICD-10-CM

## 2025-08-31 RX ORDER — VENLAFAXINE HYDROCHLORIDE 150 MG/1
150 CAPSULE, EXTENDED RELEASE ORAL DAILY
Qty: 90 CAPSULE | Refills: 3 | Status: SHIPPED | OUTPATIENT
Start: 2025-08-31

## (undated) DEVICE — PAD ABD 8X10 STERILE

## (undated) DEVICE — GLOVE BIOGEL SKINSENSE PI 7.5

## (undated) DEVICE — TUBING SUCTION STERILE

## (undated) DEVICE — DURAPREP SURG SCRUB 26ML

## (undated) DEVICE — SUT CTD VICRYL 1 UND BR

## (undated) DEVICE — CASSETTE INFINITI

## (undated) DEVICE — SEE MEDLINE ITEM 157166

## (undated) DEVICE — SOL IRR NACL .9% 3000ML

## (undated) DEVICE — SEE MEDLINE ITEM 152530

## (undated) DEVICE — SOL WATER STRL IRR 1000ML

## (undated) DEVICE — PILLOW ABDUCTION FOAM MED

## (undated) DEVICE — GLOVE BIOGEL SKINSENSE PI 6.5

## (undated) DEVICE — Device

## (undated) DEVICE — TUBE SUCTION KAMVAC MINI 20/BX

## (undated) DEVICE — ELECTRODE REM PLYHSV RETURN 9

## (undated) DEVICE — SEE MEDLINE ITEM 157216

## (undated) DEVICE — DRAPE HIP TIBURON 87X115X134

## (undated) DEVICE — CHLORAPREP W TINT 26ML APPL

## (undated) DEVICE — STAPLER SKIN ROTATING HEAD

## (undated) DEVICE — GLASSES EYE PROTECTIVE

## (undated) DEVICE — TOWELS STERILE 18 X 25.5

## (undated) DEVICE — GLOVE BIOGEL ECLIPSE SZ 7.5

## (undated) DEVICE — SUT 0 36IN COATED VICRYL UN

## (undated) DEVICE — DRESSING GAUZE CISION 1X8IN

## (undated) DEVICE — SEE MEDLINE ITEM 157117

## (undated) DEVICE — PACK GENERAL SURGERY

## (undated) DEVICE — PAD CAST SPECIALIST STRL 3

## (undated) DEVICE — SUT 1 36IN COATED VICRYL UN

## (undated) DEVICE — PACK SET UP CONVERTORS

## (undated) DEVICE — SOL BETADINE 5%

## (undated) DEVICE — GLOVE BIOGEL ORTHOPEDIC 7.5

## (undated) DEVICE — STAPLER SKIN PROXIMATE WIDE

## (undated) DEVICE — GLOVE BIOGEL ECLIPSE SZ 8